# Patient Record
Sex: MALE | Race: WHITE | HISPANIC OR LATINO | Employment: UNEMPLOYED | URBAN - METROPOLITAN AREA
[De-identification: names, ages, dates, MRNs, and addresses within clinical notes are randomized per-mention and may not be internally consistent; named-entity substitution may affect disease eponyms.]

---

## 2017-01-15 ENCOUNTER — HOSPITAL ENCOUNTER (EMERGENCY)
Facility: MEDICAL CENTER | Age: 52
End: 2017-01-15
Attending: EMERGENCY MEDICINE

## 2017-01-15 VITALS
BODY MASS INDEX: 33.71 KG/M2 | SYSTOLIC BLOOD PRESSURE: 120 MMHG | DIASTOLIC BLOOD PRESSURE: 56 MMHG | WEIGHT: 248.9 LBS | HEART RATE: 95 BPM | HEIGHT: 72 IN | TEMPERATURE: 97 F | RESPIRATION RATE: 16 BRPM | OXYGEN SATURATION: 93 %

## 2017-01-15 DIAGNOSIS — J40 BRONCHITIS: ICD-10-CM

## 2017-01-15 PROCEDURE — 99284 EMERGENCY DEPT VISIT MOD MDM: CPT

## 2017-01-15 RX ORDER — PREDNISONE 20 MG/1
20 TABLET ORAL 2 TIMES DAILY
Qty: 10 TAB | Refills: 0 | Status: SHIPPED | OUTPATIENT
Start: 2017-01-15 | End: 2017-01-20

## 2017-01-15 RX ORDER — BENZONATATE 100 MG/1
100 CAPSULE ORAL 3 TIMES DAILY PRN
Qty: 20 CAP | Refills: 0 | Status: SHIPPED | OUTPATIENT
Start: 2017-01-15 | End: 2019-05-11

## 2017-01-15 RX ORDER — AZITHROMYCIN 250 MG/1
TABLET, FILM COATED ORAL
Qty: 6 TAB | Refills: 0 | Status: SHIPPED | OUTPATIENT
Start: 2017-01-15 | End: 2019-05-11

## 2017-01-15 ASSESSMENT — PAIN SCALES - GENERAL: PAINLEVEL_OUTOF10: 0

## 2017-01-15 NOTE — DISCHARGE INSTRUCTIONS
Rest and drink lots of fluids to maintain hydration. If you're not clearly getting better on a few days of antibiotics or you feel you or getting any worse return here for recheck.

## 2017-01-15 NOTE — ED AVS SNAPSHOT
After Visit Summary                                                                                                                Robert Dorantes   MRN: [id number]    Department:  [facility], Emergency Dept   Date of Visit:  1/15/2017            [facility], Emergency Dept    [address]    [place] [address]    Phone:  [phone]      You were seen by     Darwin Schmitz M.D.      Your Diagnosis Was     Bronchitis     J40       Medication Information     Review all of your home medications and newly ordered medications with your primary doctor and/or pharmacist as soon as possible. Follow medication instructions as directed by your doctor and/or pharmacist.     Please keep your complete medication list with you and share with your physician. Update the information when medications are discontinued, doses are changed, or new medications (including over-the-counter products) are added; and carry medication information at all times in the event of emergency situations.               Medication List      START taking these medications        Instructions    azithromycin 250 MG Tabs   Commonly known as:  ZITHROMAX Z-ZAC    2 tablets by mouth on day one then one tablet by mouth daily for the next 4 days.       benzonatate 100 MG Caps   Commonly known as:  TESSALON    Take 1 Cap by mouth 3 times a day as needed for Cough.   Dose:  100 mg       predniSONE 20 MG Tabs   Commonly known as:  DELTASONE    Take 1 Tab by mouth 2 times a day for 5 days.   Dose:  20 mg         ASK your doctor about these medications        Instructions    hydrocodone-acetaminophen 5-325 MG Tabs per tablet   Commonly known as:  NORCO    Take 1-2 Tabs by mouth every 6 hours as needed.   Dose:  1-2 Tab                 Discharge Instructions       Rest and drink lots of fluids to maintain hydration. If you're not clearly getting better on a few days of antibiotics or you feel you or getting any worse return  here for recheck.          Patient Information     Patient Information    Following emergency treatment: all patient requiring follow-up care must return either to a private physician or a clinic if your condition worsens before you are able to obtain further medical attention, please return to the emergency room.     Billing Information    At [facility], we work to make the billing process streamlined for our patients.  Our Representatives are here to answer any questions you may have regarding your hospital bill.  If you have insurance coverage and have supplied your insurance information to us, we will submit a claim to your insurer on your behalf.  Should you have any questions regarding your bill, we can be reached online or by phone as follows:  Online: You are able pay your bills online or live chat with our representatives about any billing questions you may have. We are here to help Monday - Friday from 8:00am to 7:30pm and 9:00am - 12:00pm on Saturdays.  Please visit [email or web address]  for more information.   Phone:  [phone] or [phone]    Please note that your emergency physician, surgeon, pathologist, radiologist, anesthesiologist, and other specialists are not employed by [facility] and will therefore bill separately for their services.  Please contact them directly for any questions concerning their bills at the numbers below:     Emergency Physician Services:  [phone]  [facility]:  [phone]  [facility]:  [phone]  [facility]:  [phone]    1. Your final bill may vary from the amount quoted upon discharge if all procedures are not complete at that time, or if your doctor has additional procedures of which we are not aware. You will receive an additional bill if you return to the Emergency Department at [facility] for suture removal regardless of the facility of which the sutures were  placed.     2. Please arrange for settlement of this account at the emergency registration.    3. All self-pay accounts are due in full at the time of treatment.  If you are unable to meet this obligation then payment is expected within 4-5 days.     4. If you have had radiology studies (CT, X-ray, Ultrasound, MRI), you have received a preliminary result during your emergency department visit. Please contact the radiology department [phone] to receive a copy of your final result. Please discuss the Final result with your primary physician or with the follow up physician provided.     Crisis Hotline:  [place] Crisis Hotline:  9-938-AMKMBBX or 1-830.252.1345  [place] Crisis Hotline:    1-981.804.7202 or 717-380-4597         ED Discharge Follow Up Questions    1. In order to provide you with very good care, we would like to follow up with a phone call in the next few days.  May we have your permission to contact you?     YES /  NO    2. What is the best phone number to call you? (       )_____-__________    3. What is the best time to call you?      Morning  /  Afternoon  /  Evening                   Patient Signature:  ____________________________________________________________    Date:  ____________________________________________________________

## 2017-01-15 NOTE — ED PROVIDER NOTES
"ED Provider Note    CHIEF COMPLAINT  Chief Complaint   Patient presents with   • Congestion     x 1 week    • Cough     x 1 week, denies fever chills        HPI  Robert Dorantes is a 51 y.o. male who presents to the emergency department complaining of a harsh cough as well as generalized body aches and discomfort on the right side of the neck. This is been progressively worsening over the last 1 week. The patient doesn't recall any specific precipitating events.    REVIEW OF SYSTEMS no chest pain no hemoptysis no leg edema no leg pain. All other systems negative    PAST MEDICAL HISTORY  No past medical history on file.    FAMILY HISTORY  No family history on file.    SOCIAL HISTORY  Social History     Social History   • Marital Status:      Spouse Name: N/A   • Number of Children: N/A   • Years of Education: N/A     Social History Main Topics   • Smoking status: Former Smoker     Quit date: 02/24/2013   • Smokeless tobacco: Not on file   • Alcohol Use: No   • Drug Use: No   • Sexual Activity: Not on file     Other Topics Concern   • Not on file     Social History Narrative   • No narrative on file       SURGICAL HISTORY  No past surgical history on file.    CURRENT MEDICATIONS  Home Medications     **Home medications have not yet been reviewed for this encounter**          ALLERGIES  Allergies   Allergen Reactions   • Pcn [Penicillins]    • Sulfa Drugs        PHYSICAL EXAM  VITAL SIGNS: /56 mmHg  Pulse 95  Temp(Src) 36.1 °C (97 °F) (Temporal)  Resp 16  Ht 1.829 m (6' 0.01\")  Wt 112.9 kg (248 lb 14.4 oz)  BMI 33.75 kg/m2  SpO2 93%   Oxygen saturation is interpreted as adequate  Constitutional: Awake and verbal and nontoxic appearing  HENT: Mucous membranes are moist and throat clear  Eyes: No erythema or discharge or jaundice  Neck: Midline no JVD  Cardiovascular: Regular rate and rhythm  Lungs: Initially there was one slight expiratory wheeze on the left side but this cleared after coughing " otherwise lungs are clear  Skin: Warm and dry  Musculoskeletal: No leg edema no calf tenderness  Neurologic: Awake and verbal and ambulatory    MEDICAL DECISION MAKING and DISPOSITION  In general the patient looks well as echo will be safe to provide treatment on an outpatient basis and I have written prescriptions for azithromycin and prednisone and Tessalon Perles. The patient is to rest at home and drink lots of fluids to maintain hydration. If he is not getting better after a few days of antibiotics or has any new or worsening symptoms he is to return here at once for recheck    IMPRESSION  1. Acute bronchitis      Electronically signed by: Darwin Schmitz, 1/15/2017 3:50 PM

## 2017-01-15 NOTE — ED NOTES
Chief Complaint   Patient presents with   • Congestion     x 1 week    • Cough     x 1 week, denies fever chills    Pt to triage in NAD.  Pt educated on triage process and instructed to notify triage RN of any change in status.

## 2017-01-15 NOTE — ED AVS SNAPSHOT
1/15/2017          Robert Dorantes  [address] [place] [address]  [place] [address]    Dear Robert:    [facility] wants to ensure your discharge home is safe and you or your loved ones have had all your questions answered regarding your care after you leave the hospital.    You may receive a telephone call within two days of your discharge.  This call is to make certain you understand your discharge instructions as well as ensure we provided you with the best care possible during your stay with us.     The call will only last approximately 3-5 minutes and will be done by a nurse.    Once again, we want to ensure your discharge home is safe and that you have a clear understanding of any next steps in your care.  If you have any questions or concerns, please do not hesitate to contact us, we are here for you.  Thank you for choosing [facility] for your healthcare needs.    Sincerely,    Michael Bustamante    [facility]

## 2017-01-15 NOTE — ED NOTES
Took otc medications only today.  Feels sob with exertion.  Noted croupy cough.  Patient does not smoke, no fevers.  Neck aches from the cough.

## 2019-05-11 PROCEDURE — 99284 EMERGENCY DEPT VISIT MOD MDM: CPT

## 2019-05-11 RX ORDER — ACETAMINOPHEN 325 MG/1
650 TABLET ORAL EVERY 4 HOURS PRN
COMMUNITY
End: 2019-09-30

## 2019-05-12 ENCOUNTER — HOSPITAL ENCOUNTER (EMERGENCY)
Facility: MEDICAL CENTER | Age: 54
End: 2019-05-12
Attending: EMERGENCY MEDICINE
Payer: MEDICAID

## 2019-05-12 ENCOUNTER — APPOINTMENT (OUTPATIENT)
Dept: RADIOLOGY | Facility: MEDICAL CENTER | Age: 54
End: 2019-05-12
Attending: EMERGENCY MEDICINE
Payer: MEDICAID

## 2019-05-12 VITALS
OXYGEN SATURATION: 94 % | RESPIRATION RATE: 19 BRPM | SYSTOLIC BLOOD PRESSURE: 120 MMHG | BODY MASS INDEX: 34.02 KG/M2 | HEIGHT: 70 IN | DIASTOLIC BLOOD PRESSURE: 69 MMHG | WEIGHT: 237.66 LBS | TEMPERATURE: 97.3 F | HEART RATE: 72 BPM

## 2019-05-12 DIAGNOSIS — H66.002 ACUTE SUPPURATIVE OTITIS MEDIA OF LEFT EAR WITHOUT SPONTANEOUS RUPTURE OF TYMPANIC MEMBRANE, RECURRENCE NOT SPECIFIED: ICD-10-CM

## 2019-05-12 PROCEDURE — 700102 HCHG RX REV CODE 250 W/ 637 OVERRIDE(OP): Performed by: EMERGENCY MEDICINE

## 2019-05-12 PROCEDURE — A9270 NON-COVERED ITEM OR SERVICE: HCPCS | Performed by: EMERGENCY MEDICINE

## 2019-05-12 PROCEDURE — 71046 X-RAY EXAM CHEST 2 VIEWS: CPT

## 2019-05-12 RX ORDER — ACETAMINOPHEN 500 MG
1000 TABLET ORAL ONCE
Status: COMPLETED | OUTPATIENT
Start: 2019-05-12 | End: 2019-05-12

## 2019-05-12 RX ORDER — CEFDINIR 300 MG/1
300 CAPSULE ORAL 2 TIMES DAILY
Qty: 14 CAP | Refills: 0 | Status: SHIPPED | OUTPATIENT
Start: 2019-05-12 | End: 2019-05-19

## 2019-05-12 RX ORDER — IBUPROFEN 600 MG/1
600 TABLET ORAL ONCE
Status: COMPLETED | OUTPATIENT
Start: 2019-05-12 | End: 2019-05-12

## 2019-05-12 RX ORDER — CEFDINIR 300 MG/1
300 CAPSULE ORAL ONCE
Status: COMPLETED | OUTPATIENT
Start: 2019-05-12 | End: 2019-05-12

## 2019-05-12 RX ADMIN — ACETAMINOPHEN 1000 MG: 500 TABLET ORAL at 01:42

## 2019-05-12 RX ADMIN — CEFDINIR 300 MG: 300 CAPSULE ORAL at 02:57

## 2019-05-12 RX ADMIN — IBUPROFEN 600 MG: 600 TABLET ORAL at 01:42

## 2019-05-12 ASSESSMENT — LIFESTYLE VARIABLES: DO YOU DRINK ALCOHOL: NO

## 2019-05-12 NOTE — ED NOTES
Robert Dorantes discharged via ambulatory with steady gait.  Discharge instructions given and reviewed, patient educated to follow up with ENT, verbalized understanding.  Prescriptions given x 1.  All personal belongings in possession.  No questions at this time.

## 2019-05-12 NOTE — ED TRIAGE NOTES
"Robert Dorantes  53 y.o.  Chief Complaint   Patient presents with   • Earache     LEFT, worsening x 1 week   • Hearing Loss     LEFT, worsening x 2-3 days   • Headache     LEFT-SIDED, worseing x 2-3 days, starting after onset of L ear pain     Ambulatory to triage with steady gait for above.    Denies trauma.    Rates pain 10/10 unrelieved with Tylenol taken PTA.    Moaning, guarding, and grimacing in triage. Needs to be redirected multiple times to answer questions asked by Triage RN. Clutching the L side of his head and repeatedly saying \"I don't know what the fuck is going on. This is normally the ear that I hear from the best. I can't hear anything.\"      Triage process explained to patient, apologized for wait time, and returned to lobby.  "

## 2019-05-12 NOTE — ED PROVIDER NOTES
ED Provider Note        History obtained from: Patient, mother    CHIEF COMPLAINT  Chief Complaint   Patient presents with   • Earache     LEFT, worsening x 1 week   • Hearing Loss     LEFT, worsening x 2-3 days   • Headache     LEFT-SIDED, worseing x 2-3 days, starting after onset of L ear pain       HPI  Robert Dorantes is a 53 y.o. male who presents with left-sided ear pain, subjective fevers, cough.  The ear pain has been worsening for the past few days and associate with hearing loss.  There is no tenderness.  Patient denies any headache or visual changes.  He does report pain radiating around to his ear.  He denies any vomiting.  He does report a productive cough for the past several days.  There is some associated nasal congestion.  Denies any history of diabetes or similar problems in the past.  He has been taking intermittent aspirin and Tylenol for pain with minimal relief.  He reports the pain is 10/10 in severity.    REVIEW OF SYSTEMS    CONSTITUTIONAL: See HPI  CARDIOVASCULAR:  No chest discomfort.  RESPIRATORY: See HPI  GASTROINTESTINAL:  No abdominal pain.    See HPI for further details.       PAST MEDICAL HISTORY  Past Medical History:   Diagnosis Date   • Smoker        FAMILY HISTORY  History reviewed. No pertinent family history.    SOCIAL HISTORY   reports that he has been smoking Cigarettes.  He has been smoking about 0.25 packs per day. He has never used smokeless tobacco. He reports that he drinks alcohol. He reports that he does not use drugs.    SURGICAL HISTORY  History reviewed. No pertinent surgical history.    CURRENT MEDICATIONS  Home Medications     Reviewed by Jaun Mccall R.N. (Registered Nurse) on 05/12/19 at 0143  Med List Status: Complete   Medication Last Dose Status   acetaminophen (TYLENOL) 325 MG Tab  Active                ALLERGIES  Allergies   Allergen Reactions   • Pcn [Penicillins]    • Sulfa Drugs        PHYSICAL EXAM  VITAL SIGNS: /85   Pulse 76   Temp 36.3  "°C (97.3 °F) (Temporal)   Resp 18   Ht 1.778 m (5' 10\")   Wt 107.8 kg (237 lb 10.5 oz)   SpO2 96%   BMI 34.10 kg/m²    Gen: alert, mild distress  HENT: ATNC normal right tympanic membrane.  Left tympanic membrane with bulging, erythema, bullous lesions on the membrane.  No mastoid tenderness  Eyes: normal conjuctiva  Resp: No resipiratory distress.,  Clear to auscultation bilateral  CV: No JVD, regular rate and rhythm  Abd: Non-distended  Extremities: No deformity          RADIOLOGY/PROCEDURES  DX-CHEST-2 VIEWS   Final Result      1.  No focal consolidation or pleural effusions.   2.  Hiatal hernia.            LABS  Labs Reviewed - No data to display     COURSE & MEDICAL DECISION MAKING  Pertinent Labs & Imaging studies reviewed. (See chart for details)    Patient presents with hearing loss and left ear pain with abnormal exam consistent with otitis media.  Patient has a penicillin allergy, unknown reaction, occurred as a child, will be started on Omnicef for this.  Given his cough, will obtain chest x-ray to rule out pneumonia.  Patient will be trialed on non-opioid pain medications to start, however suspect he will likely need opioid pain medications given the severity of his pain.    2:44 AM  Patient feeling significantly improved with non-opioid pain medications.  Will not require opiate prescription at this time.  Will be given first dose in the emergency department, referred to ENT and discharged home.  Patient is agreeable with this plan.    FINAL IMPRESSION  1. Acute suppurative otitis media of left ear without spontaneous rupture of tympanic membrane, recurrence not specified             "

## 2019-05-12 NOTE — DISCHARGE INSTRUCTIONS
You were seen in the emergency department for ear pain.  Your examination shows an ear infection.  This is most likely from a virus, however there is the possibility that this is a bacterial infection.  We recommend treating with Motrin and Tylenol.  You are being given a prescription to take home for antibiotics.      For pain you can take ibuprofen (Motrin), 600mg every 6 hours as needed for pain (take with food to avoid GI upset). You can also take acetaminophen (Tylenol), 1000mg every 8 hours as needed for pain. Do not take more than 3000mg of acetaminophen in any 24 hour period.  Taking these medications regularly during the day can be very effective in controlling pain.    Please follow up with the ear nose and throat specialist provided.    Please return to the emergency department or seek medical attention if you develop:  Pus draining from the ear, severe symptoms, difficulty breathing, any other new or concerning findings

## 2019-05-21 ENCOUNTER — HOSPITAL ENCOUNTER (INPATIENT)
Facility: MEDICAL CENTER | Age: 54
LOS: 31 days | DRG: 025 | End: 2019-06-21
Attending: EMERGENCY MEDICINE | Admitting: INTERNAL MEDICINE
Payer: MEDICAID

## 2019-05-21 ENCOUNTER — APPOINTMENT (OUTPATIENT)
Dept: RADIOLOGY | Facility: MEDICAL CENTER | Age: 54
DRG: 025 | End: 2019-05-21
Attending: EMERGENCY MEDICINE
Payer: MEDICAID

## 2019-05-21 DIAGNOSIS — E66.09 CLASS 1 OBESITY DUE TO EXCESS CALORIES WITHOUT SERIOUS COMORBIDITY WITH BODY MASS INDEX (BMI) OF 33.0 TO 33.9 IN ADULT: ICD-10-CM

## 2019-05-21 DIAGNOSIS — R47.01 EXPRESSIVE APHASIA: ICD-10-CM

## 2019-05-21 DIAGNOSIS — R13.10 SWALLOWING IMPAIRMENT: ICD-10-CM

## 2019-05-21 DIAGNOSIS — C71.9 GLIOBLASTOMA (HCC): ICD-10-CM

## 2019-05-21 DIAGNOSIS — G93.40 ACUTE ENCEPHALOPATHY: ICD-10-CM

## 2019-05-21 DIAGNOSIS — Z98.890 STATUS POST CRANIOTOMY: ICD-10-CM

## 2019-05-21 DIAGNOSIS — G93.89 BRAIN MASS: ICD-10-CM

## 2019-05-21 PROBLEM — G06.0 BRAIN ABSCESS: Status: ACTIVE | Noted: 2019-05-21

## 2019-05-21 PROBLEM — F17.200 SMOKER: Status: ACTIVE | Noted: 2019-05-21

## 2019-05-21 PROBLEM — F15.10 METHAMPHETAMINE ABUSE (HCC): Status: ACTIVE | Noted: 2019-05-21

## 2019-05-21 LAB
ALBUMIN SERPL BCP-MCNC: 4.5 G/DL (ref 3.2–4.9)
ALBUMIN/GLOB SERPL: 1.5 G/DL
ALP SERPL-CCNC: 48 U/L (ref 30–99)
ALT SERPL-CCNC: 23 U/L (ref 2–50)
ANION GAP SERPL CALC-SCNC: 11 MMOL/L (ref 0–11.9)
APTT PPP: 33.7 SEC (ref 24.7–36)
AST SERPL-CCNC: 16 U/L (ref 12–45)
BASOPHILS # BLD AUTO: 1 % (ref 0–1.8)
BASOPHILS # BLD: 0.08 K/UL (ref 0–0.12)
BILIRUB SERPL-MCNC: 0.9 MG/DL (ref 0.1–1.5)
BUN SERPL-MCNC: 20 MG/DL (ref 8–22)
CALCIUM SERPL-MCNC: 9 MG/DL (ref 8.5–10.5)
CHLORIDE SERPL-SCNC: 107 MMOL/L (ref 96–112)
CO2 SERPL-SCNC: 22 MMOL/L (ref 20–33)
CREAT SERPL-MCNC: 0.79 MG/DL (ref 0.5–1.4)
EOSINOPHIL # BLD AUTO: 0.04 K/UL (ref 0–0.51)
EOSINOPHIL NFR BLD: 0.5 % (ref 0–6.9)
ERYTHROCYTE [DISTWIDTH] IN BLOOD BY AUTOMATED COUNT: 50.4 FL (ref 35.9–50)
GLOBULIN SER CALC-MCNC: 3.1 G/DL (ref 1.9–3.5)
GLUCOSE BLD-MCNC: 104 MG/DL (ref 65–99)
GLUCOSE SERPL-MCNC: 110 MG/DL (ref 65–99)
HCT VFR BLD AUTO: 48.2 % (ref 42–52)
HGB BLD-MCNC: 14.7 G/DL (ref 14–18)
IMM GRANULOCYTES # BLD AUTO: 0.03 K/UL (ref 0–0.11)
IMM GRANULOCYTES NFR BLD AUTO: 0.4 % (ref 0–0.9)
INR PPP: 1.21 (ref 0.87–1.13)
LACTATE BLD-SCNC: 1.8 MMOL/L (ref 0.5–2)
LYMPHOCYTES # BLD AUTO: 1.22 K/UL (ref 1–4.8)
LYMPHOCYTES NFR BLD: 14.9 % (ref 22–41)
MCH RBC QN AUTO: 23.2 PG (ref 27–33)
MCHC RBC AUTO-ENTMCNC: 30.5 G/DL (ref 33.7–35.3)
MCV RBC AUTO: 76.1 FL (ref 81.4–97.8)
MONOCYTES # BLD AUTO: 0.55 K/UL (ref 0–0.85)
MONOCYTES NFR BLD AUTO: 6.7 % (ref 0–13.4)
NEUTROPHILS # BLD AUTO: 6.25 K/UL (ref 1.82–7.42)
NEUTROPHILS NFR BLD: 76.5 % (ref 44–72)
NRBC # BLD AUTO: 0 K/UL
NRBC BLD-RTO: 0 /100 WBC
PLATELET # BLD AUTO: 350 K/UL (ref 164–446)
PMV BLD AUTO: 10.3 FL (ref 9–12.9)
POTASSIUM SERPL-SCNC: 3.7 MMOL/L (ref 3.6–5.5)
PROT SERPL-MCNC: 7.6 G/DL (ref 6–8.2)
PROTHROMBIN TIME: 15.4 SEC (ref 12–14.6)
RBC # BLD AUTO: 6.33 M/UL (ref 4.7–6.1)
SODIUM SERPL-SCNC: 140 MMOL/L (ref 135–145)
TROPONIN I SERPL-MCNC: <0.01 NG/ML (ref 0–0.04)
WBC # BLD AUTO: 8.2 K/UL (ref 4.8–10.8)

## 2019-05-21 PROCEDURE — 87040 BLOOD CULTURE FOR BACTERIA: CPT | Mod: 91

## 2019-05-21 PROCEDURE — 85730 THROMBOPLASTIN TIME PARTIAL: CPT

## 2019-05-21 PROCEDURE — 93005 ELECTROCARDIOGRAM TRACING: CPT | Performed by: STUDENT IN AN ORGANIZED HEALTH CARE EDUCATION/TRAINING PROGRAM

## 2019-05-21 PROCEDURE — 700101 HCHG RX REV CODE 250: Performed by: NEUROLOGICAL SURGERY

## 2019-05-21 PROCEDURE — 36415 COLL VENOUS BLD VENIPUNCTURE: CPT

## 2019-05-21 PROCEDURE — 96375 TX/PRO/DX INJ NEW DRUG ADDON: CPT

## 2019-05-21 PROCEDURE — 70496 CT ANGIOGRAPHY HEAD: CPT

## 2019-05-21 PROCEDURE — 85610 PROTHROMBIN TIME: CPT

## 2019-05-21 PROCEDURE — 700101 HCHG RX REV CODE 250: Performed by: EMERGENCY MEDICINE

## 2019-05-21 PROCEDURE — 84484 ASSAY OF TROPONIN QUANT: CPT

## 2019-05-21 PROCEDURE — 700111 HCHG RX REV CODE 636 W/ 250 OVERRIDE (IP): Performed by: EMERGENCY MEDICINE

## 2019-05-21 PROCEDURE — 71045 X-RAY EXAM CHEST 1 VIEW: CPT

## 2019-05-21 PROCEDURE — 82962 GLUCOSE BLOOD TEST: CPT

## 2019-05-21 PROCEDURE — 96368 THER/DIAG CONCURRENT INF: CPT

## 2019-05-21 PROCEDURE — 99291 CRITICAL CARE FIRST HOUR: CPT | Performed by: INTERNAL MEDICINE

## 2019-05-21 PROCEDURE — 99291 CRITICAL CARE FIRST HOUR: CPT

## 2019-05-21 PROCEDURE — 96367 TX/PROPH/DG ADDL SEQ IV INF: CPT

## 2019-05-21 PROCEDURE — 700105 HCHG RX REV CODE 258: Performed by: EMERGENCY MEDICINE

## 2019-05-21 PROCEDURE — 80053 COMPREHEN METABOLIC PANEL: CPT

## 2019-05-21 PROCEDURE — 700117 HCHG RX CONTRAST REV CODE 255: Performed by: EMERGENCY MEDICINE

## 2019-05-21 PROCEDURE — 700105 HCHG RX REV CODE 258: Performed by: NEUROLOGICAL SURGERY

## 2019-05-21 PROCEDURE — 83605 ASSAY OF LACTIC ACID: CPT

## 2019-05-21 PROCEDURE — 700111 HCHG RX REV CODE 636 W/ 250 OVERRIDE (IP): Performed by: NEUROLOGICAL SURGERY

## 2019-05-21 PROCEDURE — 96365 THER/PROPH/DIAG IV INF INIT: CPT

## 2019-05-21 PROCEDURE — 70498 CT ANGIOGRAPHY NECK: CPT

## 2019-05-21 PROCEDURE — 770022 HCHG ROOM/CARE - ICU (200)

## 2019-05-21 PROCEDURE — 85025 COMPLETE CBC W/AUTO DIFF WBC: CPT

## 2019-05-21 RX ORDER — DOCUSATE SODIUM 100 MG/1
100 CAPSULE, LIQUID FILLED ORAL 2 TIMES DAILY
Status: DISCONTINUED | OUTPATIENT
Start: 2019-05-21 | End: 2019-05-22

## 2019-05-21 RX ORDER — SCOLOPAMINE TRANSDERMAL SYSTEM 1 MG/1
1 PATCH, EXTENDED RELEASE TRANSDERMAL
Status: DISCONTINUED | OUTPATIENT
Start: 2019-05-21 | End: 2019-06-18

## 2019-05-21 RX ORDER — OXYCODONE HYDROCHLORIDE 5 MG/1
5 TABLET ORAL
Status: DISCONTINUED | OUTPATIENT
Start: 2019-05-21 | End: 2019-05-29

## 2019-05-21 RX ORDER — LABETALOL HYDROCHLORIDE 5 MG/ML
10 INJECTION, SOLUTION INTRAVENOUS
Status: DISCONTINUED | OUTPATIENT
Start: 2019-05-21 | End: 2019-06-18

## 2019-05-21 RX ORDER — LORAZEPAM 2 MG/ML
1 INJECTION INTRAMUSCULAR ONCE
Status: COMPLETED | OUTPATIENT
Start: 2019-05-21 | End: 2019-05-21

## 2019-05-21 RX ORDER — OXYCODONE HYDROCHLORIDE 5 MG/1
2.5 TABLET ORAL
Status: DISCONTINUED | OUTPATIENT
Start: 2019-05-21 | End: 2019-05-22

## 2019-05-21 RX ORDER — DEXAMETHASONE SODIUM PHOSPHATE 4 MG/ML
4 INJECTION, SOLUTION INTRA-ARTICULAR; INTRALESIONAL; INTRAMUSCULAR; INTRAVENOUS; SOFT TISSUE EVERY 6 HOURS
Status: DISCONTINUED | OUTPATIENT
Start: 2019-05-22 | End: 2019-05-22

## 2019-05-21 RX ORDER — ONDANSETRON 2 MG/ML
4 INJECTION INTRAMUSCULAR; INTRAVENOUS ONCE
Status: COMPLETED | OUTPATIENT
Start: 2019-05-21 | End: 2019-05-21

## 2019-05-21 RX ORDER — SODIUM CHLORIDE 9 MG/ML
1000 INJECTION, SOLUTION INTRAVENOUS ONCE
Status: COMPLETED | OUTPATIENT
Start: 2019-05-21 | End: 2019-05-21

## 2019-05-21 RX ORDER — HYDRALAZINE HYDROCHLORIDE 20 MG/ML
10 INJECTION INTRAMUSCULAR; INTRAVENOUS
Status: DISCONTINUED | OUTPATIENT
Start: 2019-05-21 | End: 2019-06-18

## 2019-05-21 RX ORDER — ACETAMINOPHEN 500 MG
1000 TABLET ORAL EVERY 6 HOURS
Status: DISCONTINUED | OUTPATIENT
Start: 2019-05-22 | End: 2019-05-22

## 2019-05-21 RX ORDER — ENEMA 19; 7 G/133ML; G/133ML
1 ENEMA RECTAL
Status: DISCONTINUED | OUTPATIENT
Start: 2019-05-21 | End: 2019-06-20 | Stop reason: ALTCHOICE

## 2019-05-21 RX ORDER — BISACODYL 10 MG
10 SUPPOSITORY, RECTAL RECTAL
Status: DISCONTINUED | OUTPATIENT
Start: 2019-05-21 | End: 2019-06-20 | Stop reason: ALTCHOICE

## 2019-05-21 RX ORDER — AMOXICILLIN 250 MG
1 CAPSULE ORAL NIGHTLY
Status: DISCONTINUED | OUTPATIENT
Start: 2019-05-21 | End: 2019-05-22

## 2019-05-21 RX ORDER — ONDANSETRON 2 MG/ML
4 INJECTION INTRAMUSCULAR; INTRAVENOUS EVERY 4 HOURS PRN
Status: DISCONTINUED | OUTPATIENT
Start: 2019-05-21 | End: 2019-06-20 | Stop reason: ALTCHOICE

## 2019-05-21 RX ORDER — MORPHINE SULFATE 4 MG/ML
2 INJECTION, SOLUTION INTRAMUSCULAR; INTRAVENOUS
Status: DISCONTINUED | OUTPATIENT
Start: 2019-05-21 | End: 2019-05-22

## 2019-05-21 RX ORDER — DEXAMETHASONE SODIUM PHOSPHATE 4 MG/ML
4 INJECTION, SOLUTION INTRA-ARTICULAR; INTRALESIONAL; INTRAMUSCULAR; INTRAVENOUS; SOFT TISSUE
Status: DISCONTINUED | OUTPATIENT
Start: 2019-05-21 | End: 2019-05-23

## 2019-05-21 RX ORDER — CEFDINIR 300 MG/1
300 CAPSULE ORAL 2 TIMES DAILY
Status: ON HOLD | COMMUNITY
Start: 2019-05-12 | End: 2019-06-21

## 2019-05-21 RX ORDER — DEXTROSE MONOHYDRATE, SODIUM CHLORIDE, AND POTASSIUM CHLORIDE 50; 1.49; 9 G/1000ML; G/1000ML; G/1000ML
INJECTION, SOLUTION INTRAVENOUS CONTINUOUS
Status: DISCONTINUED | OUTPATIENT
Start: 2019-05-21 | End: 2019-05-22

## 2019-05-21 RX ORDER — CLONIDINE HYDROCHLORIDE 0.1 MG/1
0.1 TABLET ORAL EVERY 4 HOURS PRN
Status: DISCONTINUED | OUTPATIENT
Start: 2019-05-21 | End: 2019-05-22

## 2019-05-21 RX ORDER — AMOXICILLIN 250 MG
1 CAPSULE ORAL
Status: DISCONTINUED | OUTPATIENT
Start: 2019-05-21 | End: 2019-05-22

## 2019-05-21 RX ORDER — POLYETHYLENE GLYCOL 3350 17 G/17G
1 POWDER, FOR SOLUTION ORAL 2 TIMES DAILY PRN
Status: DISCONTINUED | OUTPATIENT
Start: 2019-05-21 | End: 2019-05-22

## 2019-05-21 RX ADMIN — CEFTRIAXONE SODIUM 2 G: 2 INJECTION, POWDER, FOR SOLUTION INTRAMUSCULAR; INTRAVENOUS at 22:13

## 2019-05-21 RX ADMIN — METRONIDAZOLE 500 MG: 500 INJECTION, SOLUTION INTRAVENOUS at 22:30

## 2019-05-21 RX ADMIN — DEXAMETHASONE SODIUM PHOSPHATE 4 MG: 4 INJECTION, SOLUTION INTRA-ARTICULAR; INTRALESIONAL; INTRAMUSCULAR; INTRAVENOUS; SOFT TISSUE at 23:55

## 2019-05-21 RX ADMIN — SODIUM CHLORIDE 500 MG: 9 INJECTION, SOLUTION INTRAVENOUS at 22:14

## 2019-05-21 RX ADMIN — LORAZEPAM 1 MG: 2 INJECTION INTRAMUSCULAR; INTRAVENOUS at 23:15

## 2019-05-21 RX ADMIN — VANCOMYCIN HYDROCHLORIDE 2700 MG: 100 INJECTION, POWDER, LYOPHILIZED, FOR SOLUTION INTRAVENOUS at 22:12

## 2019-05-21 RX ADMIN — ONDANSETRON 4 MG: 2 INJECTION INTRAMUSCULAR; INTRAVENOUS at 20:03

## 2019-05-21 RX ADMIN — IOHEXOL 100 ML: 350 INJECTION, SOLUTION INTRAVENOUS at 20:29

## 2019-05-21 RX ADMIN — SODIUM CHLORIDE 1000 ML: 9 INJECTION, SOLUTION INTRAVENOUS at 20:03

## 2019-05-21 ASSESSMENT — LIFESTYLE VARIABLES: DO YOU DRINK ALCOHOL: NO

## 2019-05-22 ENCOUNTER — APPOINTMENT (OUTPATIENT)
Dept: RADIOLOGY | Facility: MEDICAL CENTER | Age: 54
DRG: 025 | End: 2019-05-22
Attending: NEUROLOGICAL SURGERY
Payer: MEDICAID

## 2019-05-22 ENCOUNTER — APPOINTMENT (OUTPATIENT)
Dept: RADIOLOGY | Facility: MEDICAL CENTER | Age: 54
DRG: 025 | End: 2019-05-22
Attending: INTERNAL MEDICINE
Payer: MEDICAID

## 2019-05-22 ENCOUNTER — ANESTHESIA EVENT (OUTPATIENT)
Dept: SURGERY | Facility: MEDICAL CENTER | Age: 54
DRG: 025 | End: 2019-05-22
Payer: MEDICAID

## 2019-05-22 ENCOUNTER — ANESTHESIA (OUTPATIENT)
Dept: SURGERY | Facility: MEDICAL CENTER | Age: 54
DRG: 025 | End: 2019-05-22
Payer: MEDICAID

## 2019-05-22 PROBLEM — J96.01 ACUTE RESPIRATORY FAILURE WITH HYPOXIA (HCC): Status: ACTIVE | Noted: 2019-05-22

## 2019-05-22 PROBLEM — R41.0 DELIRIUM: Status: ACTIVE | Noted: 2019-05-22

## 2019-05-22 LAB
ACTION RANGE TRIGGERED IACRT: NO
AMPHET UR QL SCN: NEGATIVE
ANION GAP SERPL CALC-SCNC: 10 MMOL/L (ref 0–11.9)
ANION GAP SERPL CALC-SCNC: 7 MMOL/L (ref 0–11.9)
ANION GAP SERPL CALC-SCNC: 8 MMOL/L (ref 0–11.9)
BARBITURATES UR QL SCN: NEGATIVE
BASE EXCESS BLDA CALC-SCNC: -5 MMOL/L (ref -4–3)
BENZODIAZ UR QL SCN: NEGATIVE
BODY TEMPERATURE: ABNORMAL DEGREES
BUN SERPL-MCNC: 13 MG/DL (ref 8–22)
BUN SERPL-MCNC: 13 MG/DL (ref 8–22)
BUN SERPL-MCNC: 15 MG/DL (ref 8–22)
BZE UR QL SCN: NEGATIVE
CALCIUM SERPL-MCNC: 8.4 MG/DL (ref 8.5–10.5)
CALCIUM SERPL-MCNC: 8.7 MG/DL (ref 8.5–10.5)
CALCIUM SERPL-MCNC: 8.8 MG/DL (ref 8.5–10.5)
CANNABINOIDS UR QL SCN: NEGATIVE
CFT BLD TEG: 5.5 MIN (ref 5–10)
CHLORIDE SERPL-SCNC: 112 MMOL/L (ref 96–112)
CHLORIDE SERPL-SCNC: 112 MMOL/L (ref 96–112)
CHLORIDE SERPL-SCNC: 113 MMOL/L (ref 96–112)
CLOT ANGLE BLD TEG: 60.7 DEGREES (ref 53–72)
CLOT LYSIS 30M P MA LENFR BLD TEG: 0 % (ref 0–8)
CO2 BLDA-SCNC: 23 MMOL/L (ref 20–33)
CO2 SERPL-SCNC: 22 MMOL/L (ref 20–33)
CO2 SERPL-SCNC: 24 MMOL/L (ref 20–33)
CO2 SERPL-SCNC: 24 MMOL/L (ref 20–33)
CREAT SERPL-MCNC: 0.74 MG/DL (ref 0.5–1.4)
CREAT SERPL-MCNC: 0.81 MG/DL (ref 0.5–1.4)
CREAT SERPL-MCNC: 0.92 MG/DL (ref 0.5–1.4)
CT.EXTRINSIC BLD ROTEM: 2.2 MIN (ref 1–3)
EKG IMPRESSION: NORMAL
ERYTHROCYTE [DISTWIDTH] IN BLOOD BY AUTOMATED COUNT: 50.3 FL (ref 35.9–50)
GLUCOSE BLD-MCNC: 111 MG/DL (ref 65–99)
GLUCOSE BLD-MCNC: 113 MG/DL (ref 65–99)
GLUCOSE SERPL-MCNC: 116 MG/DL (ref 65–99)
GLUCOSE SERPL-MCNC: 137 MG/DL (ref 65–99)
GLUCOSE SERPL-MCNC: 146 MG/DL (ref 65–99)
GRAM STN SPEC: NORMAL
HCO3 BLDA-SCNC: 21.4 MMOL/L (ref 17–25)
HCT VFR BLD AUTO: 48.2 % (ref 42–52)
HGB BLD-MCNC: 14.7 G/DL (ref 14–18)
HIV 1+2 AB+HIV1 P24 AG SERPL QL IA: NON REACTIVE
HOROWITZ INDEX BLDA+IHG-RTO: 135 MM[HG]
INR PPP: 1.14 (ref 0.87–1.13)
INST. QUALIFIED PATIENT IIQPT: YES
LACTATE BLD-SCNC: 0.6 MMOL/L (ref 0.5–2)
MCF BLD TEG: 64.2 MM (ref 50–70)
MCH RBC QN AUTO: 23 PG (ref 27–33)
MCHC RBC AUTO-ENTMCNC: 30.5 G/DL (ref 33.7–35.3)
MCV RBC AUTO: 75.5 FL (ref 81.4–97.8)
METHADONE UR QL SCN: NEGATIVE
O2/TOTAL GAS SETTING VFR VENT: 100 %
OPIATES UR QL SCN: NEGATIVE
OXYCODONE UR QL SCN: NEGATIVE
PA AA BLD-ACNC: 10.8 %
PA ADP BLD-ACNC: 67.1 %
PCO2 BLDA: 42.6 MMHG (ref 26–37)
PCO2 TEMP ADJ BLDA: 42.5 MMHG (ref 26–37)
PCP UR QL SCN: NEGATIVE
PH BLDA: 7.31 [PH] (ref 7.4–7.5)
PH TEMP ADJ BLDA: 7.31 [PH] (ref 7.4–7.5)
PLATELET # BLD AUTO: 333 K/UL (ref 164–446)
PMV BLD AUTO: 9.8 FL (ref 9–12.9)
PO2 BLDA: 135 MMHG (ref 64–87)
PO2 TEMP ADJ BLDA: 134 MMHG (ref 64–87)
POTASSIUM SERPL-SCNC: 3.8 MMOL/L (ref 3.6–5.5)
POTASSIUM SERPL-SCNC: 4.1 MMOL/L (ref 3.6–5.5)
POTASSIUM SERPL-SCNC: 4.2 MMOL/L (ref 3.6–5.5)
PROPOXYPH UR QL SCN: NEGATIVE
PROTHROMBIN TIME: 14.7 SEC (ref 12–14.6)
RBC # BLD AUTO: 6.38 M/UL (ref 4.7–6.1)
SAO2 % BLDA: 99 % (ref 93–99)
SIGNIFICANT IND 70042: NORMAL
SITE SITE: NORMAL
SODIUM SERPL-SCNC: 143 MMOL/L (ref 135–145)
SODIUM SERPL-SCNC: 144 MMOL/L (ref 135–145)
SODIUM SERPL-SCNC: 145 MMOL/L (ref 135–145)
SOURCE SOURCE: NORMAL
SPECIMEN DRAWN FROM PATIENT: ABNORMAL
TEG ALGORITHM TGALG: NORMAL
TREPONEMA PALLIDUM IGG+IGM AB [PRESENCE] IN SERUM OR PLASMA BY IMMUNOASSAY: NON REACTIVE
TRIGL SERPL-MCNC: 104 MG/DL (ref 0–149)
WBC # BLD AUTO: 8.3 K/UL (ref 4.8–10.8)

## 2019-05-22 PROCEDURE — 02HV33Z INSERTION OF INFUSION DEVICE INTO SUPERIOR VENA CAVA, PERCUTANEOUS APPROACH: ICD-10-PCS | Performed by: INTERNAL MEDICINE

## 2019-05-22 PROCEDURE — A9585 GADOBUTROL INJECTION: HCPCS | Performed by: NEUROLOGICAL SURGERY

## 2019-05-22 PROCEDURE — 500112 HCHG BONE WAX: Performed by: NEUROLOGICAL SURGERY

## 2019-05-22 PROCEDURE — 700101 HCHG RX REV CODE 250: Performed by: STUDENT IN AN ORGANIZED HEALTH CARE EDUCATION/TRAINING PROGRAM

## 2019-05-22 PROCEDURE — 700102 HCHG RX REV CODE 250 W/ 637 OVERRIDE(OP): Performed by: NEUROLOGICAL SURGERY

## 2019-05-22 PROCEDURE — 160031 HCHG SURGERY MINUTES - 1ST 30 MINS LEVEL 5: Performed by: NEUROLOGICAL SURGERY

## 2019-05-22 PROCEDURE — 500389 HCHG DRAIN, RESERVOIR SUCT JP 100CC: Performed by: NEUROLOGICAL SURGERY

## 2019-05-22 PROCEDURE — 82803 BLOOD GASES ANY COMBINATION: CPT

## 2019-05-22 PROCEDURE — 86780 TREPONEMA PALLIDUM: CPT

## 2019-05-22 PROCEDURE — 87389 HIV-1 AG W/HIV-1&-2 AB AG IA: CPT

## 2019-05-22 PROCEDURE — 500889 HCHG PACK, NEURO: Performed by: NEUROLOGICAL SURGERY

## 2019-05-22 PROCEDURE — 700111 HCHG RX REV CODE 636 W/ 250 OVERRIDE (IP): Performed by: INTERNAL MEDICINE

## 2019-05-22 PROCEDURE — 80307 DRUG TEST PRSMV CHEM ANLYZR: CPT

## 2019-05-22 PROCEDURE — 500371 HCHG DRAIN, BLAKE 10MM: Performed by: NEUROLOGICAL SURGERY

## 2019-05-22 PROCEDURE — 700105 HCHG RX REV CODE 258: Performed by: INTERNAL MEDICINE

## 2019-05-22 PROCEDURE — 80048 BASIC METABOLIC PNL TOTAL CA: CPT

## 2019-05-22 PROCEDURE — 85576 BLOOD PLATELET AGGREGATION: CPT | Mod: 91

## 2019-05-22 PROCEDURE — 500331 HCHG COTTONOID, SURG PATTIE: Performed by: NEUROLOGICAL SURGERY

## 2019-05-22 PROCEDURE — C1781 MESH (IMPLANTABLE): HCPCS | Performed by: NEUROLOGICAL SURGERY

## 2019-05-22 PROCEDURE — 99152 MOD SED SAME PHYS/QHP 5/>YRS: CPT

## 2019-05-22 PROCEDURE — 84478 ASSAY OF TRIGLYCERIDES: CPT

## 2019-05-22 PROCEDURE — 87102 FUNGUS ISOLATION CULTURE: CPT

## 2019-05-22 PROCEDURE — C1713 ANCHOR/SCREW BN/BN,TIS/BN: HCPCS | Performed by: NEUROLOGICAL SURGERY

## 2019-05-22 PROCEDURE — 87015 SPECIMEN INFECT AGNT CONCNTJ: CPT

## 2019-05-22 PROCEDURE — 700102 HCHG RX REV CODE 250 W/ 637 OVERRIDE(OP): Performed by: INTERNAL MEDICINE

## 2019-05-22 PROCEDURE — 70553 MRI BRAIN STEM W/O & W/DYE: CPT

## 2019-05-22 PROCEDURE — 00B70ZX EXCISION OF CEREBRAL HEMISPHERE, OPEN APPROACH, DIAGNOSTIC: ICD-10-PCS | Performed by: NEUROLOGICAL SURGERY

## 2019-05-22 PROCEDURE — 87205 SMEAR GRAM STAIN: CPT

## 2019-05-22 PROCEDURE — 0BH18EZ INSERTION OF ENDOTRACHEAL AIRWAY INTO TRACHEA, VIA NATURAL OR ARTIFICIAL OPENING ENDOSCOPIC: ICD-10-PCS | Performed by: INTERNAL MEDICINE

## 2019-05-22 PROCEDURE — 85610 PROTHROMBIN TIME: CPT

## 2019-05-22 PROCEDURE — 700111 HCHG RX REV CODE 636 W/ 250 OVERRIDE (IP): Performed by: NEUROLOGICAL SURGERY

## 2019-05-22 PROCEDURE — 36600 WITHDRAWAL OF ARTERIAL BLOOD: CPT

## 2019-05-22 PROCEDURE — 770022 HCHG ROOM/CARE - ICU (200)

## 2019-05-22 PROCEDURE — 502240 HCHG MISC OR SUPPLY RC 0272: Performed by: NEUROLOGICAL SURGERY

## 2019-05-22 PROCEDURE — 160042 HCHG SURGERY MINUTES - EA ADDL 1 MIN LEVEL 5: Performed by: NEUROLOGICAL SURGERY

## 2019-05-22 PROCEDURE — 87116 MYCOBACTERIA CULTURE: CPT

## 2019-05-22 PROCEDURE — 85027 COMPLETE CBC AUTOMATED: CPT

## 2019-05-22 PROCEDURE — 500075 HCHG BLADE, CLIPPER NEURO: Performed by: NEUROLOGICAL SURGERY

## 2019-05-22 PROCEDURE — 700105 HCHG RX REV CODE 258: Performed by: NEUROLOGICAL SURGERY

## 2019-05-22 PROCEDURE — 500440 HCHG DRESSING, STERILE ROLL (KERLIX): Performed by: NEUROLOGICAL SURGERY

## 2019-05-22 PROCEDURE — 88307 TISSUE EXAM BY PATHOLOGIST: CPT | Mod: 59

## 2019-05-22 PROCEDURE — 36556 INSERT NON-TUNNEL CV CATH: CPT

## 2019-05-22 PROCEDURE — 700101 HCHG RX REV CODE 250: Performed by: INTERNAL MEDICINE

## 2019-05-22 PROCEDURE — C1725 CATH, TRANSLUMIN NON-LASER: HCPCS | Performed by: NEUROLOGICAL SURGERY

## 2019-05-22 PROCEDURE — 36556 INSERT NON-TUNNEL CV CATH: CPT | Mod: RT,GC | Performed by: INTERNAL MEDICINE

## 2019-05-22 PROCEDURE — 93010 ELECTROCARDIOGRAM REPORT: CPT | Performed by: INTERNAL MEDICINE

## 2019-05-22 PROCEDURE — 31500 INSERT EMERGENCY AIRWAY: CPT

## 2019-05-22 PROCEDURE — 700111 HCHG RX REV CODE 636 W/ 250 OVERRIDE (IP): Performed by: ANESTHESIOLOGY

## 2019-05-22 PROCEDURE — 5A1945Z RESPIRATORY VENTILATION, 24-96 CONSECUTIVE HOURS: ICD-10-PCS | Performed by: INTERNAL MEDICINE

## 2019-05-22 PROCEDURE — 00U20KZ SUPPLEMENT DURA MATER WITH NONAUTOLOGOUS TISSUE SUBSTITUTE, OPEN APPROACH: ICD-10-PCS | Performed by: NEUROLOGICAL SURGERY

## 2019-05-22 PROCEDURE — C1751 CATH, INF, PER/CENT/MIDLINE: HCPCS

## 2019-05-22 PROCEDURE — 92950 HEART/LUNG RESUSCITATION CPR: CPT

## 2019-05-22 PROCEDURE — 83605 ASSAY OF LACTIC ACID: CPT

## 2019-05-22 PROCEDURE — 71045 X-RAY EXAM CHEST 1 VIEW: CPT

## 2019-05-22 PROCEDURE — 99291 CRITICAL CARE FIRST HOUR: CPT | Performed by: INTERNAL MEDICINE

## 2019-05-22 PROCEDURE — 87075 CULTR BACTERIA EXCEPT BLOOD: CPT

## 2019-05-22 PROCEDURE — A6222 GAUZE <=16 IN NO W/SAL W/O B: HCPCS | Performed by: NEUROLOGICAL SURGERY

## 2019-05-22 PROCEDURE — 00970ZZ DRAINAGE OF CEREBRAL HEMISPHERE, OPEN APPROACH: ICD-10-PCS | Performed by: NEUROLOGICAL SURGERY

## 2019-05-22 PROCEDURE — A6404 STERILE GAUZE > 48 SQ IN: HCPCS | Performed by: NEUROLOGICAL SURGERY

## 2019-05-22 PROCEDURE — 500445 HCHG HEMOSTAT, SURGICEL 4X8: Performed by: NEUROLOGICAL SURGERY

## 2019-05-22 PROCEDURE — 85384 FIBRINOGEN ACTIVITY: CPT

## 2019-05-22 PROCEDURE — 85347 COAGULATION TIME ACTIVATED: CPT

## 2019-05-22 PROCEDURE — 94002 VENT MGMT INPAT INIT DAY: CPT

## 2019-05-22 PROCEDURE — 501838 HCHG SUTURE GENERAL: Performed by: NEUROLOGICAL SURGERY

## 2019-05-22 PROCEDURE — 700117 HCHG RX CONTRAST REV CODE 255: Performed by: NEUROLOGICAL SURGERY

## 2019-05-22 PROCEDURE — 160009 HCHG ANES TIME/MIN: Performed by: NEUROLOGICAL SURGERY

## 2019-05-22 PROCEDURE — 87206 SMEAR FLUORESCENT/ACID STAI: CPT

## 2019-05-22 PROCEDURE — A9270 NON-COVERED ITEM OR SERVICE: HCPCS | Performed by: NEUROLOGICAL SURGERY

## 2019-05-22 PROCEDURE — 31500 INSERT EMERGENCY AIRWAY: CPT | Mod: GC | Performed by: INTERNAL MEDICINE

## 2019-05-22 PROCEDURE — 88331 PATH CONSLTJ SURG 1 BLK 1SPC: CPT

## 2019-05-22 PROCEDURE — 160048 HCHG OR STATISTICAL LEVEL 1-5: Performed by: NEUROLOGICAL SURGERY

## 2019-05-22 PROCEDURE — 700101 HCHG RX REV CODE 250: Performed by: NEUROLOGICAL SURGERY

## 2019-05-22 PROCEDURE — 82962 GLUCOSE BLOOD TEST: CPT

## 2019-05-22 PROCEDURE — B548ZZA ULTRASONOGRAPHY OF SUPERIOR VENA CAVA, GUIDANCE: ICD-10-PCS | Performed by: INTERNAL MEDICINE

## 2019-05-22 PROCEDURE — 700105 HCHG RX REV CODE 258: Performed by: ANESTHESIOLOGY

## 2019-05-22 PROCEDURE — 87070 CULTURE OTHR SPECIMN AEROBIC: CPT

## 2019-05-22 PROCEDURE — 700101 HCHG RX REV CODE 250: Performed by: ANESTHESIOLOGY

## 2019-05-22 PROCEDURE — A4314 CATH W/DRAINAGE 2-WAY LATEX: HCPCS | Performed by: INTERNAL MEDICINE

## 2019-05-22 DEVICE — PLATE NC BURR HOLE COVER 10MM (6NCX6=36): Type: IMPLANTABLE DEVICE | Status: FUNCTIONAL

## 2019-05-22 DEVICE — DUREPAIR 2X2: Type: IMPLANTABLE DEVICE | Status: FUNCTIONAL

## 2019-05-22 DEVICE — SCREW STRYK NC 1.5X4MM (6NCX40=240) CONSIGNED QTY 240 PRE-LOAD 80/PK: Type: IMPLANTABLE DEVICE | Status: FUNCTIONAL

## 2019-05-22 RX ORDER — BUPIVACAINE HYDROCHLORIDE AND EPINEPHRINE 5; 5 MG/ML; UG/ML
INJECTION, SOLUTION PERINEURAL
Status: DISCONTINUED | OUTPATIENT
Start: 2019-05-22 | End: 2019-05-22 | Stop reason: HOSPADM

## 2019-05-22 RX ORDER — CEFAZOLIN SODIUM 1 G/3ML
INJECTION, POWDER, FOR SOLUTION INTRAMUSCULAR; INTRAVENOUS PRN
Status: DISCONTINUED | OUTPATIENT
Start: 2019-05-22 | End: 2019-05-22 | Stop reason: SURG

## 2019-05-22 RX ORDER — PROPOFOL 10 MG/ML
30 INJECTION, EMULSION INTRAVENOUS ONCE
Status: COMPLETED | OUTPATIENT
Start: 2019-05-22 | End: 2019-05-22

## 2019-05-22 RX ORDER — POLYETHYLENE GLYCOL 3350 17 G/17G
1 POWDER, FOR SOLUTION ORAL 2 TIMES DAILY PRN
Status: DISCONTINUED | OUTPATIENT
Start: 2019-05-22 | End: 2019-05-26

## 2019-05-22 RX ORDER — DOCUSATE SODIUM 50 MG/5ML
100 LIQUID ORAL 2 TIMES DAILY
Status: DISCONTINUED | OUTPATIENT
Start: 2019-05-22 | End: 2019-05-26

## 2019-05-22 RX ORDER — CLONIDINE HYDROCHLORIDE 0.1 MG/1
0.1 TABLET ORAL EVERY 4 HOURS PRN
Status: DISCONTINUED | OUTPATIENT
Start: 2019-05-22 | End: 2019-05-26

## 2019-05-22 RX ORDER — FAMOTIDINE 20 MG/1
20 TABLET, FILM COATED ORAL EVERY 12 HOURS
Status: DISCONTINUED | OUTPATIENT
Start: 2019-05-22 | End: 2019-05-24

## 2019-05-22 RX ORDER — ETOMIDATE 2 MG/ML
20 INJECTION INTRAVENOUS ONCE
Status: COMPLETED | OUTPATIENT
Start: 2019-05-22 | End: 2019-05-22

## 2019-05-22 RX ORDER — SODIUM CHLORIDE, SODIUM LACTATE, POTASSIUM CHLORIDE, CALCIUM CHLORIDE 600; 310; 30; 20 MG/100ML; MG/100ML; MG/100ML; MG/100ML
INJECTION, SOLUTION INTRAVENOUS
Status: DISCONTINUED | OUTPATIENT
Start: 2019-05-22 | End: 2019-05-22 | Stop reason: SURG

## 2019-05-22 RX ORDER — MANNITOL 20 G/100ML
INJECTION, SOLUTION INTRAVENOUS PRN
Status: DISCONTINUED | OUTPATIENT
Start: 2019-05-22 | End: 2019-05-22 | Stop reason: SURG

## 2019-05-22 RX ORDER — GADOBUTROL 604.72 MG/ML
10 INJECTION INTRAVENOUS ONCE
Status: COMPLETED | OUTPATIENT
Start: 2019-05-22 | End: 2019-05-22

## 2019-05-22 RX ORDER — FAMOTIDINE 20 MG/1
20 TABLET, FILM COATED ORAL EVERY 12 HOURS
Status: DISCONTINUED | OUTPATIENT
Start: 2019-05-22 | End: 2019-05-22

## 2019-05-22 RX ORDER — ROCURONIUM BROMIDE 10 MG/ML
100 INJECTION, SOLUTION INTRAVENOUS ONCE
Status: COMPLETED | OUTPATIENT
Start: 2019-05-22 | End: 2019-05-22

## 2019-05-22 RX ORDER — HALOPERIDOL 5 MG/ML
1 INJECTION INTRAMUSCULAR
Status: DISCONTINUED | OUTPATIENT
Start: 2019-05-22 | End: 2019-05-22

## 2019-05-22 RX ORDER — 3% SODIUM CHLORIDE 3 G/100ML
500 INJECTION, SOLUTION INTRAVENOUS CONTINUOUS
Status: DISCONTINUED | OUTPATIENT
Start: 2019-05-22 | End: 2019-05-24

## 2019-05-22 RX ORDER — MIDAZOLAM HYDROCHLORIDE 1 MG/ML
1-5 INJECTION INTRAMUSCULAR; INTRAVENOUS ONCE
Status: COMPLETED | OUTPATIENT
Start: 2019-05-22 | End: 2019-05-22

## 2019-05-22 RX ORDER — AMOXICILLIN 250 MG
1 CAPSULE ORAL NIGHTLY
Status: DISCONTINUED | OUTPATIENT
Start: 2019-05-22 | End: 2019-05-26

## 2019-05-22 RX ORDER — DEXMEDETOMIDINE HYDROCHLORIDE 100 UG/ML
INJECTION, SOLUTION INTRAVENOUS PRN
Status: DISCONTINUED | OUTPATIENT
Start: 2019-05-22 | End: 2019-05-22 | Stop reason: SURG

## 2019-05-22 RX ORDER — VECURONIUM BROMIDE 1 MG/ML
INJECTION, POWDER, LYOPHILIZED, FOR SOLUTION INTRAVENOUS PRN
Status: DISCONTINUED | OUTPATIENT
Start: 2019-05-22 | End: 2019-05-22 | Stop reason: SURG

## 2019-05-22 RX ORDER — ACETAMINOPHEN 500 MG
1000 TABLET ORAL EVERY 6 HOURS
Status: DISCONTINUED | OUTPATIENT
Start: 2019-05-22 | End: 2019-05-26

## 2019-05-22 RX ORDER — OXYCODONE HYDROCHLORIDE 5 MG/1
2.5 TABLET ORAL
Status: DISCONTINUED | OUTPATIENT
Start: 2019-05-22 | End: 2019-05-29

## 2019-05-22 RX ORDER — DEXAMETHASONE SODIUM PHOSPHATE 4 MG/ML
10 INJECTION, SOLUTION INTRA-ARTICULAR; INTRALESIONAL; INTRAMUSCULAR; INTRAVENOUS; SOFT TISSUE EVERY 6 HOURS
Status: DISCONTINUED | OUTPATIENT
Start: 2019-05-22 | End: 2019-05-23

## 2019-05-22 RX ORDER — SODIUM CHLORIDE, SODIUM LACTATE, POTASSIUM CHLORIDE, CALCIUM CHLORIDE 600; 310; 30; 20 MG/100ML; MG/100ML; MG/100ML; MG/100ML
INJECTION, SOLUTION INTRAVENOUS CONTINUOUS
Status: DISCONTINUED | OUTPATIENT
Start: 2019-05-22 | End: 2019-05-22

## 2019-05-22 RX ORDER — BACITRACIN ZINC 500 [USP'U]/G
OINTMENT TOPICAL
Status: DISCONTINUED | OUTPATIENT
Start: 2019-05-22 | End: 2019-05-22 | Stop reason: HOSPADM

## 2019-05-22 RX ORDER — ZIPRASIDONE MESYLATE 20 MG/ML
20 INJECTION, POWDER, LYOPHILIZED, FOR SOLUTION INTRAMUSCULAR ONCE
Status: COMPLETED | OUTPATIENT
Start: 2019-05-22 | End: 2019-05-22

## 2019-05-22 RX ORDER — SODIUM CHLORIDE 9 MG/ML
INJECTION, SOLUTION INTRAVENOUS CONTINUOUS
Status: DISCONTINUED | OUTPATIENT
Start: 2019-05-22 | End: 2019-05-25

## 2019-05-22 RX ORDER — ONDANSETRON 2 MG/ML
4 INJECTION INTRAMUSCULAR; INTRAVENOUS
Status: DISCONTINUED | OUTPATIENT
Start: 2019-05-22 | End: 2019-05-22

## 2019-05-22 RX ORDER — HYDRALAZINE HYDROCHLORIDE 20 MG/ML
5 INJECTION INTRAMUSCULAR; INTRAVENOUS
Status: DISCONTINUED | OUTPATIENT
Start: 2019-05-22 | End: 2019-05-22

## 2019-05-22 RX ORDER — AMOXICILLIN 250 MG
1 CAPSULE ORAL
Status: DISCONTINUED | OUTPATIENT
Start: 2019-05-22 | End: 2019-05-26

## 2019-05-22 RX ORDER — DIPHENHYDRAMINE HYDROCHLORIDE 50 MG/ML
12.5 INJECTION INTRAMUSCULAR; INTRAVENOUS
Status: DISCONTINUED | OUTPATIENT
Start: 2019-05-22 | End: 2019-05-22

## 2019-05-22 RX ADMIN — MIDAZOLAM 2 MG: 1 INJECTION INTRAMUSCULAR; INTRAVENOUS at 08:20

## 2019-05-22 RX ADMIN — VECURONIUM BROMIDE 6 MG: 1 INJECTION, POWDER, LYOPHILIZED, FOR SOLUTION INTRAVENOUS at 18:52

## 2019-05-22 RX ADMIN — GADOBUTROL 10 ML: 604.72 INJECTION INTRAVENOUS at 09:01

## 2019-05-22 RX ADMIN — DEXAMETHASONE SODIUM PHOSPHATE 4 MG: 4 INJECTION, SOLUTION INTRA-ARTICULAR; INTRALESIONAL; INTRAMUSCULAR; INTRAVENOUS; SOFT TISSUE at 06:50

## 2019-05-22 RX ADMIN — DEXAMETHASONE SODIUM PHOSPHATE 4 MG: 4 INJECTION, SOLUTION INTRA-ARTICULAR; INTRALESIONAL; INTRAMUSCULAR; INTRAVENOUS; SOFT TISSUE at 12:07

## 2019-05-22 RX ADMIN — PROPOFOL 30 MG: 10 INJECTION, EMULSION INTRAVENOUS at 03:35

## 2019-05-22 RX ADMIN — SODIUM CHLORIDE: 9 INJECTION, SOLUTION INTRAVENOUS at 03:59

## 2019-05-22 RX ADMIN — FENTANYL CITRATE 50 MCG: 50 INJECTION INTRAMUSCULAR; INTRAVENOUS at 16:22

## 2019-05-22 RX ADMIN — METRONIDAZOLE 500 MG: 500 INJECTION, SOLUTION INTRAVENOUS at 15:07

## 2019-05-22 RX ADMIN — PROPOFOL 20 MCG/KG/MIN: 10 INJECTION, EMULSION INTRAVENOUS at 04:14

## 2019-05-22 RX ADMIN — ETOMIDATE 20 MG: 2 INJECTION INTRAVENOUS at 03:08

## 2019-05-22 RX ADMIN — ACETAMINOPHEN 1000 MG: 500 TABLET ORAL at 12:05

## 2019-05-22 RX ADMIN — VANCOMYCIN HYDROCHLORIDE 2000 MG: 100 INJECTION, POWDER, LYOPHILIZED, FOR SOLUTION INTRAVENOUS at 12:59

## 2019-05-22 RX ADMIN — CEFEPIME 2 G: 2 INJECTION, POWDER, FOR SOLUTION INTRAVENOUS at 23:04

## 2019-05-22 RX ADMIN — PROPOFOL 30 MG: 10 INJECTION, EMULSION INTRAVENOUS at 03:47

## 2019-05-22 RX ADMIN — SODIUM CHLORIDE 500 MG: 9 INJECTION, SOLUTION INTRAVENOUS at 05:57

## 2019-05-22 RX ADMIN — ROCURONIUM BROMIDE 100 MG: 10 INJECTION, SOLUTION INTRAVENOUS at 03:08

## 2019-05-22 RX ADMIN — FAMOTIDINE 20 MG: 10 INJECTION INTRAVENOUS at 06:04

## 2019-05-22 RX ADMIN — ROCURONIUM BROMIDE 50 MG: 10 INJECTION, SOLUTION INTRAVENOUS at 17:07

## 2019-05-22 RX ADMIN — METRONIDAZOLE 500 MG: 500 INJECTION, SOLUTION INTRAVENOUS at 06:21

## 2019-05-22 RX ADMIN — METRONIDAZOLE 500 MG: 500 INJECTION, SOLUTION INTRAVENOUS at 23:14

## 2019-05-22 RX ADMIN — FENTANYL CITRATE 50 MCG: 50 INJECTION INTRAMUSCULAR; INTRAVENOUS at 16:35

## 2019-05-22 RX ADMIN — VECURONIUM BROMIDE 4 MG: 1 INJECTION, POWDER, LYOPHILIZED, FOR SOLUTION INTRAVENOUS at 18:37

## 2019-05-22 RX ADMIN — SODIUM CHLORIDE 500 ML: 3 INJECTION, SOLUTION INTRAVENOUS at 21:18

## 2019-05-22 RX ADMIN — POTASSIUM CHLORIDE, DEXTROSE MONOHYDRATE AND SODIUM CHLORIDE: 150; 5; 900 INJECTION, SOLUTION INTRAVENOUS at 00:03

## 2019-05-22 RX ADMIN — SODIUM CHLORIDE: 9 INJECTION, SOLUTION INTRAVENOUS at 15:10

## 2019-05-22 RX ADMIN — SODIUM CHLORIDE, POTASSIUM CHLORIDE, SODIUM LACTATE AND CALCIUM CHLORIDE: 600; 310; 30; 20 INJECTION, SOLUTION INTRAVENOUS at 16:22

## 2019-05-22 RX ADMIN — PROPOFOL 10 MCG/KG/MIN: 10 INJECTION, EMULSION INTRAVENOUS at 15:16

## 2019-05-22 RX ADMIN — FENTANYL CITRATE 50 MCG: 50 INJECTION INTRAMUSCULAR; INTRAVENOUS at 18:09

## 2019-05-22 RX ADMIN — ZIPRASIDONE MESYLATE 20 MG: 20 INJECTION, POWDER, LYOPHILIZED, FOR SOLUTION INTRAMUSCULAR at 02:08

## 2019-05-22 RX ADMIN — FENTANYL CITRATE 50 MCG: 50 INJECTION INTRAMUSCULAR; INTRAVENOUS at 18:00

## 2019-05-22 RX ADMIN — CEFAZOLIN 2 G: 330 INJECTION, POWDER, FOR SOLUTION INTRAMUSCULAR; INTRAVENOUS at 16:22

## 2019-05-22 RX ADMIN — FENTANYL CITRATE 50 MCG: 50 INJECTION INTRAMUSCULAR; INTRAVENOUS at 08:20

## 2019-05-22 RX ADMIN — ROCURONIUM BROMIDE 50 MG: 10 INJECTION, SOLUTION INTRAVENOUS at 16:59

## 2019-05-22 RX ADMIN — MANNITOL 70 G: 20 INJECTION, SOLUTION INTRAVENOUS at 17:46

## 2019-05-22 RX ADMIN — DEXMEDETOMIDINE HYDROCHLORIDE 150 MCG: 100 INJECTION, SOLUTION INTRAVENOUS at 18:33

## 2019-05-22 RX ADMIN — SODIUM CHLORIDE 500 ML: 3 INJECTION, SOLUTION INTRAVENOUS at 03:56

## 2019-05-22 RX ADMIN — FENTANYL CITRATE 50 MCG: 50 INJECTION INTRAMUSCULAR; INTRAVENOUS at 18:35

## 2019-05-22 RX ADMIN — ROCURONIUM BROMIDE 50 MG: 10 INJECTION, SOLUTION INTRAVENOUS at 18:17

## 2019-05-22 ASSESSMENT — PAIN SCALES - GENERAL: PAIN_LEVEL: 0

## 2019-05-22 NOTE — ASSESSMENT & PLAN NOTE
Craniotomy on 5/22/19 with negative cultures, mild reactive gliosis on pathology.   Repeat craniotomy with resection on 6/16/19 - pathology positive for glioblastoma.  Per Neurosurgery, okay for discharge.  Will wean dexamethasone: Decrease to 6 mg p.o. 3 times daily x3 days, followed by 6 mg twice daily x3 days, followed by 4 mg twice daily x3 days.  Patient to follow-up with neurosurgery, medical oncology, radiation oncology.  Neurosurgery to complete taper outpatient.  SLP cleared for diet  Medical Oncology, Dr. De Luna, consulted on 6/20; pending recs; appreciated.    Radiation Oncology, Dr. Waters, consulted on 6/20 with specific question of whether he prefers mapping done in house or on outpatient follow-up as well as for outpatient management.   Case Management and Oncology Nurse Navigator on board for assistance with home health and oncology appointments, respectively.

## 2019-05-22 NOTE — CONSULTS
No neurosurgery Consult  Time Paged: 900 pm  Time Arrived:915pm      Neurosurgery consult.  I took the history from the emergency room physician's notes as the patient was not able to give a good history.  His mother was in attendance and is a smoker.    Robert Dorantes is a 53 y.o. male who presents with left-sided ear pain, subjective fevers, cough.  The ear pain has been worsening for the past few days and associate with hearing loss.  There is no tenderness.  Patient denies any headache or visual changes.  He does report pain radiating around to his ear.  He denies any vomiting.  He does report a productive cough for the past several days.  There is some associated nasal congestion.  Denies any history of diabetes or similar problems in the past.  He has been taking intermittent aspirin and Tylenol for pain with minimal relief.  He reports the pain is 10/10 in severity.    He works as a .  He has not worked for the last month.  She states he is never had any driving drug use.  He does smoke.      No family history on file.      Past Medical History:   Diagnosis Date   • Smoker          No past surgical history on file.      History   Smoking Status   • Current Every Day Smoker   • Packs/day: 0.25   • Types: Cigarettes   • Last attempt to quit: 2/24/2013   Smokeless Tobacco   • Never Used         No past surgical history on file.        Exam:    He was somnolent.  GCS was 13-14.  He was dysphasic.  He had an expressive component.  He did seem to understand.  In no apparent distress  Affect, mood appropriate  Oriented x 3  Pupils 3 mm midline, reactive.  Conjugate gaze.  Visual fields full to confrontation  Face symmetric  Tongue midline without fasciculation  Facial sensation intact light touch  Hearing intact to conversation, light finger rub bilaterally  Motor:  Right pronator drift.  And right hemiparesis as well.  Antigravity in his right arm with his right leg.    Sensation:  Intact touch face,  neck, torso, four extremities  Reflex:  No Cleary's no clonus  Finger-nose-finger, SHAUNA unremarkable      DX-CHEST-PORTABLE (1 VIEW)   Final Result         1. Borderline cardiomegaly. No focal consolidation or pleural effusions.   2. Hiatal hernia.      CT-CTA NECK WITH & W/O-POST PROCESSING   Final Result      Unremarkable CT angiogram of the neck. No high-grade stenosis, large vessel occlusion, aneurysm or dissection.      CT-CTA HEAD WITH & W/O-POST PROCESS   Final Result      1.  No large vessel occlusion, high-grade stenosis or aneurysm of the [place].   2.  A 4 cm mass lesion in the left frontotemporal region. Imaging features favor an abscess over a solid lesion. However, MRI is needed for definitive characterization.   3.  Extensive edema associated with the mass, resulting in 10 mm left-to-right midline shift.   4.  Effacement of the left and enlargement of the right lateral ventricles may be due to developing hydrocephalus.      Findings were discussed with JOSE ALTAMIRANO on 5/21/2019 8:39 PM.      MR-STEALTH BRAIN WITH & W/O    (Results Pending)       Blood cell count was 8.2.      Impression    1.  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  Admission ICU    2.  Infectious disease consult    3.  N.p.o.    4.  Keppra and dexamethasone and a stealth MRI scan with and without contrast tonight.    5.  I spoken to Dr. Chase Young MD one of my colleagues.  He will most likely need surgery tomorrow.  I have communicated all this to the emergency room physician and to the the patient's mother.    6.  We will also order TEG

## 2019-05-22 NOTE — DISCHARGE PLANNING
Care Transition Team Assessment    Difficult to determine patient's discharge needs at this time. Patient will most likely require some level of post acute care upon discharge from hospital.    Information Source  Orientation : Unable to Assess  Information Given By: Parent         Elopement Risk  Legal Hold: No    Interdisciplinary Discharge Planning  Does Admitting Nurse Feel This Could be a Complex Discharge?: Yes  Primary Care Physician: none  Lives with - Patient's Self Care Capacity: Parents (mom recently moved in with him)  Housing / Facility: 1 Story House  Able to Return to Previous ADL's: Other (unknown at this time)  Mobility Issues: No (recent fall)  Assistance Needed: Unknown at this Time    Discharge Preparedness  What are your discharge supports?: Parent  Prior Functional Level: Drives Self, Independent with Activities of Daily Living, Independent with Medication Management    Functional Assesment  Prior Functional Level: Drives Self, Independent with Activities of Daily Living, Independent with Medication Management         Vision / Hearing Impairment  Vision Impairment : Yes  Right Eye Vision: Wears Glasses, Other (Comments) (reading glasses)  Left Eye Vision: Wears Glasses, Other (Comments) (reading glasses)  Hearing Impairment : No                   Psychological Assessment  History of Psychiatric Problems: No    Discharge Risks or Barriers  Discharge risks or barriers?: No PCP, Uninsured / underinsured  Patient risk factors: No PCP, Uninsured or underinsured    Anticipated Discharge Information  Anticipated discharge disposition: Discharge needs currently unknown

## 2019-05-22 NOTE — CONSULTS
Neurosurgery Consultation  Referring MD:  Dr. Geovanna MD   5/21/2019  7:45 PM  Reason for Referral:  Left brain cystic mass        CHIEF COMPLAINT      Chief Complaint   Patient presents with   • Headache   • Altered Mental Status   • Inability To Get Words Out         HPI  Robert Dorantes is a 53 y.o. Otherwise healthy male who presented to the Emergency Department for evaluation of confusion onset 1.5 weeks ago. His mother states he has had left sided posterior head pain, weakness, fatigue, sleeping more than normal, and difficulty finding words which has been progressively worsening over the last week.  She denies knowledge of any fever, headache, ear pain, chest pain, and abdominal pain. He has not fallen recently. The mother reports that he has not been eating or drinking much. The patient drinks beer however she believes last drink was likely last week. The patient was recently treated for otitis media and finished his antibiotics. Per RN, the patient's nephew reports that he uses methamphetamines.  He has been intubated for airway protection.  Decadron and antibiotics have been instigated for possible brain abscess.     REVIEW OF SYSTEMS  Pertinent positive include facial droop, head pain, weakness, fatigue, sleeping more than normal, and difficulty finding words. Pertinent negative include fever, headache, ear pain, chest pain, and abdominal pain.     Review of systems limited secondary to AMS.     PAST MEDICAL HISTORY   has a past medical history of Smoker.     SOCIAL HISTORY  Social History             Social History Main Topics   • Smoking status: Current Every Day Smoker       Packs/day: 0.25       Types: Cigarettes       Last attempt to quit: 2/24/2013   • Smokeless tobacco: Never Used   • Alcohol use Yes         Comment: rare   • Drug use: No         SURGICAL HISTORY  patient denies any surgical history     CURRENT MEDICATIONS     Current Outpatient Prescriptions:   •  cefdinir (OMNICEF) 300 MG Cap,  Take 300 mg by mouth 2 times a day. Pt completed a 7 day course of CEFDINIR on 5/19, Disp: , Rfl:   •  acetaminophen (TYLENOL) 325 MG Tab, Take 650 mg by mouth every four hours as needed., Disp: , Rfl:      ALLERGIES        Allergies   Allergen Reactions   • Pcn [Penicillins]         Has tolerated cefdinir in May 2019   • Sulfa Drugs           PHYSICAL EXAM   VITAL SIGNS: /88   Pulse 66   Temp 36.4 °C (97.5 °F) (Temporal)   Resp 16   Ht 1.829 m (6')   Wt 106.6 kg (235 lb 0.2 oz)   SpO2 94%   BMI 31.87 kg/m²    Constitutional: intubated, sedated  HENT: Normocephalic, Atraumatic.  Eyes: Pupils 2 mm bilaterally. Conjunctiva normal.   Neck: no rigidity.  Skin: Warm, Dry.  .   Neurologic:   Intubated, sedated  Flexes, reaches up nearly localizing bilaterally with gentle sternal rub  No Cleary's no clonus  Psychiatric: Unable to obtain     DIAGNOSTIC STUDIES     LABS  Personally reviewed by me        Labs Reviewed   CBC WITH DIFFERENTIAL - Abnormal; Notable for the following:        Result Value      RBC 6.33 (*)       MCV 76.1 (*)       MCH 23.2 (*)       MCHC 30.5 (*)       RDW 50.4 (*)       Neutrophils-Polys 76.50 (*)       Lymphocytes 14.90 (*)       All other components within normal limits     Narrative:      Indicate which anticoagulants the patient is on:->UNKNOWN   PROTHROMBIN TIME - Abnormal; Notable for the following:      PT 15.4 (*)       INR 1.21 (*)       All other components within normal limits     Narrative:      Indicate which anticoagulants the patient is on:->UNKNOWN   COMP METABOLIC PANEL - Abnormal; Notable for the following:      Glucose 110 (*)       All other components within normal limits   ACCU-CHEK GLUCOSE - Abnormal; Notable for the following:      Glucose - Accu-Ck 104 (*)       All other components within normal limits   APTT     Narrative:      Indicate which anticoagulants the patient is on:->UNKNOWN   LACTIC ACID   TROPONIN   ESTIMATED GFR   BLOOD CULTURE     Narrative:   "    Per Hospital Policy: Only change Specimen Src: to \"Line\" if  specified by physician order.   BLOOD CULTURE     Narrative:      Per Hospital Policy: Only change Specimen Src: to \"Line\" if  specified by physician order.   PLATELET MAPPING WITH BASIC TEG            RADIOLOGY  Personally reviewed by me  DX-CHEST-PORTABLE (1 VIEW)   Final Result           1. Borderline cardiomegaly. No focal consolidation or pleural effusions.   2. Hiatal hernia.       CT-CTA NECK WITH & W/O-POST PROCESSING   Final Result       Unremarkable CT angiogram of the neck. No high-grade stenosis, large vessel occlusion, aneurysm or dissection.       CT-CTA HEAD WITH & W/O-POST PROCESS   Final Result       1.  No large vessel occlusion, high-grade stenosis or aneurysm of the [place].   2.  A 4 cm mass lesion in the left frontotemporal region. Imaging features favor an abscess over a solid lesion. However, MRI is needed for definitive characterization.   3.  Extensive edema associated with the mass, resulting in 10 mm left-to-right midline shift.   4.  Effacement of the left and enlargement of the right lateral ventricles may be due to developing hydrocephalus.       Findings were discussed with JOSE ALTAMIRANO on 5/21/2019 8:39 PM.       MR-[facility] BRAIN WITH & W/O    (Results Pending)   MRI brain with without contrast: Approximate 4 cm peripherally enhancing cystic lesion left temporal lobe.  No diffusion-weighted images were obtained.  Differential includes abscess, and primary glial tumor.  10 mm left to right shift local to cyst.    Assessment and plan: 53-year-old male with 4 cm peripherally enhancing cystic lesion involving the left temporal lobe.  He has a recent otitis media which was treated with antibiotics.  This suggest infectious etiology, although primary glial tumor is in the differential as well.  No diffusion-weighted images were obtained on the MRI, so differentiation is not possible based " radiographically.    Given the size of the lesion mass-effect and associated edema, I feel that he would benefit from surgical drainage of the cyst and possible resection of the cyst wall should malignancy be noted.  The procedure was discussed with family at the bedside.  Risks including, but not limited to, infection, bleeding, seizure, recurrence of cyst, poor incisional healing were discussed.  Procedure is scheduled for today.

## 2019-05-22 NOTE — ASSESSMENT & PLAN NOTE
Smokes 3 cigarettes per day per his mother's report.  Smoking cessation counseling prior to discharge.

## 2019-05-22 NOTE — ED TRIAGE NOTES
WC to triage w/ mother w/ c/o headache, confusion, unsteady gait, word finding impairment x 3 days, getting progressively worse.  FSBS 104.  Mother reports patient had a fall 2 wks ago, striking his head, +loc.  Not on thinners.

## 2019-05-22 NOTE — CARE PLAN
Problem: Psychosocial Needs:  Goal: Level of anxiety will decrease  Outcome: PROGRESSING AS EXPECTED  Patient less agitated with propofol drip.  Frequently reoriented and educated.    Problem: Safety - Medical Restraint  Goal: Remains free of injury from restraints (Restraint for Interference with Medical Device)  INTERVENTIONS:  1. Determine that other, less restrictive measures have been tried or would not be effective before applying the restraint  2. Evaluate the patient's condition at the time of restraint application  3. Inform patient/family regarding the reason for restraint  4. Q2H: Monitor safety, psychosocial status, comfort, nutrition and hydration   Outcome: PROGRESSING AS EXPECTED  Restraints monitored q2h, patient repositioned frequently, ROM performed.

## 2019-05-22 NOTE — ASSESSMENT & PLAN NOTE
Nicotine replacement patch  Tobacco cessation education to be provided eventually  Query COPD, RT protocols ongoing  Mobilize  Incentive spirometry in the hospital  Diagnostic spirometry as an outpatient

## 2019-05-22 NOTE — PROCEDURES
Endotracheal Intubation Procedure Note    INDICATION: _ Airway protection   PROCEDURE : Dr. Sanchez_  ATTENDING PHYSICIAN: Dr. Gonda.  In Attendance (Y/N)_ yes      CONSENT:   Consent was obtained from Mother over the phone prior to the procedure. Indications, risks, and benefits were explained at length.      PROCEDURE SUMMARY:      A time out was performed. My hands were washed immediately prior to the procedure. I wore a gloves throughout the procedure. The patient was placed on a cardiac monitor including continuous pulse oximetry. Rapid Sequence Intubation was conducted. The patient received 20 mg of Etomidate for induction and 100 mg of AP for adequate paralysis. Cricoid pressure was maintained from time induction agent was given to time of cuff balloon inflation. Using a glidescope and a size 8 endotracheal tube with stylet, the patient was intubated on the first attempt. The stylet was removed and cuff balloon was inflated. Appropriate endotracheal tube position was confirmed by direct visualization of vocal cord passage, fogging of the tube, CO2 colometric indicator and symmetric breath sounds. The tube was secured at 24 cm at the lips. Post intubation chest x-ray is pending at this time.

## 2019-05-22 NOTE — ANESTHESIA PREPROCEDURE EVALUATION
Relevant Problems   No relevant active problems       Physical Exam    Airway   Mallampati: II  TM distance: >3 FB  Neck ROM: full       Cardiovascular - normal exam  Rhythm: regular  Rate: normal  (-) murmur     Dental - normal exam         Pulmonary - normal exam  Breath sounds clear to auscultation     Abdominal   Abdomen: soft     Neurological - sedated/unconcious                 Anesthesia Plan    ASA 4 - emergent   ASA physical status 4 criteria: respiratory failure, acute alcohol or substance abuse withdrawal and CVA or TIA - recent (< 3 months)ASA physical status emergent criteria: neurologic compromise requiring immediate intervention    Plan - general       Airway plan will be ETT        Induction: inhalational    Postoperative Plan: Postoperative administration of opioids is intended.    Pertinent diagnostic labs and testing reviewed    Informed Consent:    Anesthetic plan and risks discussed with patient.    Use of blood products discussed with: patient whom consented to blood products.

## 2019-05-22 NOTE — ASSESSMENT & PLAN NOTE
History of drug abuse including methamphetamine abuse which makes him a poor candidate for outpatient management with a PICC line orders are equivalent

## 2019-05-22 NOTE — ASSESSMENT & PLAN NOTE
Hyperactive delirium, secondary CNS infection/tumor status post craniotomy  Intubated 5/22 -extubated 5/24  Physical restraints as needed for patient and staff's safety  Off dexmedetomidine, was not super helpful  As needed for safety of patient and staff soft restraints and IV Haldol/lorazepam at low dose  Mobilize if he will allow, patient improving  Limit overstimulation  Family continuing to help to calm this patient  Monitoring for withdrawals, no so far

## 2019-05-22 NOTE — PROGRESS NOTES
"Pharmacy Kinetics 53 y.o. male on vancomycin day # 2 2019    Vancomycin New Start   Received vancomycin 2700 mg in the ED at 2212 on     Indication for Treatment: Brain Abscess    Pertinent history per medical record: Admitted on 2019 for confusion for the past week and a half.  He recently completed treatment with cefdinir for otitis media.  PMH of methamphetamine abuse.  ID and Neurosurgery are consulting.  Scheduled for crani today.    Other antibiotics: ceftriaxone 2 grams iv q12h, metronidazole 500 mg iv q8h    Allergies: Pcn [penicillins] and Sulfa drugs     List concerns for renal function: obesity, CT with contrast     Pertinent cultures to date:   19 blood-peripheral x 2:  NGTD    MRSA nares swab if pneumonia is a concern (ordered/positive/negative/n-a): n-a    Recent Labs      19   0424   WBC  8.2  8.3   NEUTSPOLYS  76.50*   --      Recent Labs      194  19   1114   BUN  20  13  13   CREATININE  0.79  0.74  0.81   ALBUMIN  4.5   --    --      No results for input(s): VANCOTROUGH, VANCOPEAK, VANCORANDOM in the last 72 hours.  Intake/Output Summary (Last 24 hours) at 19 1329  Last data filed at 19 1203   Gross per 24 hour   Intake          2434.89 ml   Output             2795 ml   Net          -360.11 ml      /88   Pulse 72   Temp 37.7 °C (99.9 °F) ([place])   Resp 16   Ht 1.803 m (5' 11\")   Wt 103.3 kg (227 lb 11.8 oz)   SpO2 98%  Temp (24hrs), Av.9 °C (98.5 °F), Min:36.3 °C (97.3 °F), Max:37.7 °C (99.9 °F)      A/P   1. Vancomycin dose change: scheduled vancomycin 2000 mg iv q12h  2. Next vancomycin level: 2 days (not ordered)  3. Goal trough: 18-22 mcg/mL  4. Assessment: Scheduled for a crani today.  Awaiting ID assessment and plan.  Follow for culture specimen collection and analysis from OR.  Monitor renal function after imaging with contrast.  He is at risk for accumulation, however, do want to be " aggressive with dosing.  5. Plan:  Scheduled vancomycin 20 mg/kg iv q12h capped at 2000 mg per protocol.  Recommend a follow up level after 2 days,    Kailey Melo Pharm.D., BCPS

## 2019-05-22 NOTE — CARE PLAN
Problem: Ventilation Defect:  Goal: Ability to achieve and maintain unassisted ventilation or tolerate decreased levels of ventilator support  Outcome: PROGRESSING AS EXPECTED    Intervention: Manage ventilation therapy by monitoring diagnostic test results  Adult Ventilation Update    Total Vent Days: 1    Patient Lines/Drains/Airways Status    Active Airway     Name: Placement date: Placement time: Site: Days:    Airway ETT Oral 8.0 05/22/19   0305   Oral   less than 1              APV-CMV x 20, , +8, 30%    In the last 24 hours, no SBT    Plateau Pressure (Q Shift): 13 (05/22/19 0705)  Static Compliance (ml / cm H2O): 62 (05/22/19 1431)    Patient failed trials because of Barriers to Wean: Other (Comments) new vent, pending surgery    Sputum/Suction: strong  Cough: Productive (05/22/19 0439)  Sputum Amount: Small (05/22/19 1431)  Sputum Color: White (05/22/19 1431)  Sputum Consistency: Thin (05/22/19 1431)    Mobility  Level of Mobility: Level II (05/22/19 1400)  Activity Performed: Unable to mobilize (05/22/19 1400)  Reason Not Mobilized: Unstable condition (05/22/19 1400)    Events/Summary/Plan: Rate increased to 20 this am per Dr. Gonda,  to surgery this pm.

## 2019-05-22 NOTE — PROGRESS NOTES
Critical Care Progress Note    Date of admission  5/21/2019    Chief Complaint  53 y.o. male admitted 5/21/2019 with altered LOC, possible brain abcess by imaging     Hospital Course    53 y.o. male who presented 5/21/2019 with a past medical history significant for methamphetamine abuse who was seen in the emergency department 9 days ago and prescribed Cefdinir at discharge.  Apparently over the last several days he has had worsening pain, headache and developing confusion and unsteady gait with word finding difficulty.  No reported fevers or chills and he reportedly completed his antibiotics as prescribed.  In the emergency department patient underwent CT imaging of his brain which revealed a left frontotemporal mass favoring abscess over a solid lesion with extensive edema and a 10 mm left to right midline shift with effacement of the lateral ventricle.  Neurosurgery was consulted recommending an MRI of his brain and he was started on IV Keppra as well as Decadron.  Infectious disease was consulted and recommended IV antibiotics including Rocephin, vancomycin, and Flagyl.  Patient is being admitted to the intensive care unit and I was consulted for his critical care management.      Interval Problem Update  Reviewed last 24 hour events:    D/w RN re sedation for MRI - PRN Versed + Fent on top of PROP    Vent day #2  PEEP 8, 30%  ABG noted  CXR SSLLLA  Recent OM -> brain abcess -> to OR today  - L frontal abcess?  MRI pending interp - Stealth scan  PERRLA  Restrained  Not follows - moves all 4  PROP 10   SR 70s  SBp 90-170s  UO great, no lasix  Tm 37.8  WBC  3% saline 30cc/hr - last Na 144 - goal 150-155  EEG pending  Keppra  SCDs  Decadron    OR today  Continue aggressive antibiotic therapy  EEG later today or in a.m., Stealth MRI and neurosurgical drainage of abscess takes priority, EEG off propofol if possible  ID to see    Case reviewed at length with family at the bedside  Reviewed the case at great length  with Dr. Young, neurosurgery, in the ICU at the bedside, in particular reviewed results of exam, MRI and treatment plan    Review of Systems  Review of Systems   Unable to perform ROS: Intubated        Vital Signs for last 24 hours   Temp:  [36.9 °C (98.5 °F)-37.7 °C (99.9 °F)] 37.7 °C (99.9 °F)  Pulse:  [59-98] 63  Resp:  [5-38] 19  SpO2:  [91 %-100 %] 98 %    Hemodynamic parameters for last 24 hours       Respiratory Information for the last 24 hours  Taylor Vent Mode: APVCMV  Rate (breaths/min): 20  Vt Target (mL): 450  PEEP/CPAP: 8  FiO2: 40  P MEAN: 11  Control VTE (exp VT): 449    Physical Exam   Physical Exam   Constitutional: He appears well-developed and well-nourished. He appears lethargic. He has a sickly appearance. No distress. He is sedated, intubated and restrained.   HENT:   Head: Normocephalic and atraumatic.   Mouth/Throat: Oropharynx is clear and moist. Mucous membranes are not dry and not cyanotic.   Eyes: No scleral icterus.   Small but reactive pupils, not disconjugate   Neck: Neck supple. No JVD present. No neck rigidity. No Brudzinski's sign and no Kernig's sign noted.   Cardiovascular: Normal rate, regular rhythm and intact distal pulses.   No extrasystoles are present. Exam reveals no gallop and no friction rub.    No murmur heard.  Pulmonary/Chest: He is intubated. No respiratory distress (On full vent support). He has no wheezes. He has no rhonchi. He has no rales.   Abdominal: Soft. Bowel sounds are normal. He exhibits no distension. There is no hepatosplenomegaly. There is no tenderness. There is no guarding and no CVA tenderness.   Neurological: He appears lethargic.   Skin: Skin is warm. He is diaphoretic. No cyanosis. Nails show no clubbing.   Psychiatric:   Unable to assess   Vitals reviewed.      Medications  Current Facility-Administered Medications   Medication Dose Route Frequency Provider Last Rate Last Dose   • metroNIDAZOLE (FLAGYL) IVPB 500 mg  500 mg Intravenous Q8HRS  Sadiq Guerin M.D.   Stopped at 05/22/19 1607   • insulin regular (HUMULIN R) injection 1-6 Units  1-6 Units Subcutaneous Q6HRS Jeremy M Gonda, M.D.   Stopped at 05/22/19 0600    And   • DEXTROSE 10% BOLUS 125 mL  125 mL Intravenous Q15 MIN PRN Jeremy M Gonda, M.D.       • Respiratory Care per Protocol   Nebulization Continuous RT Jeremy M Gonda, M.D.       • MD Alert...ICU Electrolyte Replacement per Pharmacy   Other PHARMACY TO DOSE Jeremy M Gonda, M.D.       • Pharmacy Consult: Enteral tube insertion - review meds/change route/product selection   Other PHARMACY TO DOSE Jeremy M Gonda, M.D.       • fentaNYL (SUBLIMAZE) injection 25 mcg  25 mcg Intravenous Q HOUR PRN Jeremy M Gonda, M.D.   Stopped at 05/22/19 0752    Or   • fentaNYL (SUBLIMAZE) injection 50 mcg  50 mcg Intravenous Q HOUR PRN Jeremy M Gonda, M.D.   50 mcg at 05/22/19 1835    Or   • fentaNYL (SUBLIMAZE) injection 100 mcg  100 mcg Intravenous Q HOUR PRN Jeremy M Gonda, M.D.       • 3% sodium chloride (HYPERTONIC SALINE) 500mL infusion 500 mL  500 mL Intravenous Continuous Jeremy M Gonda, M.D. 30 mL/hr at 05/22/19 2118 500 mL at 05/22/19 2118   • NS infusion   Intravenous Continuous Jeremy M Gonda, M.D. 100 mL/hr at 05/22/19 1510     • propofol (DIPRIVAN) injection  0-80 mcg/kg/min Intravenous Continuous Jeremy M Gonda, M.D.   Stopped at 05/22/19 2013   • MD Alert...Vancomycin per Pharmacy   Other PHARMACY TO DOSE Amilcar Reaves M.D.       • vancomycin 2,000 mg in  mL IVPB  2,000 mg Intravenous Q12HR Amilcar Reaves M.D.   Stopped at 05/22/19 1459   • acetaminophen (TYLENOL) tablet 1,000 mg  1,000 mg Enteral Tube Q6HRS Amilcar Reaves M.D.       • docusate sodium 100mg/10mL (COLACE) solution 100 mg  100 mg Enteral Tube BID Amilcar Reaves M.D.       • famotidine (PEPCID) tablet 20 mg  20 mg Enteral Tube Q12HRS Amilcar Reaves M.D.        Or   • famotidine (PEPCID) injection 20 mg  20 mg Intravenous Q12HRS Amilcar ROSS  ADAN Reaves       • senna-docusate (PERICOLACE or SENOKOT S) 8.6-50 MG per tablet 1 Tab  1 Tab Enteral Tube Nightly Amilcar Reaves M.D.       • cloNIDine (CATAPRES) tablet 0.1 mg  0.1 mg Enteral Tube Q4HRS PRN Amilcar Reaves M.D.       • magnesium hydroxide (MILK OF MAGNESIA) suspension 30 mL  30 mL Enteral Tube QDAY PRN Amilcar Reaves M.D.       • oxyCODONE immediate-release (ROXICODONE) tablet 2.5 mg  2.5 mg Enteral Tube Q3HRS PRN Amilcar Reaves M.D.       • polyethylene glycol/lytes (MIRALAX) PACKET 1 Packet  1 Packet Enteral Tube BID PRN Amilcar Reaves M.D.       • senna-docusate (PERICOLACE or SENOKOT S) 8.6-50 MG per tablet 1 Tab  1 Tab Enteral Tube Q24HRS PRN Amilcar Reaves M.D.       • dexamethasone (DECADRON) injection 10 mg  10 mg Intravenous Q6HRS Catherine Magallon, A.P.R.N.       • cefepime (MAXIPIME) 2 g in  mL IVPB  2 g Intravenous Q8HRS Deyanira Hines M.D.       • Pharmacy Consult Request ...Pain Management Review 1 Each  1 Each Other PHARMACY TO DOSE Yanelis Austin M.D.       • oxyCODONE immediate-release (ROXICODONE) tablet 5 mg  5 mg Oral Q3HRS PRN Yanelis Austin M.D.       • MD ALERT...DO NOT ADMINISTER NSAIDS or ASPIRIN unless ORDERED By Neurosurgery 1 Each  1 Each Other PRN Yanelis Austin M.D.       • ondansetron (ZOFRAN) syringe/vial injection 4 mg  4 mg Intravenous Q4HRS PRN Yanelis Austin M.D.       • dexamethasone (DECADRON) injection 4 mg  4 mg Intravenous Once PRN Yanelis Austin M.D.       • scopolamine (TRANSDERM-SCOP) patch 1 Patch  1 Patch Transdermal Q72HRS PRN Yanelis H S Geovanna, M.D.       • labetalol (NORMODYNE,TRANDATE) injection 10 mg  10 mg Intravenous Q HOUR PRN Yanelis Austin M.D.       • hydrALAZINE (APRESOLINE) injection 10 mg  10 mg Intravenous Q HOUR PRN Yanelis Austin M.D.       • niCARdipine (CARDENE) 25 mg in  mL Infusion  0-15 mg/hr Intravenous Continuous Yanelis Austin M.D.   Stopped at 05/21/19 3519   •  levETIRAcetam (KEPPRA) 500 mg in  mL IVPB  500 mg Intravenous Q12HRS Yanelis Austin M.D.   Stopped at 05/22/19 0612   • bisacodyl (DULCOLAX) suppository 10 mg  10 mg Rectal Q24HRS PRN Yanelis Austin M.D.       • fleet enema 133 mL  1 Each Rectal Once PRN Yanelis Austin M.D.           Fluids    Intake/Output Summary (Last 24 hours) at 05/22/19 2231  Last data filed at 05/22/19 2000   Gross per 24 hour   Intake          3906.98 ml   Output             3460 ml   Net           446.98 ml       Laboratory  Recent Labs      05/22/19 0427   ISTATAPH  7.309*   ISTATAPCO2  42.6*   ISTATAPO2  135*   ISTATATCO2  23   TNRNHHI0NEL  99   ISTATARTHCO3  21.4   ISTATARTBE  -5*   ISTATTEMP  98.5 F   ISTATFIO2  100   ISTATSPEC  Arterial   ISTATAPHTC  7.310*   MRRUWKZA6VA  134*     Recent Labs      05/21/19 1944   TROPONINI  <0.01     Recent Labs      05/22/19 0424 05/22/19 1114 05/22/19 2115   SODIUM  144  145  143   POTASSIUM  3.8  4.1  4.2   CHLORIDE  112  113*  112   CO2  22  24  24   BUN  13  13  15   CREATININE  0.74  0.81  0.92   CALCIUM  8.8  8.7  8.4*     Recent Labs      05/21/19 1944 05/22/19 0424 05/22/19 1114 05/22/19 2115   ALTSGPT  23   --    --    --    ASTSGOT  16   --    --    --    ALKPHOSPHAT  48   --    --    --    TBILIRUBIN  0.9   --    --    --    GLUCOSE  110*  137*  116*  146*     Recent Labs      05/21/19 1944 05/22/19 0424   WBC  8.2  8.3   NEUTSPOLYS  76.50*   --    LYMPHOCYTES  14.90*   --    MONOCYTES  6.70   --    EOSINOPHILS  0.50   --    BASOPHILS  1.00   --    ASTSGOT  16   --    ALTSGPT  23   --    ALKPHOSPHAT  48   --    TBILIRUBIN  0.9   --      Recent Labs      05/21/19 1944 05/22/19   0424   RBC  6.33*  6.38*   HEMOGLOBIN  14.7  14.7   HEMATOCRIT  48.2  48.2   PLATELETCT  350  333   PROTHROMBTM  15.4*  14.7*   APTT  33.7   --    INR  1.21*  1.14*       Imaging  X-Ray:  I have personally reviewed the images and compared with prior images.  EKG:  I have  personally reviewed the images and compared with prior images.  CT:    Reviewed    Assessment/Plan  * Brain abscess- (present on admission)   Assessment & Plan    Presumed based on imaging and recent left otitis media  MRI most consistent with abscess, reviewed with neurosurgery  IV antibiotics including Rocephin, Flagyl, vancomycin  Cultures pending, further cultures from CNS surgery 5/22  Keppra prophylaxis  Decadron ongoing, de-escalate postop after discussion with neurosurgery  Neurosurgical ongoing and infectious disease consultation pending  Likely operative intervention to follow shortly, query need for ENT consultation depending on results of MRI, deferred to neurosurgery  Will check HIV and RPR given abscess and meth abuse, ?IVDA, pending  Strict BP control (goal SBP<160) with nicardipine gtt as needed     Acute respiratory failure with hypoxia (HCC)   Assessment & Plan    Intubated a.m. 5/22  Delirium, sepsis, inability to protect airway, respiratory failure  RT protocols  Ventilator bundle  Serial ABG/chest x-ray/ventilator mechanics review  SAT/SBT when clinically appropriate  Not ready for liberation trials     Delirium   Assessment & Plan    Hyperactive delirium, secondary CNS infection  Intubated 5/22  Physical restraints as needed for patient and staff's safety     Methamphetamine abuse (HCC)- (present on admission)   Assessment & Plan    Eventual drug cessation education to be provided once able to converse with patient  Monitor for withdrawal syndrome  Check drug screen negative, HIV pending     Smoker- (present on admission)   Assessment & Plan    Nicotine replacement patch  Tobacco cessation education to be provided eventually  Query COPD, RT protocols          VTE:  Contraindicated  Ulcer: H2 Antagonist  Lines: None    I have performed a physical exam and reviewed and updated ROS and Plan today (5/22/2019). In review of yesterday's note (5/21/2019), there are no changes except as documented  above.     Discussed patient condition and risk of morbidity and/or mortality with RN, RT, Pharmacy, [employer] resident, Charge nurse / hot rounds and neurosurgery  The patient remains critically ill.  Critical care time = 40 minutes in directly providing and coordinating critical care and extensive data review.  No time overlap and excludes procedures.

## 2019-05-22 NOTE — OR NURSING
Patient rolled through preop without stopping in a room. ICU RN gave report to Dr Castillo. ID and Consents verified. Patient's mom signed consents after speaking with physicians at the bedside.

## 2019-05-22 NOTE — ED NOTES
Med Rec complete per Pt at bedside  Allergies Reviewed    Pt completed a 7 day course of CEFDINIR on 5/19

## 2019-05-22 NOTE — PROCEDURES
Central Line Insertion    Resident:    Sadiq Guerin  Attending:    Dr. Gonda  Indication for procedure:  Critical illness, CVP monitoring,access       A time-out was completed verifying correct patient, procedure, site, positioning, and special equipment if applicable. The patient was placed in a dependent position appropriate for central line placement based on the vein to be cannulated. The patient’s right neck was prepped and draped in sterile fashion. Propofol 30 mg was given for sedation. A triple lumen 9-Maced[place] Cordis catheter was introduced into the the internal jugular vein using the Seldinger technique and under ultrasound guidance. The catheter was threaded smoothly over the guide wire and appropriate blood return was obtained. Each lumen of the catheter was evacuated of air and flushed with sterile saline. The catheter was then sutured in place to the skin and a sterile dressing applied. Perfusion to the extremity distal to the point of catheter insertion was checked and found to be adequate. The Attending was present for the entire procedure.    Estimated Blood Loss: 3cc  A chest X-ray was done after the procedure, it showed no pneumothorax, and good position of the cathter tip.  The patient tolerated the procedure well and there were no complications.

## 2019-05-22 NOTE — PROGRESS NOTES
Late entry:  Dr. Gonda notified of patient increased WOB and neurological status.  Dr. Gonda to bedside to intubate and for central line insertion.  New medication orders received.  S/W MRI, per MRI tech imaging will be postponed until neurosurgery's go ahead due to type of exam.

## 2019-05-22 NOTE — ED PROVIDER NOTES
ED Provider Note    Scribed for Kellie Marinelli M.D. by Augustin Lyons. 5/21/2019  7:45 PM    Means of arrival: Walk in  History obtained from: Patient  History limited by: Altered mental status      CHIEF COMPLAINT  Chief Complaint   Patient presents with   • Headache   • Altered Mental Status   • Inability To Get Words Out       HPI  Robert Dorantes is a 53 y.o. Otherwise healthy male who presents to the Emergency Department for evaluation of persistent confusion onset 1.5 weeks ago. The mother endorses he has had ongoing left sided posterior head pain, weakness, fatigue, sleeping more than normal, and difficulty finding words which has been progressively worsening over the last week.  She denies knowledge of any fever, headache, ear pain, chest pain, and abdominal pain. He has not fallen recently. The mother reports that he has not been eating or drinking at baseline. The patient drinks beer however she believes last drink was likely last week. The patient has never withdrawn from alcohol. He denies any drug use. The patient was recently treated for otitis media and finished his antibiotics. Per RN, the patient's nephew reports that he uses methamphetamines.    Patient is unable to provide any current history due to altered mentation.  He denies current complaints of pain.    REVIEW OF SYSTEMS  Pertinent positive include facial droop, head pain, weakness, fatigue, sleeping more than normal, and difficulty finding words. Pertinent negative include fever, headache, ear pain, chest pain, and abdominal pain.    Review of systems limited secondary to AMS.    PAST MEDICAL HISTORY   has a past medical history of Smoker.    SOCIAL HISTORY  Social History     Social History Main Topics   • Smoking status: Current Every Day Smoker     Packs/day: 0.25     Types: Cigarettes     Last attempt to quit: 2/24/2013   • Smokeless tobacco: Never Used   • Alcohol use Yes      Comment: rare   • Drug use: No       SURGICAL  HISTORY  patient denies any surgical history    CURRENT MEDICATIONS    Current Facility-Administered Medications:   •  Pharmacy Consult Request ...Pain Management Review 1 Each, 1 Each, Other, PHARMACY TO DOSE, Yanelis Austin M.D.  •  [START ON 5/22/2019] acetaminophen (TYLENOL) tablet 1,000 mg, 1,000 mg, Oral, Q6HRS, Yanelis Austin M.D.  •  oxyCODONE immediate-release (ROXICODONE) tablet 2.5 mg, 2.5 mg, Oral, Q3HRS PRN, Yanelis Austin M.D.  •  oxyCODONE immediate-release (ROXICODONE) tablet 5 mg, 5 mg, Oral, Q3HRS PRN, Yanelis Austin M.D.  •  MD ALERT...DO NOT ADMINISTER NSAIDS or ASPIRIN unless ORDERED By Neurosurgery 1 Each, 1 Each, Other, PRN, Yanelis Austin M.D.  •  ondansetron (ZOFRAN) syringe/vial injection 4 mg, 4 mg, Intravenous, Q4HRS PRN, Yanelis Austin M.D.  •  dexamethasone (DECADRON) injection 4 mg, 4 mg, Intravenous, Once PRN, Yanelis Austin M.D.  •  scopolamine (TRANSDERM-SCOP) patch 1 Patch, 1 Patch, Transdermal, Q72HRS PRN, Yanelis Austin M.D.  •  labetalol (NORMODYNE,TRANDATE) injection 10 mg, 10 mg, Intravenous, Q HOUR PRN, Yanelis Austin M.D.  •  hydrALAZINE (APRESOLINE) injection 10 mg, 10 mg, Intravenous, Q HOUR PRN, Yanelis Austin M.D.  •  cloNIDine (CATAPRES) tablet 0.1 mg, 0.1 mg, Oral, Q4HRS PRN, Yanelis Austin M.D.  •  niCARdipine (CARDENE) 25 mg in  mL Infusion, 0-15 mg/hr, Intravenous, Continuous, Yanelis Austin M.D.  •  levETIRAcetam (KEPPRA) 500 mg in  mL IVPB, 500 mg, Intravenous, Q12HRS, Yanelis Austin M.D.  •  docusate sodium (COLACE) capsule 100 mg, 100 mg, Oral, BID, Yanelis Austin M.D.  •  senna-docusate (PERICOLACE or SENOKOT S) 8.6-50 MG per tablet 1 Tab, 1 Tab, Oral, Nightly, Yanelis Austin M.D.  •  senna-docusate (PERICOLACE or SENOKOT S) 8.6-50 MG per tablet 1 Tab, 1 Tab, Oral, Q24HRS PRN, Yanelis Austin M.D.  •  polyethylene glycol/lytes (MIRALAX) PACKET 1 Packet, 1 Packet, Oral, BID PRN, Yanelis Austin M.D.  •  magnesium  hydroxide (MILK OF MAGNESIA) suspension 30 mL, 30 mL, Oral, QDAY PRN, Yanelis Austin M.D.  •  bisacodyl (DULCOLAX) suppository 10 mg, 10 mg, Rectal, Q24HRS PRN, Yanelis Austin M.D.  •  fleet enema 133 mL, 1 Each, Rectal, Once PRN, Yanelis Austin M.D.  •  dextrose 5 % and 0.9 % NaCl with KCl 20 mEq infusion, , Intravenous, Continuous, Yanelis Austin M.D.  •  morphine (pf) 4 mg/ml injection 2 mg, 2 mg, Intravenous, Q3HRS PRN, Yanelis Austin M.D.  •  [START ON 5/22/2019] dexamethasone (DECADRON) injection 4 mg, 4 mg, Intravenous, Q6HRS, Yanelis Austin M.D.  •  MD Alert...Vancomycin per Pharmacy, , Other, Once, Kellie Marinelli M.D.  •  cefTRIAXone (ROCEPHIN) 2 g in  mL IVPB, 2 g, Intravenous, Once, Kellie Marinelli M.D.  •  metroNIDAZOLE (FLAGYL) IVPB 500 mg, 500 mg, Intravenous, Once, Kellie Marinelli M.D.  •  vancomycin 2,700 mg in  mL IVPB, 25 mg/kg, Intravenous, Once, Kellie Marinelli M.D.    Current Outpatient Prescriptions:   •  cefdinir (OMNICEF) 300 MG Cap, Take 300 mg by mouth 2 times a day. Pt completed a 7 day course of CEFDINIR on 5/19, Disp: , Rfl:   •  acetaminophen (TYLENOL) 325 MG Tab, Take 650 mg by mouth every four hours as needed., Disp: , Rfl:     ALLERGIES  Allergies   Allergen Reactions   • Pcn [Penicillins]      Has tolerated cefdinir in May 2019   • Sulfa Drugs        PHYSICAL EXAM   VITAL SIGNS: /88   Pulse 66   Temp 36.4 °C (97.5 °F) (Temporal)   Resp 16   Ht 1.829 m (6')   Wt 106.6 kg (235 lb 0.2 oz)   SpO2 94%   BMI 31.87 kg/m²    Constitutional: Alert  male, nontoxic appearing however appears confused.  HENT: Normocephalic, Atraumatic. Bilateral external ears normal. Nose normal.  Moist mucous membranes.  Oropharynx clear. Right TM is erythematous and bulging, left TM is normal. No mastoid tenderness or overlying erythema.  Eyes: Pupils are equal and reactive. Conjunctiva normal.   Neck: Supple, full range of motion  Heart: Regular rate and  "rhythm.  No murmurs.    Lungs: No respiratory distress, normal work of breathing. Lungs clear to auscultation bilaterally.  Abdomen Soft, no distention.  No tenderness to palpation.  Musculoskeletal: Atraumatic. No obvious deformities noted.  No lower extremity edema.  Skin: Warm, Dry.  No erythema, No rash.   Neurologic: A&O x 2, Mild right sided facial droop, mild dysarthria.  Moving all extremities spontaneously.Strength is 5/5 in all 4 extremities.  Sensation intact.  Neurologic exam otherwise difficult due to patient's cooperation.  Psychiatric: Unable to obtain    DIAGNOSTIC STUDIES    LABS  Personally reviewed by me  Labs Reviewed   CBC WITH DIFFERENTIAL - Abnormal; Notable for the following:        Result Value    RBC 6.33 (*)     MCV 76.1 (*)     MCH 23.2 (*)     MCHC 30.5 (*)     RDW 50.4 (*)     Neutrophils-Polys 76.50 (*)     Lymphocytes 14.90 (*)     All other components within normal limits    Narrative:     Indicate which anticoagulants the patient is on:->UNKNOWN   PROTHROMBIN TIME - Abnormal; Notable for the following:     PT 15.4 (*)     INR 1.21 (*)     All other components within normal limits    Narrative:     Indicate which anticoagulants the patient is on:->UNKNOWN   COMP METABOLIC PANEL - Abnormal; Notable for the following:     Glucose 110 (*)     All other components within normal limits   ACCU-CHEK GLUCOSE - Abnormal; Notable for the following:     Glucose - Accu-Ck 104 (*)     All other components within normal limits   APTT    Narrative:     Indicate which anticoagulants the patient is on:->UNKNOWN   LACTIC ACID   TROPONIN   ESTIMATED GFR   BLOOD CULTURE    Narrative:     Per Hospital Policy: Only change Specimen Src: to \"Line\" if  specified by physician order.   BLOOD CULTURE    Narrative:     Per Hospital Policy: Only change Specimen Src: to \"Line\" if  specified by physician order.   PLATELET MAPPING WITH BASIC TEG         RADIOLOGY  Personally reviewed by me  DX-CHEST-PORTABLE (1 " VIEW)   Final Result         1. Borderline cardiomegaly. No focal consolidation or pleural effusions.   2. Hiatal hernia.      CT-CTA NECK WITH & W/O-POST PROCESSING   Final Result      Unremarkable CT angiogram of the neck. No high-grade stenosis, large vessel occlusion, aneurysm or dissection.      CT-CTA HEAD WITH & W/O-POST PROCESS   Final Result      1.  No large vessel occlusion, high-grade stenosis or aneurysm of the [place].   2.  A 4 cm mass lesion in the left frontotemporal region. Imaging features favor an abscess over a solid lesion. However, MRI is needed for definitive characterization.   3.  Extensive edema associated with the mass, resulting in 10 mm left-to-right midline shift.   4.  Effacement of the left and enlargement of the right lateral ventricles may be due to developing hydrocephalus.      Findings were discussed with JOSE ALTAMIRANO on 5/21/2019 8:39 PM.      MR-[employer] BRAIN WITH & W/O    (Results Pending)       ED COURSE  Vitals:    05/21/19 2000 05/21/19 2030 05/21/19 2100 05/21/19 2131   BP:       Pulse: 66 72 72 66   Resp:       Temp:       TempSrc:       SpO2: 96% 98% 94% 97%   Weight:       Height:             Medications administered:  IV fluids, vancomycin, ceftriaxone, metronidazole    Patient was given IV fluids for possible sepsis and dehydration.  IV hydration was used because oral hydration was not adequate alone.  Following fluid administration patient's dehydration was improved.    Old records personally reviewed:  Evaluated here 10 days ago for ear pain and headache. He was put on antibiotics for left otitis media.    7:45 PM Patient seen and examined at bedside. The patient presents with confusion. Ordered for DX chest, CT-CTA head, CT-CTA neck, lactic acid, CBC, CMP, troponin, INR, aPTT, and accu-chek glucose. to evaluate. Patient will be treated with Zofran 4 mg for his symptoms. I informed the mother that he will need to undergo imaging of the brain and lab  work for evaluation.    MEDICAL DECISION MAKING  Otherwise healthy  male who presents with 1 to 2-week history of worsening headaches and confusion.  He is afebrile with normal vitals on arrival however significantly confused.  He has evidence of dysarthria and right-sided facial droop on my initial evaluation.  Patient is outside of the window for treatment with IV alteplase therefore stroke alert was not called.  Emergent neuroimaging however was performed with evidence of a large brain mass concerning for abscess versus tumor which is causing midline shift and possible hydrocephalus.  There is no evidence of associated vascular pathology such as thrombosis or dissection.  Labs are overall reassuring without evidence of leukocytosis or electrolyte abnormality.  Lactate is normal without evidence of sepsis.    9:21 PM Dr. Austin and I discussed the patient's plan of care.  Plan for MRI in addition to steroids and antiepileptics overnight.  He will likely peform surgery tomorrow and medicine will admit.    9:23 PM I reevaluated the patient and he was resting in bed. I informed the patient and his mother of his imaging findings and need for admission and likely surgery.    9:26 PM Infectious Disease paged. Ordered Tylenol 1,000 mg and Decadron 4 mg.    9:27 PM Dr. Dorado, Infectious Disease, consulted and agree to evaluate the patient in the morning and would like the patient started on antibiotics.     9:40 PM [facility] consulted and agrees to admit the patient.    CRITICAL CARE TIME  Upon my evaluation, this patient had a high probability of imminent or life-threatening deterioration due to brain mass causing midline shift requiring close neurologic monitoring which required my direct attention, intervention, and personal management.     I personally provided 36 minutes of total critical care time outside of time spent on separately billable/documented procedures. Time includes: review of laboratory data,  review of radiology studies, discussion with consultants, discussion with family/patient, monitoring for potential decompensation.  Interventions were performed as documented above.       DISPOSITION:  Patient will be admitted to [facility] in critical condition.    IMPRESSION  1. Brain mass    2. Acute encephalopathy        Critical care time of 36 minutes.    Results, diagnoses, and treatment options were discussed with the patient and/or family. Patient verbalized understanding of plan of care.    New Prescriptions    No medications on file            IAugustin (Scribe), am scribing for, and in the presence of, Kellie Marinelli M.D..    Electronically signed by: Augustin Lyons (Scribe), 5/21/2019    I, Kellie Marinelli M.D. personally performed the services described in this documentation, as scribed by Augustin Lyons in my presence, and it is both accurate and complete. C    The note accurately reflects work and decisions made by me.  Kellie Marinelli  5/21/2019  11:24 PM

## 2019-05-22 NOTE — THERAPY
Occupational Therapy Contact Note:    OT orders received, per Neurosurgery pt is NPO for sx and currently intubated. Will hold and round back post op.     Apurva Becker, OTR/L  Pager: [phone]

## 2019-05-22 NOTE — ASSESSMENT & PLAN NOTE
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  Substance abuse counseling

## 2019-05-22 NOTE — CONSULTS
Critical Care Consultation    Date of consult: 5/21/2019    Referring Physician  Kellie Marinelli M.D.    Reason for Consultation  Altered mental status, possible brain abscess    History of Presenting Illness  53 y.o. male who presented 5/21/2019 with a past medical history significant for methamphetamine abuse who was seen in the emergency department 9 days ago and prescribed Cefdinir at discharge.  Apparently over the last several days he has had worsening pain, headache and developing confusion and unsteady gait with word finding difficulty.  No reported fevers or chills and he reportedly completed his antibiotics as prescribed.  In the emergency department patient underwent CT imaging of his brain which revealed a left frontotemporal mass favoring abscess over a solid lesion with extensive edema and a 10 mm left to right midline shift with effacement of the lateral ventricle.  Neurosurgery was consulted recommending an MRI of his brain and he was started on IV Keppra as well as Decadron.  Infectious disease was consulted and recommended IV antibiotics including Rocephin, vancomycin, and Flagyl.  Patient is being admitted to the intensive care unit and I was consulted for his critical care management.    Code Status  Full Code    Review of Systems  Review of Systems   Unable to perform ROS: Mental status change       Past Medical History   has a past medical history of Smoker.  (Methamphetamine)    Surgical History   has no past surgical history on file.-None    Family History  family history is not on file. -No history of brain masses or tumors    Social History   reports that he has been smoking Cigarettes.  He has been smoking about 0.25 packs per day. He has never used smokeless tobacco. He reports that he drinks alcohol. He reports that he does not use drugs.    Medications  Home Medications     Reviewed by Zuly Gilbert (Pharmacy Tech) on 05/21/19 at 2043  Med List Status: Complete   Medication  Last Dose Status   acetaminophen (TYLENOL) 325 MG Tab 5/21/2019 Active   cefdinir (OMNICEF) 300 MG Cap 5/19/2019 Active              Current Facility-Administered Medications   Medication Dose Route Frequency Provider Last Rate Last Dose   • Pharmacy Consult Request ...Pain Management Review 1 Each  1 Each Other PHARMACY TO DOSE aYnelis Austin M.D.       • [START ON 5/22/2019] acetaminophen (TYLENOL) tablet 1,000 mg  1,000 mg Oral Q6HRS Yanelis Austin M.D.       • oxyCODONE immediate-release (ROXICODONE) tablet 2.5 mg  2.5 mg Oral Q3HRS PRN Yanelis Austin M.D.       • oxyCODONE immediate-release (ROXICODONE) tablet 5 mg  5 mg Oral Q3HRS PRN Yanelis Austin M.D.       • MD ALERT...DO NOT ADMINISTER NSAIDS or ASPIRIN unless ORDERED By Neurosurgery 1 Each  1 Each Other PRN Yanelis Austin M.D.       • ondansetron (ZOFRAN) syringe/vial injection 4 mg  4 mg Intravenous Q4HRS PRN Yanelis Austin M.D.       • dexamethasone (DECADRON) injection 4 mg  4 mg Intravenous Once PRN Yanelis Austin M.D.       • scopolamine (TRANSDERM-SCOP) patch 1 Patch  1 Patch Transdermal Q72HRS PRN Yanelis Austin M.D.       • labetalol (NORMODYNE,TRANDATE) injection 10 mg  10 mg Intravenous Q HOUR PRN Yanelis Austin M.D.       • hydrALAZINE (APRESOLINE) injection 10 mg  10 mg Intravenous Q HOUR PRN Yanelis Austin M.D.       • cloNIDine (CATAPRES) tablet 0.1 mg  0.1 mg Oral Q4HRS PRN Yanelis Austin M.D.       • niCARdipine (CARDENE) 25 mg in  mL Infusion  0-15 mg/hr Intravenous Continuous Yanelis Austin M.D.   Stopped at 05/21/19 8863   • levETIRAcetam (KEPPRA) 500 mg in  mL IVPB  500 mg Intravenous Q12HRS Yanelis Austin M.D.   Stopped at 05/21/19 2229   • docusate sodium (COLACE) capsule 100 mg  100 mg Oral BID Yanelis Austin M.D.       • senna-docusate (PERICOLACE or SENOKOT S) 8.6-50 MG per tablet 1 Tab  1 Tab Oral Nightly Yanelis Austin M.D.       • senna-docusate (PERICOLACE or SENOKOT S) 8.6-50 MG per  tablet 1 Tab  1 Tab Oral Q24HRS PRN Yanelis Austin M.D.       • polyethylene glycol/lytes (MIRALAX) PACKET 1 Packet  1 Packet Oral BID PRN Yanelis Austin M.D.       • magnesium hydroxide (MILK OF MAGNESIA) suspension 30 mL  30 mL Oral QDAY PRN Yanelis Austin M.D.       • bisacodyl (DULCOLAX) suppository 10 mg  10 mg Rectal Q24HRS PRN Yanelis Austin M.D.       • fleet enema 133 mL  1 Each Rectal Once PRN Yanelis Austin M.D.       • dextrose 5 % and 0.9 % NaCl with KCl 20 mEq infusion   Intravenous Continuous Yanelis Austin M.D.       • morphine (pf) 4 mg/ml injection 2 mg  2 mg Intravenous Q3HRS PRN Yanelis Austin M.D.       • [START ON 5/22/2019] dexamethasone (DECADRON) injection 4 mg  4 mg Intravenous Q6HRS Yanelis Austin M.D.       • MD Alert...Vancomycin per Pharmacy   Other Once Kellie Marinelli M.D.       • metroNIDAZOLE (FLAGYL) IVPB 500 mg  500 mg Intravenous Once Kellie Marinelli M.D. 100 mL/hr at 05/21/19 2230 500 mg at 05/21/19 2230   • vancomycin 2,700 mg in  mL IVPB  25 mg/kg Intravenous Once Kellie Marinelli M.D. 166.7 mL/hr at 05/21/19 2212 2,700 mg at 05/21/19 2212   • [COMPLETED] LORazepam (ATIVAN) injection 1 mg  1 mg Intravenous Once Kellie Marinelli M.D.   1 mg at 05/21/19 2315     Current Outpatient Prescriptions   Medication Sig Dispense Refill   • cefdinir (OMNICEF) 300 MG Cap Take 300 mg by mouth 2 times a day. Pt completed a 7 day course of CEFDINIR on 5/19     • acetaminophen (TYLENOL) 325 MG Tab Take 650 mg by mouth every four hours as needed.         Allergies  Allergies   Allergen Reactions   • Pcn [Penicillins]      Has tolerated cefdinir in May 2019   • Sulfa Drugs        Vital Signs last 24 hours  Temp:  [36.3 °C (97.3 °F)-36.4 °C (97.5 °F)] 36.3 °C (97.3 °F)  Pulse:  [66-72] 69  Resp:  [16] 16  BP: (137)/(88) 137/88  SpO2:  [92 %-98 %] 92 %    Physical Exam  Physical Exam   Constitutional: He appears well-developed and well-nourished. He appears distressed.    Agitated, requiring three-point restraints   HENT:   Head: Normocephalic and atraumatic.   Nose: Nose normal.   Dry oral mucosa membranes, left TM with erythema and some compression externally of his canal, mild facial swelling anterior to the tragus   Eyes: Pupils are equal, round, and reactive to light. Conjunctivae are normal. No scleral icterus.   Neck: Neck supple. No JVD present. No tracheal deviation present.   Cardiovascular: Regular rhythm.   Occasional extrasystoles are present. Tachycardia present.  PMI is not displaced.  Exam reveals no distant heart sounds and no decreased pulses.    No murmur heard.  Pulmonary/Chest: Effort normal. No accessory muscle usage. No tachypnea. No respiratory distress. He has no decreased breath sounds. He has no wheezes. He has rhonchi in the left lower field. He has no rales.   Gag intact, currently protecting airway   Abdominal: Soft. Bowel sounds are normal. He exhibits no distension. There is no tenderness.   Genitourinary:   Genitourinary Comments: Richards catheter in place   Musculoskeletal: He exhibits no edema or deformity.   Neurological: He exhibits normal muscle tone.   Waxes and wanes between significant agitation and strength moving all extremities purposefully but not following commands with somnolence and snoring   Skin: Skin is warm and dry. No rash noted. He is not diaphoretic. No pallor.   Psychiatric:   Unable to assess given current clinical condition   Nursing note and vitals reviewed.      Fluids  No intake or output data in the 24 hours ending 05/21/19 2302    Laboratory  Recent Results (from the past 48 hour(s))   ACCU-CHEK GLUCOSE    Collection Time: 05/21/19  6:37 PM   Result Value Ref Range    Glucose - Accu-Ck 104 (H) 65 - 99 mg/dL   CBC WITH DIFFERENTIAL    Collection Time: 05/21/19  7:44 PM   Result Value Ref Range    WBC 8.2 4.8 - 10.8 K/uL    RBC 6.33 (H) 4.70 - 6.10 M/uL    Hemoglobin 14.7 14.0 - 18.0 g/dL    Hematocrit 48.2 42.0 - 52.0 %     MCV 76.1 (L) 81.4 - 97.8 fL    MCH 23.2 (L) 27.0 - 33.0 pg    MCHC 30.5 (L) 33.7 - 35.3 g/dL    RDW 50.4 (H) 35.9 - 50.0 fL    Platelet Count 350 164 - 446 K/uL    MPV 10.3 9.0 - 12.9 fL    Neutrophils-Polys 76.50 (H) 44.00 - 72.00 %    Lymphocytes 14.90 (L) 22.00 - 41.00 %    Monocytes 6.70 0.00 - 13.40 %    Eosinophils 0.50 0.00 - 6.90 %    Basophils 1.00 0.00 - 1.80 %    Immature Granulocytes 0.40 0.00 - 0.90 %    Nucleated RBC 0.00 /100 WBC    Neutrophils (Absolute) 6.25 1.82 - 7.42 K/uL    Lymphs (Absolute) 1.22 1.00 - 4.80 K/uL    Monos (Absolute) 0.55 0.00 - 0.85 K/uL    Eos (Absolute) 0.04 0.00 - 0.51 K/uL    Baso (Absolute) 0.08 0.00 - 0.12 K/uL    Immature Granulocytes (abs) 0.03 0.00 - 0.11 K/uL    NRBC (Absolute) 0.00 K/uL   PROTHROMBIN TIME (INR)    Collection Time: 05/21/19  7:44 PM   Result Value Ref Range    PT 15.4 (H) 12.0 - 14.6 sec    INR 1.21 (H) 0.87 - 1.13   APTT    Collection Time: 05/21/19  7:44 PM   Result Value Ref Range    APTT 33.7 24.7 - 36.0 sec   LACTIC ACID    Collection Time: 05/21/19  7:44 PM   Result Value Ref Range    Lactic Acid 1.8 0.5 - 2.0 mmol/L   Comp Metabolic Panel    Collection Time: 05/21/19  7:44 PM   Result Value Ref Range    Sodium 140 135 - 145 mmol/L    Potassium 3.7 3.6 - 5.5 mmol/L    Chloride 107 96 - 112 mmol/L    Co2 22 20 - 33 mmol/L    Anion Gap 11.0 0.0 - 11.9    Glucose 110 (H) 65 - 99 mg/dL    Bun 20 8 - 22 mg/dL    Creatinine 0.79 0.50 - 1.40 mg/dL    Calcium 9.0 8.5 - 10.5 mg/dL    AST(SGOT) 16 12 - 45 U/L    ALT(SGPT) 23 2 - 50 U/L    Alkaline Phosphatase 48 30 - 99 U/L    Total Bilirubin 0.9 0.1 - 1.5 mg/dL    Albumin 4.5 3.2 - 4.9 g/dL    Total Protein 7.6 6.0 - 8.2 g/dL    Globulin 3.1 1.9 - 3.5 g/dL    A-G Ratio 1.5 g/dL   TROPONIN    Collection Time: 05/21/19  7:44 PM   Result Value Ref Range    Troponin I <0.01 0.00 - 0.04 ng/mL   ESTIMATED GFR    Collection Time: 05/21/19  7:44 PM   Result Value Ref Range    GFR If  >60 >60  mL/min/1.73 m 2    GFR If Non African American >60 >60 mL/min/1.73 m 2       Imaging  DX-CHEST-PORTABLE (1 VIEW)   Final Result         1. Borderline cardiomegaly. No focal consolidation or pleural effusions.   2. Hiatal hernia.      CT-CTA NECK WITH & W/O-POST PROCESSING   Final Result      Unremarkable CT angiogram of the neck. No high-grade stenosis, large vessel occlusion, aneurysm or dissection.      CT-CTA HEAD WITH & W/O-POST PROCESS   Final Result      1.  No large vessel occlusion, high-grade stenosis or aneurysm of the [place].   2.  A 4 cm mass lesion in the left frontotemporal region. Imaging features favor an abscess over a solid lesion. However, MRI is needed for definitive characterization.   3.  Extensive edema associated with the mass, resulting in 10 mm left-to-right midline shift.   4.  Effacement of the left and enlargement of the right lateral ventricles may be due to developing hydrocephalus.      Findings were discussed with JOSE ALTAMIRANO on 5/21/2019 8:39 PM.      MR-[facility] BRAIN WITH & W/O    (Results Pending)    *Personally reviewed chest x-ray and compared to prior showing low lung volumes with enlarged cardiac silhouette, mild perihilar fullness with bibasilar atelectasis   *Personally reviewed EKG showing normal sinus rhythm with a rate of 71 with borderline T wave abnormalities in the lateral leads, QTc interval normal at 408    Assessment/Plan  Brain abscess- (present on admission)   Assessment & Plan    Presumed based on imaging and recent left otitis media  MRI with and without contrast tonight  IV antibiotics including Rocephin, Flagyl, vancomycin  Cultures  Keppra  Decadron  Neurosurgical and infectious disease consultation  Low threshold for intubation for airway protection and to facilitate MRI brain  Likely operative intervention to follow shortly, query need for ENT consultation depending on results of MRI  Will check HIV and RPR given abscess and meth abuse,  ?IVDA  Strict BP control (goal SBP<160) with nicardipine gtt     Delirium   Assessment & Plan    Hyperactive delirium  We will trial Geodon IM x1 (normal QTc interval)  Low threshold for intubation if unsuccessful given need for MRI and operative intervention  Physical restraints as needed for patient and staff's safety     Methamphetamine abuse (HCC)- (present on admission)   Assessment & Plan    Eventual drug cessation education to be provided once able to converse with patient  Monitor for withdrawal syndrome  Check drug screen, HIV     Smoker- (present on admission)   Assessment & Plan    Nicotine replacement patch  Tobacco cessation education to be provided eventually         Discussed patient condition and risk of morbidity and/or mortality with RN, RT, Pharmacy, [employer] [facility] resident, Charge nurse / hot rounds, Patient and infectious disease and neurosurgery.    The patient remains critically ill.  Critical care time = 45 minutes in directly providing and coordinating critical care and extensive data review.  No time overlap and excludes procedures.

## 2019-05-22 NOTE — ANESTHESIA PREPROCEDURE EVALUATION
Relevant Problems   No relevant active problems       Physical Exam    Airway   Mallampati: II  TM distance: >3 FB  Neck ROM: full       Cardiovascular - normal exam  Rhythm: regular  Rate: normal  (-) murmur     Dental - normal exam         Pulmonary - normal exam  Breath sounds clear to auscultation  Comments: Intubated/vented    Abdominal   Abdomen: soft     Neurological - sedated/unconcious                 Anesthesia Plan    ASA 4 - emergent   ASA physical status 4 criteria: CVA or TIA - recent (< 3 months)ASA physical status emergent criteria: neurologic compromise requiring immediate intervention    Plan - general       Airway plan will be ETT        Induction: inhalational          Informed Consent:  Emergent - Consent given by clinician

## 2019-05-22 NOTE — PROGRESS NOTES
2 RN skin check complete with GRACIELA Pimentel.   Devices in place SCDs, PIVs, ekg leads, bp cuff, r/l wrist and r ankle soft restraint, pulse oximeter..  Skin assessed under devices listed above.  Confirmed pressure ulcers found on n/a.  Abrasions noted to posterior left thigh.  New potential pressure ulcers noted on n/a.  The following interventions in place frequent repositioning, photos taken of pre existing wounds and charted on in EPIC, padding and rotating medical devices, pillows for positioning.

## 2019-05-23 ENCOUNTER — APPOINTMENT (OUTPATIENT)
Dept: CARDIOLOGY | Facility: MEDICAL CENTER | Age: 54
DRG: 025 | End: 2019-05-23
Attending: INTERNAL MEDICINE
Payer: MEDICAID

## 2019-05-23 ENCOUNTER — APPOINTMENT (OUTPATIENT)
Dept: RADIOLOGY | Facility: MEDICAL CENTER | Age: 54
DRG: 025 | End: 2019-05-23
Attending: INTERNAL MEDICINE
Payer: MEDICAID

## 2019-05-23 PROBLEM — Z98.890 S/P CRANIOTOMY: Status: ACTIVE | Noted: 2019-05-23

## 2019-05-23 LAB
ACTION RANGE TRIGGERED IACRT: NO
ANION GAP SERPL CALC-SCNC: 5 MMOL/L (ref 0–11.9)
ANION GAP SERPL CALC-SCNC: 6 MMOL/L (ref 0–11.9)
ANION GAP SERPL CALC-SCNC: 7 MMOL/L (ref 0–11.9)
BASE EXCESS BLDA CALC-SCNC: -2 MMOL/L (ref -4–3)
BODY TEMPERATURE: NORMAL DEGREES
BUN SERPL-MCNC: 19 MG/DL (ref 8–22)
BUN SERPL-MCNC: 23 MG/DL (ref 8–22)
BUN SERPL-MCNC: 26 MG/DL (ref 8–22)
CALCIUM SERPL-MCNC: 8.1 MG/DL (ref 8.5–10.5)
CALCIUM SERPL-MCNC: 8.3 MG/DL (ref 8.5–10.5)
CALCIUM SERPL-MCNC: 8.4 MG/DL (ref 8.5–10.5)
CHLORIDE SERPL-SCNC: 117 MMOL/L (ref 96–112)
CHLORIDE SERPL-SCNC: 118 MMOL/L (ref 96–112)
CHLORIDE SERPL-SCNC: 119 MMOL/L (ref 96–112)
CO2 BLDA-SCNC: 23 MMOL/L (ref 20–33)
CO2 SERPL-SCNC: 22 MMOL/L (ref 20–33)
CO2 SERPL-SCNC: 23 MMOL/L (ref 20–33)
CO2 SERPL-SCNC: 25 MMOL/L (ref 20–33)
CREAT SERPL-MCNC: 0.81 MG/DL (ref 0.5–1.4)
CREAT SERPL-MCNC: 0.84 MG/DL (ref 0.5–1.4)
CREAT SERPL-MCNC: 0.9 MG/DL (ref 0.5–1.4)
ERYTHROCYTE [DISTWIDTH] IN BLOOD BY AUTOMATED COUNT: 52.9 FL (ref 35.9–50)
GLUCOSE BLD-MCNC: 128 MG/DL (ref 65–99)
GLUCOSE BLD-MCNC: 136 MG/DL (ref 65–99)
GLUCOSE BLD-MCNC: 138 MG/DL (ref 65–99)
GLUCOSE SERPL-MCNC: 143 MG/DL (ref 65–99)
GLUCOSE SERPL-MCNC: 145 MG/DL (ref 65–99)
GLUCOSE SERPL-MCNC: 146 MG/DL (ref 65–99)
HCO3 BLDA-SCNC: 22.2 MMOL/L (ref 17–25)
HCT VFR BLD AUTO: 39.2 % (ref 42–52)
HGB BLD-MCNC: 11.4 G/DL (ref 14–18)
HOROWITZ INDEX BLDA+IHG-RTO: 173 MM[HG]
INST. QUALIFIED PATIENT IIQPT: YES
MAGNESIUM SERPL-MCNC: 1.8 MG/DL (ref 1.5–2.5)
MCH RBC QN AUTO: 23 PG (ref 27–33)
MCHC RBC AUTO-ENTMCNC: 29.5 G/DL (ref 33.7–35.3)
MCV RBC AUTO: 77.8 FL (ref 81.4–97.8)
O2/TOTAL GAS SETTING VFR VENT: 40 %
PATHOLOGY CONSULT NOTE: NORMAL
PCO2 BLDA: 35.4 MMHG (ref 26–37)
PCO2 TEMP ADJ BLDA: 35.9 MMHG (ref 26–37)
PH BLDA: 7.41 [PH] (ref 7.4–7.5)
PH TEMP ADJ BLDA: 7.4 [PH] (ref 7.4–7.5)
PHOSPHATE SERPL-MCNC: 3.1 MG/DL (ref 2.5–4.5)
PLATELET # BLD AUTO: 329 K/UL (ref 164–446)
PMV BLD AUTO: 10.4 FL (ref 9–12.9)
PO2 BLDA: 69 MMHG (ref 64–87)
PO2 TEMP ADJ BLDA: 70 MMHG (ref 64–87)
POTASSIUM SERPL-SCNC: 3.8 MMOL/L (ref 3.6–5.5)
POTASSIUM SERPL-SCNC: 3.9 MMOL/L (ref 3.6–5.5)
POTASSIUM SERPL-SCNC: 3.9 MMOL/L (ref 3.6–5.5)
RBC # BLD AUTO: 5.01 M/UL (ref 4.7–6.1)
SAO2 % BLDA: 94 % (ref 93–99)
SODIUM SERPL-SCNC: 145 MMOL/L (ref 135–145)
SODIUM SERPL-SCNC: 148 MMOL/L (ref 135–145)
SODIUM SERPL-SCNC: 149 MMOL/L (ref 135–145)
SPECIMEN DRAWN FROM PATIENT: NORMAL
VANCOMYCIN TROUGH SERPL-MCNC: 14.9 UG/ML (ref 10–20)
WBC # BLD AUTO: 14.6 K/UL (ref 4.8–10.8)

## 2019-05-23 PROCEDURE — A9270 NON-COVERED ITEM OR SERVICE: HCPCS | Performed by: INTERNAL MEDICINE

## 2019-05-23 PROCEDURE — 700101 HCHG RX REV CODE 250: Performed by: INTERNAL MEDICINE

## 2019-05-23 PROCEDURE — 82803 BLOOD GASES ANY COMBINATION: CPT

## 2019-05-23 PROCEDURE — 83735 ASSAY OF MAGNESIUM: CPT

## 2019-05-23 PROCEDURE — 700101 HCHG RX REV CODE 250: Performed by: STUDENT IN AN ORGANIZED HEALTH CARE EDUCATION/TRAINING PROGRAM

## 2019-05-23 PROCEDURE — 80202 ASSAY OF VANCOMYCIN: CPT

## 2019-05-23 PROCEDURE — 700111 HCHG RX REV CODE 636 W/ 250 OVERRIDE (IP): Performed by: INTERNAL MEDICINE

## 2019-05-23 PROCEDURE — 700105 HCHG RX REV CODE 258: Performed by: INTERNAL MEDICINE

## 2019-05-23 PROCEDURE — 770022 HCHG ROOM/CARE - ICU (200)

## 2019-05-23 PROCEDURE — 84100 ASSAY OF PHOSPHORUS: CPT

## 2019-05-23 PROCEDURE — 94003 VENT MGMT INPAT SUBQ DAY: CPT

## 2019-05-23 PROCEDURE — 4A10X4Z MONITORING OF CENTRAL NERVOUS ELECTRICAL ACTIVITY, EXTERNAL APPROACH: ICD-10-PCS | Performed by: PSYCHIATRY & NEUROLOGY

## 2019-05-23 PROCEDURE — 700112 HCHG RX REV CODE 229: Performed by: INTERNAL MEDICINE

## 2019-05-23 PROCEDURE — 80048 BASIC METABOLIC PNL TOTAL CA: CPT | Mod: 91

## 2019-05-23 PROCEDURE — 71045 X-RAY EXAM CHEST 1 VIEW: CPT

## 2019-05-23 PROCEDURE — 700105 HCHG RX REV CODE 258: Performed by: NEUROLOGICAL SURGERY

## 2019-05-23 PROCEDURE — 85027 COMPLETE CBC AUTOMATED: CPT

## 2019-05-23 PROCEDURE — 37799 UNLISTED PX VASCULAR SURGERY: CPT

## 2019-05-23 PROCEDURE — 700102 HCHG RX REV CODE 250 W/ 637 OVERRIDE(OP): Performed by: INTERNAL MEDICINE

## 2019-05-23 PROCEDURE — 99253 IP/OBS CNSLTJ NEW/EST LOW 45: CPT | Performed by: INTERNAL MEDICINE

## 2019-05-23 PROCEDURE — 95819 EEG AWAKE AND ASLEEP: CPT | Mod: 26 | Performed by: PSYCHIATRY & NEUROLOGY

## 2019-05-23 PROCEDURE — 84478 ASSAY OF TRIGLYCERIDES: CPT

## 2019-05-23 PROCEDURE — 95819 EEG AWAKE AND ASLEEP: CPT | Performed by: PSYCHIATRY & NEUROLOGY

## 2019-05-23 PROCEDURE — 99291 CRITICAL CARE FIRST HOUR: CPT | Performed by: INTERNAL MEDICINE

## 2019-05-23 PROCEDURE — 700111 HCHG RX REV CODE 636 W/ 250 OVERRIDE (IP): Performed by: NURSE PRACTITIONER

## 2019-05-23 PROCEDURE — 82962 GLUCOSE BLOOD TEST: CPT

## 2019-05-23 PROCEDURE — 700111 HCHG RX REV CODE 636 W/ 250 OVERRIDE (IP): Performed by: NEUROLOGICAL SURGERY

## 2019-05-23 RX ORDER — DEXAMETHASONE SODIUM PHOSPHATE 4 MG/ML
10 INJECTION, SOLUTION INTRA-ARTICULAR; INTRALESIONAL; INTRAMUSCULAR; INTRAVENOUS; SOFT TISSUE 3 TIMES DAILY
Status: COMPLETED | OUTPATIENT
Start: 2019-05-23 | End: 2019-05-23

## 2019-05-23 RX ORDER — DEXMEDETOMIDINE HYDROCHLORIDE 4 UG/ML
.1-1.5 INJECTION, SOLUTION INTRAVENOUS CONTINUOUS
Status: DISCONTINUED | OUTPATIENT
Start: 2019-05-23 | End: 2019-05-25

## 2019-05-23 RX ORDER — DEXAMETHASONE SODIUM PHOSPHATE 4 MG/ML
4 INJECTION, SOLUTION INTRA-ARTICULAR; INTRALESIONAL; INTRAMUSCULAR; INTRAVENOUS; SOFT TISSUE 3 TIMES DAILY
Status: COMPLETED | OUTPATIENT
Start: 2019-05-26 | End: 2019-05-27

## 2019-05-23 RX ORDER — DEXAMETHASONE SODIUM PHOSPHATE 4 MG/ML
4 INJECTION, SOLUTION INTRA-ARTICULAR; INTRALESIONAL; INTRAMUSCULAR; INTRAVENOUS; SOFT TISSUE 2 TIMES DAILY
Status: COMPLETED | OUTPATIENT
Start: 2019-05-28 | End: 2019-05-29

## 2019-05-23 RX ORDER — DEXAMETHASONE SODIUM PHOSPHATE 4 MG/ML
2 INJECTION, SOLUTION INTRA-ARTICULAR; INTRALESIONAL; INTRAMUSCULAR; INTRAVENOUS; SOFT TISSUE 2 TIMES DAILY
Status: COMPLETED | OUTPATIENT
Start: 2019-05-30 | End: 2019-05-31

## 2019-05-23 RX ORDER — MAGNESIUM SULFATE HEPTAHYDRATE 40 MG/ML
2 INJECTION, SOLUTION INTRAVENOUS ONCE
Status: COMPLETED | OUTPATIENT
Start: 2019-05-23 | End: 2019-05-23

## 2019-05-23 RX ORDER — DEXAMETHASONE SODIUM PHOSPHATE 4 MG/ML
6 INJECTION, SOLUTION INTRA-ARTICULAR; INTRALESIONAL; INTRAMUSCULAR; INTRAVENOUS; SOFT TISSUE 3 TIMES DAILY
Status: COMPLETED | OUTPATIENT
Start: 2019-05-24 | End: 2019-05-25

## 2019-05-23 RX ORDER — DEXAMETHASONE SODIUM PHOSPHATE 4 MG/ML
2 INJECTION, SOLUTION INTRA-ARTICULAR; INTRALESIONAL; INTRAMUSCULAR; INTRAVENOUS; SOFT TISSUE DAILY
Status: COMPLETED | OUTPATIENT
Start: 2019-06-01 | End: 2019-06-02

## 2019-05-23 RX ADMIN — DOCUSATE SODIUM 100 MG: 50 LIQUID ORAL at 05:00

## 2019-05-23 RX ADMIN — CEFEPIME 2 G: 2 INJECTION, POWDER, FOR SOLUTION INTRAVENOUS at 14:28

## 2019-05-23 RX ADMIN — DEXMEDETOMIDINE HYDROCHLORIDE 0.4 MCG/KG/HR: 100 INJECTION, SOLUTION INTRAVENOUS at 14:41

## 2019-05-23 RX ADMIN — CEFEPIME 2 G: 2 INJECTION, POWDER, FOR SOLUTION INTRAVENOUS at 22:14

## 2019-05-23 RX ADMIN — METRONIDAZOLE 500 MG: 500 INJECTION, SOLUTION INTRAVENOUS at 15:08

## 2019-05-23 RX ADMIN — CEFEPIME 2 G: 2 INJECTION, POWDER, FOR SOLUTION INTRAVENOUS at 05:00

## 2019-05-23 RX ADMIN — METRONIDAZOLE 500 MG: 500 INJECTION, SOLUTION INTRAVENOUS at 22:14

## 2019-05-23 RX ADMIN — VANCOMYCIN HYDROCHLORIDE 2000 MG: 100 INJECTION, POWDER, LYOPHILIZED, FOR SOLUTION INTRAVENOUS at 13:03

## 2019-05-23 RX ADMIN — FENTANYL CITRATE 100 MCG: 50 INJECTION INTRAMUSCULAR; INTRAVENOUS at 13:42

## 2019-05-23 RX ADMIN — SENNOSIDES, DOCUSATE SODIUM 1 TABLET: 50; 8.6 TABLET, FILM COATED ORAL at 19:55

## 2019-05-23 RX ADMIN — FENTANYL CITRATE 100 MCG: 50 INJECTION INTRAMUSCULAR; INTRAVENOUS at 09:51

## 2019-05-23 RX ADMIN — FAMOTIDINE 20 MG: 10 INJECTION INTRAVENOUS at 18:00

## 2019-05-23 RX ADMIN — SODIUM CHLORIDE 500 ML: 3 INJECTION, SOLUTION INTRAVENOUS at 11:34

## 2019-05-23 RX ADMIN — MAGNESIUM SULFATE 2 G: 2 INJECTION INTRAVENOUS at 07:39

## 2019-05-23 RX ADMIN — ACETAMINOPHEN 1000 MG: 500 TABLET ORAL at 00:29

## 2019-05-23 RX ADMIN — VANCOMYCIN HYDROCHLORIDE 2000 MG: 100 INJECTION, POWDER, LYOPHILIZED, FOR SOLUTION INTRAVENOUS at 00:30

## 2019-05-23 RX ADMIN — FAMOTIDINE 20 MG: 20 TABLET ORAL at 05:00

## 2019-05-23 RX ADMIN — SODIUM CHLORIDE 500 MG: 9 INJECTION, SOLUTION INTRAVENOUS at 18:01

## 2019-05-23 RX ADMIN — ACETAMINOPHEN 1000 MG: 500 TABLET ORAL at 12:39

## 2019-05-23 RX ADMIN — METRONIDAZOLE 500 MG: 500 INJECTION, SOLUTION INTRAVENOUS at 05:00

## 2019-05-23 RX ADMIN — DOCUSATE SODIUM 100 MG: 50 LIQUID ORAL at 18:00

## 2019-05-23 RX ADMIN — DEXAMETHASONE SODIUM PHOSPHATE 10 MG: 4 INJECTION, SOLUTION INTRA-ARTICULAR; INTRALESIONAL; INTRAMUSCULAR; INTRAVENOUS; SOFT TISSUE at 18:00

## 2019-05-23 RX ADMIN — FENTANYL CITRATE 50 MCG: 50 INJECTION INTRAMUSCULAR; INTRAVENOUS at 07:34

## 2019-05-23 RX ADMIN — DEXAMETHASONE SODIUM PHOSPHATE 10 MG: 4 INJECTION, SOLUTION INTRA-ARTICULAR; INTRALESIONAL; INTRAMUSCULAR; INTRAVENOUS; SOFT TISSUE at 14:14

## 2019-05-23 RX ADMIN — FENTANYL CITRATE 100 MCG: 50 INJECTION INTRAMUSCULAR; INTRAVENOUS at 23:59

## 2019-05-23 RX ADMIN — ACETAMINOPHEN 1000 MG: 500 TABLET ORAL at 19:55

## 2019-05-23 RX ADMIN — ACETAMINOPHEN 1000 MG: 500 TABLET ORAL at 05:00

## 2019-05-23 RX ADMIN — SODIUM CHLORIDE 500 MG: 9 INJECTION, SOLUTION INTRAVENOUS at 05:00

## 2019-05-23 RX ADMIN — SENNOSIDES, DOCUSATE SODIUM 1 TABLET: 50; 8.6 TABLET, FILM COATED ORAL at 00:00

## 2019-05-23 RX ADMIN — PROPOFOL 20 MCG/KG/MIN: 10 INJECTION, EMULSION INTRAVENOUS at 10:30

## 2019-05-23 RX ADMIN — DEXAMETHASONE SODIUM PHOSPHATE 10 MG: 4 INJECTION, SOLUTION INTRA-ARTICULAR; INTRALESIONAL; INTRAMUSCULAR; INTRAVENOUS; SOFT TISSUE at 00:30

## 2019-05-23 RX ADMIN — DEXAMETHASONE SODIUM PHOSPHATE 10 MG: 4 INJECTION, SOLUTION INTRA-ARTICULAR; INTRALESIONAL; INTRAMUSCULAR; INTRAVENOUS; SOFT TISSUE at 05:00

## 2019-05-23 ASSESSMENT — COGNITIVE AND FUNCTIONAL STATUS - GENERAL
STANDING UP FROM CHAIR USING ARMS: TOTAL
MOVING TO AND FROM BED TO CHAIR: UNABLE
TURNING FROM BACK TO SIDE WHILE IN FLAT BAD: UNABLE
MOVING FROM LYING ON BACK TO SITTING ON SIDE OF FLAT BED: UNABLE
WALKING IN HOSPITAL ROOM: TOTAL

## 2019-05-23 ASSESSMENT — PAIN SCALES - WONG BAKER: WONGBAKER_NUMERICALRESPONSE: DOESN'T HURT AT ALL

## 2019-05-23 NOTE — ANESTHESIA PROCEDURE NOTES
Arterial Line  Performed by: MATTHEW ZHAO  Authorized by: MATTHEW ZHAO     Start Time:  5/22/2019 5:00 PM  End Time:  5/22/2019 5:02 PM  Localization: ultrasound guidance and surface landmarks  Image captured, interpreted and electronically stored.  Patient Location:  OR  Indication: continuous blood pressure monitoring    Catheter Size:  20 G  Seldinger Technique?: Yes    Laterality:  Left  Site:  Radial artery  Line Secured:  Antimicrobial disc, tape and transparent dressing  Events: patient tolerated procedure well with no complications

## 2019-05-23 NOTE — ANESTHESIA POSTPROCEDURE EVALUATION
Patient: Robert Dorantes    Procedure Summary     Date:  05/22/19 Room / Location:  [facility] 04 / SURGERY [facility]    Anesthesia Start:  1622 Anesthesia Stop:  1929    Procedure:  LEFT TEMPORAL CRANIOTOMY for DRAINAGE OF BRAIN CYST, RESECTION OF BRAIN MASS (Left Head) Diagnosis:  (PENDING PATHOLOGY)    Surgeon:  Chase Young M.D. Responsible Provider:  Casper Garcia M.D.    Anesthesia Type:  general ASA Status:  4 - Emergent          Final Anesthesia Type: general  Last vitals  BP   Blood Pressure: 137/88, NIBP: 109/63    Temp   37.7 °C (99.9 °F)    Pulse   Pulse: 69, Heart Rate (Monitored): 69   Resp   (!) 21    SpO2   98 %      Anesthesia Post Evaluation    Patient location during evaluation: ICU  Patient participation: complete - patient cannot participate  Level of consciousness: awake and alert and obtunded/minimal responses  Pain score: 0    Airway patency: patent  Anesthetic complications: no  Cardiovascular status: hemodynamically stable  Respiratory status: acceptable  Hydration status: euvolemic    PONV: none           Nurse Pain Score: 10 (NPRS)

## 2019-05-23 NOTE — ANESTHESIA PROCEDURE NOTES
Arterial Line  Performed by: MATTHEW ZHAO  Authorized by: MATTHEW ZHAO     Start Time:  5/22/2019 5:00 PM  End Time:  5/22/2019 5:00 PM  Localization: surface landmarks    Patient Location:  OR  Indication: continuous blood pressure monitoring    Catheter Size:  20 G  Seldinger Technique?: Yes    Laterality:  Left  Site:  Radial artery  Line Secured:  Antimicrobial disc, tape and transparent dressing  Events: patient tolerated procedure well with no complications

## 2019-05-23 NOTE — ANESTHESIA TIME REPORT
Anesthesia Start and Stop Event Times     Date Time Event    5/22/2019 1622 Anesthesia Start     1929 Anesthesia Stop        Responsible Staff  05/22/19    Name Role Begin End    Zain Castillo M.D. Anesth 1622 1832    Casper Garcia M.D. Anesth 1832 1929        Preop Diagnosis (Free Text):  Pre-op Diagnosis      left temporal lobe cystic lesion        Preop Diagnosis (Codes):  Diagnosis Information     Diagnosis Code(s):         Post op Diagnosis  Intracranial abscess      Premium Reason  A. 3PM - 7AM    Comments:

## 2019-05-23 NOTE — CARE PLAN
Problem: Ventilation Defect:  Goal: Ability to achieve and maintain unassisted ventilation or tolerate decreased levels of ventilator support    Intervention: Support and monitor invasive and noninvasive mechanical ventilation  Adult Ventilation Update    Total Vent Days: 1  RR 20 Vt 450 Peep 8 FIO2 30%     Patient Lines/Drains/Airways Status    Active Airway     Name: Placement date: Placement time: Site: Days:    Airway ETT Oral 8.0 05/22/19   0305   Oral   less than 1              In last 24 hrs no SBTs    Patient failed trials because of Barriers to Wean: Other (Comments) (pending transport to [facility] this am) (05/22/19 0705)  Barriers to SBT    Length of Weaning Trial      Cough: Productive (05/22/19 2209)  Sputum Amount: Small (05/22/19 2209)  Sputum Color: White (05/22/19 2209)  Sputum Consistency: Thin;Thick (05/22/19 2209)    Mobility  Level of Mobility: Level I (05/22/19 2000)  Activity Performed: Unable to mobilize (05/22/19 2000)  Reason Not Mobilized: Unstable condition (05/22/19 2000)  Mobilization Comments: aggressive, respiratory decline (05/22/19 0200)

## 2019-05-23 NOTE — THERAPY
Occupational Therapy Contact Note:    Per RN pt is intubated, sedated and agitated requested to hold for another day.    Apurva Becker, OTR/L  Pager: [phone]

## 2019-05-23 NOTE — PROGRESS NOTES
Critical Care Progress Note    Date of admission  5/21/2019    Chief Complaint  53 y.o. male admitted 5/21/2019 with altered LOC, possible brain abcess by imaging     Hospital Course    53 y.o. male who presented 5/21/2019 with a past medical history significant for methamphetamine abuse who was seen in the emergency department 9 days ago and prescribed Cefdinir at discharge.  Apparently over the last several days he has had worsening pain, headache and developing confusion and unsteady gait with word finding difficulty.  No reported fevers or chills and he reportedly completed his antibiotics as prescribed.  In the emergency department patient underwent CT imaging of his brain which revealed a left frontotemporal mass favoring abscess over a solid lesion with extensive edema and a 10 mm left to right midline shift with effacement of the lateral ventricle.  Neurosurgery was consulted recommending an MRI of his brain and he was started on IV Keppra as well as Decadron.  Infectious disease was consulted and recommended IV antibiotics including Rocephin, vancomycin, and Flagyl.  Patient is being admitted to the intensive care unit and I was consulted for his critical care management.      Interval Problem Update  Reviewed last 24 hour events:    POD#1 Cranio for CNS abcess - gramstain neg  Agitated with suctioning  Follows bilat L>R, purposeful  SB/SR 50-60s  SBp 90-110s  Tm 37.3  Vent day #2  PEEP 8 - 30%  CXR worse ATX BB  Min secretions  NPO - OG to suction  400/12hrs  UO good  CVC  Prop 20  EEG about to start    3% saline 40 - new goal 135-145  NO Pharm VTE  Vanco/Cefepimne/Flagyl  Decadron    Replete Mg  SAT/SBT not tolerated  Switching propofol over to dexmedetomidine although bradycardia at times    Successfully off propofol and on dexmedetomidine with heart rate running in the 50s with normal blood pressure  Repeat SBT when clinically appropriate     YESTERDAY  D/w RN re sedation for MRI - PRN Versed +  Fent on top of PROP    Vent day #2  PEEP 8, 30%  ABG noted  CXR SSLLLA  Recent OM -> brain abcess -> to OR today  - L frontal abcess?  MRI pending interp - Stealth scan  PERRLA  Restrained  Not follows - moves all 4  PROP 10   SR 70s  SBp 90-170s  UO great, no lasix  Tm 37.8  WBC  3% saline 30cc/hr - last Na 144 - goal 150-155  EEG pending  Kera  SCDs  Decadron    OR today  Continue aggressive antibiotic therapy  EEG later today or in a.m., Stealth MRI and neurosurgical drainage of abscess takes priority, EEG off propofol if possible  ID to see    Case reviewed at length with family at the bedside  Reviewed the case at great length with Dr. Young, neurosurgery, in the ICU at the bedside, in particular reviewed results of exam, MRI and treatment plan    Review of Systems  Review of Systems   Unable to perform ROS: Intubated   Remains agitated and not following    Vital Signs for last 24 hours   Pulse:  [49-67] 49  Resp:  [11-26] 19  SpO2:  [94 %-100 %] 97 %    Hemodynamic parameters for last 24 hours       Respiratory Information for the last 24 hours  Taylor Vent Mode: APVCMV  Rate (breaths/min): 20  Vt Target (mL): 450  PEEP/CPAP: 8  FiO2: 30  P Support: 5  P MEAN: 12  Length of Weaning Trial (Hours): .75  Control VTE (exp VT): 447    Physical Exam   Physical Exam   Constitutional: He appears well-developed and well-nourished. He appears lethargic.  Non-toxic appearance. He appears ill. No distress. He is sedated, intubated and restrained.   HENT:   Head: Normocephalic.   Mouth/Throat: Oropharynx is clear and moist. Mucous membranes are not dry and not cyanotic.   CNS strain craniotomy scar noted on the left   Eyes: No scleral icterus.   Small but reactive pupils, not disconjugate   Neck: Neck supple. No JVD present. No neck rigidity. No Brudzinski's sign and no Kernig's sign noted.   Cardiovascular: Normal rate, regular rhythm and intact distal pulses.   No extrasystoles are present. Exam reveals no gallop  and no friction rub.    No murmur heard.  Pulmonary/Chest: He is intubated. No respiratory distress (On full vent support). He has no wheezes. He has no rhonchi. He has no rales.   Abdominal: Soft. Bowel sounds are normal. He exhibits no distension. There is no hepatosplenomegaly. There is no tenderness. There is no guarding and no CVA tenderness.   Neurological: He appears lethargic. He exhibits normal muscle tone. GCS eye subscore is 3. GCS verbal subscore is 1. GCS motor subscore is 5.   Cranial nerves intact, moves all 4, not following commands, no tremor or seizure activity   Skin: Skin is warm and dry. He is not diaphoretic (Improved). No cyanosis or erythema. Nails show no clubbing.   Psychiatric:   Unable to assess   Vitals reviewed.      Medications  Current Facility-Administered Medications   Medication Dose Route Frequency Provider Last Rate Last Dose   • [START ON 5/24/2019] dexamethasone (DECADRON) injection 6 mg  6 mg Intravenous TID Catherine Magallon, A.P.R.N.        Followed by   • [START ON 5/26/2019] dexamethasone (DECADRON) injection 4 mg  4 mg Intravenous TID Catherine Magallon A.P.R.N.        Followed by   • [START ON 5/28/2019] dexamethasone (DECADRON) injection 4 mg  4 mg Intravenous BID Catherine Magallon, A.P.R.N.        Followed by   • [START ON 5/30/2019] dexamethasone (DECADRON) injection 2 mg  2 mg Intravenous BID Catherine Magallon, A.P.R.N.        Followed by   • [START ON 6/1/2019] dexamethasone (DECADRON) injection 2 mg  2 mg Intravenous DAILY Catherine Magallon A.P.R.N.       • dexmedetomidine (PRECEDEX) 400 mcg/100mL NS premix infusion  0.1-1.5 mcg/kg/hr Intravenous Continuous Amilcar Reaves M.D. 10.6 mL/hr at 05/23/19 1626 0.4 mcg/kg/hr at 05/23/19 1626   • metroNIDAZOLE (FLAGYL) IVPB 500 mg  500 mg Intravenous Q8HRS Sadiq Guerin M.D.   Stopped at 05/23/19 1608   • insulin regular (HUMULIN R) injection 1-6 Units  1-6 Units Subcutaneous Q6HRS Jeremy M Gonda, M.D.    Stopped at 05/22/19 0600    And   • DEXTROSE 10% BOLUS 125 mL  125 mL Intravenous Q15 MIN PRN Jeremy M Gonda, M.D.       • Respiratory Care per Protocol   Nebulization Continuous RT Jeremy M Gonda, M.D.       • MD Alert...ICU Electrolyte Replacement per Pharmacy   Other PHARMACY TO DOSE Jeremy M Gonda, M.D.       • Pharmacy Consult: Enteral tube insertion - review meds/change route/product selection   Other PHARMACY TO DOSE Jeremy M Gonda, M.D.       • fentaNYL (SUBLIMAZE) injection 25 mcg  25 mcg Intravenous Q HOUR PRN Jeremy M Gonda, M.D.   Stopped at 05/22/19 0752    Or   • fentaNYL (SUBLIMAZE) injection 50 mcg  50 mcg Intravenous Q HOUR PRN Jeremy M Gonda, M.D.   50 mcg at 05/23/19 0734    Or   • fentaNYL (SUBLIMAZE) injection 100 mcg  100 mcg Intravenous Q HOUR PRN Jeremy M Gonda, M.D.   100 mcg at 05/23/19 1342   • 3% sodium chloride (HYPERTONIC SALINE) 500mL infusion 500 mL  500 mL Intravenous Continuous Amilcar Reaves M.D. 20 mL/hr at 05/23/19 1220 500 mL at 05/23/19 1220   • NS infusion   Intravenous Continuous Jeremy M Gonda, M.D. 100 mL/hr at 05/22/19 1510     • propofol (DIPRIVAN) injection  0-80 mcg/kg/min Intravenous Continuous Jeremy M Gonda, M.D.   Stopped at 05/23/19 1625   • MD Alert...Vancomycin per Pharmacy   Other PHARMACY TO DOSE Amilcar Reaves M.D.       • vancomycin 2,000 mg in  mL IVPB  2,000 mg Intravenous Q12HR Amilcar Reaves M.D.   Stopped at 05/23/19 1503   • acetaminophen (TYLENOL) tablet 1,000 mg  1,000 mg Enteral Tube Q6HRS Amilcar Reaves M.D.   1,000 mg at 05/23/19 1955   • docusate sodium 100mg/10mL (COLACE) solution 100 mg  100 mg Enteral Tube BID Amilcar Reaves M.D.   100 mg at 05/23/19 1800   • famotidine (PEPCID) tablet 20 mg  20 mg Enteral Tube Q12HRS Amilcar Reaves M.D.   20 mg at 05/23/19 0500    Or   • famotidine (PEPCID) injection 20 mg  20 mg Intravenous Q12HRS Amilcar Reaves M.D.   20 mg at 05/23/19 1800   • senna-docusate (PERICOLACE or  SENOKOT S) 8.6-50 MG per tablet 1 Tab  1 Tab Enteral Tube Nightly Amilcar Reaves M.D.   1 Tab at 05/23/19 1955   • cloNIDine (CATAPRES) tablet 0.1 mg  0.1 mg Enteral Tube Q4HRS PRN Amilcar Reaves M.D.       • magnesium hydroxide (MILK OF MAGNESIA) suspension 30 mL  30 mL Enteral Tube QDAY PRN Amilcar Reaves M.D.       • oxyCODONE immediate-release (ROXICODONE) tablet 2.5 mg  2.5 mg Enteral Tube Q3HRS PRN Amilcar Reaves M.D.       • polyethylene glycol/lytes (MIRALAX) PACKET 1 Packet  1 Packet Enteral Tube BID PRN Amilcar Reaves M.D.       • senna-docusate (PERICOLACE or SENOKOT S) 8.6-50 MG per tablet 1 Tab  1 Tab Enteral Tube Q24HRS PRN Amilcar Reaves M.D.       • cefepime (MAXIPIME) 2 g in  mL IVPB  2 g Intravenous Q8HRS Deyanira Hines M.D.   Stopped at 05/23/19 1458   • Pharmacy Consult Request ...Pain Management Review 1 Each  1 Each Other PHARMACY TO DOSE Yanelis Austin M.D.       • oxyCODONE immediate-release (ROXICODONE) tablet 5 mg  5 mg Oral Q3HRS PRN Yanelis Austin M.D.       • MD ALERT...DO NOT ADMINISTER NSAIDS or ASPIRIN unless ORDERED By Neurosurgery 1 Each  1 Each Other PRN Yanelis Austin M.D.       • ondansetron (ZOFRAN) syringe/vial injection 4 mg  4 mg Intravenous Q4HRS PRN Yanelis Austin M.D.       • scopolamine (TRANSDERM-SCOP) patch 1 Patch  1 Patch Transdermal Q72HRS PRN Yanelis Austin M.D.       • labetalol (NORMODYNE,TRANDATE) injection 10 mg  10 mg Intravenous Q HOUR PRN Yanelis Austin M.D.       • hydrALAZINE (APRESOLINE) injection 10 mg  10 mg Intravenous Q HOUR PRN Yanelis Austin M.D.       • niCARdipine (CARDENE) 25 mg in  mL Infusion  0-15 mg/hr Intravenous Continuous Yanelis Austin M.D.   Stopped at 05/21/19 2214   • levETIRAcetam (KEPPRA) 500 mg in  mL IVPB  500 mg Intravenous Q12HRS Yanelis Austin M.D. 400 mL/hr at 05/23/19 1801 500 mg at 05/23/19 1801   • bisacodyl (DULCOLAX) suppository 10 mg  10 mg Rectal Q24HRS PRN  Yanelis Austin M.D.       • fleet enema 133 mL  1 Each Rectal Once PRN Yanelis Austin M.D.           Fluids    Intake/Output Summary (Last 24 hours) at 05/23/19 2144  Last data filed at 05/23/19 2000   Gross per 24 hour   Intake          4517.12 ml   Output             1963 ml   Net          2554.12 ml       Laboratory  Recent Labs      05/22/19 0427 05/23/19 0424   ISTATAPH  7.309*  7.406   ISTATAPCO2  42.6*  35.4   ISTATAPO2  135*  69   ISTATATCO2  23  23   [id number]  99  94   ISTATARTHCO3  21.4  22.2   ISTATARTBE  -5*  -2   ISTATTEMP  98.5 F  37.3 C   ISTATFIO2  100  40   ISTATSPEC  Arterial  Arterial   ISTATAPHTC  7.310*  7.402   [id number]  134*  70     Recent Labs      05/21/19 1944   TROPONINI  <0.01     Recent Labs      05/23/19 0450 05/23/19 1133 05/23/19 1717   SODIUM  148*  149*  145   POTASSIUM  3.9  3.8  3.9   CHLORIDE  117*  119*  118*   CO2  25  23  22   BUN  19  23*  26*   CREATININE  0.90  0.84  0.81   MAGNESIUM  1.8   --    --    PHOSPHORUS  3.1   --    --    CALCIUM  8.4*  8.1*  8.3*     Recent Labs      05/21/19 1944 05/23/19 0450 05/23/19 1133 05/23/19 1717   ALTSGPT  23   --    --    --    --    ASTSGOT  16   --    --    --    --    ALKPHOSPHAT  48   --    --    --    --    TBILIRUBIN  0.9   --    --    --    --    GLUCOSE  110*   < >  145*  143*  146*    < > = values in this interval not displayed.     Recent Labs      05/21/19 1944 05/22/19 0424 05/23/19 0450   WBC  8.2  8.3  14.6*   NEUTSPOLYS  76.50*   --    --    LYMPHOCYTES  14.90*   --    --    MONOCYTES  6.70   --    --    EOSINOPHILS  0.50   --    --    BASOPHILS  1.00   --    --    ASTSGOT  16   --    --    ALTSGPT  23   --    --    ALKPHOSPHAT  48   --    --    TBILIRUBIN  0.9   --    --      Recent Labs      05/21/19 1944 05/22/19 0424  05/23/19   0450   RBC  6.33*  6.38*  5.01   HEMOGLOBIN  14.7  14.7  11.4*   HEMATOCRIT  48.2  48.2  39.2*   PLATELETCT  350  333  329   PROTHROMBTM   15.4*  14.7*   --    APTT  33.7   --    --    INR  1.21*  1.14*   --        Imaging  X-Ray:  I have personally reviewed the images and compared with prior images.  EKG:  I have personally reviewed the images and compared with prior images.  CT:    Reviewed    Assessment/Plan  * Brain abscess- (present on admission)   Assessment & Plan    Presumed based on imaging and recent left otitis media  MRI most consistent with abscess, reviewed with neurosurgery  IV antibiotics including Rocephin, Flagyl, vancomycin  Cultures pending, further cultures from CNS surgery 5/22, culture negative so far  Keppra prophylaxis ongoing  Decadron ongoing, de-escalate postop after discussion with neurosurgery, weaning program in the chart  Neurosurgical ongoing and infectious disease consultation pending  Likely operative intervention to follow shortly, query need for ENT consultation depending on results of MRI, deferred to neurosurgery  Will check HIV and RPR given abscess and meth abuse, ?IVDA, pending  Strict BP control (goal SBP<160) with nicardipine gtt as needed     Acute respiratory failure with hypoxia (HCC)- (present on admission)   Assessment & Plan    Intubated a.m. 5/22  Delirium, sepsis, inability to protect airway, respiratory failure  RT protocols  Ventilator bundle  Serial ABG/chest x-ray/ventilator mechanics review  SAT/SBT when clinically appropriate, not tolerated 5/23  Not ready for liberation trials  Attempt to wean off propofol and use dexmedetomidine to facilitate SATs safely and then SBT     Delirium   Assessment & Plan    Hyperactive delirium, secondary CNS infection  Intubated 5/22  Physical restraints as needed for patient and staff's safety  Add dexmedetomidine and wean off propofol as needed, continue infusion  Mobilize  Limit other forms of sedation or overstimulation     Methamphetamine abuse (HCC)- (present on admission)   Assessment & Plan    Eventual drug cessation education to be provided once able to  converse with patient  Monitor for withdrawal syndrome  Check drug screen negative, HIV pending     S/P craniotomy   Assessment & Plan    Wound site looks good  Await pathology and microbiology results  Frequent neurochecks ongoing     Smoker- (present on admission)   Assessment & Plan    Nicotine replacement patch  Tobacco cessation education to be provided eventually  Query COPD, RT protocols          VTE:  Contraindicated  Ulcer: H2 Antagonist  Lines: None    I have performed a physical exam and reviewed and updated ROS and Plan today (5/23/2019). In review of yesterday's note (5/22/2019), there are no changes except as documented above.     Patient remains critically ill.  Patient remains on full support with ventilator not tolerating sedation vacation.  Patient remains altered and not following for me as well.  Patient status post craniotomy and drainage of presumably CNS abscess.  Extensive microbiological studies pending and ID now starting to assess.  Continue current IV antibiotic regimen.  Converting propofol over to dexmedetomidine for sedation.  Spontaneous breathing trial in a.m. as well as sedation vacation, hopefully attempt to wean and extubate.  Prognosis remains guarded.  Patient remains at increased risk for significant morbidity mortality without the above critical care interventions.    Discussed patient condition and risk of morbidity and/or mortality with Family, RN, RT, Pharmacy, [employer] [facility] resident, Charge nurse / hot rounds and neurosurgery  The patient remains critically ill.  Critical care time = 35 minutes in directly providing and coordinating critical care and extensive data review.  No time overlap and excludes procedures.

## 2019-05-23 NOTE — ANESTHESIA QCDR
2019 [facility] Clinical Data Registry (for Quality Improvement)     Postoperative nausea/vomiting risk protocol (Adult = 18 yrs and Pediatric 3-17 yrs)- (430 and 463)  General inhalation anesthetic (NOT TIVA) with PONV risk factors: No  Provision of anti-emetic therapy with at least 2 different classes of agents: N/A  Patient DID NOT receive anti-emetic therapy and reason is documented in Medical Record: N/A    Multimodal Pain Management- (AQI59)  Patient undergoing Elective Surgery (i.e. Outpatient, or ASC, or Prescheduled Surgery prior to Hospital Admission): No  Use of Multimodal Pain Management, two or more drugs and/or interventions, NOT including systemic opioids: N/A  Exception: Documented allergy to multiple classes of analgesics: N/A    PACU assessment of acute postoperative pain prior to Anesthesia Care End- Applies to Patients Age = 18- (ABG7)  Initial PACU pain score is which of the following: < 7/10  Patient unable to report pain score: N/A    Post-anesthetic transfer of care checklist/protocol to PACU/ICU- (426 and 427)  Upon conclusion of case, patient transferred to which of the following locations: ICU  Use of transfer checklist/protocol: Yes  Exclusion: Service Performed in Patient Hospital Room (and thus did not require transfer): N/A    PACU Reintubation- (AQI31)  General anesthesia requiring endotracheal intubation (ETT) along with subsequent extubation in OR or PACU: No  Required reintubation in the PACU: N/A  Extubation was a planned trial documented in the medical record prior to removal of the original airway device: N/A    Unplanned admission to ICU related to anesthesia service up through end of PACU care- (MD51)  Unplanned admission to ICU (not initially anticipated at anesthesia start time): No

## 2019-05-23 NOTE — PROGRESS NOTES
"Pharmacy Kinetics 53 y.o. male on vancomycin day #3 5/23/2019    Vancomycin New Start              Received vancomycin 2700 mg in the ED at 2212 on 5/21   Followed by 2000mg q12hr     Indication for Treatment: Brain Abscess     Pertinent history per medical record: Admitted on 5/21/2019 for confusion for the past week and a half.  He recently completed treatment with cefdinir for otitis media.  PMH of methamphetamine abuse.  ID and Neurosurgery are consulting. S/p craniotomy.     Other antibiotics: cefepime 2 grams iv q8h, metronidazole 500 mg iv q8h     Allergies: Pcn [penicillins] and Sulfa drugs       List concerns for renal function: obesity, CT with contrast 5/21     Pertinent cultures to date:   5/21/19 blood-peripheral x 2:  NGTD  No organisms seen on gram stain from OR, cultures remain in process       Recent Labs      05/21/19 1944 05/22/19   0424  05/23/19   0450   WBC  8.2  8.3  14.6*   NEUTSPOLYS  76.50*   --    --      Recent Labs      05/21/19   1944 05/22/19   0424  05/22/19   1114  05/22/19   2115  05/23/19   0450  05/23/19   1133   BUN  20  13  13  15  19  23*   CREATININE  0.79  0.74  0.81  0.92  0.90  0.84   ALBUMIN  4.5   --    --    --    --    --      No results for input(s): VANCOTROUGH, VANCOPEAK, VANCORANDOM in the last 72 hours.  Intake/Output Summary (Last 24 hours) at 05/23/19 1355  Last data filed at 05/23/19 1200   Gross per 24 hour   Intake          4830.55 ml   Output             2103 ml   Net          2727.55 ml      /88   Pulse 60   Temp 37.7 °C (99.9 °F) ([place])   Resp 18   Ht 1.803 m (5' 11\")   Wt 105.7 kg (233 lb 0.4 oz)   SpO2 98%  No data recorded.      A/P   1. Vancomycin dose change: N/A continue current dosing  2. Next vancomycin level: 5/24/18 @0000 - prior to the 5th total dose  3. Goal trough: 18-22 mcg/mL  4. Comments: Renal stable at this time. Trough tomorrow as above. Patient is at risk for accumulation, however will dose aggressively at this time. " Pharmacy will follow. Narrow therapy as able. Follow cultures.    Herberth Camara, PharmD, BCPS

## 2019-05-23 NOTE — ASSESSMENT & PLAN NOTE
Resolved.  Intubated on admission for airway protection on 5/22/2019, extubated on 5/24/2019.  RT protocol, supplemental oxygen as needed.

## 2019-05-23 NOTE — PROGRESS NOTES
· 2 RN skin check complete with GALI.  · Devices in place left radial [place], right TL IJ, rojo, OGT, and left temporal drain.  · Skin assessed under devices: yes  · Confirmed pressure ulcers found on N/A..  · The following interventions in place Q2H turns, pillows to support elbows and float heels, waffle cushion in place, reposition pulse ox and BP cuff, mepilex to bottom, barrier wipes in use.

## 2019-05-23 NOTE — PROCEDURES
ROUTINE ELECTROENCEPHALOGRAM REPORT      Referring provider: Dr. Gonda.     DOS: 5/23/2019 (total recording of 35 minutes)    INDICATION:  Robert Dorantes 53 y.o. male presenting with left frontal cyst, recurrent speech disturbances.  Rule out nonconvulsive seizures.    CURRENT ANTIEPILEPTIC REGIMEN: Levetiracetam 500 mg every 12 hours.  Propofol infusion, which was paussed for the study per    TECHNIQUE: 30 channel routine electroencephalogram (EEG) was performed in accordance with the international 10-20 system. The study was reviewed in bipolar and referential montages. The recording examined the patient during wakeful and drowsy/sleep state(s).     DESCRIPTION OF THE RECORD:  During the wakefulness, the background showed a symmetrical 6-7 hz theta activity posteriorly with amplitude of 70 mV.  There was reactivity to eye closure/opening.  During drowsiness, theta/delta frequencies were seen.    During the sleep state, background shows diffuse high-amplitude 4-5 Hz delta activity.      ACTIVATION PROCEDURES:   Intermittent Photic stimulation was performed in a stepwise fashion from 1 to 30 Hz, but failed to produce any significant background changes.      ICTAL AND/OR INTERICTAL FINDINGS:   There is slowing on the left frontal region.  No seizures captured during the study.    EKG: sampling of the EKG recording demonstrated sinus rhythm.       INTERPRETATION:  This is an abnormal routine EEG in an awake, drowsy, and sleep states.  A mild encephalopathy is suggested.  There is slowing in the left frontal region, suggestive of an underlying structural abnormality.  No seizures were captured during the study.    Clinical and radiological correlation is recommended.    Note: This EEG does not rule out epilepsy.  If the clinical suspicion remains high for seizures, a prolonged recording to capture clinical or subclinical events may be helpful.        Alcon Fernando MD   Epilepsy and Neurodiagnostics.    Clinical  of Neurology [facility].   Diplomate in Neurology, Epilepsy, and Electrodiagnostic Medicine.   Office: [phone]  Fax: [phone]

## 2019-05-23 NOTE — CONSULTS
Consults   INFECTIOUS DISEASES INPATIENT CONSULT NOTE     Date of Service: 5/23/2019    Consult Requested By: Jeremy M Gonda, M.D.    Reason for Consultation: Brain mass     History of Present Illness:   Robert Dorantes is a 53 y.o.  admitted 5/21/2019. Pt has a past medical history of meth abuse came to the emergency department per notes with approximately 10 days of confusion.  He previously been in the ED on May 12 and was diagnosed with acute suppurative otitis media.  He was given a 7-day course of Cefdinir.  Per report he continued to have ear pain and then increasing confusion and word finding aphasia.  On the day of the ED visit he had new onset right-sided facial droop.      Hospital Course:   He is been afebrile, some low blood pressures.  He underwent CTA head in the ED with no stenosis or aneurysm.  A 4 cm mass lesion in the left frontotemporal region was noted.  Imaging features consistent with abscess.  Extensive edema was associated with a mass resulting in a 10 m left to right midline shift.  Concern for early developing hydrocephalus.  CT neck soft tissue unremarkable.  MRI head was done on 5/22 also showing a large peripherally enhancing intra-axial mass in the left temporal lobe with marked surrounding edema.  Per MRI findings are consistent with high-grade primary brain tumor such as glioblastoma.  No metastasis were noted.  Brain abscess not excluded.  He was taken the OR on 5/22.  He was initially started on vancomycin, ceftriaxone and Flagyl.  Ceftriaxone changed to cefepime on 5/22.  Pt remains in the ICU on the ventilator at minimal settings.  Starting EEG today.      Review Of Systems:  Review of Systems   Unable to perform ROS: Intubated         PMH:   Past Medical History:   Diagnosis Date   • Smoker        PSH:  Past Surgical History:   Procedure Laterality Date   • CRANIOTOMY STEALTH Left 5/22/2019    Procedure: LEFT TEMPORAL CRANIOTOMY for DRAINAGE OF BRAIN CYST, RESECTION OF BRAIN  MASS;  Surgeon: Chase Young M.D.;  Location: SURGERY [facility];  Service: Neurosurgery       FAMILY HX:  History reviewed. No pertinent family history.    SOCIAL HX:  Social History     Social History   • Marital status:      Spouse name: N/A   • Number of children: N/A   • Years of education: N/A     Occupational History   • Not on file.     Social History Main Topics   • Smoking status: Current Every Day Smoker     Packs/day: 0.25     Types: Cigarettes     Last attempt to quit: 2/24/2013   • Smokeless tobacco: Never Used   • Alcohol use Yes      Comment: rare   • Drug use: Yes     Types: Methamphetamines   • Sexual activity: Not on file     Other Topics Concern   • Not on file     Social History Narrative   • No narrative on file     History   Smoking Status   • Current Every Day Smoker   • Packs/day: 0.25   • Types: Cigarettes   • Last attempt to quit: 2/24/2013   Smokeless Tobacco   • Never Used     History   Alcohol Use   • Yes     Comment: rare       Allergies/Intolerances:  Allergies   Allergen Reactions   • Pcn [Penicillins]      Has tolerated cefdinir in May 2019   • Sulfa Drugs        History reviewed from chart.     Other Current Medications:    Current Facility-Administered Medications:   •  magnesium sulfate IVPB premix 2 g, 2 g, Intravenous, Once, Amilcar Reaves M.D., Last Rate: 25 mL/hr at 05/23/19 0739, 2 g at 05/23/19 0739  •  metroNIDAZOLE (FLAGYL) IVPB 500 mg, 500 mg, Intravenous, Q8HRS, Sadiq Guerin M.D., Stopped at 05/23/19 0600  •  insulin regular (HUMULIN R) injection 1-6 Units, 1-6 Units, Subcutaneous, Q6HRS, Stopped at 05/22/19 0600 **AND** Accu-Chek Q6 if NPO, , , Q6H **AND** NOTIFY MD and PharmD, , , Once **AND** [DISCONTINUED] glucose 4 g chewable tablet 16 g, 16 g, Oral, Q15 MIN PRN **AND** DEXTROSE 10% BOLUS 125 mL, 125 mL, Intravenous, Q15 MIN PRN, Jeremy M Gonda, M.D.  •  Respiratory Care per Protocol, , Nebulization, Continuous RT, Jeremy M Gonda,  M.D.  •  MD Alert...ICU Electrolyte Replacement per Pharmacy, , Other, PHARMACY TO DOSE, Jeremy M Gonda, M.D.  •  Pharmacy Consult: Enteral tube insertion - review meds/change route/product selection, , Other, PHARMACY TO DOSE, Jeremy M Gonda, M.D.  •  fentaNYL (SUBLIMAZE) injection 25 mcg, 25 mcg, Intravenous, Q HOUR PRN, Stopped at 05/22/19 0752 **OR** fentaNYL (SUBLIMAZE) injection 50 mcg, 50 mcg, Intravenous, Q HOUR PRN, 50 mcg at 05/23/19 0734 **OR** fentaNYL (SUBLIMAZE) injection 100 mcg, 100 mcg, Intravenous, Q HOUR PRN, Jeremy M Gonda, M.D.  •  3% sodium chloride (HYPERTONIC SALINE) 500mL infusion 500 mL, 500 mL, Intravenous, Continuous, Jeremy M Gonda, M.D., Last Rate: 40 mL/hr at 05/22/19 2303, 500 mL at 05/22/19 2303  •  NS infusion, , Intravenous, Continuous, Jeremy M Gonda, M.D., Last Rate: 100 mL/hr at 05/22/19 1510  •  propofol (DIPRIVAN) injection, 0-80 mcg/kg/min, Intravenous, Continuous, Last Rate: 12.4 mL/hr at 05/23/19 0530, 20 mcg/kg/min at 05/23/19 0530 **AND** Triglycerides Starting now and then Every 3 Days, , , Every 3 Days (0300), Jeremy M Gonda, M.D.  •  MD Alert...Vancomycin per Pharmacy, , Other, PHARMACY TO DOSE, Amilcar Reaves M.D.  •  vancomycin 2,000 mg in  mL IVPB, 2,000 mg, Intravenous, Q12HR, Amilcar Reaves M.D., Stopped at 05/23/19 0230  •  acetaminophen (TYLENOL) tablet 1,000 mg, 1,000 mg, Enteral Tube, Q6HRS, Amilcar Reaves M.D., 1,000 mg at 05/23/19 0500  •  docusate sodium 100mg/10mL (COLACE) solution 100 mg, 100 mg, Enteral Tube, BID, Amilcar Reaves M.D., 100 mg at 05/23/19 0500  •  famotidine (PEPCID) tablet 20 mg, 20 mg, Enteral Tube, Q12HRS, 20 mg at 05/23/19 0500 **OR** famotidine (PEPCID) injection 20 mg, 20 mg, Intravenous, Q12HRS, Amilcar Reaves M.D.  •  senna-docusate (PERICOLACE or SENOKOT S) 8.6-50 MG per tablet 1 Tab, 1 Tab, Enteral Tube, Nightly, Amilcar Reaves M.D., 1 Tab at 05/23/19 0000  •  cloNIDine (CATAPRES) tablet 0.1 mg, 0.1  mg, Enteral Tube, Q4HRS PRN, Amilcar Reaves M.D.  •  magnesium hydroxide (MILK OF MAGNESIA) suspension 30 mL, 30 mL, Enteral Tube, QDAY PRN, Amilcar Reaves M.D.  •  oxyCODONE immediate-release (ROXICODONE) tablet 2.5 mg, 2.5 mg, Enteral Tube, Q3HRS PRN, Amilcar Reaves M.D.  •  polyethylene glycol/lytes (MIRALAX) PACKET 1 Packet, 1 Packet, Enteral Tube, BID PRN, Amilcar Reaves M.D.  •  senna-docusate (PERICOLACE or SENOKOT S) 8.6-50 MG per tablet 1 Tab, 1 Tab, Enteral Tube, Q24HRS PRN, Amilcar Reaves M.D.  •  dexamethasone (DECADRON) injection 10 mg, 10 mg, Intravenous, Q6HRS, Catherine Magallon, A.P.R.N., 10 mg at 05/23/19 0500  •  cefepime (MAXIPIME) 2 g in  mL IVPB, 2 g, Intravenous, Q8HRS, Deyanira Hines M.D., Stopped at 05/23/19 0530  •  Pharmacy Consult Request ...Pain Management Review 1 Each, 1 Each, Other, PHARMACY TO DOSE, Yanelis Austin M.D.  •  oxyCODONE immediate-release (ROXICODONE) tablet 5 mg, 5 mg, Oral, Q3HRS PRN, Yanelis Austin M.D.  •  MD ALERT...DO NOT ADMINISTER NSAIDS or ASPIRIN unless ORDERED By Neurosurgery 1 Each, 1 Each, Other, PRN, Yanelis Austin M.D.  •  ondansetron (ZOFRAN) syringe/vial injection 4 mg, 4 mg, Intravenous, Q4HRS PRN, Yanelis Austin M.D.  •  dexamethasone (DECADRON) injection 4 mg, 4 mg, Intravenous, Once PRN, Yanelis Austin M.D.  •  scopolamine (TRANSDERM-SCOP) patch 1 Patch, 1 Patch, Transdermal, Q72HRS PRN, Yanelis Austin M.D.  •  labetalol (NORMODYNE,TRANDATE) injection 10 mg, 10 mg, Intravenous, Q HOUR PRN, Yanelis Austin M.D.  •  hydrALAZINE (APRESOLINE) injection 10 mg, 10 mg, Intravenous, Q HOUR PRN, Yanelis Austin M.D.  •  niCARdipine (CARDENE) 25 mg in  mL Infusion, 0-15 mg/hr, Intravenous, Continuous, Yanelis Austin M.D., Stopped at 05/21/19 0514  •  levETIRAcetam (KEPPRA) 500 mg in  mL IVPB, 500 mg, Intravenous, Q12HRS, Yanelis Austin M.D., Stopped at 05/23/19 5205  •  bisacodyl (DULCOLAX)  "suppository 10 mg, 10 mg, Rectal, Q24HRS PRN, Yanelis Austin M.D.  •  fleet enema 133 mL, 1 Each, Rectal, Once PRN, Yanelis Austin M.D.  [unfilled]    Most Recent Vital Signs:  /88   Pulse 63   Temp 37.7 °C (99.9 °F) ([place])   Resp 19   Ht 1.803 m (5' 11\")   Wt 105.7 kg (233 lb 0.4 oz)   SpO2 95%   BMI 32.50 kg/m²   Temp  Av.9 °C (98.5 °F)  Min: 36.3 °C (97.3 °F)  Max: 37.7 °C (99.9 °F)    Physical Exam:  Physical Exam   Constitutional: He is well-developed, well-nourished, and in no distress.   HENT:   Head: Normocephalic.   Surgical site on left side of head, some dried blood, drain in place.   Eyes: Conjunctivae and EOM are normal.   Cardiovascular: Normal rate, regular rhythm and normal heart sounds.    Pulmonary/Chest: Effort normal and breath sounds normal.   Abdominal: Soft. Bowel sounds are normal. He exhibits no distension. There is no tenderness. There is no rebound and no guarding.   Musculoskeletal: He exhibits no edema.   Neurological:   Agitated    Skin: Skin is warm and dry.         Pertinent Lab Results:  Recent Labs      19   0450   WBC  8.2  8.3  14.6*      Recent Labs      194  19   0450   HEMOGLOBIN  14.7  14.7  11.4*   HEMATOCRIT  48.2  48.2  39.2*   MCV  76.1*  75.5*  77.8*   MCH  23.2*  23.0*  23.0*   PLATELETCT  350  333  329         Recent Labs      19   1114  19   0450   SODIUM  145  143  148*   POTASSIUM  4.1  4.2  3.9   CHLORIDE  113*  112  117*   CO2  24  24  25   CREATININE  0.81  0.92  0.90        Recent Labs      19   ALBUMIN  4.5        Pertinent Micro:  Results     Procedure Component Value Units Date/Time    GRAM STAIN [[id number]] Collected:  19    Order Status:  Completed Specimen:  Wound Updated:  19     Significant Indicator .     Source WND     Site Brain Abscess     Gram Stain Result Rare WBCs.  No organisms " "seen.      Narrative:       CALL  [facility]  RST2 tel. ,  CALLED  RST2 tel.  05/22/2019, 19:14, RB PERF. RESULTS CALLED TO:[id number].  Surgery Specimen    Anaerobic Culture [[id number]] Collected:  05/22/19 1818    Order Status:  Completed Specimen:  Other Updated:  05/22/19 1844    CULTURE WOUND W/ GRAM STAIN [[id number]] Collected:  05/22/19 1818    Order Status:  Completed Specimen:  Other Updated:  05/22/19 1844    AFB Culture [[id number]] Collected:  05/22/19 1700    Order Status:  Sent Specimen:  Cyst from Cyst     Fungal Culture [[id number]] Collected:  05/22/19 1700    Order Status:  Sent Specimen:  Cyst from Cyst     FLUID CULTURE W/GRAM STAIN [[id number]]     Order Status:  No result Specimen:  Cyst from Other Body Fluid     BLOOD CULTURE [[id number]] Collected:  05/21/19 2126    Order Status:  Completed Specimen:  Blood from Peripheral Updated:  05/22/19 0833     Significant Indicator NEG     Source BLD     Site PERIPHERAL     Culture Result No Growth  Note: Blood cultures are incubated for 5 days and  are monitored continuously.Positive blood cultures  are called to the RN and reported as soon as  they are identified.      Narrative:       Per Hospital Policy: Only change Specimen Src: to \"Line\" if  specified by physician order.  Left AC    BLOOD CULTURE [[id number]] Collected:  05/21/19 2120    Order Status:  Completed Specimen:  Blood from Peripheral Updated:  05/22/19 0833     Significant Indicator NEG     Source BLD     Site PERIPHERAL     Culture Result No Growth  Note: Blood cultures are incubated for 5 days and  are monitored continuously.Positive blood cultures  are called to the RN and reported as soon as  they are identified.      Narrative:       Per Hospital Policy: Only change Specimen Src: to \"Line\" if  specified by physician order.  Right Wrist        No results found for: BLOODCULTU, BLDCULT, BCHOLD     Studies:  Ct-cta Head With & W/o-post Process    Result Date: 5/21/2019 5/21/2019 8:06 PM " HISTORY/REASON FOR EXAM:  Headache, acute, severe, worst HA of life TECHNIQUE/EXAM DESCRIPTION: CT angiogram of the [place] without and with contrast.  Initial precontrast images were obtained of the head from the skull base through the vertex.  Postcontrast images were obtained of the [place] following the power injection of nonionic contrast at 5.0 mL/sec. Thin-section helical images were obtained with overlapping reconstruction interval. Coronal and sagittal multiplanar volume reformats were generated.  3D angiographic images were reviewed on PACS.  Maximum intensity projection (MIP) images were generated and reviewed. 100 mL of Omnipaque 350 nonionic contrast was injected intravenously. Low dose optimization technique was utilized for this CT exam including automated exposure control and adjustment of the mA and/or kV according to patient size. COMPARISON:  None. FINDINGS: The posterior circulation shows the distal vertebral arteries to be patent. The vertebrobasilar confluence is intact. The basilar artery is patent. No aneurysm or occlusive lesion is evident. The anterior circulation shows no stenotic or occlusive lesion. No aneurysm is evident about the [place]. There is a 4.0 x 3.0 cm mass lesion in the left frontotemporal lobe. No definitive enhancement is seen within the lesion. Associated marked edema in the left hemisphere, with mass effect on the left lateral ventricle. Mild dilatation of the right lateral  ventricle, with periventricular white matter hypoattenuation. No other mass lesions are detected. 10 mm left-to-right midline shift. No CT evidence of acute infarct or hemorrhage. Moderate mucosal thickening of the maxillary sinuses with frothy secretions. The mastoid air cells are clear. Clear frontal sinuses. 3D angiographic/MIP images of the vasculature confirm the vascular findings as described above.     1.  No large vessel occlusion, high-grade stenosis or aneurysm of  the [place]. 2.  A 4 cm mass lesion in the left frontotemporal region. Imaging features favor an abscess over a solid lesion. However, MRI is needed for definitive characterization. 3.  Extensive edema associated with the mass, resulting in 10 mm left-to-right midline shift. 4.  Effacement of the left and enlargement of the right lateral ventricles may be due to developing hydrocephalus. Findings were discussed with JOSE ALTAMIRANO on 5/21/2019 8:39 PM.    Ct-cta Neck With & W/o-post Processing    Result Date: 5/21/2019 5/21/2019 8:06 PM HISTORY/REASON FOR EXAM:  SAH suspected, initial exam TECHNIQUE/EXAM DESCRIPTION: CT angiogram of the neck with contrast. Postcontrast images were obtained of the neck from the great vessels through the skull base following the power injection of nonionic contrast at 5.0 mL/sec. Thin-section helical images were obtained with overlapping reconstruction interval. Coronal and oblique multiplanar volume reformats were generated. Cervical internal carotid artery percent stenosis is calculated using the standard method according to the NASCET criteria wherein a segment of uniform caliber mid or distal cervical internal carotid is used as the reference denominator. 3D angiographic images were reviewed on PACS.  Maximum intensity projection (MIP) images were generated and reviewed 100 mL of Omnipaque 350 nonionic contrast was injected intravenously. Low dose optimization technique was utilized for this CT exam including automated exposure control and adjustment of the mA and/or kV according to patient size. COMPARISON:  None. FINDINGS: Left-sided aortic arch with patent great vessel origins. The right common carotid artery, cervical carotid bifurcation, and cervical internal carotid artery show no significant stenosis. There is no evidence of dissection or aneurysm. The left common carotid artery, cervical carotid bifurcation, and cervical internal carotid artery show no  significant stenosis. There is no evidence of dissection or aneurysm. The cervical vertebral arteries are patent bilaterally. Prominent right paratracheal node measuring 1 cm short axis, statistically reactive. Otherwise, the neck soft tissues and lung apices in the field of view are unremarkable. 3D angiographic/MIP images of the vasculature confirm the vascular findings as described above.     Unremarkable CT angiogram of the neck. No high-grade stenosis, large vessel occlusion, aneurysm or dissection.    Dx-chest-2 Views    Result Date: 5/12/2019 5/12/2019 2:28 AM HISTORY/REASON FOR EXAM:  Cough TECHNIQUE/EXAM DESCRIPTION AND NUMBER OF VIEWS: Two views of the chest. COMPARISON:  None. FINDINGS: LUNGS: Linear atelectasis in the left midlung. No focal consolidation. No effusions. PNEUMOTHORAX: None. LINES AND TUBES: None. MEDIASTINUM: No cardiomegaly. Atherosclerosis. MUSCULOSKELETAL STRUCTURES: No acute fracture. Hiatal hernia.     1.  No focal consolidation or pleural effusions. 2.  Hiatal hernia.    Dx-chest-portable (1 View)    Result Date: 5/23/2019 5/23/2019 12:59 AM HISTORY/REASON FOR EXAM:  For indication of respiratory failure. TECHNIQUE/EXAM DESCRIPTION AND NUMBER OF VIEWS: Single portable view of the chest. COMPARISON: 5/22/2019 FINDINGS: LUNGS: Slight hypoinflation. Bibasilar atelectasis. No large pleural effusions. PNEUMOTHORAX: None. LINES AND TUBES: Stable right IJ central catheter. Stable ETT. Enteric tube tip is distal to the GE junction. MEDIASTINUM: Stable cardiac silhouette. MUSCULOSKELETAL STRUCTURES: Unchanged.     1.  Lines and tubes as above. 2.  Hypoinflation with bibasilar atelectasis. No new consolidation or large pleural effusions.    Dx-chest-portable (1 View)    Result Date: 5/22/2019 5/22/2019 3:56 AM HISTORY/REASON FOR EXAM:  POST INTUBATION. TECHNIQUE/EXAM DESCRIPTION AND NUMBER OF VIEWS: Single portable view of the chest. COMPARISON: 5/21/2019 FINDINGS: LUNGS: No new  consolidation. No effusions. PNEUMOTHORAX: None. LINES AND TUBES: Right IJ central catheter tip is in the mid SVC. Well-positioned ETT. MEDIASTINUM: No cardiomegaly. MUSCULOSKELETAL STRUCTURES: No acute fracture.     1.  Well-positioned lines and tubes. 2.  No new consolidation or pleural effusions.    Dx-chest-portable (1 View)    Result Date: 5/21/2019 5/21/2019 8:21 PM HISTORY/REASON FOR EXAM:  Shortness of Breath. TECHNIQUE/EXAM DESCRIPTION AND NUMBER OF VIEWS: Single portable view of the chest. COMPARISON: 5/12/2019 FINDINGS: LUNGS: Clear. No effusions. PNEUMOTHORAX: None. LINES AND TUBES: None. MEDIASTINUM: Borderline cardiomegaly. Atherosclerosis. MUSCULOSKELETAL STRUCTURES: No acute fracture. Hiatal hernia.     1. Borderline cardiomegaly. No focal consolidation or pleural effusions. 2. Hiatal hernia.    Mr-stealth Brain With & W/o    Result Date: 5/22/2019 5/22/2019 8:05 AM HISTORY/REASON FOR EXAM:  Altered level of consciousness (LOC), unexplained; Headache Mass seen on CTA of the head. TECHNIQUE/EXAM DESCRIPTION AND NUMBER OF VIEWS: MRI of the brain without and with contrast, [employer] protocol. Fiducial markers for the Stealth stereotactic system were placed on the scalp. Thin-section 2 mm T2 fast spin-echo true axial images were obtained of the whole brain. Thin-section 1.5 mm T1-weighted postcontrast axial 3D BRAVO images were obtained of the whole brain. 3D reconstructions in the Coronal and Sagittal planes were generated from the axial 3D BRAVO postcontrast sequence. The study was performed on a Arstasisa 1.5 Nory MRI scanner. 10 mL Gadavist contrast was administered intravenously. COMPARISON:  CTA of the head 5/21/2019 FINDINGS: No fiducial markers are appreciated. The calvaria are unremarkable. There are no extra-axial fluid collections. The left temporal lobe, there is a thick-walled ring-enhancing mass which measures about 47 mm x 32 mm x 43 mm. Central hypoenhancement may represent tumor  necrosis or cystic change. There is marked surrounding vasogenic edema extending into the basal ganglia, subinsular white matter, internal capsule, corona radiata, and left frontal deep white matter. There is effacement of the left lateral ventricle and moderate dilatation of the right lateral ventricle including the temporal horn due to trapping. There is left-to-right shift of midline structures of about 11 mm (T2 axial image 54, series 3). There are no other enhancing lesions. The brainstem and posterior fossa structures are unremarkable for marked compression of the midbrain with flattening of the left cerebral peduncle and effacement of perimesencephalic cisterns. There is some vasogenic edema extending down into the left side of the midbrain as well as the left thalamus and subthalamic nucleus. The major vessels including the dural venous sinuses appear intact. Paranasal sinuses show moderate mucosal thickening with small air-fluid/debris levels in the maxillary antra. Moderate mucosal thickening in the sphenoid sinus and among the ethmoid air cells. Minimal mucosal thickening in the frontal air cells.     1.  Large peripherally enhancing intra-axial mass in the left temporal lobe with marked surrounding vasogenic edema. Findings are most consistent with high-grade primary brain tumor such as glioblastoma. No other enhancing lesions elsewhere to suggest metastasis, however, a solitary brain metastasis would not be excluded. Brain abscess would not be excluded. There are no diffusion weighted images to determine whether contents of this lesion show restricted diffusion. There are no prior MRI scans for comparison. 2.  Marked left-to-right shift of midline structures along with midbrain compression and displacement.      ASSESSMENT/PLAN:     Robert Dorantes is a 53 y.o.  admitted 5/21/2019. Pt has a past medical history of meth abuse came to the emergency department per notes with approximately 10 days of  confusion.  He previously been in the ED on May 12 and was diagnosed with acute suppurative otitis media.  He was given a 7-day course of Cefdinir.  Per report he continued to have ear pain and then increasing confusion and word finding aphasia.  On the day of the ED visit he had new onset right-sided facial droop per notes.     Hospital Course:   He is been afebrile, some low blood pressures.  He underwent CTA head in the ED with no stenosis or aneurysm.  A 4 cm mass lesion in the left frontotemporal region was noted.  Imaging features consistent with abscess.  Extensive edema was associated with a mass resulting in a 10 m left to right midline shift.  Concern for early developing hydrocephalus.  CT neck soft tissue unremarkable.  MRI head was done on 5/22 also showing a large peripherally enhancing intra-axial mass in the left temporal lobe with marked surrounding edema.  Per MRI findings are consistent with high-grade primary brain tumor such as glioblastoma.  No metastasis were noted.  Brain abscess not excluded.  He was taken the OR on 5/22.  He was initially started on vancomycin, ceftriaxone and Flagyl.  Ceftriaxone changed to cefepime on 5/22.     Brain mass and new onset right facial droop  -See above for imaging findings, tumor versus abscess-in setting of recent diagnosis of supportive otitis media and raises suspicion for infectious etiology   -Patient was taken to the OR on 5/22, cultures and biopsy were taken, he remains ventilated  -EEG today   -Syphilis screen negative     Hypotensive, ongoing    Leukocytosis, new  Recent diagnosis of acute suppurative otitis  -Given a 7-day course of Cefdinir on 5/12/19     Methamphetamine abuse, recent for notes   -Drug screen on admission - negative  -HIV negative     Antibiotic allergies: Reported as penicillins, has tolerated cephalosporins    --- Continue cefepime, vanco and flagyl while awaiting culture results  --- F/up blood and OR cultures   --- No known hx  of diabetes -  ordered Hgb A1C   --- Acute hepatitis panel as ongoing drug use - ordered   --- Consider CT sinus if this is confirmed as an infectious brain abscess when feasible - as reports from ED of nasal discharge, unsure if he uses drugs intertarsally to assess for sinusitis/lesions   --- Defer to primary regarding the use of steroids- if needed for brain edema I have no objections         Plan of care discussed with nurse.  Will continue to follow    Deyanira Hines M.D.

## 2019-05-23 NOTE — FLOWSHEET NOTE
Ventilator Weaning Update    Patient is on vent day 2.  SBT was tolerated for a minimum of (P) .75 hours on settings of 5/8.    Wean parameters for this SBT were:  #FVC / Vital Capacity (liters) : (P)  (unable to do, uncooperative) (05/23/19 1340)  NIF (cm H2O) : (P)  (unable to do, uncooperative) (05/23/19 1340)  Rapid Shallow Breathing Index (RR/VT): (P) 54 (05/23/19 1340)  RR (bpm): (P) 24 (05/23/19 1340)  Spontaneous VE: (P) 12 (05/23/19 1340)  Spontaneous VT: (P) 450 (05/23/19 1340)      Weaning Trial Stopped due to:: (P) Change in neurologic condition (i.e. diaphoresis, seizures, somnolence, agitation, etc) (very agitated, uncooperative)

## 2019-05-23 NOTE — CARE PLAN
Problem: Safety  Goal: Will remain free from injury  Outcome: PROGRESSING AS EXPECTED      Problem: Infection  Goal: Will remain free from infection  Outcome: PROGRESSING AS EXPECTED      Problem: Knowledge Deficit  Goal: Knowledge of disease process/condition, treatment plan, diagnostic tests, and medications will improve  Outcome: PROGRESSING SLOWER THAN EXPECTED      Problem: Fluid Volume:  Goal: Will maintain balanced intake and output  Outcome: PROGRESSING AS EXPECTED      Problem: Respiratory:  Goal: Respiratory status will improve  Outcome: PROGRESSING AS EXPECTED      Problem: Psychosocial Needs:  Goal: Level of anxiety will decrease  Outcome: PROGRESSING AS EXPECTED

## 2019-05-23 NOTE — PROGRESS NOTES
[facility] ICU Progress Note      Admit Date: 5/21/2019  ICU Day: 2  LOS: 2    Resident(s): Angeline Trinidad   Attending: NETO RAMIREZ/ Dr. Reaves    Date & Time:   5/23/2019   1:45 PM       Patient ID:    Name:             Robert Dorantes     YOB: 1965  Age:                 53 y.o.  male   MRN:               [id number]    Diagnosis:  Principal Problem:    Brain abscess POA: Yes  Active Problems:    Acute respiratory failure with hypoxia (HCC) POA: Unknown    Methamphetamine abuse (HCC) POA: Yes    Delirium POA: Unknown    Smoker POA: Yes  Resolved Problems:    * No resolved hospital problems. *      HPI:  53 y.o male presented to the ER with 90 history of confusion.  Patient was seen in the ED on May 12 and diagnosed with acute supportive otitis media; treated with Ceftin ear for 7 days.  Patient's mother reported that the patient has been complaining of left-sided ear ache, increased drowsiness, increased fatigue and confusion since that time and over the past 2 days he has had difficulty recognizing family members, speech difficulty, and word finding difficulties.  In the ED, CT head showed 4 cm mass lesion in the left frontotemporal lobe with extensive edema and mass-effect as well as a 10 mm shift to the right; imaging favors an abscess.  Neurosurgery was consulted and the patient was admitted to the ICU for further treatment and close monitoring.    Consultants:  Neurosurgery  Infectious disease  PMA    Interval Events:  No acute overnight events.  Underwent craniotomy yesterday without complications.  Patient following commands off sedation.  Vented.  ID to see today.  EEG today.  Cultures pending.  New Na goals noted, orders changed.      Review of Systems   Unable to perform ROS: Intubated       Physical Exam   Constitutional: He is well-developed, well-nourished, and in no distress. No distress.   HENT:   Right Ear: External ear normal.   Left Ear: External ear normal.   Head bandaged with LUIS  drain with sanguinous fluid.   Eyes: Pupils are equal, round, and reactive to light.   Neck: No JVD present.   Cardiovascular: Normal rate, regular rhythm and normal heart sounds.  Exam reveals no gallop and no friction rub.    No murmur heard.  Pulmonary/Chest: Effort normal. He has no wheezes. He has no rales.   Abdominal: Soft. There is no tenderness. There is no rebound.   Musculoskeletal: He exhibits no edema.   Lymphadenopathy:     He has no cervical adenopathy.   Neurological:   Follows commands off sedation.   Skin: He is not diaphoretic.         Respiratory:  Taylor Vent Mode: APVCMV  Respiration: 18, Pulse Oximetry: 98 %    Chest Tube Drains:    Recent Labs      05/22/19   0427  05/23/19   0424   ISTATAPH  7.309*  7.406   ISTATAPCO2  42.6*  35.4   ISTATAPO2  135*  69   ISTATATCO2  23  23   [id number]  99  94   ISTATARTHCO3  21.4  22.2   ISTATARTBE  -5*  -2   ISTATTEMP  98.5 F  37.3 C   ISTATFIO2  100  40   ISTATSPEC  Arterial  Arterial   ISTATAPHTC  7.310*  7.402   [id number]  134*  70       HemoDynamics:  Pulse: 60, Heart Rate (Monitored): (!) 57 Arterial BP: (!) 94/57, NIBP: (!) 81/49      Neuro:      Fluids:    Date 05/23/19 0700 - 05/24/19 0659   Shift 8063-4453 8453-0353 6467-6264 24 Hour Total   I  N  T  A  K  E   I.V. 931.9   931.9      Magnesium Sulfate Volume 50   50      Propofol Volume 62.1   62.1      Volume (mL) (3% sodium chloride (HYPERTONIC SALINE) 500mL infusion 500 mL) 219.8   219.8      Volume (mL) (NS infusion) 600   600    Other 100   100      Medications (PO/Enteral Liquids) 100   100    Shift Total 1031.9   1031.9   O  U  T  P  U  T   Urine 355   355      Output (mL) (Urethral Catheter Latex;Temperature probe) 355   355    Drains 18   18      Output (mL) (Closed/Suction Drain 1 Left Scalp Yariel Frias 10 Fr.) 18   18    Emesis/NG output 50   50      Output (mL) (Enteral Tube 05/22/19 Orogastric Oral) 50   50    Shift Total 423   423   .9   608.9        Intake/Output  Summary (Last 24 hours) at 19 1345  Last data filed at 19 1200   Gross per 24 hour   Intake          4830.55 ml   Output             2103 ml   Net          2727.55 ml       Weight: 105.7 kg (233 lb 0.4 oz)  Body mass index is 32.5 kg/m².    Recent Labs      193   SODIUM  143  148*  149*   POTASSIUM  4.2  3.9  3.8   CHLORIDE  112  117*  119*   CO2  24     BUN  15  19  23*   CREATININE  0.92  0.90  0.84   MAGNESIUM   --   1.8   --    PHOSPHORUS   --   3.1   --    CALCIUM  8.4*  8.4*  8.1*       GI/Nutrition:  Recent Labs      193   ALTSGPT  23   --    --    --    --    ASTSGOT  16   --    --    --    --    ALKPHOSPHAT  48   --    --    --    --    TBILIRUBIN  0.9   --    --    --    --    GLUCOSE  110*   < >  146*  145*  143*    < > = values in this interval not displayed.       Heme:  Recent Labs      19   RBC  6.33*  6.38*  5.01   HEMOGLOBIN  14.7  14.7  11.4*   HEMATOCRIT  48.2  48.2  39.2*   PLATELETCT  350  333  329   PROTHROMBTM  15.4*  14.7*   --    APTT  33.7   --    --    INR  1.21*  1.14*   --        Infectious Disease:  Monitored Temp 2  Av.5 °C (99.5 °F)  Min: 37.1 °C (98.8 °F)  Max: 37.8 °C (100 °F)  Recent Labs      19   WBC  8.2  8.3  14.6*   NEUTSPOLYS  76.50*   --    --    LYMPHOCYTES  14.90*   --    --    MONOCYTES  6.70   --    --    EOSINOPHILS  0.50   --    --    BASOPHILS  1.00   --    --    ASTSGOT  16   --    --    ALTSGPT  23   --    --    ALKPHOSPHAT  48   --    --    TBILIRUBIN  0.9   --    --        Meds:  • dexamethasone  10 mg      Followed by   • [START ON 2019] dexamethasone  6 mg      Followed by   • [START ON 2019] dexamethasone  4 mg      Followed by   • [START ON 2019] dexamethasone  4 mg      Followed by   • [START ON 2019] dexamethasone   2 mg      Followed by   • [START ON 6/1/2019] dexamethasone  2 mg     • metroNIDAZOLE (FLAGYL) IV  500 mg Stopped (05/23/19 0600)   • insulin regular  1-6 Units      And   • dextrose 10% bolus  125 mL     • Respiratory Care per Protocol       • MD Alert...Adult ICU Electrolyte Replacement per Pharmacy       • Pharmacy       • fentaNYL  25 mcg      Or   • fentaNYL  50 mcg      Or   • fentaNYL  100 mcg     • 3% sodium chloride  500 mL 500 mL (05/23/19 1220)   • NS   100 mL/hr at 05/22/19 1510   • propofol  0-80 mcg/kg/min 20 mcg/kg/min (05/23/19 1342)   • MD Alert...Vancomycin per Pharmacy       • vancomycin  2,000 mg Stopped (05/23/19 1503)   • acetaminophen  1,000 mg     • docusate sodium 100mg/10mL  100 mg     • famotidine  20 mg      Or   • famotidine  20 mg     • senna-docusate  1 Tab     • cloNIDine  0.1 mg     • magnesium hydroxide  30 mL     • oxyCODONE immediate-release  2.5 mg     • polyethylene glycol/lytes  1 Packet     • senna-docusate  1 Tab     • cefepime  2 g Stopped (05/23/19 0530)   • Pharmacy Consult Request  1 Each     • oxyCODONE immediate-release  5 mg     • MD ALERT...DO NOT ADMINISTER NSAIDS or ASPIRIN unless ORDERED By Neurosurgery  1 Each     • ondansetron  4 mg     • scopolamine  1 Patch     • labetalol  10 mg     • hydrALAZINE  10 mg     • niCARdipine infusion  0-15 mg/hr Stopped (05/21/19 2214)   • levETIRAcetam (KEPPRA) IV  500 mg Stopped (05/23/19 0515)   • bisacodyl  10 mg     • fleet  1 Each          Imaging:  DX-CHEST-PORTABLE (1 VIEW)   Final Result      1.  Lines and tubes as above.   2.  Hypoinflation with bibasilar atelectasis. No new consolidation or large pleural effusions.      MR-STEALTH BRAIN WITH & W/O   Final Result      1.  Large peripherally enhancing intra-axial mass in the left temporal lobe with marked surrounding vasogenic edema. Findings are most consistent with high-grade primary brain tumor such as glioblastoma. No other enhancing lesions elsewhere to suggest     metastasis, however, a solitary brain metastasis would not be excluded. Brain abscess would not be excluded. There are no diffusion weighted images to determine whether contents of this lesion show restricted diffusion. There are no prior MRI scans for    comparison.   2.  Marked left-to-right shift of midline structures along with midbrain compression and displacement.      DX-CHEST-PORTABLE (1 VIEW)   Final Result      1.  Well-positioned lines and tubes.   2.  No new consolidation or pleural effusions.      DX-CHEST-PORTABLE (1 VIEW)   Final Result         1. Borderline cardiomegaly. No focal consolidation or pleural effusions.   2. Hiatal hernia.      CT-CTA NECK WITH & W/O-POST PROCESSING   Final Result      Unremarkable CT angiogram of the neck. No high-grade stenosis, large vessel occlusion, aneurysm or dissection.      CT-CTA HEAD WITH & W/O-POST PROCESS   Final Result      1.  No large vessel occlusion, high-grade stenosis or aneurysm of the [place].   2.  A 4 cm mass lesion in the left frontotemporal region. Imaging features favor an abscess over a solid lesion. However, MRI is needed for definitive characterization.   3.  Extensive edema associated with the mass, resulting in 10 mm left-to-right midline shift.   4.  Effacement of the left and enlargement of the right lateral ventricles may be due to developing hydrocephalus.      Findings were discussed with JOSE ALTAMIRANO on 5/21/2019 8:39 PM.      EC-ECHOCARDIOGRAM COMPLETE W/O CONT    (Results Pending)       Procedures:  Intubation 5/22 -  Craniotomy 5/22  EEG 5/23      Problem and Plan:  * Brain abscess- (present on admission)   Assessment & Plan    Treated for acute suppurative otitis media 9 days ago.  9 days history of increasing left ear pain, confusion, difficulty finding words.  History of meth abuse.  CT scan of head with contrast showed fluid collection most likely abscess over the left frontotemporal area with mass-effect, midline  shift and evidence of developing hydrocephalus.  Neurosurgery consulted, recommendations appreciated.  S/p craniotomy 5/22/19  ID consulted, recommendations appreciated.  Continue Cefepime, Vanco, Flagyl  Bacterial culture, fungal culture, and AFB in process.  Na goals noted.  EEG.     Acute respiratory failure with hypoxia (HCC)- (present on admission)   Assessment & Plan    Intubated for airway protection in ED.  RT/O2 protocol.  Wean as tolerated.     Methamphetamine abuse (HCC)- (present on admission)   Assessment & Plan    History of meth abuse per nephew, smoking.  No history of IV use.  Consider patient education and counseling.     S/P craniotomy   Assessment & Plan    No complications.  Follows commands off sedation.     Smoker- (present on admission)   Assessment & Plan    Patient confused, per mom smokes about 3 cigarettes/day.  Consider education and counseling.         DISPO: ICU    CODE STATUS: FULL    Quality Measures:  Richards Catheter: yes.  DVT Prophylaxis: SCD.  Ulcer Prophylaxis: Pepsid.  Antibiotics: Cefepime/Vanco/Flagyl  Lines: CVC

## 2019-05-23 NOTE — PROGRESS NOTES
Neurosurgery Progress Note    Subjective:  No acute events. Remains intubated, sedated.     Exam:  Intubated/Sedated.  Rouses to stimulation  3 PERRL  Following simple commands.   CASEY, decreased movement of RUE.   Sensation intact  Incision: dressing c/d/i with staples intact  LUIS drain: 30cc/8hrs. Bloody      Pulse  Av.3  Min: 55  Max: 81  Resp  Av.1  Min: 5  Max: 26  Monitored Temp 2  Av.5 °C (99.5 °F)  Min: 37.1 °C (98.8 °F)  Max: 37.8 °C (100 °F)  SpO2  Av.7 %  Min: 91 %  Max: 100 %    No Data Recorded    Recent Labs      19   WBC  8.2  8.3  14.6*   RBC  6.33*  6.38*  5.01   HEMOGLOBIN  14.7  14.7  11.4*   HEMATOCRIT  48.2  48.2  39.2*   MCV  76.1*  75.5*  77.8*   MCH  23.2*  23.0*  23.0*   MCHC  30.5*  30.5*  29.5*   RDW  50.4*  50.3*  52.9*   PLATELETCT  350  333  329   MPV  10.3  9.8  10.4     Recent Labs      19   1114  19   SODIUM  145  143  148*   POTASSIUM  4.1  4.2  3.9   CHLORIDE  113*  112  117*   CO2  24  24  25   GLUCOSE  116*  146*  145*   BUN  13  15  19   CREATININE  0.81  0.92  0.90   CALCIUM  8.7  8.4*  8.4*     Recent Labs      19   APTT  33.7   --    INR  1.21*  1.14*     Recent Labs      19   0012   REACTMIN  5.5   CLOTKINET  2.2   CLOTANGL  60.7   MAXCLOTS  64.2   [id number]  0.0   PRCINADP  67.1   PRCINAA  10.8       Intake/Output       19 0700 - 19 0659 19 0700 - 19 0659      8930-2991 2916-3507 Total 2134-2705 9871-7841 Total       Intake    I.V.  1093.7  2546.3 3640  --  -- --    Propofol Volume 53.7 33.8 87.5 -- -- --    Volume (mL) (3% sodium chloride (HYPERTONIC SALINE) 500mL infusion 500 mL) 240 412.5 652.5 -- -- --    Volume (mL) (NS infusion) 800 1100 1900 -- -- --    Volume (mL) (lactated ringers infusion) -- 1000 1000 -- -- --    NG/GT  --  80 80  --  -- --    Intake (mL) (Enteral Tube 19 Orogastric Oral) -- 80 80 --  -- --    IV Piggyback  100  900 1000  --  -- --    Volume (mL) (metroNIDAZOLE (FLAGYL) IVPB 500 mg) 100 200 300 -- -- --    Volume (mL) (vancomycin 2,000 mg in  mL IVPB) -- 500 500 -- -- --    Volume (mL) (cefepime (MAXIPIME) 2 g in  mL IVPB) -- 200 200 -- -- --    Total Intake 1193.7 3526.3 4720 -- -- --       Output    Urine  1065  1000 2065  --  -- --    Urine 20 -- 20 -- -- --    Output (mL) (Urethral Catheter Latex;Temperature probe) 1045 1000 2045 -- -- --    Drains  --  40 40  --  -- --    Output (mL) (Closed/Suction Drain 1 Left Scalp Yariel Frias 10 Fr.) -- 40 40 -- -- --    Emesis/NG output  --  400 400  --  -- --    Output (mL) (Enteral Tube 05/22/19 Orogastric Oral) -- 400 400 -- -- --    Blood  20  50 70  --  -- --    Est. Blood Loss 20 50 70 -- -- --    Total Output 1085 1490 2575 -- -- --       Net I/O     108.7 2036.3 2145 -- -- --            Intake/Output Summary (Last 24 hours) at 05/23/19 0859  Last data filed at 05/23/19 0600   Gross per 24 hour   Intake          4343.47 ml   Output             2150 ml   Net          2193.47 ml            • magnesium sulfate  2 g Once   • metroNIDAZOLE (FLAGYL) IV  500 mg Q8HRS   • insulin regular  1-6 Units Q6HRS    And   • dextrose 10% bolus  125 mL Q15 MIN PRN   • Respiratory Care per Protocol   Continuous RT   • MD Alert...Adult ICU Electrolyte Replacement per Pharmacy   PHARMACY TO DOSE   • Pharmacy   PHARMACY TO DOSE   • fentaNYL  25 mcg Q HOUR PRN    Or   • fentaNYL  50 mcg Q HOUR PRN    Or   • fentaNYL  100 mcg Q HOUR PRN   • 3% sodium chloride  500 mL Continuous   • NS   Continuous   • propofol  0-80 mcg/kg/min Continuous   • MD Alert...Vancomycin per Pharmacy   PHARMACY TO DOSE   • vancomycin  2,000 mg Q12HR   • acetaminophen  1,000 mg Q6HRS   • docusate sodium 100mg/10mL  100 mg BID   • famotidine  20 mg Q12HRS    Or   • famotidine  20 mg Q12HRS   • senna-docusate  1 Tab Nightly   • cloNIDine  0.1 mg Q4HRS PRN   • magnesium hydroxide  30  mL QDAY PRN   • oxyCODONE immediate-release  2.5 mg Q3HRS PRN   • polyethylene glycol/lytes  1 Packet BID PRN   • senna-docusate  1 Tab Q24HRS PRN   • dexamethasone  10 mg Q6HRS   • cefepime  2 g Q8HRS   • Pharmacy Consult Request  1 Each PHARMACY TO DOSE   • oxyCODONE immediate-release  5 mg Q3HRS PRN   • MD ALERT...DO NOT ADMINISTER NSAIDS or ASPIRIN unless ORDERED By Neurosurgery  1 Each PRN   • ondansetron  4 mg Q4HRS PRN   • dexamethasone  4 mg Once PRN   • scopolamine  1 Patch Q72HRS PRN   • labetalol  10 mg Q HOUR PRN   • hydrALAZINE  10 mg Q HOUR PRN   • niCARdipine infusion  0-15 mg/hr Continuous   • levETIRAcetam (KEPPRA) IV  500 mg Q12HRS   • bisacodyl  10 mg Q24HRS PRN   • fleet  1 Each Once PRN       Assessment and Plan:  Hospital Day# 2  POD #2 Left crani for surgical drainage of cyst  Prophylactic anticoagulation: no         Start date/time: tbd    Plan:  Stable neuro exam  LUIS drain to remain today  Decadron taper placed  Na goal 135-145. Na 148 this am. On 3% managed by intensivist.  Wean from vent as tolerated.   Ok to mobilize  Kwame dc'd on POD# 14

## 2019-05-23 NOTE — ASSESSMENT & PLAN NOTE
S/p craniotomy on 5/22, 6/16.  SCDs for mechanical DVT prophylaxis.  Restart chemical DVT prophylaxis as per Neurosurgery.

## 2019-05-24 ENCOUNTER — APPOINTMENT (OUTPATIENT)
Dept: CARDIOLOGY | Facility: MEDICAL CENTER | Age: 54
DRG: 025 | End: 2019-05-24
Attending: INTERNAL MEDICINE
Payer: MEDICAID

## 2019-05-24 ENCOUNTER — APPOINTMENT (OUTPATIENT)
Dept: RADIOLOGY | Facility: MEDICAL CENTER | Age: 54
DRG: 025 | End: 2019-05-24
Attending: INTERNAL MEDICINE
Payer: MEDICAID

## 2019-05-24 PROBLEM — R45.1 AGITATION: Status: ACTIVE | Noted: 2019-05-24

## 2019-05-24 LAB
ACTION RANGE TRIGGERED IACRT: NO
ANION GAP SERPL CALC-SCNC: 8 MMOL/L (ref 0–11.9)
BASE EXCESS BLDA CALC-SCNC: -3 MMOL/L (ref -4–3)
BODY TEMPERATURE: ABNORMAL DEGREES
BUN SERPL-MCNC: 28 MG/DL (ref 8–22)
BUN SERPL-MCNC: 30 MG/DL (ref 8–22)
BUN SERPL-MCNC: 31 MG/DL (ref 8–22)
CALCIUM SERPL-MCNC: 8.1 MG/DL (ref 8.5–10.5)
CALCIUM SERPL-MCNC: 8.3 MG/DL (ref 8.5–10.5)
CALCIUM SERPL-MCNC: 8.6 MG/DL (ref 8.5–10.5)
CHLORIDE SERPL-SCNC: 116 MMOL/L (ref 96–112)
CHLORIDE SERPL-SCNC: 116 MMOL/L (ref 96–112)
CHLORIDE SERPL-SCNC: 117 MMOL/L (ref 96–112)
CO2 BLDA-SCNC: 22 MMOL/L (ref 20–33)
CO2 SERPL-SCNC: 23 MMOL/L (ref 20–33)
CREAT SERPL-MCNC: 0.79 MG/DL (ref 0.5–1.4)
CREAT SERPL-MCNC: 0.84 MG/DL (ref 0.5–1.4)
CREAT SERPL-MCNC: 0.91 MG/DL (ref 0.5–1.4)
ERYTHROCYTE [DISTWIDTH] IN BLOOD BY AUTOMATED COUNT: 53.3 FL (ref 35.9–50)
EST. AVERAGE GLUCOSE BLD GHB EST-MCNC: 123 MG/DL
GLUCOSE BLD-MCNC: 111 MG/DL (ref 65–99)
GLUCOSE BLD-MCNC: 114 MG/DL (ref 65–99)
GLUCOSE SERPL-MCNC: 135 MG/DL (ref 65–99)
GLUCOSE SERPL-MCNC: 148 MG/DL (ref 65–99)
GLUCOSE SERPL-MCNC: 151 MG/DL (ref 65–99)
HAV IGM SERPL QL IA: NEGATIVE
HBA1C MFR BLD: 5.9 % (ref 0–5.6)
HBV CORE IGM SER QL: NEGATIVE
HBV SURFACE AG SER QL: NEGATIVE
HCO3 BLDA-SCNC: 20.6 MMOL/L (ref 17–25)
HCT VFR BLD AUTO: 38.9 % (ref 42–52)
HCV AB SER QL: NEGATIVE
HGB BLD-MCNC: 11.2 G/DL (ref 14–18)
HOROWITZ INDEX BLDA+IHG-RTO: 337 MM[HG]
INR PPP: 1.23 (ref 0.87–1.13)
INST. QUALIFIED PATIENT IIQPT: YES
MAGNESIUM SERPL-MCNC: 2.1 MG/DL (ref 1.5–2.5)
MCH RBC QN AUTO: 22.6 PG (ref 27–33)
MCHC RBC AUTO-ENTMCNC: 28.8 G/DL (ref 33.7–35.3)
MCV RBC AUTO: 78.6 FL (ref 81.4–97.8)
O2/TOTAL GAS SETTING VFR VENT: 30 %
PCO2 BLDA: 30.9 MMHG (ref 26–37)
PCO2 TEMP ADJ BLDA: 30.2 MMHG (ref 26–37)
PH BLDA: 7.43 [PH] (ref 7.4–7.5)
PH TEMP ADJ BLDA: 7.44 [PH] (ref 7.4–7.5)
PHOSPHATE SERPL-MCNC: 3.7 MG/DL (ref 2.5–4.5)
PLATELET # BLD AUTO: 313 K/UL (ref 164–446)
PMV BLD AUTO: 10.8 FL (ref 9–12.9)
PO2 BLDA: 101 MMHG (ref 64–87)
PO2 TEMP ADJ BLDA: 98 MMHG (ref 64–87)
POTASSIUM SERPL-SCNC: 3.6 MMOL/L (ref 3.6–5.5)
POTASSIUM SERPL-SCNC: 3.8 MMOL/L (ref 3.6–5.5)
POTASSIUM SERPL-SCNC: 3.9 MMOL/L (ref 3.6–5.5)
PROTHROMBIN TIME: 15.6 SEC (ref 12–14.6)
RBC # BLD AUTO: 4.95 M/UL (ref 4.7–6.1)
RHODAMINE-AURAMINE STN SPEC: NORMAL
SAO2 % BLDA: 98 % (ref 93–99)
SIGNIFICANT IND 70042: NORMAL
SITE SITE: NORMAL
SODIUM SERPL-SCNC: 147 MMOL/L (ref 135–145)
SODIUM SERPL-SCNC: 147 MMOL/L (ref 135–145)
SODIUM SERPL-SCNC: 148 MMOL/L (ref 135–145)
SOURCE SOURCE: NORMAL
SPECIMEN DRAWN FROM PATIENT: ABNORMAL
TRIGL SERPL-MCNC: 102 MG/DL (ref 0–149)
WBC # BLD AUTO: 12.6 K/UL (ref 4.8–10.8)

## 2019-05-24 PROCEDURE — 99233 SBSQ HOSP IP/OBS HIGH 50: CPT | Performed by: INTERNAL MEDICINE

## 2019-05-24 PROCEDURE — 700111 HCHG RX REV CODE 636 W/ 250 OVERRIDE (IP): Performed by: INTERNAL MEDICINE

## 2019-05-24 PROCEDURE — 85610 PROTHROMBIN TIME: CPT

## 2019-05-24 PROCEDURE — 700111 HCHG RX REV CODE 636 W/ 250 OVERRIDE (IP): Performed by: STUDENT IN AN ORGANIZED HEALTH CARE EDUCATION/TRAINING PROGRAM

## 2019-05-24 PROCEDURE — 37799 UNLISTED PX VASCULAR SURGERY: CPT

## 2019-05-24 PROCEDURE — 71045 X-RAY EXAM CHEST 1 VIEW: CPT

## 2019-05-24 PROCEDURE — 83735 ASSAY OF MAGNESIUM: CPT

## 2019-05-24 PROCEDURE — 93306 TTE W/DOPPLER COMPLETE: CPT

## 2019-05-24 PROCEDURE — 700101 HCHG RX REV CODE 250: Performed by: INTERNAL MEDICINE

## 2019-05-24 PROCEDURE — 85027 COMPLETE CBC AUTOMATED: CPT

## 2019-05-24 PROCEDURE — 700105 HCHG RX REV CODE 258: Performed by: NEUROLOGICAL SURGERY

## 2019-05-24 PROCEDURE — 700102 HCHG RX REV CODE 250 W/ 637 OVERRIDE(OP): Performed by: NEUROLOGICAL SURGERY

## 2019-05-24 PROCEDURE — 700111 HCHG RX REV CODE 636 W/ 250 OVERRIDE (IP): Performed by: NEUROLOGICAL SURGERY

## 2019-05-24 PROCEDURE — 700102 HCHG RX REV CODE 250 W/ 637 OVERRIDE(OP): Performed by: INTERNAL MEDICINE

## 2019-05-24 PROCEDURE — 700111 HCHG RX REV CODE 636 W/ 250 OVERRIDE (IP): Performed by: NURSE PRACTITIONER

## 2019-05-24 PROCEDURE — 700111 HCHG RX REV CODE 636 W/ 250 OVERRIDE (IP)

## 2019-05-24 PROCEDURE — 83036 HEMOGLOBIN GLYCOSYLATED A1C: CPT

## 2019-05-24 PROCEDURE — 700105 HCHG RX REV CODE 258: Performed by: INTERNAL MEDICINE

## 2019-05-24 PROCEDURE — 97535 SELF CARE MNGMENT TRAINING: CPT

## 2019-05-24 PROCEDURE — 80074 ACUTE HEPATITIS PANEL: CPT

## 2019-05-24 PROCEDURE — 94003 VENT MGMT INPAT SUBQ DAY: CPT

## 2019-05-24 PROCEDURE — 84100 ASSAY OF PHOSPHORUS: CPT

## 2019-05-24 PROCEDURE — 80048 BASIC METABOLIC PNL TOTAL CA: CPT

## 2019-05-24 PROCEDURE — A9270 NON-COVERED ITEM OR SERVICE: HCPCS | Performed by: INTERNAL MEDICINE

## 2019-05-24 PROCEDURE — 82962 GLUCOSE BLOOD TEST: CPT

## 2019-05-24 PROCEDURE — 700112 HCHG RX REV CODE 229: Performed by: INTERNAL MEDICINE

## 2019-05-24 PROCEDURE — 82803 BLOOD GASES ANY COMBINATION: CPT

## 2019-05-24 PROCEDURE — 51798 US URINE CAPACITY MEASURE: CPT

## 2019-05-24 PROCEDURE — 700101 HCHG RX REV CODE 250: Performed by: STUDENT IN AN ORGANIZED HEALTH CARE EDUCATION/TRAINING PROGRAM

## 2019-05-24 PROCEDURE — 770022 HCHG ROOM/CARE - ICU (200)

## 2019-05-24 PROCEDURE — A9270 NON-COVERED ITEM OR SERVICE: HCPCS | Performed by: NEUROLOGICAL SURGERY

## 2019-05-24 RX ORDER — LORAZEPAM 2 MG/ML
INJECTION INTRAMUSCULAR
Status: COMPLETED
Start: 2019-05-24 | End: 2019-05-24

## 2019-05-24 RX ORDER — HALOPERIDOL 5 MG/ML
INJECTION INTRAMUSCULAR
Status: ACTIVE
Start: 2019-05-24 | End: 2019-05-24

## 2019-05-24 RX ORDER — HALOPERIDOL 5 MG/ML
1-5 INJECTION INTRAMUSCULAR EVERY 4 HOURS PRN
Status: DISCONTINUED | OUTPATIENT
Start: 2019-05-24 | End: 2019-05-27

## 2019-05-24 RX ORDER — LORAZEPAM 2 MG/ML
.5-2 INJECTION INTRAMUSCULAR EVERY 4 HOURS PRN
Status: DISCONTINUED | OUTPATIENT
Start: 2019-05-24 | End: 2019-05-27

## 2019-05-24 RX ADMIN — DEXAMETHASONE SODIUM PHOSPHATE 6 MG: 4 INJECTION, SOLUTION INTRA-ARTICULAR; INTRALESIONAL; INTRAMUSCULAR; INTRAVENOUS; SOFT TISSUE at 05:37

## 2019-05-24 RX ADMIN — LORAZEPAM 1 MG: 2 INJECTION INTRAMUSCULAR; INTRAVENOUS at 12:00

## 2019-05-24 RX ADMIN — METRONIDAZOLE 500 MG: 500 INJECTION, SOLUTION INTRAVENOUS at 20:44

## 2019-05-24 RX ADMIN — METRONIDAZOLE 500 MG: 500 INJECTION, SOLUTION INTRAVENOUS at 14:10

## 2019-05-24 RX ADMIN — OXYCODONE HYDROCHLORIDE 5 MG: 5 TABLET ORAL at 23:43

## 2019-05-24 RX ADMIN — FENTANYL CITRATE 100 MCG: 50 INJECTION INTRAMUSCULAR; INTRAVENOUS at 02:04

## 2019-05-24 RX ADMIN — VANCOMYCIN HYDROCHLORIDE 2000 MG: 100 INJECTION, POWDER, LYOPHILIZED, FOR SOLUTION INTRAVENOUS at 00:10

## 2019-05-24 RX ADMIN — CEFEPIME 2 G: 2 INJECTION, POWDER, FOR SOLUTION INTRAVENOUS at 20:43

## 2019-05-24 RX ADMIN — DEXAMETHASONE SODIUM PHOSPHATE 6 MG: 4 INJECTION, SOLUTION INTRA-ARTICULAR; INTRALESIONAL; INTRAMUSCULAR; INTRAVENOUS; SOFT TISSUE at 11:37

## 2019-05-24 RX ADMIN — FENTANYL CITRATE 100 MCG: 50 INJECTION INTRAMUSCULAR; INTRAVENOUS at 09:47

## 2019-05-24 RX ADMIN — ACETAMINOPHEN 1000 MG: 500 TABLET ORAL at 05:36

## 2019-05-24 RX ADMIN — VANCOMYCIN HYDROCHLORIDE 1600 MG: 100 INJECTION, POWDER, LYOPHILIZED, FOR SOLUTION INTRAVENOUS at 16:58

## 2019-05-24 RX ADMIN — ACETAMINOPHEN 1000 MG: 500 TABLET ORAL at 17:33

## 2019-05-24 RX ADMIN — FAMOTIDINE 20 MG: 20 TABLET ORAL at 05:36

## 2019-05-24 RX ADMIN — SODIUM CHLORIDE 500 MG: 9 INJECTION, SOLUTION INTRAVENOUS at 05:37

## 2019-05-24 RX ADMIN — CEFEPIME 2 G: 2 INJECTION, POWDER, FOR SOLUTION INTRAVENOUS at 14:10

## 2019-05-24 RX ADMIN — DEXAMETHASONE SODIUM PHOSPHATE 6 MG: 4 INJECTION, SOLUTION INTRA-ARTICULAR; INTRALESIONAL; INTRAMUSCULAR; INTRAVENOUS; SOFT TISSUE at 16:58

## 2019-05-24 RX ADMIN — OXYCODONE HYDROCHLORIDE 5 MG: 5 TABLET ORAL at 16:58

## 2019-05-24 RX ADMIN — METRONIDAZOLE 500 MG: 500 INJECTION, SOLUTION INTRAVENOUS at 05:37

## 2019-05-24 RX ADMIN — VANCOMYCIN HYDROCHLORIDE 1600 MG: 100 INJECTION, POWDER, LYOPHILIZED, FOR SOLUTION INTRAVENOUS at 23:44

## 2019-05-24 RX ADMIN — LORAZEPAM 1 MG: 2 INJECTION INTRAMUSCULAR; INTRAVENOUS at 20:00

## 2019-05-24 RX ADMIN — FENTANYL CITRATE 100 MCG: 50 INJECTION INTRAMUSCULAR; INTRAVENOUS at 04:39

## 2019-05-24 RX ADMIN — VANCOMYCIN HYDROCHLORIDE 1600 MG: 100 INJECTION, POWDER, LYOPHILIZED, FOR SOLUTION INTRAVENOUS at 08:30

## 2019-05-24 RX ADMIN — ACETAMINOPHEN 1000 MG: 500 TABLET ORAL at 00:10

## 2019-05-24 RX ADMIN — CEFEPIME 2 G: 2 INJECTION, POWDER, FOR SOLUTION INTRAVENOUS at 05:37

## 2019-05-24 RX ADMIN — ACETAMINOPHEN 1000 MG: 500 TABLET ORAL at 23:43

## 2019-05-24 RX ADMIN — DOCUSATE SODIUM 100 MG: 50 LIQUID ORAL at 05:37

## 2019-05-24 RX ADMIN — HALOPERIDOL LACTATE 2.5 MG: 5 INJECTION, SOLUTION INTRAMUSCULAR at 11:40

## 2019-05-24 RX ADMIN — OXYCODONE HYDROCHLORIDE 5 MG: 5 TABLET ORAL at 20:00

## 2019-05-24 RX ADMIN — SODIUM CHLORIDE 500 MG: 9 INJECTION, SOLUTION INTRAVENOUS at 16:58

## 2019-05-24 RX ADMIN — SENNOSIDES, DOCUSATE SODIUM 1 TABLET: 50; 8.6 TABLET, FILM COATED ORAL at 20:00

## 2019-05-24 RX ADMIN — DEXMEDETOMIDINE HYDROCHLORIDE 0.4 MCG/KG/HR: 100 INJECTION, SOLUTION INTRAVENOUS at 00:11

## 2019-05-24 RX ADMIN — OXYCODONE HYDROCHLORIDE 5 MG: 5 TABLET ORAL at 08:55

## 2019-05-24 RX ADMIN — DEXMEDETOMIDINE HYDROCHLORIDE 0.4 MCG/KG/HR: 100 INJECTION, SOLUTION INTRAVENOUS at 11:38

## 2019-05-24 ASSESSMENT — LIFESTYLE VARIABLES: EVER_SMOKED: YES

## 2019-05-24 ASSESSMENT — PATIENT HEALTH QUESTIONNAIRE - PHQ9
1. LITTLE INTEREST OR PLEASURE IN DOING THINGS: NOT AT ALL
SUM OF ALL RESPONSES TO PHQ9 QUESTIONS 1 AND 2: 0
2. FEELING DOWN, DEPRESSED, IRRITABLE, OR HOPELESS: NOT AT ALL

## 2019-05-24 ASSESSMENT — ACTIVITIES OF DAILY LIVING (ADL): TOILETING: INDEPENDENT

## 2019-05-24 ASSESSMENT — ENCOUNTER SYMPTOMS: NAUSEA: 0

## 2019-05-24 ASSESSMENT — PAIN SCALES - WONG BAKER: WONGBAKER_NUMERICALRESPONSE: HURTS EVEN MORE

## 2019-05-24 NOTE — PROGRESS NOTES
Neurosurgery Progress Note    Subjective:  No acute events. Remains intubated, sedated.     Exam:  Intubated/Sedated.  Rouses to stimulation  3 PERRL  Following simple commands.   CASEY w/full strength   Sensation intact  Incision: dressing c/d/i with staples intact  LUIS drain: 30cc/8hrs. Bloody      Pulse  Av.4  Min: 40  Max: 72  Resp  Av  Min: 0  Max: 35  Monitored Temp 2  Av.9 °C (98.4 °F)  Min: 36.4 °C (97.5 °F)  Max: 37.2 °C (99 °F)  SpO2  Av.6 %  Min: 94 %  Max: 100 %    No Data Recorded    Recent Labs      19   0424  19   0450  19   WBC  8.3  14.6*  12.6*   RBC  6.38*  5.01  4.95   HEMOGLOBIN  14.7  11.4*  11.2*   HEMATOCRIT  48.2  39.2*  38.9*   MCV  75.5*  77.8*  78.6*   MCH  23.0*  23.0*  22.6*   MCHC  30.5*  29.5*  28.8*   RDW  50.3*  52.9*  53.3*   PLATELETCT  333  329  313   MPV  9.8  10.4  10.8     Recent Labs      19   1717  19   2315  19   SODIUM  145  147*  147*   POTASSIUM  3.9  3.8  3.9   CHLORIDE  118*  116*  116*   CO2  22  23  23   GLUCOSE  146*  148*  151*   BUN  26*  28*  30*   CREATININE  0.81  0.84  0.91   CALCIUM  8.3*  8.3*  8.6     Recent Labs      19   1944  19   0424  19   APTT  33.7   --    --    INR  1.21*  1.14*  1.23*     Recent Labs      19   0012   REACTMIN  5.5   CLOTKINET  2.2   CLOTANGL  60.7   MAXCLOTS  64.2   [id number]  0.0   PRCINADP  67.1   PRCINAA  10.8       Intake/Output       19 0700 - 19 0659 19 0700 - 19 Total  Total       Intake    I.V.  1709.7  1567.2 3276.9  --  -- --    Magnesium Sulfate Volume 50 -- 50 -- -- --    Precedex Volume 27.7 127.2 154.9 -- -- --    Propofol Volume 88.8 -- 88.8 -- -- --    Volume (mL) (3% sodium chloride (HYPERTONIC SALINE) 500mL infusion 500 mL) 343.2 240 583.2 -- -- --    Volume (mL) (NS infusion) 1200 1200 2400 -- -- --    Other  100  100 200  --  -- --     Medications (PO/Enteral Liquids) 100 100 200 -- -- --    NG/GT  --  60 60  --  -- --    Intake (mL) (Enteral Tube 05/22/19 Orogastric Oral) -- 60 60 -- -- --    Total Intake 1809.7 1727.2 3536.9 -- -- --       Output    Urine  705  360 1065  --  -- --    Output (mL) (Urethral Catheter Latex;Temperature probe)  -- -- --    Drains  33  40 73  --  -- --    Output (mL) (Closed/Suction Drain 1 Left Scalp Yariel Frias 10 Fr.) 33 40 73 -- -- --    Emesis/NG output  150  -- 150  --  -- --    Output (mL) (Enteral Tube 05/22/19 Orogastric Oral) 150 -- 150 -- -- --    Total Output  -- -- --       Net I/O     921.7 1327.2 2248.9 -- -- --            Intake/Output Summary (Last 24 hours) at 05/24/19 0918  Last data filed at 05/24/19 0600   Gross per 24 hour   Intake          3234.89 ml   Output             1109 ml   Net          2125.89 ml            • vancomycin  1,600 mg Q8HR   • dexamethasone  6 mg TID    Followed by   • [START ON 5/26/2019] dexamethasone  4 mg TID    Followed by   • [START ON 5/28/2019] dexamethasone  4 mg BID    Followed by   • [START ON 5/30/2019] dexamethasone  2 mg BID    Followed by   • [START ON 6/1/2019] dexamethasone  2 mg DAILY   • dexmedetomidine (PRECEDEX) infusion  0.1-1.5 mcg/kg/hr Continuous   • metroNIDAZOLE (FLAGYL) IV  500 mg Q8HRS   • insulin regular  1-6 Units Q6HRS    And   • dextrose 10% bolus  125 mL Q15 MIN PRN   • Respiratory Care per Protocol   Continuous RT   • MD Alert...Adult ICU Electrolyte Replacement per Pharmacy   PHARMACY TO DOSE   • Pharmacy   PHARMACY TO DOSE   • fentaNYL  25 mcg Q HOUR PRN    Or   • fentaNYL  50 mcg Q HOUR PRN    Or   • fentaNYL  100 mcg Q HOUR PRN   • 3% sodium chloride  500 mL Continuous   • NS   Continuous   • propofol  0-80 mcg/kg/min Continuous   • MD Alert...Vancomycin per Pharmacy   PHARMACY TO DOSE   • acetaminophen  1,000 mg Q6HRS   • docusate sodium 100mg/10mL  100 mg BID   • famotidine  20 mg Q12HRS    Or   • famotidine   20 mg Q12HRS   • senna-docusate  1 Tab Nightly   • cloNIDine  0.1 mg Q4HRS PRN   • magnesium hydroxide  30 mL QDAY PRN   • oxyCODONE immediate-release  2.5 mg Q3HRS PRN   • polyethylene glycol/lytes  1 Packet BID PRN   • senna-docusate  1 Tab Q24HRS PRN   • cefepime  2 g Q8HRS   • Pharmacy Consult Request  1 Each PHARMACY TO DOSE   • oxyCODONE immediate-release  5 mg Q3HRS PRN   • MD ALERT...DO NOT ADMINISTER NSAIDS or ASPIRIN unless ORDERED By Neurosurgery  1 Each PRN   • ondansetron  4 mg Q4HRS PRN   • scopolamine  1 Patch Q72HRS PRN   • labetalol  10 mg Q HOUR PRN   • hydrALAZINE  10 mg Q HOUR PRN   • niCARdipine infusion  0-15 mg/hr Continuous   • levETIRAcetam (KEPPRA) IV  500 mg Q12HRS   • bisacodyl  10 mg Q24HRS PRN   • fleet  1 Each Once PRN       Assessment and Plan:  Hospital Day# 3  POD #3 Left crani for surgical drainage of cyst  Prophylactic anticoagulation: no         Start date/time: tbd    Plan:  Stable neuro exam  LUIS drain to remain today. Continues to drain >30cc/hrs. Can dc when less than 30cc/8hrs   Continue Decadron taper   Na goal 135-145. Na 147 this am. On 3% managed by intensivist.  Wean from vent as tolerated.   Ok to mobilize  Keep scalp incision clean and dry. Ok to leave open to air. Kwame dc'd on POD# 14

## 2019-05-24 NOTE — PROGRESS NOTES
Critical Care Progress Note    Date of admission  5/21/2019    Chief Complaint  53 y.o. male admitted 5/21/2019 with altered LOC, possible brain abcess by imaging     Hospital Course    53 y.o. male who presented 5/21/2019 with a past medical history significant for methamphetamine abuse who was seen in the emergency department 9 days ago and prescribed Cefdinir at discharge.  Apparently over the last several days he has had worsening pain, headache and developing confusion and unsteady gait with word finding difficulty.  No reported fevers or chills and he reportedly completed his antibiotics as prescribed.  In the emergency department patient underwent CT imaging of his brain which revealed a left frontotemporal mass favoring abscess over a solid lesion with extensive edema and a 10 mm left to right midline shift with effacement of the lateral ventricle.  Neurosurgery was consulted recommending an MRI of his brain and he was started on IV Keppra as well as Decadron.  Infectious disease was consulted and recommended IV antibiotics including Rocephin, vancomycin, and Flagyl.  Patient is being admitted to the intensive care unit and I was consulted for his critical care management.      Interval Problem Update  Reviewed last 24 hour events:    POD#2 - craniotomy for brain abcess  Micro pending - everything neg so far  Vent day#4  CXR improved ATX  PEEP 8 - 30%  RSBI 50s  Good cough  Secretions min  Awake and aggressive - follows some  SB/SR  SBp 150s  UO adequate  FENT no drip  DEX 0.4  3% 10cc/hr  Na 137  No VTE  Cefepime/Vanco/Flagyl  Decadron    Stop 3%  Etiology?  Micro negative so far    Patient extubated with the help of multiple staff, patient quite agitated/angry/combative but after were able to get the tube out safely he improved       YESTERDAY  POD#1 Cranio for CNS abcess - gramstain neg  Agitated with suctioning  Follows bilat L>R, purposeful  SB/SR 50-60s  SBp 90-110s  Tm 37.3  Vent day #2  PEEP 8 -  30%  CXR worse ATX BB  Min secretions  NPO - OG to suction  400/12hrs  UO good  CVC  Prop 20  EEG about to start    3% saline 40 - new goal 135-145  NO Pharm VTE  Vanco/Cefepimne/Flagyl  Decadron    Replete Mg  SAT/SBT not tolerated  Switching propofol over to dexmedetomidine although bradycardia at times    Successfully off propofol and on dexmedetomidine with heart rate running in the 50s with normal blood pressure  Repeat SBT when clinically appropriate    Review of Systems  Review of Systems   Unable to perform ROS: Acuity of condition   Remains agitated and not following    Vital Signs for last 24 hours   Temp:  [37 °C (98.6 °F)-37.2 °C (99 °F)] 37.2 °C (99 °F)  Pulse:  [40-73] 58  Resp:  [0-35] 26  SpO2:  [92 %-100 %] 96 %    Hemodynamic parameters for last 24 hours       Respiratory Information for the last 24 hours  Taylor Vent Mode: Spont  Rate (breaths/min): 20  Vt Target (mL): 450  PEEP/CPAP: 8  FiO2: 30  P Support: 5  P MEAN: 10  Control VTE (exp VT): 520    Physical Exam   Physical Exam   Constitutional: He appears well-developed and well-nourished. He is uncooperative.  Non-toxic appearance. No distress. He is sedated, intubated and restrained.   HENT:   Head: Normocephalic.   Mouth/Throat: Oropharynx is clear and moist. Mucous membranes are not dry and not cyanotic.   CNS drain and craniotomy scar noted on the left, clean and dry   Eyes: No scleral icterus.   Small but reactive pupils, not disconjugate   Neck: Neck supple. Normal carotid pulses and no JVD present. No neck rigidity. No Brudzinski's sign and no Kernig's sign noted.   Cardiovascular: Normal rate, regular rhythm and intact distal pulses.   No extrasystoles are present. Exam reveals no gallop and no friction rub.    No murmur heard.  Pulmonary/Chest: No accessory muscle usage. He is intubated. No respiratory distress (On full vent support). He has no decreased breath sounds. He has no wheezes. He has no rhonchi. He has no rales.    Abdominal: Soft. Bowel sounds are normal. He exhibits no distension. There is no hepatosplenomegaly. There is no tenderness. There is no rigidity, no guarding and no CVA tenderness.   Musculoskeletal: He exhibits no edema.   Neurological: He is alert. He displays no tremor. No cranial nerve deficit. He exhibits normal muscle tone. He displays no seizure activity. Coordination normal. GCS eye subscore is 4. GCS verbal subscore is 1. GCS motor subscore is 6.   Cranial nerves intact, moves all 4, starting to follow commands, no tremor or seizure activity   Skin: Skin is warm and dry. He is not diaphoretic (Improved). No cyanosis or erythema. Nails show no clubbing.   Psychiatric: His mood appears anxious. His affect is angry and labile. He is agitated, aggressive and combative. Cognition and memory are impaired.   Improved after extubation   Vitals reviewed.      Medications  Current Facility-Administered Medications   Medication Dose Route Frequency Provider Last Rate Last Dose   • vancomycin 1,600 mg in  mL IVPB  1,600 mg Intravenous Q8HR Deyanira Hines M.D.   Stopped at 05/24/19 1858   • haloperidol lactate (HALDOL) injection 1-5 mg  1-5 mg Intravenous Q4HRS PRN Angeline Trinidad M.D.   2.5 mg at 05/24/19 1140   • LORazepam (ATIVAN) injection 0.5-2 mg  0.5-2 mg Intravenous Q4HRS PRN Angeline Trinidad M.D.   1 mg at 05/24/19 2000   • insulin regular (HUMULIN R) injection 1-6 Units  1-6 Units Subcutaneous 4X/DAY ACHS Jeremy M Gonda, M.D.   Stopped at 05/24/19 2100    And   • DEXTROSE 10% BOLUS 125 mL  125 mL Intravenous Q15 MIN PRN Jeremy M Gonda, M.D.       • dexamethasone (DECADRON) injection 6 mg  6 mg Intravenous TID Catherine Magallon, A.P.R.N.   6 mg at 05/24/19 1658    Followed by   • [START ON 5/26/2019] dexamethasone (DECADRON) injection 4 mg  4 mg Intravenous TID Catherine Magallon, A.P.R.N.        Followed by   • [START ON 5/28/2019] dexamethasone (DECADRON) injection 4 mg  4 mg Intravenous BID  SHAWANDA GilPMgRMARCIN        Followed by   • [START ON 5/30/2019] dexamethasone (DECADRON) injection 2 mg  2 mg Intravenous BID IVELISSE GilRMARCIN        Followed by   • [START ON 6/1/2019] dexamethasone (DECADRON) injection 2 mg  2 mg Intravenous DAILY IVELISSE GilRMARCIN       • dexmedetomidine (PRECEDEX) 400 mcg/100mL NS premix infusion  0.1-1.5 mcg/kg/hr Intravenous Continuous Amilcar Reaves M.D.   Stopped at 05/24/19 1400   • metroNIDAZOLE (FLAGYL) IVPB 500 mg  500 mg Intravenous Q8HRS Sadiq Guerin M.D.   Stopped at 05/24/19 2144   • Respiratory Care per Protocol   Nebulization Continuous RT Jeremy M Gonda, M.D.       • MD Alert...ICU Electrolyte Replacement per Pharmacy   Other PHARMACY TO DOSE Jeremy M Gonda, M.D.       • Pharmacy Consult: Enteral tube insertion - review meds/change route/product selection   Other PHARMACY TO DOSE Jeremy M Gonda, M.D.       • fentaNYL (SUBLIMAZE) injection 25 mcg  25 mcg Intravenous Q HOUR PRN Jeremy M Gonda, M.D.   Stopped at 05/22/19 0752    Or   • fentaNYL (SUBLIMAZE) injection 50 mcg  50 mcg Intravenous Q HOUR PRN Jeremy M Gonda, M.D.   50 mcg at 05/23/19 0734    Or   • fentaNYL (SUBLIMAZE) injection 100 mcg  100 mcg Intravenous Q HOUR PRN Jeremy M Gonda, M.D.   100 mcg at 05/24/19 0947   • NS infusion   Intravenous Continuous Jeremy M Gonda, M.D. 100 mL/hr at 05/24/19 1930 100 mL at 05/24/19 1930   • MD Alert...Vancomycin per Pharmacy   Other PHARMACY TO DOSE Amilcar Reaves M.D.       • acetaminophen (TYLENOL) tablet 1,000 mg  1,000 mg Enteral Tube Q6HRS Amilcar Reaves M.D.   1,000 mg at 05/24/19 1733   • docusate sodium 100mg/10mL (COLACE) solution 100 mg  100 mg Enteral Tube BID Amilcar Reaves M.D.   100 mg at 05/24/19 0537   • senna-docusate (PERICOLACE or SENOKOT S) 8.6-50 MG per tablet 1 Tab  1 Tab Enteral Tube Nightly Amilcar Reaves M.D.   1 Tab at 05/24/19 2000   • cloNIDine (CATAPRES) tablet 0.1 mg  0.1 mg  Enteral Tube Q4HRS PRN Amilcar Reaves M.D.       • magnesium hydroxide (MILK OF MAGNESIA) suspension 30 mL  30 mL Enteral Tube QDAY PRN Amilcar Reaves M.D.       • oxyCODONE immediate-release (ROXICODONE) tablet 2.5 mg  2.5 mg Enteral Tube Q3HRS PRN Amilcar Reaves M.D.       • polyethylene glycol/lytes (MIRALAX) PACKET 1 Packet  1 Packet Enteral Tube BID PRN Amilcar Reaves M.D.       • senna-docusate (PERICOLACE or SENOKOT S) 8.6-50 MG per tablet 1 Tab  1 Tab Enteral Tube Q24HRS PRN Amilcar Reaves M.D.       • cefepime (MAXIPIME) 2 g in  mL IVPB  2 g Intravenous Q8HRS Deyanira Hines M.D.   Stopped at 05/24/19 2113   • Pharmacy Consult Request ...Pain Management Review 1 Each  1 Each Other PHARMACY TO DOSE Yanelis Austin M.D.       • oxyCODONE immediate-release (ROXICODONE) tablet 5 mg  5 mg Oral Q3HRS PRN Yanelis Austin M.D.   5 mg at 05/24/19 2000   • MD ALERT...DO NOT ADMINISTER NSAIDS or ASPIRIN unless ORDERED By Neurosurgery 1 Each  1 Each Other PRN Yanelis Austin M.D.       • ondansetron (ZOFRAN) syringe/vial injection 4 mg  4 mg Intravenous Q4HRS PRN Yanelis Austin M.D.       • scopolamine (TRANSDERM-SCOP) patch 1 Patch  1 Patch Transdermal Q72HRS PRN Yanelis Austin M.D.       • labetalol (NORMODYNE,TRANDATE) injection 10 mg  10 mg Intravenous Q HOUR PRN Yanelis Austin M.D.       • hydrALAZINE (APRESOLINE) injection 10 mg  10 mg Intravenous Q HOUR PRN Yanelis Austin M.D.       • niCARdipine (CARDENE) 25 mg in  mL Infusion  0-15 mg/hr Intravenous Continuous Yanelis Austin M.D.   Stopped at 05/21/19 0922   • levETIRAcetam (KEPPRA) 500 mg in  mL IVPB  500 mg Intravenous Q12HRS Yanelis Austin M.D.   Stopped at 05/24/19 1713   • bisacodyl (DULCOLAX) suppository 10 mg  10 mg Rectal Q24HRS PRN Yanelis Austin M.D.       • fleet enema 133 mL  1 Each Rectal Once PRN Yanelis Austin M.D.           Fluids    Intake/Output Summary (Last 24 hours) at 05/24/19  2210  Last data filed at 05/24/19 1858   Gross per 24 hour   Intake          3489.88 ml   Output              510 ml   Net          2979.88 ml       Laboratory  Recent Labs      05/22/19 0427 05/23/19 0424 05/24/19 0422   ISTATAPH  7.309*  7.406  7.432   ISTATAPCO2  42.6*  35.4  30.9   ISTATAPO2  135*  69  101*   ISTATATCO2  23  23  22   NFRWVNA3DNV  99  94  98   ISTATARTHCO3  21.4  22.2  20.6   ISTATARTBE  -5*  -2  -3   ISTATTEMP  98.5 F  37.3 C  36.5 C   ISTATFIO2  100  40  30   ISTATSPEC  Arterial  Arterial  Arterial   ISTATAPHTC  7.310*  7.402  7.439   IIIKSKTV6MV  134*  70  98*         Recent Labs      05/23/19 0450 05/23/19 2315 05/24/19 0429 05/24/19   1218   SODIUM  148*   < >  147*  147*  148*   POTASSIUM  3.9   < >  3.8  3.9  3.6   CHLORIDE  117*   < >  116*  116*  117*   CO2  25   < >  23  23  23   BUN  19   < >  28*  30*  31*   CREATININE  0.90   < >  0.84  0.91  0.79   MAGNESIUM  1.8   --    --   2.1   --    PHOSPHORUS  3.1   --    --   3.7   --    CALCIUM  8.4*   < >  8.3*  8.6  8.1*    < > = values in this interval not displayed.     Recent Labs      05/23/19 2315 05/24/19 0429 05/24/19   1218   GLUCOSE  148*  151*  135*     Recent Labs      05/22/19 0424 05/23/19 0450 05/24/19 0426   WBC  8.3  14.6*  12.6*     Recent Labs      05/22/19 0424 05/23/19 0450 05/24/19 0426 05/24/19 0429   RBC  6.38*  5.01  4.95   --    HEMOGLOBIN  14.7  11.4*  11.2*   --    HEMATOCRIT  48.2  39.2*  38.9*   --    PLATELETCT  333  329  313   --    PROTHROMBTM  14.7*   --    --   15.6*   INR  1.14*   --    --   1.23*       Imaging  X-Ray:  I have personally reviewed the images and compared with prior images.  EKG:  I have personally reviewed the images and compared with prior images.  CT:    Reviewed    Assessment/Plan  * Brain abscess- (present on admission)   Assessment & Plan    Presumed based on imaging/craniotomy politics and recent left otitis media  MRI most consistent with  abscess, reviewed with neurosurgery  IV antibiotics including Rocephin, Flagyl, vancomycin  Cultures pending, further cultures from CNS surgery 5/22, all still negative  Keppra prophylaxis ongoing, continue/will review duration with NS  Decadron ongoing, de-escalate postop after discussion with neurosurgery, weaning program in the chart  Neurosurgical evaluation ongoing and infectious disease consultation noted  Status post craniotomy p.m. 5/22  Will check HIV and RPR given abscess and meth abuse, ?IVDA, pending  Strict BP control (goal SBP<160) with nicardipine gtt as needed     Acute respiratory failure with hypoxia (HCC)- (present on admission)   Assessment & Plan    Intubated a.m. 5/22  Extubated 5/24  RT protocols  Incentive spirometry  Mobilize/pulmonary toilet     Delirium   Assessment & Plan    Hyperactive delirium, secondary CNS infection  Intubated 5/22  Physical restraints as needed for patient and staff's safety  Titrate dexmedetomidine, hopefully wean off  As needed for safety of patient and staff soft restraints and IV Haldol/lorazepam at low dose  Mobilize  Limit overstimulation  Family helping to calm this patient     Methamphetamine abuse (HCC)- (present on admission)   Assessment & Plan    Eventual drug cessation education to be provided once able to converse with patient  Monitor for withdrawal syndrome  Hepatitis panel, syphilis study and HIV negative  UDS sent?     Agitation   Assessment & Plan    Secondary to delirium/frontal lobe CNS infection status post [place]  As needed Ativan/Haldol intravenously  Titrating dexmedetomidine now, hopefully wean off over the course of the day have switched over from propofol for weaning purposes     S/P craniotomy- (present on admission)   Assessment & Plan    Wound site looks good  Await pathology and microbiology results, all micro negative so far  Initial path interpretation no GBM and or carcinoma, specimen sent to [place] for formal second opinion,  pending  Frequent neurochecks ongoing, improving, now every 2 hours     Smoker- (present on admission)   Assessment & Plan    Nicotine replacement patch  Tobacco cessation education to be provided eventually  Query COPD, RT protocols ongoing          VTE:  Contraindicated  Ulcer: H2 Antagonist  Lines: None    I have performed a physical exam and reviewed and updated ROS and Plan today (5/24/2019). In review of yesterday's note (5/23/2019), there are no changes except as documented above.       Discussed patient condition and risk of morbidity and/or mortality with Family, RN, RT, Pharmacy, [employer] [facility] resident, Charge nurse / hot rounds and neurosurgery

## 2019-05-24 NOTE — THERAPY
Occupational Therapy Contact Note:    OT vianney initiated and attempted, pt originally agreeable to participate, PLOF information obtained from mother bedside. As bedding was being re-adjusted pt became very agitated and wanted to drink a cup of water. Ed he needed to wait for SLP evaluation. Pt started pulling at lines and shaking fists. Terminated attempt at this time. Will attempt again as able and appropriate    ALLISON Chan/L  Pager: [phone]

## 2019-05-24 NOTE — PROGRESS NOTES
· 2 RN skin check complete with Serafin   · Devices in place ETT, right TL IJ, rojo, OGT-LIS, and left LUIS Drain.  · Skin assessed under devices: yes  · Confirmed pressure ulcers found on N/A.  · The following interventions in place Q2H turns, pillows to support elbows and float heels, waffle cushion in place, reposition pulse ox and BP cuff, mepilex to bottom, barrier wipes in use.

## 2019-05-24 NOTE — PROGRESS NOTES
"Pharmacy Kinetics 53 y.o. male on vancomycin day # 4 5/24/2019    Currently on Vancomycin 2000 mg iv q12hr    Indication for Treatment: Brain Abscess    Pertinent history per medical record: Admitted on 5/21/2019 for confusion for the past week and a half.  He recently completed treatment with cefdinir for otitis media.  PMH of methamphetamine abuse.  ID and Neurosurgery are consulting. S/p craniotomy.    Other antibiotics: cefepime 2 grams iv q8h, metronidazole 500 mg iv q8h    Allergies: Pcn [penicillins] and Sulfa drugs     List concerns for renal function: obesity, CT with contrast 5/21  Pertinent cultures to date:   5/21/19 blood-peripheral x 2:  NGTD  No organisms seen on gram stain from OR, cultures remain in process    Recent Labs      05/21/19 1944 05/22/19   0424  05/23/19   0450  05/24/19   0426   WBC  8.2  8.3  14.6*  12.6*   NEUTSPOLYS  76.50*   --    --    --      Recent Labs      05/21/19   1944 05/23/19   1133  05/23/19   1717  05/23/19   2315  05/24/19   0429  05/24/19   1218   BUN  20   < >  23*  26*  28*  30*  31*   CREATININE  0.79   < >  0.84  0.81  0.84  0.91  0.79   ALBUMIN  4.5   --    --    --    --    --    --     < > = values in this interval not displayed.     Recent Labs      05/23/19   2315   VANCOTROUGH  14.9     Intake/Output Summary (Last 24 hours) at 05/24/19 1541  Last data filed at 05/24/19 1000   Gross per 24 hour   Intake          2315.89 ml   Output              980 ml   Net          1335.89 ml      /88   Pulse (!) 48   Temp 37.7 °C (99.9 °F) ([place])   Resp 15   Ht 1.803 m (5' 11\")   Wt 108.8 kg (239 lb 13.8 oz)   SpO2 99%  No data recorded.      A/P   1. Vancomycin dose change: Change to 1600mg q8hr  2. Next vancomycin level: 5/25 @0730 - prior to 4th dose on new interval  3. Goal trough: 18-22 mcg/mL  4. Comments: Renal stable at this time. Level collected today prior to the 5th dose. Subtherapeutic. Will change to q8hr dosing. Expect a trough of ~19 on above " dose. Dose aggressively due to possible brain abscess. At risk for accumulation due to body habitus. Will monitor renal closely. Pharmacy will follow. Narrow therapy as able.    Herberth Camara, PharmD, BCPS

## 2019-05-24 NOTE — CARE PLAN
Problem: Ventilation Defect:  Goal: Ability to achieve and maintain unassisted ventilation or tolerate decreased levels of ventilator support    Intervention: Support and monitor invasive and noninvasive mechanical ventilation  Adult Ventilation Update    Total Vent Days: 3   RR 20  Peep 8 FIO2 30%    Patient Lines/Drains/Airways Status    Active Airway     Name: Placement date: Placement time: Site: Days:    Airway ETT Oral 8.0 05/22/19   0305   Oral   1              In the last 24 hours, the patient tolerated SBT for 45min on settings of 5/8.    #FVC / Vital Capacity (liters) :  (unable to do, uncooperative) (05/23/19 1340)  NIF (cm H2O) :  (unable to do, uncooperative) (05/23/19 1340)  Rapid Shallow Breathing Index (RR/VT): 54 (05/23/19 1340)  Plateau Pressure (Q Shift): 16 (05/23/19 1852)  Static Compliance (ml / cm H2O): 67.1 (05/23/19 2214)    Patient failed trials because of Barriers to Wean: Other (Comments) (pending transport to [facility] this am) (05/22/19 0705)  Barriers to SBT Weaning Trial Stopped due to:: Change in neurologic condition (i.e. diaphoresis, seizures, somnolence, agitation, etc) (very agitated, uncooperative) (05/23/19 1340)  Length of Weaning Trial Length of Weaning Trial (Hours): .75 (05/23/19 1340)    Cough: Productive (05/24/19 0000)  Sputum Amount: Small (05/24/19 0000)  Sputum Color: White;Clear (05/24/19 0000)  Sputum Consistency: Thin (05/24/19 0000)    Mobility  Level of Mobility: Level I (05/24/19 0000)  Activity Performed: Unable to mobilize (05/24/19 0000)  Assistance: Assistance of Two or More (05/23/19 2031)  Assistive Devices: None (05/23/19 2031)  Reason Not Mobilized: Unstable condition (05/24/19 0000)  Mobilization Comments: aggressive, respiratory decline (05/22/19 0200)    Events/Summary/Plan: continue daily SBTs

## 2019-05-24 NOTE — CARE PLAN
Problem: Communication  Goal: The ability to communicate needs accurately and effectively will improve  Outcome: PROGRESSING SLOWER THAN EXPECTED  Sedation titrated to lowest amount. Patient is responding and following commands appropriately. Denies pain.  Intervention: Educate patient and significant other/support system about the plan of care, procedures, treatments, medications and allow for questions  POC reviewed with patients  - [place]      Problem: Safety  Goal: Will remain free from injury  Outcome: PROGRESSING AS EXPECTED    Goal: Will remain free from falls  Outcome: PROGRESSING AS EXPECTED      Problem: Pain Management  Goal: Pain level will decrease to patient's comfort goal  Denies pain

## 2019-05-24 NOTE — THERAPY
Physical Therapy: PT evaluation attempted, education done, PLF obtained, pt became agitated when trying to answer questions, wanted drink of water. Cleared for ice chips only, pt became more agitated with education about waiting for swallow eval. Hold today, try another day.-Devorah Alonso, PT

## 2019-05-24 NOTE — THERAPY
"Speech Language Therapy Clinical Swallow Evaluation completed.  Functional Status: The patient was seen for clinical swallow evaluation this date. The patient was awake, alert and pleasant with signs of expressive aphasia. The patient was able to follow oral motor directives with mild right facial asymmetry and which appeared symmetrical on motor tasks. The patient was noted to have perseverations and neologisms during attempts at verbalization. The patient was given PO trials of ice chips, nectars, purees, thin liquids and soft solids. The patient presented with signs of oropharyngeal dysphagia as evidenced by slow/prolonged mastication with slight oral residue noted on puree and soft solid trials. Patient presented with overt s/s concerning for penetration/aspiration on thin liquids as evidenced by delayed cough response in 3 of 4 trials. No overt s/s of aspiration noted on modified PO trials of NTL, purees or soft solids. At this time, recommend patient begin D2/NTL meal items with swallow strategies and standby feeding assistance 2/2 impulsivity during PO intake.     Recommendations - Diet:  Dysphagia 2/ nectar thick liquids.                           Strategies: No Straws and Head of Bed at 90 Degrees                          Medication Administration:  Whole in puree or whole in nectars    Plan of Care: Will benefit from Speech Therapy 5 times per week  Post-Acute Therapy: Recommend inpatient transitional care services for continued speech therapy services.        See \"Rehab Therapy-Acute\" Patient Summary Report for complete documentation.   "

## 2019-05-24 NOTE — CARE PLAN
Problem: Ventilation Defect:  Goal: Ability to achieve and maintain unassisted ventilation or tolerate decreased levels of ventilator support  Outcome: PROGRESSING AS EXPECTED  Adult Ventilation Update    Total Vent Days: 2    Patient Lines/Drains/Airways Status    Active Airway     Name: Placement date: Placement time: Site: Days:    Airway ETT Oral 8.0 05/22/19 0305   Oral   1              In the last 24 hours, the patient tolerated SBT for 45 minutes on settings of 5/8.    #FVC / Vital Capacity (liters) :  (unable to do, uncooperative) (05/23/19 1340)  NIF (cm H2O) :  (unable to do, uncooperative) (05/23/19 1340)  Rapid Shallow Breathing Index (RR/VT): 54 (05/23/19 1340)  Plateau Pressure (Q Shift): 18 (05/23/19 0649)  Static Compliance (ml / cm H2O): 69 (05/23/19 1459)    Barriers to SBT Weaning Trial Stopped due to:: Change in neurologic condition (i.e. diaphoresis, seizures, somnolence, agitation, etc) (very agitated, uncooperative) (05/23/19 1340)  Length of Weaning Trial Length of Weaning Trial (Hours): .75 (05/23/19 1340)    Sputum/Suction   Cough: Productive (05/23/19 0208)  Sputum Amount: Small (05/23/19 1600)  Sputum Color: White;Clear (05/23/19 1600)  Sputum Consistency: Thin (05/23/19 1600)    Mobility  Level of Mobility: Level I (05/23/19 1600)  Activity Performed: Unable to mobilize (05/23/19 1600)  Assistance: Assistance of Two or More (05/23/19 1600)  Reason Not Mobilized: Unstable condition (05/23/19 0800)  Mobilization Comments: aggressive, respiratory decline (05/22/19 0200)    Events/Summary/Plan: SBT aborted, placed back on rest settings (05/23/19 1340)

## 2019-05-24 NOTE — PROGRESS NOTES
[facility] Progress Note      Admit Date: 5/21/2019  LOS: 3    Resident(s): Angeline Trinidad   Attending: NETO RAMIREZ/ Dr. Reaves    Date & Time:   5/24/2019   10:23 AM       Patient ID:    Name:             Robert Dorantes     YOB: 1965  Age:                 53 y.o.  male   MRN:               [id number]    Diagnosis:  Principal Problem:    Brain abscess POA: Yes  Active Problems:    Acute respiratory failure with hypoxia (HCC) POA: Yes    Methamphetamine abuse (HCC) POA: Yes    Delirium POA: Unknown    Smoker POA: Yes    S/P craniotomy POA: Unknown  Resolved Problems:    * No resolved hospital problems. *      HPI:  53 y.o male presented to the ER with 90 history of confusion.  Patient was seen in the ED on May 12 and diagnosed with acute supportive otitis media; treated with Ceftin ear for 7 days.  Patient's mother reported that the patient has been complaining of left-sided ear ache, increased drowsiness, increased fatigue and confusion since that time and over the past 2 days he has had difficulty recognizing family members, speech difficulty, and word finding difficulties.  In the ED, CT head showed 4 cm mass lesion in the left frontotemporal lobe with extensive edema and mass-effect as well as a 10 mm shift to the right; imaging favors an abscess.  Neurosurgery was consulted and the patient was admitted to the ICU for further treatment and close monitoring.    Consultants:  Neurosurgery  Infectious disease  PMA    Interval Events:  No acute overnight events.  LUIS drain with sanguinous fluids.  Patient following commands intermittently.  Extubated today. Tolerating room air.  Preliminary cultures negative.    Review of Systems   Unable to perform ROS: Intubated       Physical Exam   Constitutional: He is well-developed, well-nourished, and in no distress. No distress.   HENT:   Right Ear: External ear normal.   Left Ear: External ear normal.   Head bandaged with LUIS drain with sanguinous fluid.    Eyes: Pupils are equal, round, and reactive to light.   Neck: No JVD present.   Cardiovascular: Normal rate, regular rhythm and normal heart sounds.  Exam reveals no gallop and no friction rub.    No murmur heard.  Pulmonary/Chest: Effort normal. He has no wheezes. He has no rales.   Abdominal: Soft. There is no tenderness. There is no rebound.   Musculoskeletal: He exhibits no edema.   Lymphadenopathy:     He has no cervical adenopathy.   Neurological:   Follows commands off sedation.   Skin: He is not diaphoretic.         Respiratory:  Taylor Vent Mode: APVCMV  Respiration: 16, Pulse Oximetry: 96 %    Chest Tube Drains:    Recent Labs      05/22/19   0427  05/23/19   0424  05/24/19   0422   ISTATAPH  7.309*  7.406  7.432   ISTATAPCO2  42.6*  35.4  30.9   ISTATAPO2  135*  69  101*   ISTATATCO2  23  23  22   [id number]  99  94  98   ISTATARTHCO3  21.4  22.2  20.6   ISTATARTBE  -5*  -2  -3   ISTATTEMP  98.5 F  37.3 C  36.5 C   ISTATFIO2  100  40  30   ISTATSPEC  Arterial  Arterial  Arterial   ISTATAPHTC  7.310*  7.402  7.439   [id number]  134*  70  98*       HemoDynamics:  Pulse: 73, Heart Rate (Monitored): 72 Arterial BP: 138/74, NIBP: 128/77      Neuro:      Fluids:        Intake/Output Summary (Last 24 hours) at 05/23/19 1345  Last data filed at 05/23/19 1200   Gross per 24 hour   Intake          4830.55 ml   Output             2103 ml   Net          2727.55 ml       Weight: 108.8 kg (239 lb 13.8 oz)  Body mass index is 33.45 kg/m².    Recent Labs      05/23/19   0450   05/23/19   1717  05/23/19   2315  05/24/19   0429   SODIUM  148*   < >  145  147*  147*   POTASSIUM  3.9   < >  3.9  3.8  3.9   CHLORIDE  117*   < >  118*  116*  116*   CO2  25   < >  22  23  23   BUN  19   < >  26*  28*  30*   CREATININE  0.90   < >  0.81  0.84  0.91   MAGNESIUM  1.8   --    --    --   2.1   PHOSPHORUS  3.1   --    --    --   3.7   CALCIUM  8.4*   < >  8.3*  8.3*  8.6    < > = values in this interval not displayed.        GI/Nutrition:  Recent Labs      19   1717  195  19   ALTSGPT  23   --    --    --    --    ASTSGOT  16   --    --    --    --    ALKPHOSPHAT  48   --    --    --    --    TBILIRUBIN  0.9   --    --    --    --    GLUCOSE  110*   < >  146*  148*  151*    < > = values in this interval not displayed.       Heme:  Recent Labs      19   RBC  6.33*  6.38*  5.01  4.95   --    HEMOGLOBIN  14.7  14.7  11.4*  11.2*   --    HEMATOCRIT  48.2  48.2  39.2*  38.9*   --    PLATELETCT  350  333  329  313   --    PROTHROMBTM  15.4*  14.7*   --    --   15.6*   APTT  33.7   --    --    --    --    INR  1.21*  1.14*   --    --   1.23*       Infectious Disease:  Monitored Temp 2  Av.9 °C (98.4 °F)  Min: 36.4 °C (97.5 °F)  Max: 37.2 °C (99 °F)  Recent Labs      19   WBC  8.2  8.3  14.6*  12.6*   NEUTSPOLYS  76.50*   --    --    --    LYMPHOCYTES  14.90*   --    --    --    MONOCYTES  6.70   --    --    --    EOSINOPHILS  0.50   --    --    --    BASOPHILS  1.00   --    --    --    ASTSGOT  16   --    --    --    ALTSGPT  23   --    --    --    ALKPHOSPHAT  48   --    --    --    TBILIRUBIN  0.9   --    --    --        Meds:  • haloperidol lactate       • LORazepam       • vancomycin  1,600 mg 1,600 mg (19 0830)   • haloperidol lactate  1-5 mg     • LORazepam  0.5-2 mg     • dexamethasone  6 mg      Followed by   • [START ON 2019] dexamethasone  4 mg      Followed by   • [START ON 2019] dexamethasone  4 mg      Followed by   • [START ON 2019] dexamethasone  2 mg      Followed by   • [START ON 2019] dexamethasone  2 mg     • dexmedetomidine (PRECEDEX) infusion  0.1-1.5 mcg/kg/hr 0.4 mcg/kg/hr (19 7954)   • metroNIDAZOLE (FLAGYL) IV  500 mg Stopped (19 2861)   • insulin regular  1-6 Units      And   •  dextrose 10% bolus  125 mL     • Respiratory Care per Protocol       • MD Alert...Adult ICU Electrolyte Replacement per Pharmacy       • Pharmacy       • fentaNYL  25 mcg      Or   • fentaNYL  50 mcg      Or   • fentaNYL  100 mcg     • NS   100 mL/hr at 05/22/19 1510   • propofol  0-80 mcg/kg/min Stopped (05/23/19 1625)   • MD Alert...Vancomycin per Pharmacy       • acetaminophen  1,000 mg     • docusate sodium 100mg/10mL  100 mg     • famotidine  20 mg      Or   • famotidine  20 mg     • senna-docusate  1 Tab     • cloNIDine  0.1 mg     • magnesium hydroxide  30 mL     • oxyCODONE immediate-release  2.5 mg     • polyethylene glycol/lytes  1 Packet     • senna-docusate  1 Tab     • cefepime  2 g Stopped (05/24/19 0607)   • Pharmacy Consult Request  1 Each     • oxyCODONE immediate-release  5 mg     • MD ALERT...DO NOT ADMINISTER NSAIDS or ASPIRIN unless ORDERED By Neurosurgery  1 Each     • ondansetron  4 mg     • scopolamine  1 Patch     • labetalol  10 mg     • hydrALAZINE  10 mg     • niCARdipine infusion  0-15 mg/hr Stopped (05/21/19 2214)   • levETIRAcetam (KEPPRA) IV  500 mg Stopped (05/24/19 0545)   • bisacodyl  10 mg     • fleet  1 Each          Imaging:  DX-CHEST-PORTABLE (1 VIEW)   Final Result      Slight improvement in lung aeration.      DX-CHEST-PORTABLE (1 VIEW)   Final Result      1.  Lines and tubes as above.   2.  Hypoinflation with bibasilar atelectasis. No new consolidation or large pleural effusions.      MR-Pending sale to [facility] BRAIN WITH & W/O   Final Result      1.  Large peripherally enhancing intra-axial mass in the left temporal lobe with marked surrounding vasogenic edema. Findings are most consistent with high-grade primary brain tumor such as glioblastoma. No other enhancing lesions elsewhere to suggest    metastasis, however, a solitary brain metastasis would not be excluded. Brain abscess would not be excluded. There are no diffusion weighted images to determine whether contents of this lesion  show restricted diffusion. There are no prior MRI scans for    comparison.   2.  Marked left-to-right shift of midline structures along with midbrain compression and displacement.      DX-CHEST-PORTABLE (1 VIEW)   Final Result      1.  Well-positioned lines and tubes.   2.  No new consolidation or pleural effusions.      DX-CHEST-PORTABLE (1 VIEW)   Final Result         1. Borderline cardiomegaly. No focal consolidation or pleural effusions.   2. Hiatal hernia.      CT-CTA NECK WITH & W/O-POST PROCESSING   Final Result      Unremarkable CT angiogram of the neck. No high-grade stenosis, large vessel occlusion, aneurysm or dissection.      CT-CTA HEAD WITH & W/O-POST PROCESS   Final Result      1.  No large vessel occlusion, high-grade stenosis or aneurysm of the [place].   2.  A 4 cm mass lesion in the left frontotemporal region. Imaging features favor an abscess over a solid lesion. However, MRI is needed for definitive characterization.   3.  Extensive edema associated with the mass, resulting in 10 mm left-to-right midline shift.   4.  Effacement of the left and enlargement of the right lateral ventricles may be due to developing hydrocephalus.      Findings were discussed with JOSE ALTAMIRANO on 5/21/2019 8:39 PM.      EC-ECHOCARDIOGRAM COMPLETE W/O CONT    (Results Pending)       Procedures:  Intubation 5/22 - 5/24  Craniotomy 5/22  EEG 5/23      Problem and Plan:  * Brain abscess- (present on admission)   Assessment & Plan    Treated for acute suppurative otitis media 9 days ago.  9 days history of increasing left ear pain, confusion, difficulty finding words.  History of meth abuse.  CT scan of head with contrast showed fluid collection most likely abscess over the left frontotemporal area with mass-effect, midline shift and evidence of developing hydrocephalus.  Neurosurgery consulted, recommendations appreciated.  S/p craniotomy 5/22/19  ID consulted, recommendations appreciated.  Continue Cefepime,  Vanco, Flagyl  Bacterial culture, fungal culture, and AFB with negative preliminary results.     Acute respiratory failure with hypoxia (HCC)- (present on admission)   Assessment & Plan    Intubated for airway protection in ED 5/22, extubated 5/24.  RT/O2 protocol.     Methamphetamine abuse (HCC)- (present on admission)   Assessment & Plan    History of meth abuse per nephew, smoking.  No history of IV use.  Consider patient education and counseling.     Agitation   Assessment & Plan    Patient intermittently agitated and aggressive towards staff.  Attempted to self extubate.  Continue Precedex. PRN fentanyl, ativan, and haldol ordered.     S/P craniotomy- (present on admission)   Assessment & Plan    No complications.  Follows commands off sedation.     Smoker- (present on admission)   Assessment & Plan    Patient confused, per mom smokes about 3 cigarettes/day.  Consider education and counseling.         DISPO: ICU    CODE STATUS: FULL    Quality Measures:  Richards Catheter: yes.  DVT Prophylaxis: SCD.  Ulcer Prophylaxis: Pepsid.  Antibiotics: Cefepime/Vanco/Flagyl  Lines: CVC

## 2019-05-24 NOTE — PROGRESS NOTES
Pt. Vitally stable throughout day. HR in the high 40's to low 60's. Sedation switched to precedex and HR sitting in low 50's with -120's. Dr. Reaves aware and is comfortable with sedation plan for the night. Pt. Following commands and laying in bed with bilateral wrist restraints to protect ETT. LUIS drain is clean and intact with Head incision AMRIK per Neuro surgery recommendations. Care handed over to night shift RN

## 2019-05-24 NOTE — PROGRESS NOTES
Infectious Disease Progress Note    Author: Coreen Martinez M.D. Date & Time of service: 2019  1:42 PM    Chief Complaint:  Brain mass       Interval History:  53 y.o. male  admitted 2019 for AMS due to above. +meth abuse.  Recently in the ED on May 12 and was diagnosed with acute suppurative otitis media   AF WBC 12.6 extubated this am-pain controlled-decreased strength RUE +delerium  Labs Reviewed, Medications Reviewed, Radiology Reviewed and Wound Reviewed.    Review of Systems:  Review of Systems   Unable to perform ROS: Mental status change   Gastrointestinal: Negative for nausea.       Hemodynamics:  No data recorded.  Monitored Temp: 36.8 °C (98.2 °F)  Pulse  Av.4  Min: 40  Max: 98Heart Rate (Monitored): (!) 47  Arterial BP: 151/83, NIBP: 145/84       Physical Exam:  Physical Exam   Constitutional: No distress.   HENT:   Mouth/Throat: No oropharyngeal exudate.   Left craniotomy site with staples and drain-bloody  +edema inferior to site   Eyes: Pupils are equal, round, and reactive to light. EOM are normal. No scleral icterus.   Neck: JVD present.   Cardiovascular:   kevin   Pulmonary/Chest: Effort normal. No stridor. No respiratory distress. He has no wheezes.   Abdominal: Soft. He exhibits no distension. There is no tenderness.   Musculoskeletal: He exhibits edema.   Neurological:   Awake-intermittently following commands  Decreased strength/hand  RUE   Skin: Skin is warm. No rash noted. He is not diaphoretic.   Nursing note and vitals reviewed.      Meds:    Current Facility-Administered Medications:   •  vancomycin  •  haloperidol lactate  •  LORazepam  •  haloperidol lactate  •  LORazepam  •  [COMPLETED] dexamethasone **FOLLOWED BY** dexamethasone **FOLLOWED BY** [START ON 2019] dexamethasone **FOLLOWED BY** [START ON 2019] dexamethasone **FOLLOWED BY** [START ON 2019] dexamethasone **FOLLOWED BY** [START ON 2019] dexamethasone  •  dexmedetomidine (PRECEDEX)  infusion  •  metroNIDAZOLE (FLAGYL) IV  •  insulin regular **AND** Accu-Chek Q6 if NPO **AND** NOTIFY MD and PharmD **AND** [DISCONTINUED] glucose **AND** dextrose 10% bolus  •  Respiratory Care per Protocol  •  MD Alert...Adult ICU Electrolyte Replacement per Pharmacy  •  Pharmacy  •  fentaNYL **OR** fentaNYL **OR** fentaNYL  •  NS  •  propofol **AND** Triglycerides Starting now and then Every 3 Days  •  MD Alert...Vancomycin per Pharmacy  •  acetaminophen  •  docusate sodium 100mg/10mL  •  famotidine **OR** famotidine  •  senna-docusate  •  cloNIDine  •  magnesium hydroxide  •  oxyCODONE immediate-release  •  polyethylene glycol/lytes  •  senna-docusate  •  cefepime  •  Pharmacy Consult Request  •  oxyCODONE immediate-release  •  MD ALERT...DO NOT ADMINISTER NSAIDS or ASPIRIN unless ORDERED By Neurosurgery  •  ondansetron  •  scopolamine  •  labetalol  •  hydrALAZINE  •  niCARdipine infusion  •  levETIRAcetam (KEPPRA) IV  •  bisacodyl  •  fleet    Labs:  Recent Labs      05/21/19   1944 05/22/19 0424 05/23/19   0450  05/24/19   0426   WBC  8.2  8.3  14.6*  12.6*   RBC  6.33*  6.38*  5.01  4.95   HEMOGLOBIN  14.7  14.7  11.4*  11.2*   HEMATOCRIT  48.2  48.2  39.2*  38.9*   MCV  76.1*  75.5*  77.8*  78.6*   MCH  23.2*  23.0*  23.0*  22.6*   RDW  50.4*  50.3*  52.9*  53.3*   PLATELETCT  350  333  329  313   MPV  10.3  9.8  10.4  10.8   NEUTSPOLYS  76.50*   --    --    --    LYMPHOCYTES  14.90*   --    --    --    MONOCYTES  6.70   --    --    --    EOSINOPHILS  0.50   --    --    --    BASOPHILS  1.00   --    --    --      Recent Labs      05/23/19   2315  05/24/19   0429  05/24/19   1218   SODIUM  147*  147*  148*   POTASSIUM  3.8  3.9  3.6   CHLORIDE  116*  116*  117*   CO2  23 23 23   GLUCOSE  148*  151*  135*   BUN  28*  30*  31*     Recent Labs      05/21/19   1944 05/23/19 2315 05/24/19 0429  05/24/19   1218   ALBUMIN  4.5   --    --    --    --    TBILIRUBIN  0.9   --    --    --    --     ALKPHOSPHAT  48   --    --    --    --    TOTPROTEIN  7.6   --    --    --    --    ALTSGPT  23   --    --    --    --    ASTSGOT  16   --    --    --    --    CREATININE  0.79   < >  0.84  0.91  0.79    < > = values in this interval not displayed.       Imaging:  Ct-cta Head With & W/o-post Process  1.  No large vessel occlusion, high-grade stenosis or aneurysm of the [place]. 2.  A 4 cm mass lesion in the left frontotemporal region. Imaging features favor an abscess over a solid lesion. However, MRI is needed for definitive characterization. 3.  Extensive edema associated with the mass, resulting in 10 mm left-to-right midline shift. 4.  Effacement of the left and enlargement of the right lateral ventricles may be due to developing hydrocephalus. Findings were discussed with JOSE ALTAMIRANO on 5/21/2019 8:39 PM.    Ct-cta Neck With & W/o-post Processing  Result Date: 5/21/2019 5/21/2019 8:06 PM HISTORY/REASON FOR EXAM:  SAH suspected, initial exam TECHNIQUE/EXAM DESCRIPTION: CT angiogram of the neck with contrast. Postcontrast images were obtained of the neck from the great vessels through the skull base following the power injection of nonionic contrast at 5.0 mL/sec. Thin-section helical images were obtained with overlapping reconstruction interval. Coronal and oblique multiplanar volume reformats were generated. Cervical internal carotid artery percent stenosis is calculated using the standard method according to the NASCET criteria wherein a segment of uniform caliber mid or distal cervical internal carotid is used as the reference denominator. 3D angiographic images were reviewed on PACS.  Maximum intensity projection (MIP) images were generated and reviewed 100 mL of Omnipaque 350 nonionic contrast was injected intravenously. Low dose optimization technique was utilized for this CT exam including automated exposure control and adjustment of the mA and/or kV according to patient size. COMPARISON:   None. FINDINGS: Left-sided aortic arch with patent great vessel origins. The right common carotid artery, cervical carotid bifurcation, and cervical internal carotid artery show no significant stenosis. There is no evidence of dissection or aneurysm. The left common carotid artery, cervical carotid bifurcation, and cervical internal carotid artery show no significant stenosis. There is no evidence of dissection or aneurysm. The cervical vertebral arteries are patent bilaterally. Prominent right paratracheal node measuring 1 cm short axis, statistically reactive. Otherwise, the neck soft tissues and lung apices in the field of view are unremarkable. 3D angiographic/MIP images of the vasculature confirm the vascular findings as described above.     Unremarkable CT angiogram of the neck. No high-grade stenosis, large vessel occlusion, aneurysm or dissection.    Dx-chest-2 Views  Result Date: 5/12/2019 5/12/2019 2:28 AM HISTORY/REASON FOR EXAM:  Cough TECHNIQUE/EXAM DESCRIPTION AND NUMBER OF VIEWS: Two views of the chest. COMPARISON:  None. FINDINGS: LUNGS: Linear atelectasis in the left midlung. No focal consolidation. No effusions. PNEUMOTHORAX: None. LINES AND TUBES: None. MEDIASTINUM: No cardiomegaly. Atherosclerosis. MUSCULOSKELETAL STRUCTURES: No acute fracture. Hiatal hernia.   1.  No focal consolidation or pleural effusions. 2.  Hiatal hernia.    Dx-chest-portable (1 View)  Result Date: 5/24/2019 5/24/2019 1:08 AM HISTORY/REASON FOR EXAM:  For indication of respiratory failure. TECHNIQUE/EXAM DESCRIPTION AND NUMBER OF VIEWS: Single portable view of the chest. COMPARISON: Yesterday FINDINGS: Endotracheal tube, NG tube and right IJ central line mean in place. There is bibasilar atelectasis or consolidation. There are probable trace effusions. No pneumothorax. Lung aeration appears slightly improved.   Slight improvement in lung aeration.      Mr-stealth Brain With & W/o  Result Date: 5/22/2019  COMPARISON:  CTA  of the head 5/21/2019 FINDINGS: No fiducial markers are appreciated. The calvaria are unremarkable. There are no extra-axial fluid collections. The left temporal lobe, there is a thick-walled ring-enhancing mass which measures about 47 mm x 32 mm x 43 mm. Central hypoenhancement may represent tumor necrosis or cystic change. There is marked surrounding vasogenic edema extending into the basal ganglia, subinsular white matter, internal capsule, corona radiata, and left frontal deep white matter. There is effacement of the left lateral ventricle and moderate dilatation of the right lateral ventricle including the temporal horn due to trapping. There is left-to-right shift of midline structures of about 11 mm (T2 axial image 54, series 3). There are no other enhancing lesions. The brainstem and posterior fossa structures are unremarkable for marked compression of the midbrain with flattening of the left cerebral peduncle and effacement of perimesencephalic cisterns. There is some vasogenic edema extending down into the left side of the midbrain as well as the left thalamus and subthalamic nucleus. The major vessels including the dural venous sinuses appear intact. Paranasal sinuses show moderate mucosal thickening with small air-fluid/debris levels in the maxillary antra. Moderate mucosal thickening in the sphenoid sinus and among the ethmoid air cells. Minimal mucosal thickening in the frontal air cells.     1.  Large peripherally enhancing intra-axial mass in the left temporal lobe with marked surrounding vasogenic edema. Findings are most consistent with high-grade primary brain tumor such as glioblastoma. No other enhancing lesions elsewhere to suggest metastasis, however, a solitary brain metastasis would not be excluded. Brain abscess would not be excluded. There are no diffusion weighted images to determine whether contents of this lesion show restricted diffusion. There are no prior MRI scans for comparison. 2.   Marked left-to-right shift of midline structures along with midbrain compression and displacement.      Micro:  Results     Procedure Component Value Units Date/Time    Anaerobic Culture [[id number]] Collected:  05/22/19 1818    Order Status:  Completed Specimen:  Wound Updated:  05/24/19 0740     Significant Indicator NEG     Source WND     Site Brain Abscess     Culture Result Culture in progress.    Narrative:       CALL  Watson  RST2 tel. ,  CALLED  RST2 tel.  05/22/2019, 19:14, RB PERF. RESULTS CALLED TO:[id number].  Surgery Specimen    CULTURE WOUND W/ GRAM STAIN [[id number]] Collected:  05/22/19 1818    Order Status:  Completed Specimen:  Wound Updated:  05/24/19 0740     Significant Indicator NEG     Source WND     Site Brain Abscess     Culture Result No growth at 48 hours.     Gram Stain Result Rare WBCs.  No organisms seen.      Narrative:       CALL  Watson  RST2 tel. ,  CALLED  RST2 tel.  05/22/2019, 19:14, RB PERF. RESULTS CALLED TO:[id number].  Surgery Specimen    AFB Culture [[id number]] Collected:  05/22/19 1700    Order Status:  Completed Specimen:  Wound from Cyst Updated:  05/23/19 1427     Significant Indicator NEG     Source WND     Site BRAIN ABSCESS     Culture Result Culture in progress.     AFB Smear Results -    Narrative:       Collected By:[id number] KIMBERLEEAttila TechnologiesROBERT YIFAN  Brain cyst.  Surgery Specimen    Fungal Culture [[id number]] Collected:  05/22/19 1700    Order Status:  Completed Specimen:  Wound from Cyst Updated:  05/23/19 1427     Significant Indicator NEG     Source WND     Site BRAIN ABSCESS     Culture Result Culture in progress.    Narrative:       Collected By:[id number] PROSHKINA YIFAN  Brain cyst.  Surgery Specimen    GRAM STAIN [[id number]] Collected:  05/22/19 1818    Order Status:  Completed Specimen:  Wound Updated:  05/22/19 1914     Significant Indicator .     Source WND     Site Brain Abscess     Gram Stain Result Rare WBCs.  No organisms seen.      Narrative:       CALL  [facility]  RST2 tel.  ",  CALLED  RST2 tel.  05/22/2019, 19:14, RB PERF. RESULTS CALLED TO:[id number].  Surgery Specimen    FLUID CULTURE W/GRAM STAIN [[id number]]     Order Status:  No result Specimen:  Cyst from Other Body Fluid     BLOOD CULTURE [[id number]] Collected:  05/21/19 2126    Order Status:  Completed Specimen:  Blood from Peripheral Updated:  05/22/19 0833     Significant Indicator NEG     Source BLD     Site PERIPHERAL     Culture Result No Growth  Note: Blood cultures are incubated for 5 days and  are monitored continuously.Positive blood cultures  are called to the RN and reported as soon as  they are identified.      Narrative:       Per Hospital Policy: Only change Specimen Src: to \"Line\" if  specified by physician order.  Left AC    BLOOD CULTURE [[id number]] Collected:  05/21/19 2120    Order Status:  Completed Specimen:  Blood from Peripheral Updated:  05/22/19 0833     Significant Indicator NEG     Source BLD     Site PERIPHERAL     Culture Result No Growth  Note: Blood cultures are incubated for 5 days and  are monitored continuously.Positive blood cultures  are called to the RN and reported as soon as  they are identified.      Narrative:       Per Hospital Policy: Only change Specimen Src: to \"Line\" if  specified by physician order.  Right Wrist          Assessment:  Active Hospital Problems    Diagnosis   • *Brain abscess [G06.0]   • Acute respiratory failure with hypoxia (HCC) [J96.01]   • Delirium [R41.0]   • Methamphetamine abuse (HCC) [F15.10]   • S/P craniotomy [Z98.890]       Plan:  Brain mass   Necrotic tumor vs brain abscess  Afebrile  MRI with 4.7 cm left temporal lobe mass with midline shift  S/p craniotomy 5/22-no op note available for review as yet  Gram stain neg  Path pending  Cultures pending  Blood cxs neg  HIV neg  Continue vanco/cefepime/flagyl for now    Leukocytosis  Multifactorial  On steroids  Monitor    Meth abuse  Will needs monitored setting for IV abx if confirmed abscess    DW RN/OT  "

## 2019-05-24 NOTE — ASSESSMENT & PLAN NOTE
Wound site looks good, drain out 5/25  Await pathology and microbiology results, all micro negative so far  Initial path interpretation no GBM and or carcinoma, specimen sent to [facility] for formal second opinion, pending still  Frequent neurochecks ongoing, improving, now every 2 hours  Keep in ICU for another day

## 2019-05-24 NOTE — CARE PLAN
Problem: Knowledge Deficit  Goal: Knowledge of disease process/condition, treatment plan, diagnostic tests, and medications will improve    Intervention: Assess knowledge level of disease process/condition, treatment plan, diagnostic tests, and medications  Plan of care reviewed with patient for today.      Problem: Pain Management  Goal: Pain level will decrease to patient's comfort goal    Intervention: Follow pain managment plan developed in collaboration with patient and Interdisciplinary Team  Assess and medicate as needed for signs/symptoms of acute pain

## 2019-05-24 NOTE — FLOWSHEET NOTE
Extubation    Cuff leak noted : yes  Stridor present : no       O2 Daily Delivery Respiratory : Room Air with O2 Available (05/24/19 1203)    Patient toleration : yes  RCP Complete? : yes    Events/Summary/Plan:Pt very agitated and combative, extubated with Dr. Reaves at bedside. No oxygen needed at this time.

## 2019-05-24 NOTE — DIETARY
"Nutrition services: Day 3 of admit.  Robert Dorantes is a 53 y.o. male with admitting DX of brain abscess.    Consult received for NPO x 3.      Assessment:  Height: 180.3 cm (5' 11\")  Weight: 108.8 kg (239 lb 13.8 oz)  Body mass index is 33.45 kg/m².   Diet/Intake: NPO    Evaluation:   1. POD#2 craniotomy for drainage of brain cyst.   2. Pt extubated today.  3. Consult for swallow evaluation pending.  4. Sodium 148, Glucose 135, BUN 31  5. Pertinent meds:  Maxipime, Decadron, Colace, SSI, Flagyl, Pericolace    Malnutrition Risk: No risk identified at this time.    Recommendations/Plan:  1. Advance diet as tolerated per SLP/MD.  2. If unable to advance diet within 48 hours, consider cortrak placement and initiation of nutrition support.  3. Encourage intake of meals  4. Document intake of all meals as % taken in ADLs to provide interdisciplinary communication across all shifts.   5. Monitor weight.  6. Nutrition rep will continue to see patient for ongoing meal and snack preferences.     RD will continue to follow.      "

## 2019-05-25 ENCOUNTER — APPOINTMENT (OUTPATIENT)
Dept: RADIOLOGY | Facility: MEDICAL CENTER | Age: 54
DRG: 025 | End: 2019-05-25
Attending: INTERNAL MEDICINE
Payer: MEDICAID

## 2019-05-25 ENCOUNTER — APPOINTMENT (OUTPATIENT)
Dept: RADIOLOGY | Facility: MEDICAL CENTER | Age: 54
DRG: 025 | End: 2019-05-25
Attending: STUDENT IN AN ORGANIZED HEALTH CARE EDUCATION/TRAINING PROGRAM
Payer: MEDICAID

## 2019-05-25 PROBLEM — E66.09 CLASS 1 OBESITY DUE TO EXCESS CALORIES WITHOUT SERIOUS COMORBIDITY WITH BODY MASS INDEX (BMI) OF 33.0 TO 33.9 IN ADULT: Status: ACTIVE | Noted: 2019-05-25

## 2019-05-25 PROBLEM — D50.9 MICROCYTIC ANEMIA: Status: ACTIVE | Noted: 2019-05-25

## 2019-05-25 LAB
ANION GAP SERPL CALC-SCNC: 7 MMOL/L (ref 0–11.9)
BACTERIA WND AEROBE CULT: NORMAL
BUN SERPL-MCNC: 27 MG/DL (ref 8–22)
CALCIUM SERPL-MCNC: 7.7 MG/DL (ref 8.5–10.5)
CHLORIDE SERPL-SCNC: 113 MMOL/L (ref 96–112)
CO2 SERPL-SCNC: 24 MMOL/L (ref 20–33)
CREAT SERPL-MCNC: 0.68 MG/DL (ref 0.5–1.4)
ERYTHROCYTE [DISTWIDTH] IN BLOOD BY AUTOMATED COUNT: 53.2 FL (ref 35.9–50)
GLUCOSE BLD-MCNC: 101 MG/DL (ref 65–99)
GLUCOSE BLD-MCNC: 112 MG/DL (ref 65–99)
GLUCOSE SERPL-MCNC: 91 MG/DL (ref 65–99)
GRAM STN SPEC: NORMAL
HCT VFR BLD AUTO: 35.8 % (ref 42–52)
HGB BLD-MCNC: 10.4 G/DL (ref 14–18)
INR PPP: 1.29 (ref 0.87–1.13)
LV EJECT FRACT  99904: 65
LV EJECT FRACT MOD 2C 99903: 64.12
LV EJECT FRACT MOD 4C 99902: 63.42
LV EJECT FRACT MOD BP 99901: 63.49
MAGNESIUM SERPL-MCNC: 1.8 MG/DL (ref 1.5–2.5)
MCH RBC QN AUTO: 22.8 PG (ref 27–33)
MCHC RBC AUTO-ENTMCNC: 29.1 G/DL (ref 33.7–35.3)
MCV RBC AUTO: 78.5 FL (ref 81.4–97.8)
PHOSPHATE SERPL-MCNC: 3.1 MG/DL (ref 2.5–4.5)
PLATELET # BLD AUTO: 294 K/UL (ref 164–446)
PMV BLD AUTO: 10.1 FL (ref 9–12.9)
POTASSIUM SERPL-SCNC: 3.8 MMOL/L (ref 3.6–5.5)
PROTHROMBIN TIME: 16.2 SEC (ref 12–14.6)
RBC # BLD AUTO: 4.56 M/UL (ref 4.7–6.1)
SIGNIFICANT IND 70042: NORMAL
SITE SITE: NORMAL
SODIUM SERPL-SCNC: 144 MMOL/L (ref 135–145)
SOURCE SOURCE: NORMAL
VANCOMYCIN TROUGH SERPL-MCNC: 20.5 UG/ML (ref 10–20)
WBC # BLD AUTO: 12.2 K/UL (ref 4.8–10.8)

## 2019-05-25 PROCEDURE — 700105 HCHG RX REV CODE 258: Performed by: INTERNAL MEDICINE

## 2019-05-25 PROCEDURE — 700111 HCHG RX REV CODE 636 W/ 250 OVERRIDE (IP): Performed by: NURSE PRACTITIONER

## 2019-05-25 PROCEDURE — 700105 HCHG RX REV CODE 258: Performed by: NEUROLOGICAL SURGERY

## 2019-05-25 PROCEDURE — 700111 HCHG RX REV CODE 636 W/ 250 OVERRIDE (IP): Performed by: INTERNAL MEDICINE

## 2019-05-25 PROCEDURE — 85610 PROTHROMBIN TIME: CPT

## 2019-05-25 PROCEDURE — 70450 CT HEAD/BRAIN W/O DYE: CPT

## 2019-05-25 PROCEDURE — 99232 SBSQ HOSP IP/OBS MODERATE 35: CPT | Performed by: INTERNAL MEDICINE

## 2019-05-25 PROCEDURE — 700112 HCHG RX REV CODE 229: Performed by: INTERNAL MEDICINE

## 2019-05-25 PROCEDURE — 700102 HCHG RX REV CODE 250 W/ 637 OVERRIDE(OP): Performed by: NEUROLOGICAL SURGERY

## 2019-05-25 PROCEDURE — 82962 GLUCOSE BLOOD TEST: CPT | Mod: 91

## 2019-05-25 PROCEDURE — 93306 TTE W/DOPPLER COMPLETE: CPT | Mod: 26 | Performed by: INTERNAL MEDICINE

## 2019-05-25 PROCEDURE — 700102 HCHG RX REV CODE 250 W/ 637 OVERRIDE(OP): Performed by: INTERNAL MEDICINE

## 2019-05-25 PROCEDURE — 85027 COMPLETE CBC AUTOMATED: CPT

## 2019-05-25 PROCEDURE — 99233 SBSQ HOSP IP/OBS HIGH 50: CPT | Performed by: INTERNAL MEDICINE

## 2019-05-25 PROCEDURE — A9270 NON-COVERED ITEM OR SERVICE: HCPCS | Performed by: INTERNAL MEDICINE

## 2019-05-25 PROCEDURE — 71045 X-RAY EXAM CHEST 1 VIEW: CPT

## 2019-05-25 PROCEDURE — A9270 NON-COVERED ITEM OR SERVICE: HCPCS | Performed by: NEUROLOGICAL SURGERY

## 2019-05-25 PROCEDURE — 84100 ASSAY OF PHOSPHORUS: CPT

## 2019-05-25 PROCEDURE — 700111 HCHG RX REV CODE 636 W/ 250 OVERRIDE (IP): Performed by: STUDENT IN AN ORGANIZED HEALTH CARE EDUCATION/TRAINING PROGRAM

## 2019-05-25 PROCEDURE — 83735 ASSAY OF MAGNESIUM: CPT

## 2019-05-25 PROCEDURE — 700101 HCHG RX REV CODE 250: Performed by: STUDENT IN AN ORGANIZED HEALTH CARE EDUCATION/TRAINING PROGRAM

## 2019-05-25 PROCEDURE — 770022 HCHG ROOM/CARE - ICU (200)

## 2019-05-25 PROCEDURE — 80048 BASIC METABOLIC PNL TOTAL CA: CPT

## 2019-05-25 PROCEDURE — 700111 HCHG RX REV CODE 636 W/ 250 OVERRIDE (IP): Performed by: NEUROLOGICAL SURGERY

## 2019-05-25 PROCEDURE — 80202 ASSAY OF VANCOMYCIN: CPT

## 2019-05-25 RX ORDER — MAGNESIUM SULFATE HEPTAHYDRATE 40 MG/ML
2 INJECTION, SOLUTION INTRAVENOUS ONCE
Status: COMPLETED | OUTPATIENT
Start: 2019-05-25 | End: 2019-05-25

## 2019-05-25 RX ADMIN — METRONIDAZOLE 500 MG: 500 INJECTION, SOLUTION INTRAVENOUS at 15:02

## 2019-05-25 RX ADMIN — METRONIDAZOLE 500 MG: 500 INJECTION, SOLUTION INTRAVENOUS at 05:37

## 2019-05-25 RX ADMIN — DEXAMETHASONE SODIUM PHOSPHATE 6 MG: 4 INJECTION, SOLUTION INTRA-ARTICULAR; INTRALESIONAL; INTRAMUSCULAR; INTRAVENOUS; SOFT TISSUE at 17:42

## 2019-05-25 RX ADMIN — OXYCODONE HYDROCHLORIDE 5 MG: 5 TABLET ORAL at 17:47

## 2019-05-25 RX ADMIN — ACETAMINOPHEN 1000 MG: 500 TABLET ORAL at 17:42

## 2019-05-25 RX ADMIN — POLYETHYLENE GLYCOL 3350 1 PACKET: 17 POWDER, FOR SOLUTION ORAL at 05:37

## 2019-05-25 RX ADMIN — CEFEPIME 2 G: 2 INJECTION, POWDER, FOR SOLUTION INTRAVENOUS at 15:02

## 2019-05-25 RX ADMIN — OXYCODONE HYDROCHLORIDE 5 MG: 5 TABLET ORAL at 10:08

## 2019-05-25 RX ADMIN — SENNOSIDES, DOCUSATE SODIUM 1 TABLET: 50; 8.6 TABLET, FILM COATED ORAL at 20:17

## 2019-05-25 RX ADMIN — HALOPERIDOL LACTATE 5 MG: 5 INJECTION, SOLUTION INTRAMUSCULAR at 06:18

## 2019-05-25 RX ADMIN — ACETAMINOPHEN 1000 MG: 500 TABLET ORAL at 05:37

## 2019-05-25 RX ADMIN — DOCUSATE SODIUM 100 MG: 50 LIQUID ORAL at 05:37

## 2019-05-25 RX ADMIN — LORAZEPAM 1 MG: 2 INJECTION INTRAMUSCULAR; INTRAVENOUS at 10:26

## 2019-05-25 RX ADMIN — DEXAMETHASONE SODIUM PHOSPHATE 6 MG: 4 INJECTION, SOLUTION INTRA-ARTICULAR; INTRALESIONAL; INTRAMUSCULAR; INTRAVENOUS; SOFT TISSUE at 05:56

## 2019-05-25 RX ADMIN — HALOPERIDOL LACTATE 2.5 MG: 5 INJECTION, SOLUTION INTRAMUSCULAR at 12:04

## 2019-05-25 RX ADMIN — VANCOMYCIN HYDROCHLORIDE 1500 MG: 100 INJECTION, POWDER, LYOPHILIZED, FOR SOLUTION INTRAVENOUS at 17:41

## 2019-05-25 RX ADMIN — DOCUSATE SODIUM 100 MG: 50 LIQUID ORAL at 17:43

## 2019-05-25 RX ADMIN — METRONIDAZOLE 500 MG: 500 INJECTION, SOLUTION INTRAVENOUS at 21:30

## 2019-05-25 RX ADMIN — ACETAMINOPHEN 1000 MG: 500 TABLET ORAL at 12:04

## 2019-05-25 RX ADMIN — SODIUM CHLORIDE 500 MG: 9 INJECTION, SOLUTION INTRAVENOUS at 05:38

## 2019-05-25 RX ADMIN — SODIUM CHLORIDE 500 MG: 9 INJECTION, SOLUTION INTRAVENOUS at 17:42

## 2019-05-25 RX ADMIN — FENTANYL CITRATE 100 MCG: 50 INJECTION INTRAMUSCULAR; INTRAVENOUS at 08:19

## 2019-05-25 RX ADMIN — CEFEPIME 2 G: 2 INJECTION, POWDER, FOR SOLUTION INTRAVENOUS at 21:00

## 2019-05-25 RX ADMIN — CEFEPIME 2 G: 2 INJECTION, POWDER, FOR SOLUTION INTRAVENOUS at 05:56

## 2019-05-25 RX ADMIN — MAGNESIUM SULFATE 2 G: 2 INJECTION INTRAVENOUS at 08:19

## 2019-05-25 RX ADMIN — DEXAMETHASONE SODIUM PHOSPHATE 6 MG: 4 INJECTION, SOLUTION INTRA-ARTICULAR; INTRALESIONAL; INTRAMUSCULAR; INTRAVENOUS; SOFT TISSUE at 15:03

## 2019-05-25 RX ADMIN — VANCOMYCIN HYDROCHLORIDE 1600 MG: 100 INJECTION, POWDER, LYOPHILIZED, FOR SOLUTION INTRAVENOUS at 08:19

## 2019-05-25 ASSESSMENT — COGNITIVE AND FUNCTIONAL STATUS - GENERAL
PERSONAL GROOMING: A LOT
DRESSING REGULAR UPPER BODY CLOTHING: A LOT
HELP NEEDED FOR BATHING: A LOT
SUGGESTED CMS G CODE MODIFIER DAILY ACTIVITY: CL
DAILY ACTIVITIY SCORE: 12
STANDING UP FROM CHAIR USING ARMS: A LOT
MOBILITY SCORE: 13
WALKING IN HOSPITAL ROOM: A LOT
TOILETING: A LOT
TURNING FROM BACK TO SIDE WHILE IN FLAT BAD: A LITTLE
DRESSING REGULAR LOWER BODY CLOTHING: A LOT
MOVING TO AND FROM BED TO CHAIR: A LOT
SUGGESTED CMS G CODE MODIFIER MOBILITY: CL
EATING MEALS: A LOT
MOVING FROM LYING ON BACK TO SITTING ON SIDE OF FLAT BED: A LOT
CLIMB 3 TO 5 STEPS WITH RAILING: A LOT

## 2019-05-25 ASSESSMENT — ENCOUNTER SYMPTOMS
NAUSEA: 0
HEADACHES: 1
CHILLS: 0
FEVER: 0

## 2019-05-25 NOTE — PROGRESS NOTES
[facility] Progress Note      Admit Date: 5/21/2019  LOS: 4    Resident(s): Angeline Trinidad   Attending: NETO RAMIREZ/ Dr. Reaves    Date & Time:   5/25/2019   9:53 AM       Patient ID:    Name:             Robert Dorantes     YOB: 1965  Age:                 53 y.o.  male   MRN:               [id number]    Diagnosis:  Principal Problem:    Brain abscess POA: Yes  Active Problems:    Acute respiratory failure with hypoxia (HCC) POA: Yes    Methamphetamine abuse (HCC) POA: Yes    Delirium POA: Unknown    Smoker POA: Yes    S/P craniotomy POA: Yes    Agitation POA: Unknown  Resolved Problems:    * No resolved hospital problems. *      HPI:  53 y.o male presented to the ER with 90 history of confusion.  Patient was seen in the ED on May 12 and diagnosed with acute supportive otitis media; treated with Ceftin ear for 7 days.  Patient's mother reported that the patient has been complaining of left-sided ear ache, increased drowsiness, increased fatigue and confusion since that time and over the past 2 days he has had difficulty recognizing family members, speech difficulty, and word finding difficulties.  In the ED, CT head showed 4 cm mass lesion in the left frontotemporal lobe with extensive edema and mass-effect as well as a 10 mm shift to the right; imaging favors an abscess.  Neurosurgery was consulted and the patient was admitted to the ICU for further treatment and close monitoring.    Consultants:  Neurosurgery  Infectious disease  PMA    Interval Events:  Overnight intermittently agitated and aggressive towards staff.  Received PRN Ativan and Haldol.  Pulled LUIS by a few inches - neurosurgery to see.  Intermittently oriented; requires frequent re-orientation.  Incontinent of urine.  Pathology specimen from brain sent to [place].    Review of Systems   Unable to perform ROS: Acuity of condition       Physical Exam   Constitutional: He is well-developed, well-nourished, and in no distress. No  distress.   HENT:   Right Ear: External ear normal.   Left Ear: External ear normal.   LUIS drain with sanguinous fluid.   Eyes: Pupils are equal, round, and reactive to light.   Neck: No JVD present.   Cardiovascular: Normal rate, regular rhythm and normal heart sounds.  Exam reveals no gallop and no friction rub.    No murmur heard.  Pulmonary/Chest: Effort normal. He has no wheezes. He has no rales.   Abdominal: Soft. There is no tenderness. There is no rebound.   Musculoskeletal: He exhibits no edema.   Lymphadenopathy:     He has no cervical adenopathy.   Neurological:   Follows commands intermittently.   Skin: He is not diaphoretic.     Respiratory:     Respiration: (!) 26, Pulse Oximetry: 98 %, O2 Daily Delivery Respiratory : Room Air with O2 Available    Chest Tube Drains:    Recent Labs      05/23/19   0424  05/24/19   0422   ISTATAPH  7.406  7.432   ISTATAPCO2  35.4  30.9   ISTATAPO2  69  101*   ISTATATCO2  23  22   [id number]  94  98   ISTATARTHCO3  22.2  20.6   ISTATARTBE  -2  -3   ISTATTEMP  37.3 C  36.5 C   ISTATFIO2  40  30   ISTATSPEC  Arterial  Arterial   ISTATAPHTC  7.402  7.439   [id number]  70  98*       HemoDynamics:  Pulse: (!) 57, Heart Rate (Monitored): 64 Arterial BP: 151/83, NIBP: 129/74      Fluids:        Intake/Output Summary (Last 24 hours) at 05/23/19 1345  Last data filed at 05/23/19 1200   Gross per 24 hour   Intake          4830.55 ml   Output             2103 ml   Net          2727.55 ml       Weight: 108 kg (238 lb 1.6 oz)  Body mass index is 33.21 kg/m².    Recent Labs      05/23/19   0450   05/24/19   0429  05/24/19   1218  05/25/19   0405   SODIUM  148*   < >  147*  148*  144   POTASSIUM  3.9   < >  3.9  3.6  3.8   CHLORIDE  117*   < >  116*  117*  113*   CO2  25   < >  23  23  24   BUN  19   < >  30*  31*  27*   CREATININE  0.90   < >  0.91  0.79  0.68   MAGNESIUM  1.8   --   2.1   --   1.8   PHOSPHORUS  3.1   --   3.7   --   3.1   CALCIUM  8.4*   < >  8.6  8.1*  7.7*    < > =  values in this interval not displayed.       GI/Nutrition:  Recent Labs      19   0429  19   1218  19   0405   GLUCOSE  151*  135*  91       Heme:  Recent Labs      196  19   0405   RBC  5.01  4.95   --   4.56*   HEMOGLOBIN  11.4*  11.2*   --   10.4*   HEMATOCRIT  39.2*  38.9*   --   35.8*   PLATELETCT  329  313   --   294   PROTHROMBTM   --    --   15.6*  16.2*   INR   --    --   1.23*  1.29*       Infectious Disease:  Monitored Temp 2  Av.8 °C (98.2 °F)  Min: 36.8 °C (98.2 °F)  Max: 36.8 °C (98.2 °F)  Temp  Av °C (98.6 °F)  Min: 36.9 °C (98.4 °F)  Max: 37.2 °C (99 °F)  Recent Labs      19   0405   WBC  14.6*  12.6*  12.2*       Meds:  • Influenza Vaccine Quad pf  0.5 mL     • magnesium sulfate  2 g 2 g (19)   • vancomycin  1,600 mg 1,600 mg (19)   • haloperidol lactate  1-5 mg     • LORazepam  0.5-2 mg     • insulin regular  1-6 Units      And   • dextrose 10% bolus  125 mL     • dexamethasone  6 mg      Followed by   • [START ON 2019] dexamethasone  4 mg      Followed by   • [START ON 2019] dexamethasone  4 mg      Followed by   • [START ON 2019] dexamethasone  2 mg      Followed by   • [START ON 2019] dexamethasone  2 mg     • metroNIDAZOLE (FLAGYL) IV  500 mg Stopped (19)   • Respiratory Care per Protocol       • MD Alert...Adult ICU Electrolyte Replacement per Pharmacy       • Pharmacy       • fentaNYL  25 mcg      Or   • fentaNYL  50 mcg      Or   • fentaNYL  100 mcg     • MD Alert...Vancomycin per Pharmacy       • acetaminophen  1,000 mg     • docusate sodium 100mg/10mL  100 mg     • senna-docusate  1 Tab     • cloNIDine  0.1 mg     • magnesium hydroxide  30 mL     • oxyCODONE immediate-release  2.5 mg     • polyethylene glycol/lytes  1 Packet     • senna-docusate  1 Tab     • cefepime  2 g Stopped (19 06)   • Pharmacy Consult  Request  1 Each     • oxyCODONE immediate-release  5 mg     • MD ALERT...DO NOT ADMINISTER NSAIDS or ASPIRIN unless ORDERED By Neurosurgery  1 Each     • ondansetron  4 mg     • scopolamine  1 Patch     • labetalol  10 mg     • hydrALAZINE  10 mg     • levETIRAcetam (KEPPRA) IV  500 mg Stopped (05/25/19 0553)   • bisacodyl  10 mg     • fleet  1 Each          Imaging:  DX-CHEST-PORTABLE (1 VIEW)   Final Result      1.  Interval extubation      2.  Mild bibasilar atelectasis.      EC-ECHOCARDIOGRAM COMPLETE W/O CONT         DX-CHEST-PORTABLE (1 VIEW)   Final Result      Slight improvement in lung aeration.      DX-CHEST-PORTABLE (1 VIEW)   Final Result      1.  Lines and tubes as above.   2.  Hypoinflation with bibasilar atelectasis. No new consolidation or large pleural effusions.      MR-STEALTH BRAIN WITH & W/O   Final Result      1.  Large peripherally enhancing intra-axial mass in the left temporal lobe with marked surrounding vasogenic edema. Findings are most consistent with high-grade primary brain tumor such as glioblastoma. No other enhancing lesions elsewhere to suggest    metastasis, however, a solitary brain metastasis would not be excluded. Brain abscess would not be excluded. There are no diffusion weighted images to determine whether contents of this lesion show restricted diffusion. There are no prior MRI scans for    comparison.   2.  Marked left-to-right shift of midline structures along with midbrain compression and displacement.      DX-CHEST-PORTABLE (1 VIEW)   Final Result      1.  Well-positioned lines and tubes.   2.  No new consolidation or pleural effusions.      DX-CHEST-PORTABLE (1 VIEW)   Final Result         1. Borderline cardiomegaly. No focal consolidation or pleural effusions.   2. Hiatal hernia.      CT-CTA NECK WITH & W/O-POST PROCESSING   Final Result      Unremarkable CT angiogram of the neck. No high-grade stenosis, large vessel occlusion, aneurysm or dissection.      CT-CTA HEAD  WITH & W/O-POST PROCESS   Final Result      1.  No large vessel occlusion, high-grade stenosis or aneurysm of the [place].   2.  A 4 cm mass lesion in the left frontotemporal region. Imaging features favor an abscess over a solid lesion. However, MRI is needed for definitive characterization.   3.  Extensive edema associated with the mass, resulting in 10 mm left-to-right midline shift.   4.  Effacement of the left and enlargement of the right lateral ventricles may be due to developing hydrocephalus.      Findings were discussed with JOSE ALTAMIRANO on 5/21/2019 8:39 PM.          Procedures:  Intubation 5/22 - 5/24  Craniotomy 5/22  EEG 5/23      Problem and Plan:  * Brain abscess- (present on admission)   Assessment & Plan    Treated for acute suppurative otitis media 9 days ago.  9 days history of increasing left ear pain, confusion, difficulty finding words.  History of meth abuse.  CT scan of head with contrast showed fluid collection most likely abscess over the left frontotemporal area with mass-effect, midline shift and evidence of developing hydrocephalus.  Neurosurgery consulted, recommendations appreciated.  S/p craniotomy 5/22/19  ID consulted, recommendations appreciated.  Continue Cefepime, Vanco, Flagyl  Bacterial culture, fungal culture, and AFB with negative preliminary results.  Pathology sent to [place].     Acute respiratory failure with hypoxia (HCC)- (present on admission)   Assessment & Plan    RESOLVED.  Intubated for airway protection in ED 5/22 - extubated 5/24.  RT/O2 protocol.     Methamphetamine abuse (HCC)- (present on admission)   Assessment & Plan    History of meth abuse per nephew, smoking.  No history of IV use.  Consider patient education and counseling.     Agitation- (present on admission)   Assessment & Plan    Patient intermittently agitated and aggressive towards staff.  PRN fentanyl, ativan, and haldol ordered.     S/P craniotomy- (present on admission)    Assessment & Plan    No complications.  Follows commands off sedation.     Smoker- (present on admission)   Assessment & Plan    Patient confused, per mom smokes about 3 cigarettes/day.  Consider education and counseling.         DISPO: ICU    CODE STATUS: FULL    Quality Measures:  [place] Catheter: yes.  DVT Prophylaxis: SCD.  Ulcer Prophylaxis: Pepsid.  Antibiotics: Cefepime/Vanco/Flagyl  Lines: CVC

## 2019-05-25 NOTE — CARE PLAN
Problem: Safety  Goal: Will remain free from falls    Intervention: Assess risk factors for falls  Patient instructed to call for assistance before attempting to get up out of bed.      Problem: Pain Management  Goal: Pain level will decrease to patient's comfort goal    Intervention: Follow pain managment plan developed in collaboration with patient and Interdisciplinary Team  Assess and medicate as needed for complaints/ sign or symptoms of acute pain

## 2019-05-25 NOTE — ASSESSMENT & PLAN NOTE
Behavioral modification and weight loss recommended  Dietary consult/education  MONTRELL?,  Monitor for apnea and nocturnal hypoxemia, airway body habitus put him at risk  Patient having nocturnal desaturations, O2 required, crowded airway, elevated BMI  Outpatient sleep study for probable MONTRELL

## 2019-05-25 NOTE — PROGRESS NOTES
Infectious Disease Progress Note    Author: Coreen Martinez M.D. Date & Time of service: 2019  11:59 AM    Chief Complaint:  Brain mass       Interval History:  53 y.o. male  admitted 2019 for AMS due to above. +meth abuse.  Recently in the ED on May 12 and was diagnosed with acute suppurative otitis media   AF WBC 12.6 extubated this am-pain controlled-decreased strength RUE +delerium   AF improved strength RUE-intermittently combative and cooperative  Labs Reviewed, Medications Reviewed, Radiology Reviewed and Wound Reviewed.    Review of Systems:  Review of Systems   Constitutional: Negative for chills and fever.   Cardiovascular: Negative for chest pain.   Gastrointestinal: Negative for nausea.   Neurological: Positive for headaches.   All other systems reviewed and are negative.      Hemodynamics:  Temp (24hrs), Av.1 °C (98.7 °F), Min:36.9 °C (98.4 °F), Max:37.2 °C (99 °F)  Temperature: 37.1 °C (98.8 °F)  Pulse  Av.9  Min: 40  Max: 98Heart Rate (Monitored): 63  NIBP: 134/81       Physical Exam:  Physical Exam   Constitutional: No distress.   HENT:   Mouth/Throat: No oropharyngeal exudate.   Left craniotomy site with staples and drain-bloody  +edema inferior to site unchanged   Eyes: Pupils are equal, round, and reactive to light. EOM are normal. No scleral icterus.   Neck: No JVD present.   Cardiovascular:   kevin   Pulmonary/Chest: Effort normal. No stridor. No respiratory distress. He has no wheezes.   Abdominal: Soft. He exhibits no distension. There is no tenderness.   Musculoskeletal: He exhibits edema.   Neurological: He is alert.   Awake following commands  increased strength/hand  RUE   Skin: Skin is warm. No rash noted. He is not diaphoretic.   Nursing note and vitals reviewed.      Meds:    Current Facility-Administered Medications:   •  Influenza Vaccine Quad pf  •  vancomycin  •  haloperidol lactate  •  LORazepam  •  insulin regular **AND** Accu-Chek Q6 if NPO  **AND** NOTIFY MD and PharmD **AND** [DISCONTINUED] glucose **AND** dextrose 10% bolus  •  [COMPLETED] dexamethasone **FOLLOWED BY** dexamethasone **FOLLOWED BY** [START ON 5/26/2019] dexamethasone **FOLLOWED BY** [START ON 5/28/2019] dexamethasone **FOLLOWED BY** [START ON 5/30/2019] dexamethasone **FOLLOWED BY** [START ON 6/1/2019] dexamethasone  •  metroNIDAZOLE (FLAGYL) IV  •  Respiratory Care per Protocol  •  MD Alert...Adult ICU Electrolyte Replacement per Pharmacy  •  Pharmacy  •  fentaNYL **OR** fentaNYL **OR** fentaNYL  •  MD Alert...Vancomycin per Pharmacy  •  acetaminophen  •  docusate sodium 100mg/10mL  •  senna-docusate  •  cloNIDine  •  magnesium hydroxide  •  oxyCODONE immediate-release  •  polyethylene glycol/lytes  •  senna-docusate  •  cefepime  •  Pharmacy Consult Request  •  oxyCODONE immediate-release  •  MD ALERT...DO NOT ADMINISTER NSAIDS or ASPIRIN unless ORDERED By Neurosurgery  •  ondansetron  •  scopolamine  •  labetalol  •  hydrALAZINE  •  levETIRAcetam (KEPPRA) IV  •  bisacodyl  •  fleet    Labs:  Recent Labs      05/23/19   0450  05/24/19   0426  05/25/19   0405   WBC  14.6*  12.6*  12.2*   RBC  5.01  4.95  4.56*   HEMOGLOBIN  11.4*  11.2*  10.4*   HEMATOCRIT  39.2*  38.9*  35.8*   MCV  77.8*  78.6*  78.5*   MCH  23.0*  22.6*  22.8*   RDW  52.9*  53.3*  53.2*   PLATELETCT  329  313  294   MPV  10.4  10.8  10.1     Recent Labs      05/24/19   0429 05/24/19   1218  05/25/19   0405   SODIUM  147*  148*  144   POTASSIUM  3.9  3.6  3.8   CHLORIDE  116*  117*  113*   CO2  23 23 24   GLUCOSE  151*  135*  91   BUN  30*  31*  27*     Recent Labs      05/24/19 0429 05/24/19   1218  05/25/19   0405   CREATININE  0.91  0.79  0.68       Imaging:  Ct-cta Head With & W/o-post Process  1.  No large vessel occlusion, high-grade stenosis or aneurysm of the [place]. 2.  A 4 cm mass lesion in the left frontotemporal region. Imaging features favor an abscess over a solid lesion. However, MRI  is needed for definitive characterization. 3.  Extensive edema associated with the mass, resulting in 10 mm left-to-right midline shift. 4.  Effacement of the left and enlargement of the right lateral ventricles may be due to developing hydrocephalus. Findings were discussed with JOSE ALTAMIRANO on 5/21/2019 8:39 PM.    Ct-cta Neck With & W/o-post Processing  Result Date: 5/21/2019 5/21/2019 8:06 PM HISTORY/REASON FOR EXAM:  SAH suspected, initial exam TECHNIQUE/EXAM DESCRIPTION: CT angiogram of the neck with contrast. Postcontrast images were obtained of the neck from the great vessels through the skull base following the power injection of nonionic contrast at 5.0 mL/sec. Thin-section helical images were obtained with overlapping reconstruction interval. Coronal and oblique multiplanar volume reformats were generated. Cervical internal carotid artery percent stenosis is calculated using the standard method according to the NASCET criteria wherein a segment of uniform caliber mid or distal cervical internal carotid is used as the reference denominator. 3D angiographic images were reviewed on PACS.  Maximum intensity projection (MIP) images were generated and reviewed 100 mL of Omnipaque 350 nonionic contrast was injected intravenously. Low dose optimization technique was utilized for this CT exam including automated exposure control and adjustment of the mA and/or kV according to patient size. COMPARISON:  None. FINDINGS: Left-sided aortic arch with patent great vessel origins. The right common carotid artery, cervical carotid bifurcation, and cervical internal carotid artery show no significant stenosis. There is no evidence of dissection or aneurysm. The left common carotid artery, cervical carotid bifurcation, and cervical internal carotid artery show no significant stenosis. There is no evidence of dissection or aneurysm. The cervical vertebral arteries are patent bilaterally. Prominent right paratracheal node  measuring 1 cm short axis, statistically reactive. Otherwise, the neck soft tissues and lung apices in the field of view are unremarkable. 3D angiographic/MIP images of the vasculature confirm the vascular findings as described above.     Unremarkable CT angiogram of the neck. No high-grade stenosis, large vessel occlusion, aneurysm or dissection.    Dx-chest-2 Views  Result Date: 5/12/2019 5/12/2019 2:28 AM HISTORY/REASON FOR EXAM:  Cough TECHNIQUE/EXAM DESCRIPTION AND NUMBER OF VIEWS: Two views of the chest. COMPARISON:  None. FINDINGS: LUNGS: Linear atelectasis in the left midlung. No focal consolidation. No effusions. PNEUMOTHORAX: None. LINES AND TUBES: None. MEDIASTINUM: No cardiomegaly. Atherosclerosis. MUSCULOSKELETAL STRUCTURES: No acute fracture. Hiatal hernia.   1.  No focal consolidation or pleural effusions. 2.  Hiatal hernia.    Dx-chest-portable (1 View)  Result Date: 5/24/2019 5/24/2019 1:08 AM HISTORY/REASON FOR EXAM:  For indication of respiratory failure. TECHNIQUE/EXAM DESCRIPTION AND NUMBER OF VIEWS: Single portable view of the chest. COMPARISON: Yesterday FINDINGS: Endotracheal tube, NG tube and right IJ central line mean in place. There is bibasilar atelectasis or consolidation. There are probable trace effusions. No pneumothorax. Lung aeration appears slightly improved.   Slight improvement in lung aeration.      Mr-stealth Brain With & W/o  Result Date: 5/22/2019  COMPARISON:  CTA of the head 5/21/2019 FINDINGS: No fiducial markers are appreciated. The calvaria are unremarkable. There are no extra-axial fluid collections. The left temporal lobe, there is a thick-walled ring-enhancing mass which measures about 47 mm x 32 mm x 43 mm. Central hypoenhancement may represent tumor necrosis or cystic change. There is marked surrounding vasogenic edema extending into the basal ganglia, subinsular white matter, internal capsule, corona radiata, and left frontal deep white matter. There is  effacement of the left lateral ventricle and moderate dilatation of the right lateral ventricle including the temporal horn due to trapping. There is left-to-right shift of midline structures of about 11 mm (T2 axial image 54, series 3). There are no other enhancing lesions. The brainstem and posterior fossa structures are unremarkable for marked compression of the midbrain with flattening of the left cerebral peduncle and effacement of perimesencephalic cisterns. There is some vasogenic edema extending down into the left side of the midbrain as well as the left thalamus and subthalamic nucleus. The major vessels including the dural venous sinuses appear intact. Paranasal sinuses show moderate mucosal thickening with small air-fluid/debris levels in the maxillary antra. Moderate mucosal thickening in the sphenoid sinus and among the ethmoid air cells. Minimal mucosal thickening in the frontal air cells.     1.  Large peripherally enhancing intra-axial mass in the left temporal lobe with marked surrounding vasogenic edema. Findings are most consistent with high-grade primary brain tumor such as glioblastoma. No other enhancing lesions elsewhere to suggest metastasis, however, a solitary brain metastasis would not be excluded. Brain abscess would not be excluded. There are no diffusion weighted images to determine whether contents of this lesion show restricted diffusion. There are no prior MRI scans for comparison. 2.  Marked left-to-right shift of midline structures along with midbrain compression and displacement.      Micro:  Results     Procedure Component Value Units Date/Time    CULTURE WOUND W/ GRAM STAIN [[id number]] Collected:  05/22/19 1818    Order Status:  Completed Specimen:  Wound Updated:  05/25/19 1056     Significant Indicator NEG     Source WND     Site Brain Abscess     Culture Result No growth at 72 hours.     Gram Stain Result Rare WBCs.  No organisms seen.      Narrative:       CALL  [facility]  RST2  tel. ,  CALLED  RST2 tel.  05/22/2019, 19:14, RB PERF. RESULTS CALLED TO:[id number].  Surgery Specimen    Anaerobic Culture [[id number]] Collected:  05/22/19 1818    Order Status:  Completed Specimen:  Wound Updated:  05/25/19 1056     Significant Indicator NEG     Source WND     Site Brain Abscess     Culture Result Culture in progress.    Narrative:       CALL  [facility]  RST2 tel. ,  CALLED  RST2 tel.  05/22/2019, 19:14, RB PERF. RESULTS CALLED TO:[id number].  Surgery Specimen    Acid Fast Stain [[id number]] Collected:  05/22/19 1700    Order Status:  Completed Specimen:  Wound Updated:  05/24/19 2057     Significant Indicator NEG     Source WND     Site BRAIN ABSCESS     AFB Smear Results No acid fast bacilli seen.    Narrative:       Collected By:[id number] PROSHKINA YIFAN  Brain cyst.  Surgery Specimen    AFB Culture [[id number]] Collected:  05/22/19 1700    Order Status:  Completed Specimen:  Wound from Cyst Updated:  05/24/19 2056     Significant Indicator NEG     Source WND     Site BRAIN ABSCESS     Culture Result Culture in progress.     AFB Smear Results No acid fast bacilli seen.    Narrative:       Collected By:[id number] PROSHKINA YIFAN  Brain cyst.  Surgery Specimen    Fungal Culture [[id number]] Collected:  05/22/19 1700    Order Status:  Completed Specimen:  Wound from Cyst Updated:  05/24/19 2056     Significant Indicator NEG     Source WND     Site BRAIN ABSCESS     Culture Result Culture in progress.    Narrative:       Collected By:[id number] PROSHKINA YIFAN  Brain cyst.  Surgery Specimen    GRAM STAIN [[id number]] Collected:  05/22/19 1818    Order Status:  Completed Specimen:  Wound Updated:  05/22/19 1914     Significant Indicator .     Source WND     Site Brain Abscess     Gram Stain Result Rare WBCs.  No organisms seen.      Narrative:       CALL  [facility]  RST2 tel. ,  CALLED  RST2 tel.  05/22/2019, 19:14, RB PERF. RESULTS CALLED TO:[id number].  Surgery Specimen    FLUID CULTURE W/GRAM STAIN [[id number]]     Order  "Status:  No result Specimen:  Cyst from Other Body Fluid     BLOOD CULTURE [[id number]] Collected:  05/21/19 2126    Order Status:  Completed Specimen:  Blood from Peripheral Updated:  05/22/19 0833     Significant Indicator NEG     Source BLD     Site PERIPHERAL     Culture Result No Growth  Note: Blood cultures are incubated for 5 days and  are monitored continuously.Positive blood cultures  are called to the RN and reported as soon as  they are identified.      Narrative:       Per Hospital Policy: Only change Specimen Src: to \"Line\" if  specified by physician order.  Left AC    BLOOD CULTURE [[id number]] Collected:  05/21/19 2120    Order Status:  Completed Specimen:  Blood from Peripheral Updated:  05/22/19 0833     Significant Indicator NEG     Source BLD     Site PERIPHERAL     Culture Result No Growth  Note: Blood cultures are incubated for 5 days and  are monitored continuously.Positive blood cultures  are called to the RN and reported as soon as  they are identified.      Narrative:       Per Hospital Policy: Only change Specimen Src: to \"Line\" if  specified by physician order.  Right Wrist          Assessment:  Active Hospital Problems    Diagnosis   • *Brain abscess [G06.0]   • Acute respiratory failure with hypoxia (HCC) [J96.01]   • Delirium [R41.0]   • Methamphetamine abuse (HCC) [F15.10]   • S/P craniotomy [Z98.890]       Plan:  Brain mass   Necrotic tumor vs brain abscess  Afebrile  +leukocytosis  MRI with 4.7 cm left temporal lobe mass with midline shift  S/p craniotomy 5/22-no op note available for review as yet  Gram stain neg  Path pending-sent to [facility]  Cultures pending  Blood cxs neg  HIV neg  Continue vanco/cefepime/flagyl for now    Leukocytosis  Multifactorial  On steroids  Monitor    Meth abuse  Will needs monitored setting for IV abx if confirmed abscess    DW RN/OT  "

## 2019-05-25 NOTE — ASSESSMENT & PLAN NOTE
Oh secondary to delirium/left temporal lobe CNS infection versus atypical tumor status post Cranio, improved  As needed Ativan/Haldol intravenously, trying to limit  Dexmedetomidine discontinued  Restraint for safety  Keep in the ICU for safety

## 2019-05-25 NOTE — PROGRESS NOTES
"Neuro: Exam waxes and wanes based on mood, agitation, and willingness to participate.  Pt. Fluctuates from calm and compliant saying \"thank you\", etc. To being verbally aggressive and frustrated and pulling off monitoring equipment and other items while being rude to staff.  Oriented to place and self.  PERRL, EOM intact, denies changes in vision and wears glasses.  Dysphagia diet ordered by speech.  5 strength all extremities, sensation intact when willing to participate.  Intermittent pain in left head, managed with rest and 5mg Oxy per MAR.  Afebrile.  Cardiac: Sinus bradycardia lowest noted 38, not sustained to sinus rhythm 70.  BP WDL.  Resp: Room air.  GI: Dysphagia puree diet with nectar thick.  Some coughing noted when pt. Drinks to fast, monitor.  : Incontinent every 1-2 hours overnight.  Unable to consistently use urinal and pulls condom catheter off.  Skin: Left temporal crani site with LUIS drain.  Access: Right IJ triple lumen, 18G left hand PIV.    Overnight Events:  2000: On neuro assessment, pt. Very frustrated and irritable.  Mumbles under his breath when asked questions and needs consistent reinforcement to participate.  Will grab lines and other items within reach forcefully and will not return items to caregivers.  Ativan 1 mg given per MAR for agitation.  Will continue to monitor for pt. And staff safety.  0400: On assessment,LUIS Drain appears to be slightly pulled out 2-3 inches based on tape on LUIS drain.  Message left for neuro surgery.  0600: Pt. Very agitated and aggressive towards staff.  Hitting, kicking, yelling profanities.  Bilateral soft wrist restraints placed and MD notified.  Haldol given per MAR.    Samina Kate R.N.  "

## 2019-05-25 NOTE — PROGRESS NOTES
2 RN Skin Check Completed with GRACIELA Parnell.      Problems Noted:  1) Left temporal crani site.  Clean, dry, intact.  LUIS drain in place.    No evidence of pressure injury.    Interventions: Q2hr turns, educated about pressure injury prevention, nutrition consult in place.    Samina Kate R.N.

## 2019-05-25 NOTE — PROGRESS NOTES
"Pharmacy Kinetics 53 y.o. male on vancomycin day #5 2019    Currently on Vancomycin 1600 mg iv q8hr    Indication for Treatment: rule out Brain abscess    Pertinent history per medical record: Admitted on 2019 for confusion for the past week and a half.  He recently completed treatment with cefdinir for otitis media.  PMH of methamphetamine abuse.  ID and Neurosurgery are consulting. S/p craniotomy.     Other antibiotics: cefepime 2 grams iv q8h, metronidazole 500 mg iv q8h    Allergies: Pcn [penicillins] and Sulfa drugs     List concerns for renal function: obesity    Pertinent cultures to date:   19 blood-peripheral x 2:  NGTD  19 No organisms seen on gram stain from OR, cultures remain in process  Pathology sent to [facility].    Recent Labs      19   0450  19   0426  19   0405   WBC  14.6*  12.6*  12.2*     Recent Labs      19   1717  19   2315  19   0429  19   1218  19   0405   BUN  26*  28*  30*  31*  27*   CREATININE  0.81  0.84  0.91  0.79  0.68     Recent Labs      19   2315  19   0749   VANCOTROUGH  14.9  20.5*     Intake/Output Summary (Last 24 hours) at 19 1258  Last data filed at 19 0637   Gross per 24 hour   Intake          5780.91 ml   Output               60 ml   Net          5720.91 ml      /88   Pulse 63   Temp 37.1 °C (98.8 °F) (Temporal)   Resp 15   Ht 1.803 m (5' 11\")   Wt 108 kg (238 lb 1.6 oz)   SpO2 97%  Temp (24hrs), Av.1 °C (98.7 °F), Min:36.9 °C (98.4 °F), Max:37.2 °C (99 °F)      A/P   1. Vancomycin dose change: Decrease to 1500mg q8hr  2. Next vancomycin level: 1-2 days or sooner if clinically indicated  3. Goal trough: 18-22 mcg/mL   4. Comments: Renal stable at this time. Level collected appropriately prior to 4th dose on this new interval. Level is therapeutic. Expect there will be some continued accumulation on q8hr dosing. Will slightly decrease dose as above. Dose aggressively " due to possible brain abscess. Will monitor renal closely. Pharmacy will follow. Narrow therapy as able.    Herberth Camara, PharmD, BCPS

## 2019-05-25 NOTE — OP REPORT
NEUROSURGERY OPERATIVE NOTE  DATE:  8:20 PM 2018    PATIENT NAME:  Robert Dorantes   1965 MRN [id number]      PROCEDURE:  1.  Left temporal cranitotomy  2.  Evacuation left temporal cystic mass  3.  Dural augmentation  4.  STEALTH intraoperative navigation    Surgeon:  Chase Young MD, PhD  Assistant: Catherine SHER    Anesthesia:  GETA    Diagnosis:  Left temporal intraparenchymal cystic mass    Indication:  53 year old male with progressive confusion and mental status changes.  He has been intubated for respiratory protection.  MRI demonstrates a peripherally enhancing cystic lesion in the left temporal lobe with adjacent edema.      Procedure:  The patient was identified in the holding area, and the surgery site marked, consent was obtained from his family.  The patient was brought back to the operating room; he is already receiving IV antibiotics.  He was transferred to the operating room table in a supoine manner and all pressure points well padded.  His head was secured in a [place] pin headholder, a gel roll placed under his left shoulder and head turned right with the left temporal region near parallel to the floor.  His head was registered to the [employer] intraoperative navigation system using the tracer methodology; good registration was verified.  His left scalp was prepped with hair clipping, chlorhexidine and betadine scrub, and Chloroprep.  Sterile drapes were applied including a layer of [place]an.   A curved incision beginning just anterior to the tragus, running posterior to the ear and then coursing anteriorly just above the superior temporal line was planned.  The skin was infiltrated with 0.5% Marcaine with epinephrine.  The skin was incised sharply and dissection carried deeply to the temporalis muscle using monopolar cautery.  Trang clips were placed circumferentially.  The temporalis muscle was then divided just beneath the superior temporal line and posteriorly using  monopolar cautery.  The temporalis muscle was elevated off the underlying skull using monopolar cautery.  The myocutaneous flap was wrapped in a damp lap pad and retracted inferiorly.  The temporalis muscle was very thick, and quite vascular.  The underlying cystic region was localized using Stealth.  2 bur holes were placed, one anteriorly one posteriorly using the high-speed drill fit with a skull  bit.  The dura was dissected free from the overlying skull using a Penfield 3 dissector.  A craniotomy was created using the high-speed drill fit with the B1 footplate attachment.  The skull flap was removed using a Penfield 3 dissector and stored in normal saline bacitracin irrigation.  Surgifoam and thin strips woven Surgicel were then placed in the epidural space, and 4-0 Nurolon tack up sutures placed.  The dura was quite tense at this time.  70 g of mannitol was administered intravenously by anesthesia service.  The dura was opened sharply in a cruciate manner.  The underlying brain was quite erythematous and swollen.  The corticotomy was planned after the shortest trajectory to the underlying cyst was identified using Stealth.  The abdoul and arachnoid were cauterized with bipolar cautery and opened sharply using microscissors.  Dissection was carried deeply using gentle suction and bipolar cautery.  A thick rind of gray-tan material was encountered, consistent with the cyst wall.  Specimen was obtained and sent for frozen pathology, this demonstrated no evidence of malignancy.  Dissection was then carried somewhat deeper and a gray amorphous somewhat gelatinous material was encountered with the extruded out of the corticotomy.  Specimen was sent for aerobic, anaerobic culture as well as stat Gram stain.  Stat Gram stain results came back with white blood cells but no obvious organisms.  The wall of the cystic cavity was further biopsied to be sent for permanent pathology.  At this time, with no evidence of  neoplasia on frozen pathology and visual examination most consistent with abscess, further resection was not pursued.  The operative site was copiously irrigated normal saline irrigation.  Good hemostasis was noted.  The operative cavity was lined with woven Surgicel.  The dura was unable to be reapproximated at this time, although significant decrease in the cerebral extrusion through the dural opening had occurred with drainage of the cyst cavity.  Dural augmentation was created using Dura repair (Ornicept) synthetic dura.  This was held in position using 4-0 Nurolon suture in a running manner.  Gelfoam was placed over the dura, and the cranial flap secured in position using the [employer] mini plating system.  The operative site was closely irrigated normal saline bacitracin irrigation.  A flat drain was placed in the subgaleal position, brought out through a separate incision.  The drain was secured to the skin using 2-0 nylon suture.  The drain was attached to a LUIS bulb.  The temporalis muscle and fascia were reapproximated using 2-0 Vicryl suture in interrupted manner.  The dermis was reapproximated using 3-0 Vicryl suture in an interrupted inverted manner.  The skin was reapproximated using staples.  Sterile dressing was applied.  Final counts were correct.    FINDINGS: Gray amorphous gelatinous material, frozen pathology demonstrates no evidence of malignancy.  Most consistent with abscess.  SPECIMEN: Left intraparenchymal cystic mass for frozen pathology, aerobic, anaerobic culture, Gram stain  DRAINS:  #7 flat to LUIS bulb  EBL:  <50 CC  COMPLICATIONS:  None apparent at end of procedure.

## 2019-05-25 NOTE — PROGRESS NOTES
Critical Care Progress Note    Date of admission  5/21/2019    Chief Complaint  53 y.o. male admitted 5/21/2019 with altered LOC, possible brain abcess by imaging     Hospital Course    53 y.o. male who presented 5/21/2019 with a past medical history significant for methamphetamine abuse who was seen in the emergency department 9 days ago and prescribed Cefdinir at discharge.  Apparently over the last several days he has had worsening pain, headache and developing confusion and unsteady gait with word finding difficulty.  No reported fevers or chills and he reportedly completed his antibiotics as prescribed.  In the emergency department patient underwent CT imaging of his brain which revealed a left frontotemporal mass favoring abscess over a solid lesion with extensive edema and a 10 mm left to right midline shift with effacement of the lateral ventricle.  Neurosurgery was consulted recommending an MRI of his brain and he was started on IV Keppra as well as Decadron.  Infectious disease was consulted and recommended IV antibiotics including Rocephin, vancomycin, and Flagyl.  Patient is being admitted to the intensive care unit and I was consulted for his critical care management.      Interval Problem Update  Reviewed last 24 hour events:    POD #3 for brain abcess  LUIS 35  Follows - GCS 14  More agitated last night  DEX  Weaned off  PRN Haldol x5 and Ativan 1 mg  Extubated yesterday  Weaned to RA    SB/SR 50-60s  SBp 110s  UO good  Tm 99.0  WBC 12.2  CVC  Vanco/Cefepime/Flagyl  Keppra  Decadron taper  Modified diet  Na 144  Muicro still neg  Micro  Path noted again - [place] second opinion pending still    TKO IVF  D/c CXR  Reduce neuro checks  D/c DEX  Keep in ICU     YESTERDAY  POD#2 - craniotomy for brain abcess  Micro pending - everything neg so far  Vent day#4  CXR improved ATX  PEEP 8 - 30%  RSBI 50s  Good cough  Secretions min  Awake and aggressive - follows some  SB/SR  SBp 150s  UO adequate  FENT no  drip  DEX 0.4  3% 10cc/hr  Na 137  No VTE  Cefepime/Vanco/Flagyl  Decadron    Stop 3%  Etiology?  Micro negative so far    Patient extubated with the help of multiple staff, patient quite agitated/angry/combative but after were able to get the tube out safely he improved    Review of Systems  Review of Systems   Unable to perform ROS: Acuity of condition   Still unable to do ROS    Vital Signs for last 24 hours   Temp:  [36.9 °C (98.4 °F)-37.2 °C (99 °F)] 37.1 °C (98.8 °F)  Pulse:  [46-72] 63  Resp:  [13-48] 15  SpO2:  [90 %-99 %] 97 %    Hemodynamic parameters for last 24 hours       Respiratory Information for the last 24 hours       Physical Exam   Physical Exam   Constitutional: He appears well-developed and well-nourished. He appears lethargic. He is uncooperative.  Non-toxic appearance. No distress.   HENT:   Head: Normocephalic.   Mouth/Throat: Oropharynx is clear and moist. Mucous membranes are not dry and not cyanotic.   CNS drain and craniotomy scar noted on the left, clean and dry old blood   Eyes: Pupils are equal, round, and reactive to light. EOM are normal. No scleral icterus.   Neck: Phonation normal. Neck supple. Normal carotid pulses and no JVD present. No neck rigidity. No Brudzinski's sign and no Kernig's sign noted.   Cardiovascular: Normal rate, regular rhythm and intact distal pulses.   No extrasystoles are present. Exam reveals no gallop and no friction rub.    No murmur heard.  Pulmonary/Chest: No accessory muscle usage. No respiratory distress. He has no decreased breath sounds. He has no wheezes. He has no rhonchi. He has no rales.   Abdominal: Soft. Bowel sounds are normal. He exhibits no distension. There is no hepatosplenomegaly. There is no tenderness. There is no rigidity, no guarding and no CVA tenderness.   Musculoskeletal: He exhibits no edema.   Neurological: He appears lethargic. He displays no tremor. No cranial nerve deficit. He exhibits normal muscle tone. He displays no  seizure activity. Coordination normal. GCS eye subscore is 4. GCS verbal subscore is 4. GCS motor subscore is 6.   Cranial nerves grossly intact, a bit more lethargic and sleepy than yesterday but easily aroused and follows, remains confused and agitated at times   Skin: Skin is warm and dry. He is not diaphoretic (Improved). No cyanosis or erythema. Nails show no clubbing.   Psychiatric: His speech is normal. His mood appears anxious. His affect is labile. He is agitated (At times). Cognition and memory are impaired. He expresses impulsivity.   Vitals reviewed.      Medications  Current Facility-Administered Medications   Medication Dose Route Frequency Provider Last Rate Last Dose   • Influenza Vaccine Quad pf injection 0.5 mL  0.5 mL Intramuscular Once Jeremy M Gonda, M.D.       • vancomycin 1,500 mg in  mL IVPB  1,500 mg Intravenous Q8HR Coreen Martinez M.D.       • haloperidol lactate (HALDOL) injection 1-5 mg  1-5 mg Intravenous Q4HRS PRN Angeline Trinidad M.D.   2.5 mg at 05/25/19 1204   • LORazepam (ATIVAN) injection 0.5-2 mg  0.5-2 mg Intravenous Q4HRS PRN Angeline Trinidad M.D.   1 mg at 05/25/19 1026   • insulin regular (HUMULIN R) injection 1-6 Units  1-6 Units Subcutaneous 4X/DAY ACHS Jeremy M Gonda, M.D.   Stopped at 05/24/19 2100    And   • DEXTROSE 10% BOLUS 125 mL  125 mL Intravenous Q15 MIN PRN Jeremy M Gonda, M.D.       • dexamethasone (DECADRON) injection 6 mg  6 mg Intravenous TID Catherine Magallon, A.P.R.N.   6 mg at 05/25/19 0556    Followed by   • [START ON 5/26/2019] dexamethasone (DECADRON) injection 4 mg  4 mg Intravenous TID Catherine Magallon, A.P.R.N.        Followed by   • [START ON 5/28/2019] dexamethasone (DECADRON) injection 4 mg  4 mg Intravenous BID Catherine Magallon, A.P.R.N.        Followed by   • [START ON 5/30/2019] dexamethasone (DECADRON) injection 2 mg  2 mg Intravenous BID Catherine Magallon A.P.R.NMg        Followed by   • [START ON 6/1/2019] dexamethasone  (DECADRON) injection 2 mg  2 mg Intravenous DAILY NGOZI Gil       • metroNIDAZOLE (FLAGYL) IVPB 500 mg  500 mg Intravenous Q8HRS Sadiq Guerin M.D.   Stopped at 05/25/19 0637   • Respiratory Care per Protocol   Nebulization Continuous RT Jeremy M Gonda, M.D.       • MD Alert...ICU Electrolyte Replacement per Pharmacy   Other PHARMACY TO DOSE Jeremy M Gonda, M.D.       • Pharmacy Consult: Enteral tube insertion - review meds/change route/product selection   Other PHARMACY TO DOSE Jeremy M Gonda, M.D.       • fentaNYL (SUBLIMAZE) injection 25 mcg  25 mcg Intravenous Q HOUR PRN Jeremy M Gonda, M.D.   Stopped at 05/22/19 0752    Or   • fentaNYL (SUBLIMAZE) injection 50 mcg  50 mcg Intravenous Q HOUR PRN Jeremy M Gonda, M.D.   50 mcg at 05/23/19 0734    Or   • fentaNYL (SUBLIMAZE) injection 100 mcg  100 mcg Intravenous Q HOUR PRN Jeremy M Gonda, M.D.   100 mcg at 05/25/19 0819   • MD Alert...Vancomycin per Pharmacy   Other PHARMACY TO DOSE Amilcar Reaves M.D.       • acetaminophen (TYLENOL) tablet 1,000 mg  1,000 mg Enteral Tube Q6HRS Amilcar Reaves M.D.   1,000 mg at 05/25/19 1204   • docusate sodium 100mg/10mL (COLACE) solution 100 mg  100 mg Enteral Tube BID Amilcar Reaves M.D.   100 mg at 05/25/19 0537   • senna-docusate (PERICOLACE or SENOKOT S) 8.6-50 MG per tablet 1 Tab  1 Tab Enteral Tube Nightly Amilcar Reaves M.D.   1 Tab at 05/24/19 2000   • cloNIDine (CATAPRES) tablet 0.1 mg  0.1 mg Enteral Tube Q4HRS PRN Amilcar Reaves M.D.       • magnesium hydroxide (MILK OF MAGNESIA) suspension 30 mL  30 mL Enteral Tube QDAY PRN Amilcar Reaves M.D.       • oxyCODONE immediate-release (ROXICODONE) tablet 2.5 mg  2.5 mg Enteral Tube Q3HRS PRN Amilcar Reaves M.D.       • polyethylene glycol/lytes (MIRALAX) PACKET 1 Packet  1 Packet Enteral Tube BID PRN Amilcar Reaves M.D.   1 Packet at 05/25/19 0537   • senna-docusate (PERICOLACE or SENOKOT S) 8.6-50 MG per tablet 1  Tab  1 Tab Enteral Tube Q24HRS PRN Amilcar Reaves M.D.       • cefepime (MAXIPIME) 2 g in  mL IVPB  2 g Intravenous Q8HRS Deyanira Hines M.D.   Stopped at 05/25/19 0626   • Pharmacy Consult Request ...Pain Management Review 1 Each  1 Each Other PHARMACY TO DOSE Yanelis Austin M.D.       • oxyCODONE immediate-release (ROXICODONE) tablet 5 mg  5 mg Oral Q3HRS PRN Yanelis Austin M.D.   5 mg at 05/25/19 1008   • MD ALERT...DO NOT ADMINISTER NSAIDS or ASPIRIN unless ORDERED By Neurosurgery 1 Each  1 Each Other PRN Yanelis Austin M.D.       • ondansetron (ZOFRAN) syringe/vial injection 4 mg  4 mg Intravenous Q4HRS PRN Yanelis Austin M.D.       • scopolamine (TRANSDERM-SCOP) patch 1 Patch  1 Patch Transdermal Q72HRS PRN Yanelis Austin M.D.       • labetalol (NORMODYNE,TRANDATE) injection 10 mg  10 mg Intravenous Q HOUR PRN Yanelis Austin M.D.       • hydrALAZINE (APRESOLINE) injection 10 mg  10 mg Intravenous Q HOUR PRN Yanelis Austin M.D.       • levETIRAcetam (KEPPRA) 500 mg in  mL IVPB  500 mg Intravenous Q12HRS Yanelis Austin M.D.   Stopped at 05/25/19 0553   • bisacodyl (DULCOLAX) suppository 10 mg  10 mg Rectal Q24HRS PRN Yanelis Austin M.D.       • fleet enema 133 mL  1 Each Rectal Once PRN Yanelis Austin M.D.           Fluids    Intake/Output Summary (Last 24 hours) at 05/25/19 1330  Last data filed at 05/25/19 0637   Gross per 24 hour   Intake          5780.91 ml   Output               60 ml   Net          5720.91 ml       Laboratory  Recent Labs      05/23/19   0424  05/24/19   0422   ISTATAPH  7.406  7.432   ISTATAPCO2  35.4  30.9   ISTATAPO2  69  101*   ISTATATCO2  23  22   [id number]  94  98   ISTATARTHCO3  22.2  20.6   ISTATARTBE  -2  -3   ISTATTEMP  37.3 C  36.5 C   ISTATFIO2  40  30   ISTATSPEC  Arterial  Arterial   ISTATAPHTC  7.402  7.439   [id number]  70  98*         Recent Labs      05/23/19   0450   05/24/19   0429  05/24/19   1218  05/25/19   0405   SODIUM   148*   < >  147*  148*  144   POTASSIUM  3.9   < >  3.9  3.6  3.8   CHLORIDE  117*   < >  116*  117*  113*   CO2  25   < >  23  23  24   BUN  19   < >  30*  31*  27*   CREATININE  0.90   < >  0.91  0.79  0.68   MAGNESIUM  1.8   --   2.1   --   1.8   PHOSPHORUS  3.1   --   3.7   --   3.1   CALCIUM  8.4*   < >  8.6  8.1*  7.7*    < > = values in this interval not displayed.     Recent Labs      05/24/19   0429  05/24/19   1218  05/25/19   0405   GLUCOSE  151*  135*  91     Recent Labs      05/23/19   0450  05/24/19   0426 05/25/19   0405   WBC  14.6*  12.6*  12.2*     Recent Labs      05/23/19   0450  05/24/19   0426 05/24/19   0429  05/25/19   0405   RBC  5.01  4.95   --   4.56*   HEMOGLOBIN  11.4*  11.2*   --   10.4*   HEMATOCRIT  39.2*  38.9*   --   35.8*   PLATELETCT  329  313   --   294   PROTHROMBTM   --    --   15.6*  16.2*   INR   --    --   1.23*  1.29*       Imaging  X-Ray:  I have personally reviewed the images and compared with prior images.  EKG:  I have personally reviewed the images and compared with prior images.  CT:    Reviewed    Assessment/Plan  * Brain abscess- (present on admission)   Assessment & Plan    Presumed based on imaging/craniotomy politics and recent left otitis media  MRI most consistent with abscess versus tumor, reviewed with neurosurgery  Pathology not consistent with carcinoma or glioblastoma, tissue sent off to [place] for second opinion, still pending  IV antibiotics including Rocephin, Flagyl, vancomycin  Cultures pending, further cultures from CNS surgery 5/22, all still negative times several days  Keppra prophylaxis ongoing, continue/will review duration with NS  Decadron ongoing, de-escalate postop after discussion with neurosurgery, weaning program in the chart  Neurosurgical evaluation ongoing and infectious disease consultation noted  Status post craniotomy p.m. 5/22  Will check HIV and RPR given abscess and meth abuse, ?IVDA, pending  Strict BP control (goal SBP<160)  with nicardipine gtt as needed     S/P craniotomy- (present on admission)   Assessment & Plan    Wound site looks good  Await pathology and microbiology results, all micro negative so far  Initial path interpretation no GBM and or carcinoma, specimen sent to [facility] for formal second opinion, pending still  Frequent neurochecks ongoing, improving, now every 2 hours  Keep in ICU for now     Agitation- (present on admission)   Assessment & Plan    Secondary to delirium/left temporal lobe CNS infection versus atypical tumor status post Cranio  As needed Ativan/Haldol intravenously  Dexmedetomidine discontinued  Restraint for safety  Keep in the ICU for safety     Acute respiratory failure with hypoxia (HCC)- (present on admission)   Assessment & Plan    Intubated a.m. 5/22  Extubated 5/24  RT protocols  Incentive spirometry  Mobilize/pulmonary toilet  Room air trial     Delirium   Assessment & Plan    Hyperactive delirium, secondary CNS infection/tumor status post craniotomy  Intubated 5/22 -extubated 5/24  Physical restraints as needed for patient and staff's safety  Off dexmedetomidine, was not super helpful  As needed for safety of patient and staff soft restraints and IV Haldol/lorazepam at low dose  Mobilize if he will allow  Limit overstimulation  Family helping to calm this patient  Monitoring for withdrawals     Methamphetamine abuse (HCC)- (present on admission)   Assessment & Plan    Eventual drug cessation education to be provided once able to converse with patient  Monitor for withdrawal syndrome  Hepatitis panel, syphilis study and HIV negative  UDS completely -5/22     Microcytic anemia   Assessment & Plan    No clear evidence of GI bleeding  Iron studies  Serial CBC  Stool for occult blood  Transfuse per conservative policy     Class 1 obesity due to excess calories without serious comorbidity with body mass index (BMI) of 33.0 to 33.9 in adult   Assessment & Plan    Behavioral modification and weight  loss recommended  Dietary consult/education  MONTRELL?,  Monitor for apnea and nocturnal hypoxemia, airway body habitus put him at risk  Outpatient sleep study?     Smoker- (present on admission)   Assessment & Plan    Nicotine replacement patch  Tobacco cessation education to be provided eventually  Query COPD, RT protocols ongoing  Mobilize  Assess spirometry          VTE:  Contraindicated  Ulcer: H2 Antagonist  Lines: None    I have performed a physical exam and reviewed and updated ROS and Plan today (5/25/2019). In review of yesterday's note (5/24/2019), there are no changes except as documented above.       Discussed patient condition and risk of morbidity and/or mortality with Family, RN, RT, Pharmacy, [facility] resident and Charge nurse / hot rounds

## 2019-05-25 NOTE — ASSESSMENT & PLAN NOTE
No clear evidence of GI bleeding  Iron studies  Serial CBC  Stool for occult blood  Transfuse per conservative policy

## 2019-05-26 ENCOUNTER — APPOINTMENT (OUTPATIENT)
Dept: RADIOLOGY | Facility: MEDICAL CENTER | Age: 54
DRG: 025 | End: 2019-05-26
Attending: INTERNAL MEDICINE
Payer: MEDICAID

## 2019-05-26 LAB
ANION GAP SERPL CALC-SCNC: 5 MMOL/L (ref 0–11.9)
BUN SERPL-MCNC: 19 MG/DL (ref 8–22)
CALCIUM SERPL-MCNC: 8.1 MG/DL (ref 8.5–10.5)
CHLORIDE SERPL-SCNC: 105 MMOL/L (ref 96–112)
CO2 SERPL-SCNC: 27 MMOL/L (ref 20–33)
CREAT SERPL-MCNC: 0.63 MG/DL (ref 0.5–1.4)
ERYTHROCYTE [DISTWIDTH] IN BLOOD BY AUTOMATED COUNT: 49.6 FL (ref 35.9–50)
GLUCOSE BLD-MCNC: 103 MG/DL (ref 65–99)
GLUCOSE SERPL-MCNC: 113 MG/DL (ref 65–99)
HCT VFR BLD AUTO: 37.7 % (ref 42–52)
HGB BLD-MCNC: 11.7 G/DL (ref 14–18)
HGB RETIC QN AUTO: 30.6 PG/CELL (ref 29–35)
IMM RETICS NFR: 15.1 % (ref 9.3–17.4)
IRON SATN MFR SERPL: 4 % (ref 15–55)
IRON SERPL-MCNC: 17 UG/DL (ref 50–180)
MCH RBC QN AUTO: 23.4 PG (ref 27–33)
MCHC RBC AUTO-ENTMCNC: 31 G/DL (ref 33.7–35.3)
MCV RBC AUTO: 75.4 FL (ref 81.4–97.8)
MORPHOLOGY BLD-IMP: NORMAL
PLATELET # BLD AUTO: 279 K/UL (ref 164–446)
PMV BLD AUTO: 10.3 FL (ref 9–12.9)
POTASSIUM SERPL-SCNC: 3.7 MMOL/L (ref 3.6–5.5)
RBC # BLD AUTO: 5 M/UL (ref 4.7–6.1)
RETICS # AUTO: 0.04 M/UL (ref 0.04–0.06)
RETICS/RBC NFR: 0.8 % (ref 0.8–2.1)
SODIUM SERPL-SCNC: 137 MMOL/L (ref 135–145)
TIBC SERPL-MCNC: 386 UG/DL (ref 250–450)
WBC # BLD AUTO: 9.7 K/UL (ref 4.8–10.8)

## 2019-05-26 PROCEDURE — 700112 HCHG RX REV CODE 229: Performed by: INTERNAL MEDICINE

## 2019-05-26 PROCEDURE — 700111 HCHG RX REV CODE 636 W/ 250 OVERRIDE (IP): Performed by: NEUROLOGICAL SURGERY

## 2019-05-26 PROCEDURE — 700111 HCHG RX REV CODE 636 W/ 250 OVERRIDE (IP): Performed by: NURSE PRACTITIONER

## 2019-05-26 PROCEDURE — 83540 ASSAY OF IRON: CPT

## 2019-05-26 PROCEDURE — 99233 SBSQ HOSP IP/OBS HIGH 50: CPT | Performed by: INTERNAL MEDICINE

## 2019-05-26 PROCEDURE — 82962 GLUCOSE BLOOD TEST: CPT

## 2019-05-26 PROCEDURE — 700102 HCHG RX REV CODE 250 W/ 637 OVERRIDE(OP): Performed by: INTERNAL MEDICINE

## 2019-05-26 PROCEDURE — 85046 RETICYTE/HGB CONCENTRATE: CPT

## 2019-05-26 PROCEDURE — 80048 BASIC METABOLIC PNL TOTAL CA: CPT

## 2019-05-26 PROCEDURE — 700102 HCHG RX REV CODE 250 W/ 637 OVERRIDE(OP): Performed by: NEUROLOGICAL SURGERY

## 2019-05-26 PROCEDURE — 700105 HCHG RX REV CODE 258: Performed by: INTERNAL MEDICINE

## 2019-05-26 PROCEDURE — 83550 IRON BINDING TEST: CPT

## 2019-05-26 PROCEDURE — 700111 HCHG RX REV CODE 636 W/ 250 OVERRIDE (IP): Performed by: INTERNAL MEDICINE

## 2019-05-26 PROCEDURE — 770001 HCHG ROOM/CARE - MED/SURG/GYN PRIV*

## 2019-05-26 PROCEDURE — A9270 NON-COVERED ITEM OR SERVICE: HCPCS | Performed by: INTERNAL MEDICINE

## 2019-05-26 PROCEDURE — A9270 NON-COVERED ITEM OR SERVICE: HCPCS | Performed by: NEUROLOGICAL SURGERY

## 2019-05-26 PROCEDURE — 85027 COMPLETE CBC AUTOMATED: CPT

## 2019-05-26 PROCEDURE — 700101 HCHG RX REV CODE 250: Performed by: STUDENT IN AN ORGANIZED HEALTH CARE EDUCATION/TRAINING PROGRAM

## 2019-05-26 PROCEDURE — 700105 HCHG RX REV CODE 258: Performed by: NEUROLOGICAL SURGERY

## 2019-05-26 RX ORDER — DOCUSATE SODIUM 100 MG/1
100 CAPSULE, LIQUID FILLED ORAL 2 TIMES DAILY
Status: DISCONTINUED | OUTPATIENT
Start: 2019-05-26 | End: 2019-06-20

## 2019-05-26 RX ORDER — LEVETIRACETAM 500 MG/1
500 TABLET ORAL 2 TIMES DAILY
Status: COMPLETED | OUTPATIENT
Start: 2019-05-26 | End: 2019-05-28

## 2019-05-26 RX ORDER — CLONIDINE HYDROCHLORIDE 0.1 MG/1
0.1 TABLET ORAL EVERY 4 HOURS PRN
Status: DISCONTINUED | OUTPATIENT
Start: 2019-05-26 | End: 2019-06-18

## 2019-05-26 RX ORDER — AMOXICILLIN 250 MG
1 CAPSULE ORAL NIGHTLY
Status: DISCONTINUED | OUTPATIENT
Start: 2019-05-26 | End: 2019-06-20

## 2019-05-26 RX ORDER — ACETAMINOPHEN 500 MG
1000 TABLET ORAL EVERY 6 HOURS
Status: COMPLETED | OUTPATIENT
Start: 2019-05-26 | End: 2019-05-26

## 2019-05-26 RX ORDER — POLYETHYLENE GLYCOL 3350 17 G/17G
1 POWDER, FOR SOLUTION ORAL 2 TIMES DAILY
Status: DISCONTINUED | OUTPATIENT
Start: 2019-05-26 | End: 2019-06-20

## 2019-05-26 RX ORDER — AMOXICILLIN 250 MG
1 CAPSULE ORAL
Status: DISCONTINUED | OUTPATIENT
Start: 2019-05-26 | End: 2019-06-18

## 2019-05-26 RX ORDER — POTASSIUM CHLORIDE 20 MEQ/1
40 TABLET, EXTENDED RELEASE ORAL ONCE
Status: COMPLETED | OUTPATIENT
Start: 2019-05-26 | End: 2019-05-26

## 2019-05-26 RX ADMIN — VANCOMYCIN HYDROCHLORIDE 1500 MG: 100 INJECTION, POWDER, LYOPHILIZED, FOR SOLUTION INTRAVENOUS at 16:33

## 2019-05-26 RX ADMIN — CEFEPIME 2 G: 2 INJECTION, POWDER, FOR SOLUTION INTRAVENOUS at 21:00

## 2019-05-26 RX ADMIN — METRONIDAZOLE 500 MG: 500 INJECTION, SOLUTION INTRAVENOUS at 21:00

## 2019-05-26 RX ADMIN — SODIUM CHLORIDE 500 MG: 9 INJECTION, SOLUTION INTRAVENOUS at 05:31

## 2019-05-26 RX ADMIN — METRONIDAZOLE 500 MG: 500 INJECTION, SOLUTION INTRAVENOUS at 13:40

## 2019-05-26 RX ADMIN — METRONIDAZOLE 500 MG: 500 INJECTION, SOLUTION INTRAVENOUS at 05:24

## 2019-05-26 RX ADMIN — ACETAMINOPHEN 1000 MG: 500 TABLET ORAL at 16:33

## 2019-05-26 RX ADMIN — DEXAMETHASONE SODIUM PHOSPHATE 4 MG: 4 INJECTION, SOLUTION INTRA-ARTICULAR; INTRALESIONAL; INTRAMUSCULAR; INTRAVENOUS; SOFT TISSUE at 13:40

## 2019-05-26 RX ADMIN — LEVETIRACETAM 500 MG: 500 TABLET ORAL at 16:33

## 2019-05-26 RX ADMIN — VANCOMYCIN HYDROCHLORIDE 1500 MG: 100 INJECTION, POWDER, LYOPHILIZED, FOR SOLUTION INTRAVENOUS at 00:02

## 2019-05-26 RX ADMIN — OXYCODONE HYDROCHLORIDE 5 MG: 5 TABLET ORAL at 16:52

## 2019-05-26 RX ADMIN — OXYCODONE HYDROCHLORIDE 5 MG: 5 TABLET ORAL at 03:48

## 2019-05-26 RX ADMIN — POTASSIUM CHLORIDE 40 MEQ: 1500 TABLET, EXTENDED RELEASE ORAL at 10:40

## 2019-05-26 RX ADMIN — VANCOMYCIN HYDROCHLORIDE 1500 MG: 100 INJECTION, POWDER, LYOPHILIZED, FOR SOLUTION INTRAVENOUS at 08:10

## 2019-05-26 RX ADMIN — POLYETHYLENE GLYCOL 3350 1 PACKET: 17 POWDER, FOR SOLUTION ORAL at 20:30

## 2019-05-26 RX ADMIN — DEXAMETHASONE SODIUM PHOSPHATE 4 MG: 4 INJECTION, SOLUTION INTRA-ARTICULAR; INTRALESIONAL; INTRAMUSCULAR; INTRAVENOUS; SOFT TISSUE at 16:53

## 2019-05-26 RX ADMIN — CEFEPIME 2 G: 2 INJECTION, POWDER, FOR SOLUTION INTRAVENOUS at 05:24

## 2019-05-26 RX ADMIN — OXYCODONE HYDROCHLORIDE 5 MG: 5 TABLET ORAL at 08:28

## 2019-05-26 RX ADMIN — MAGNESIUM HYDROXIDE 30 ML: 400 SUSPENSION ORAL at 16:34

## 2019-05-26 RX ADMIN — SENNOSIDES,DOCUSATE SODIUM 1 TABLET: 8.6; 5 TABLET, FILM COATED ORAL at 21:00

## 2019-05-26 RX ADMIN — ACETAMINOPHEN 1000 MG: 500 TABLET ORAL at 05:24

## 2019-05-26 RX ADMIN — ACETAMINOPHEN 1000 MG: 500 TABLET ORAL at 00:02

## 2019-05-26 RX ADMIN — OXYCODONE HYDROCHLORIDE 5 MG: 5 TABLET ORAL at 13:38

## 2019-05-26 RX ADMIN — CEFEPIME 2 G: 2 INJECTION, POWDER, FOR SOLUTION INTRAVENOUS at 13:40

## 2019-05-26 RX ADMIN — DOCUSATE SODIUM 100 MG: 50 LIQUID ORAL at 05:24

## 2019-05-26 RX ADMIN — DEXAMETHASONE SODIUM PHOSPHATE 4 MG: 4 INJECTION, SOLUTION INTRA-ARTICULAR; INTRALESIONAL; INTRAMUSCULAR; INTRAVENOUS; SOFT TISSUE at 05:25

## 2019-05-26 RX ADMIN — DOCUSATE SODIUM 100 MG: 100 CAPSULE, LIQUID FILLED ORAL at 16:33

## 2019-05-26 ASSESSMENT — ENCOUNTER SYMPTOMS
VOMITING: 0
NAUSEA: 1
FOCAL WEAKNESS: 0
PALPITATIONS: 0
SORE THROAT: 0
SPUTUM PRODUCTION: 0
BACK PAIN: 0
SHORTNESS OF BREATH: 0
FEVER: 0
NAUSEA: 0
COUGH: 0
HEADACHES: 1
CHILLS: 0
ABDOMINAL PAIN: 0

## 2019-05-26 ASSESSMENT — PATIENT HEALTH QUESTIONNAIRE - PHQ9
2. FEELING DOWN, DEPRESSED, IRRITABLE, OR HOPELESS: NOT AT ALL
1. LITTLE INTEREST OR PLEASURE IN DOING THINGS: NOT AT ALL
SUM OF ALL RESPONSES TO PHQ9 QUESTIONS 1 AND 2: 0

## 2019-05-26 NOTE — PROGRESS NOTES
2 RN Skin Check Completed with GRACIELA Donato.       Problems Noted:  1) Left temporal crani site.  Clean, dry, intact.  LUIS drain in place.     No evidence of pressure injury.    Pt. Incontinent of urine every 30 minutes to 1 hour.  Pt. Unable to keep condom catheter on, unable to use urinal with or without assistance and will try to get out of bed when urinating.  Briefs ordered due to frequent soaking on linens.  Skin was assessed prior to placement to ensure briefs would not cause skin breakdown.     Interventions: Q2hr turns, educated about pressure injury prevention, nutrition consult in place.     Samina Kate R.N.

## 2019-05-26 NOTE — PROGRESS NOTES
Neurosurgery Progress Note    Subjective:  Patient extubated.  Has variable behavior, sometimes cooperative, sometimes combative    Exam:  Perrl  Moves all extremities with mild right hemiparesis  Follows commands with some encouragement  Incision well healed.  Drain in place.       Pulse  Av.3  Min: 43  Max: 72  Resp  Av.2  Min: 11  Max: 57  Temp  Av.9 °C (98.4 °F)  Min: 36.2 °C (97.2 °F)  Max: 37.2 °C (98.9 °F)  SpO2  Av.3 %  Min: 90 %  Max: 98 %    No Data Recorded    Recent Labs      19   0450  19   0426  19   0405   WBC  14.6*  12.6*  12.2*   RBC  5.01  4.95  4.56*   HEMOGLOBIN  11.4*  11.2*  10.4*   HEMATOCRIT  39.2*  38.9*  35.8*   MCV  77.8*  78.6*  78.5*   MCH  23.0*  22.6*  22.8*   MCHC  29.5*  28.8*  29.1*   RDW  52.9*  53.3*  53.2*   PLATELETCT  329  313  294   MPV  10.4  10.8  10.1     Recent Labs      19   0429  19   1218  19   0405   SODIUM  147*  148*  144   POTASSIUM  3.9  3.6  3.8   CHLORIDE  116*  117*  113*   CO2  23  23  24   GLUCOSE  151*  135*  91   BUN  30*  31*  27*   CREATININE  0.91  0.79  0.68   CALCIUM  8.6  8.1*  7.7*     Recent Labs      19   0429  19   0405   INR  1.23*  1.29*           Intake/Output       19 07 - 19 0659 19 07 - 19 0659       Total  Total       Intake    P.O.  --  100 100  370  -- 370    P.O. -- 100 100 370 -- 370    I.V.  1345.1  1200 2545.1  350  -- 350    Magnesium Sulfate Volume -- -- -- 50 -- 50    Precedex Volume 80.9 -- 80.9 -- -- --    Volume (mL) (3% sodium chloride (HYPERTONIC SALINE) 500mL infusion 500 mL) 64.2 -- 64.2 -- -- --    Volume (mL) (NS infusion) 1200 1200 2400 300 -- 300    IV Piggyback  1000  2200 3200  800  450 1250    Volume (mL) (vancomycin 1,500 mg in  mL IVPB) -- -- -- -- 250 250    Volume (mL) (levETIRAcetam (KEPPRA) 500 mg in  mL IVPB) -- 600 600 -- -- --    Volume (mL) (metroNIDAZOLE (FLAGYL)  IVPB 500 mg) -- 600 600 100 100 200    Volume (mL) (cefepime (MAXIPIME) 2 g in  mL IVPB) -- 500 500 200 100 300    Volume (mL) (vancomycin 1,600 mg in  mL IVPB) 2622 384 9721 500 -- 500    Total Intake 2345.1 3500 5845.1 7882 549 1733       Output    Urine  220  -- 220  200  -- 200    Number of Times Voided -- 7 x 7 x 1 x -- 1 x    Number of Times Incontinent of Urine 1 x 7 x 8 x 2 x -- 2 x    Urine Void (mL) -- -- -- 200 -- 200    Output (mL) ([REMOVED] Urethral Catheter Latex;Temperature probe) 220 -- 220 -- -- --    Drains  35  25 60  25  -- 25    Output (mL) (Closed/Suction Drain 1 Left Scalp Yariel Frias 10 Fr.) 35 25 60 25 -- 25    Stool  --  -- --  --  -- --    Number of Times Stooled -- 0 x 0 x -- 0 x 0 x    Total Output 255 25 280 225 -- 225       Net I/O     2090.1 3475 5565.1 0292 274 7774            Intake/Output Summary (Last 24 hours) at 05/25/19 2129  Last data filed at 05/25/19 1941   Gross per 24 hour   Intake             4070 ml   Output              250 ml   Net             3820 ml            • Influenza Vaccine Quad pf  0.5 mL Once   • vancomycin  1,500 mg Q8HR   • haloperidol lactate  1-5 mg Q4HRS PRN   • LORazepam  0.5-2 mg Q4HRS PRN   • insulin regular  1-6 Units 4X/DAY ACHS    And   • dextrose 10% bolus  125 mL Q15 MIN PRN   • [START ON 5/26/2019] dexamethasone  4 mg TID    Followed by   • [START ON 5/28/2019] dexamethasone  4 mg BID    Followed by   • [START ON 5/30/2019] dexamethasone  2 mg BID    Followed by   • [START ON 6/1/2019] dexamethasone  2 mg DAILY   • metroNIDAZOLE (FLAGYL) IV  500 mg Q8HRS   • Respiratory Care per Protocol   Continuous RT   • MD Alert...Adult ICU Electrolyte Replacement per Pharmacy   PHARMACY TO DOSE   • Pharmacy   PHARMACY TO DOSE   • fentaNYL  25 mcg Q HOUR PRN    Or   • fentaNYL  50 mcg Q HOUR PRN    Or   • fentaNYL  100 mcg Q HOUR PRN   • MD Alert...Vancomycin per Pharmacy   PHARMACY TO DOSE   • acetaminophen  1,000 mg Q6HRS   • docusate sodium  100mg/10mL  100 mg BID   • senna-docusate  1 Tab Nightly   • cloNIDine  0.1 mg Q4HRS PRN   • magnesium hydroxide  30 mL QDAY PRN   • oxyCODONE immediate-release  2.5 mg Q3HRS PRN   • polyethylene glycol/lytes  1 Packet BID PRN   • senna-docusate  1 Tab Q24HRS PRN   • cefepime  2 g Q8HRS   • Pharmacy Consult Request  1 Each PHARMACY TO DOSE   • oxyCODONE immediate-release  5 mg Q3HRS PRN   • MD ALERT...DO NOT ADMINISTER NSAIDS or ASPIRIN unless ORDERED By Neurosurgery  1 Each PRN   • ondansetron  4 mg Q4HRS PRN   • scopolamine  1 Patch Q72HRS PRN   • labetalol  10 mg Q HOUR PRN   • hydrALAZINE  10 mg Q HOUR PRN   • levETIRAcetam (KEPPRA) IV  500 mg Q12HRS   • bisacodyl  10 mg Q24HRS PRN   • fleet  1 Each Once PRN       Assessment and Plan:  Hospital day #4  POD #4  Prophylactic anticoagulation:no         Start date/time; tbd    Patient weaned from vent and is stable neurologically.   He will follow and has a mild right hemiparesis.   Pathology pending.   Cultures neg at 72 hours.

## 2019-05-26 NOTE — PROGRESS NOTES
"0630: 10ml Output from LUIS in 8 hours.  See LIP order to remove drain.  When attempting to remove, met resistance and severe pain for pt.  Neuro surgery paged.  5145: Page returned by Erickson Perez.  Informed to \"notify team during rounds and that he wound not be rushing in to remove the drain\".  Bulb left deflated, will continue to monitor.    Samina Kate R.N.  "

## 2019-05-26 NOTE — PROGRESS NOTES
Neurosurgery Progress Note    Subjective:  Patient has become more appropriate today.  He is more talkative and follows commands.   Drain was difficult to pull by the nursing staff, but was removed with out difficulty- but painful for patient      Exam:  Alert  Perrl  Speech clearer today  Follow commands x 4 with no drift this am.    Pulse  Av.4  Min: 43  Max: 65  Resp  Av.3  Min: 11  Max: 57  Temp  Av.8 °C (98.3 °F)  Min: 36.2 °C (97.2 °F)  Max: 37.2 °C (98.9 °F)  SpO2  Av.3 %  Min: 86 %  Max: 98 %    No Data Recorded    Recent Labs      19   0426  19   0405  19   0215   WBC  12.6*  12.2*  9.7   RBC  4.95  4.56*  5.00   HEMOGLOBIN  11.2*  10.4*  11.7*   HEMATOCRIT  38.9*  35.8*  37.7*   MCV  78.6*  78.5*  75.4*   MCH  22.6*  22.8*  23.4*   MCHC  28.8*  29.1*  31.0*   RDW  53.3*  53.2*  49.6   PLATELETCT  313  294  279   MPV  10.8  10.1  10.3     Recent Labs      19   1218  19   0405  19   0215   SODIUM  148*  144  137   POTASSIUM  3.6  3.8  3.7   CHLORIDE  117*  113*  105   CO2  23  24  27   GLUCOSE  135*  91  113*   BUN  31*  27*  19   CREATININE  0.79  0.68  0.63   CALCIUM  8.1*  7.7*  8.1*     Recent Labs      19   0429  19   0405   INR  1.23*  1.29*           Intake/Output       19 07 - 1959 19 - 19 Total  Total       Intake    P.O.  370  50 420  --  -- --    P.O. 370 50 420 -- -- --    I.V.  350  -- 350  --  -- --    Magnesium Sulfate Volume 50 -- 50 -- -- --    Volume (mL) (NS infusion) 300 -- 300 -- -- --    IV Piggyback  800  1100 1900  --  -- --    Volume (mL) (vancomycin 1,500 mg in  mL IVPB) -- 500 500 -- -- --    Volume (mL) (levETIRAcetam (KEPPRA) 500 mg in  mL IVPB) -- 200 200 -- -- --    Volume (mL) (metroNIDAZOLE (FLAGYL) IVPB 500 mg) 100 200 300 -- -- --    Volume (mL) (cefepime (MAXIPIME) 2 g in  mL IVPB) 200 200 400 -- -- --     Volume (mL) (vancomycin 1,600 mg in  mL IVPB) 500 -- 500 -- -- --    Total Intake 1520 1150 2670 -- -- --       Output    Urine  200  -- 200  --  -- --    Number of Times Voided 1 x 9 x 10 x -- -- --    Number of Times Incontinent of Urine 2 x 9 x 11 x -- -- --    Urine Void (mL) 200 -- 200 -- -- --    Drains  25  40 65  --  -- --    Output (mL) (Closed/Suction Drain 1 Left Scalp Yariel Frias 10 Fr.) 25 40 65 -- -- --    Stool  --  -- --  --  -- --    Number of Times Stooled -- 0 x 0 x -- -- --    Total Output 225 40 265 -- -- --       Net I/O     1295 1110 2405 -- -- --            Intake/Output Summary (Last 24 hours) at 05/26/19 0957  Last data filed at 05/26/19 0624   Gross per 24 hour   Intake             2170 ml   Output              265 ml   Net             1905 ml            • potassium chloride SA  40 mEq Once   • polyethylene glycol/lytes  1 Packet BID   • Influenza Vaccine Quad pf  0.5 mL Once   • vancomycin  1,500 mg Q8HR   • haloperidol lactate  1-5 mg Q4HRS PRN   • LORazepam  0.5-2 mg Q4HRS PRN   • insulin regular  1-6 Units 4X/DAY ACHS    And   • dextrose 10% bolus  125 mL Q15 MIN PRN   • dexamethasone  4 mg TID    Followed by   • [START ON 5/28/2019] dexamethasone  4 mg BID    Followed by   • [START ON 5/30/2019] dexamethasone  2 mg BID    Followed by   • [START ON 6/1/2019] dexamethasone  2 mg DAILY   • metroNIDAZOLE (FLAGYL) IV  500 mg Q8HRS   • Respiratory Care per Protocol   Continuous RT   • MD Alert...Adult ICU Electrolyte Replacement per Pharmacy   PHARMACY TO DOSE   • Pharmacy   PHARMACY TO DOSE   • fentaNYL  25 mcg Q HOUR PRN    Or   • fentaNYL  50 mcg Q HOUR PRN    Or   • fentaNYL  100 mcg Q HOUR PRN   • MD Alert...Vancomycin per Pharmacy   PHARMACY TO DOSE   • acetaminophen  1,000 mg Q6HRS   • docusate sodium 100mg/10mL  100 mg BID   • senna-docusate  1 Tab Nightly   • cloNIDine  0.1 mg Q4HRS PRN   • magnesium hydroxide  30 mL QDAY PRN   • oxyCODONE immediate-release  2.5 mg Q3HRS  PRN   • senna-docusate  1 Tab Q24HRS PRN   • cefepime  2 g Q8HRS   • Pharmacy Consult Request  1 Each PHARMACY TO DOSE   • oxyCODONE immediate-release  5 mg Q3HRS PRN   • MD ALERT...DO NOT ADMINISTER NSAIDS or ASPIRIN unless ORDERED By Neurosurgery  1 Each PRN   • ondansetron  4 mg Q4HRS PRN   • scopolamine  1 Patch Q72HRS PRN   • labetalol  10 mg Q HOUR PRN   • hydrALAZINE  10 mg Q HOUR PRN   • levETIRAcetam (KEPPRA) IV  500 mg Q12HRS   • bisacodyl  10 mg Q24HRS PRN   • fleet  1 Each Once PRN       Assessment and Plan:  Hospital day #5  POD #5  Prophylactic anticoagulation: no    Start date/time: tbd by Dr. Young    Patient has improved over the last day.   He is more responsive and appropriate.   Strength on the right appears improved.     Drain out.   Path pending.  Cultures appear neg so far.

## 2019-05-26 NOTE — PROGRESS NOTES
Infectious Disease Progress Note    Author: Coreen Martinez M.D. Date & Time of service: 2019  9:42 AM    Chief Complaint:  Brain mass       Interval History:  53 y.o. male  admitted 2019 for AMS due to above. +meth abuse.  Recently in the ED on May 12 and was diagnosed with acute suppurative otitis media   AF WBC 12.6 extubated this am-pain controlled-decreased strength RUE +delerium   AF improved strength RUE-intermittently combative and cooperative   AF WBC 9.7 drain removed-somnolent this am-per RN strength continues to improve  Labs Reviewed, Medications Reviewed, Radiology Reviewed and Wound Reviewed.    Review of Systems:  Review of Systems   Constitutional: Negative for chills and fever.   Respiratory: Negative for cough and shortness of breath.    Cardiovascular: Negative for chest pain.   Gastrointestinal: Negative for nausea.   Skin: Negative for rash.   Neurological: Positive for headaches.   All other systems reviewed and are negative.      Hemodynamics:  Temp (24hrs), Av.8 °C (98.3 °F), Min:36.2 °C (97.2 °F), Max:37.2 °C (98.9 °F)  Temperature: 37 °C (98.6 °F)  Pulse  Av.3  Min: 40  Max: 98Heart Rate (Monitored): (!) 46  NIBP: 131/70       Physical Exam:  Physical Exam   Constitutional: No distress.   HENT:   Left craniotomy site with staples   +edema inferior to site no change   Eyes: Right eye exhibits no discharge. Left eye exhibits no discharge.   Neck: No JVD present.   Cardiovascular:   bradycardic   Pulmonary/Chest: Effort normal. No stridor. No respiratory distress.   Abdominal: Soft. He exhibits no distension. There is no tenderness.   Musculoskeletal: He exhibits edema.   Lymphadenopathy:     He has no cervical adenopathy.   Neurological:   somnolent   Skin: Skin is warm. No rash noted. He is not diaphoretic.   Nursing note and vitals reviewed.      Meds:    Current Facility-Administered Medications:   •  potassium chloride SA  •  polyethylene  glycol/lytes  •  Influenza Vaccine Quad pf  •  vancomycin  •  haloperidol lactate  •  LORazepam  •  insulin regular **AND** Accu-Chek Q6 if NPO **AND** NOTIFY MD and PharmD **AND** [DISCONTINUED] glucose **AND** dextrose 10% bolus  •  [COMPLETED] dexamethasone **FOLLOWED BY** [COMPLETED] dexamethasone **FOLLOWED BY** dexamethasone **FOLLOWED BY** [START ON 5/28/2019] dexamethasone **FOLLOWED BY** [START ON 5/30/2019] dexamethasone **FOLLOWED BY** [START ON 6/1/2019] dexamethasone  •  metroNIDAZOLE (FLAGYL) IV  •  Respiratory Care per Protocol  •  MD Alert...Adult ICU Electrolyte Replacement per Pharmacy  •  Pharmacy  •  fentaNYL **OR** fentaNYL **OR** fentaNYL  •  MD Alert...Vancomycin per Pharmacy  •  acetaminophen  •  docusate sodium 100mg/10mL  •  senna-docusate  •  cloNIDine  •  magnesium hydroxide  •  oxyCODONE immediate-release  •  senna-docusate  •  cefepime  •  Pharmacy Consult Request  •  oxyCODONE immediate-release  •  MD ALERT...DO NOT ADMINISTER NSAIDS or ASPIRIN unless ORDERED By Neurosurgery  •  ondansetron  •  scopolamine  •  labetalol  •  hydrALAZINE  •  levETIRAcetam (KEPPRA) IV  •  bisacodyl  •  fleet    Labs:  Recent Labs      05/24/19 0426 05/25/19 0405 05/26/19 0215   WBC  12.6*  12.2*  9.7   RBC  4.95  4.56*  5.00   HEMOGLOBIN  11.2*  10.4*  11.7*   HEMATOCRIT  38.9*  35.8*  37.7*   MCV  78.6*  78.5*  75.4*   MCH  22.6*  22.8*  23.4*   RDW  53.3*  53.2*  49.6   PLATELETCT  313  294  279   MPV  10.8  10.1  10.3     Recent Labs      05/24/19 1218 05/25/19   0405  05/26/19   0215   SODIUM  148*  144  137   POTASSIUM  3.6  3.8  3.7   CHLORIDE  117*  113*  105   CO2  23  24  27   GLUCOSE  135*  91  113*   BUN  31*  27*  19     Recent Labs      05/24/19 1218 05/25/19 0405  05/26/19   0215   CREATININE  0.79  0.68  0.63       Imaging:  Ct-cta Head With & W/o-post Process  1.  No large vessel occlusion, high-grade stenosis or aneurysm of the [place]. 2.  A 4 cm mass lesion in  the left frontotemporal region. Imaging features favor an abscess over a solid lesion. However, MRI is needed for definitive characterization. 3.  Extensive edema associated with the mass, resulting in 10 mm left-to-right midline shift. 4.  Effacement of the left and enlargement of the right lateral ventricles may be due to developing hydrocephalus. Findings were discussed with JOSE ALTAMIRANO on 5/21/2019 8:39 PM.    Ct-cta Neck With & W/o-post Processing  Result Date: 5/21/2019 5/21/2019 8:06 PM HISTORY/REASON FOR EXAM:  SAH suspected, initial exam TECHNIQUE/EXAM DESCRIPTION: CT angiogram of the neck with contrast. Postcontrast images were obtained of the neck from the great vessels through the skull base following the power injection of nonionic contrast at 5.0 mL/sec. Thin-section helical images were obtained with overlapping reconstruction interval. Coronal and oblique multiplanar volume reformats were generated. Cervical internal carotid artery percent stenosis is calculated using the standard method according to the NASCET criteria wherein a segment of uniform caliber mid or distal cervical internal carotid is used as the reference denominator. 3D angiographic images were reviewed on PACS.  Maximum intensity projection (MIP) images were generated and reviewed 100 mL of Omnipaque 350 nonionic contrast was injected intravenously. Low dose optimization technique was utilized for this CT exam including automated exposure control and adjustment of the mA and/or kV according to patient size. COMPARISON:  None. FINDINGS: Left-sided aortic arch with patent great vessel origins. The right common carotid artery, cervical carotid bifurcation, and cervical internal carotid artery show no significant stenosis. There is no evidence of dissection or aneurysm. The left common carotid artery, cervical carotid bifurcation, and cervical internal carotid artery show no significant stenosis. There is no evidence of dissection or  aneurysm. The cervical vertebral arteries are patent bilaterally. Prominent right paratracheal node measuring 1 cm short axis, statistically reactive. Otherwise, the neck soft tissues and lung apices in the field of view are unremarkable. 3D angiographic/MIP images of the vasculature confirm the vascular findings as described above.     Unremarkable CT angiogram of the neck. No high-grade stenosis, large vessel occlusion, aneurysm or dissection.    Dx-chest-2 Views  Result Date: 5/12/2019 5/12/2019 2:28 AM HISTORY/REASON FOR EXAM:  Cough TECHNIQUE/EXAM DESCRIPTION AND NUMBER OF VIEWS: Two views of the chest. COMPARISON:  None. FINDINGS: LUNGS: Linear atelectasis in the left midlung. No focal consolidation. No effusions. PNEUMOTHORAX: None. LINES AND TUBES: None. MEDIASTINUM: No cardiomegaly. Atherosclerosis. MUSCULOSKELETAL STRUCTURES: No acute fracture. Hiatal hernia.   1.  No focal consolidation or pleural effusions. 2.  Hiatal hernia.    Dx-chest-portable (1 View)  Result Date: 5/24/2019 5/24/2019 1:08 AM HISTORY/REASON FOR EXAM:  For indication of respiratory failure. TECHNIQUE/EXAM DESCRIPTION AND NUMBER OF VIEWS: Single portable view of the chest. COMPARISON: Yesterday FINDINGS: Endotracheal tube, NG tube and right IJ central line mean in place. There is bibasilar atelectasis or consolidation. There are probable trace effusions. No pneumothorax. Lung aeration appears slightly improved.   Slight improvement in lung aeration.      Mr-stealth Brain With & W/o  Result Date: 5/22/2019  COMPARISON:  CTA of the head 5/21/2019 FINDINGS: No fiducial markers are appreciated. The calvaria are unremarkable. There are no extra-axial fluid collections. The left temporal lobe, there is a thick-walled ring-enhancing mass which measures about 47 mm x 32 mm x 43 mm. Central hypoenhancement may represent tumor necrosis or cystic change. There is marked surrounding vasogenic edema extending into the basal ganglia, subinsular  white matter, internal capsule, corona radiata, and left frontal deep white matter. There is effacement of the left lateral ventricle and moderate dilatation of the right lateral ventricle including the temporal horn due to trapping. There is left-to-right shift of midline structures of about 11 mm (T2 axial image 54, series 3). There are no other enhancing lesions. The brainstem and posterior fossa structures are unremarkable for marked compression of the midbrain with flattening of the left cerebral peduncle and effacement of perimesencephalic cisterns. There is some vasogenic edema extending down into the left side of the midbrain as well as the left thalamus and subthalamic nucleus. The major vessels including the dural venous sinuses appear intact. Paranasal sinuses show moderate mucosal thickening with small air-fluid/debris levels in the maxillary antra. Moderate mucosal thickening in the sphenoid sinus and among the ethmoid air cells. Minimal mucosal thickening in the frontal air cells.     1.  Large peripherally enhancing intra-axial mass in the left temporal lobe with marked surrounding vasogenic edema. Findings are most consistent with high-grade primary brain tumor such as glioblastoma. No other enhancing lesions elsewhere to suggest metastasis, however, a solitary brain metastasis would not be excluded. Brain abscess would not be excluded. There are no diffusion weighted images to determine whether contents of this lesion show restricted diffusion. There are no prior MRI scans for comparison. 2.  Marked left-to-right shift of midline structures along with midbrain compression and displacement.      Micro:  Results     Procedure Component Value Units Date/Time    CULTURE WOUND W/ GRAM STAIN [[id number]] Collected:  05/22/19 1810    Order Status:  Completed Specimen:  Wound Updated:  05/25/19 1052     Significant Indicator NEG     Source WND     Site Brain Abscess     Culture Result No growth at 72 hours.      Gram Stain Result Rare WBCs.  No organisms seen.      Narrative:       CALL  Watson  RST2 tel. ,  CALLED  RST2 tel.  05/22/2019, 19:14, RB PERF. RESULTS CALLED TO:[id number].  Surgery Specimen    Anaerobic Culture [[id number]] Collected:  05/22/19 1818    Order Status:  Completed Specimen:  Wound Updated:  05/25/19 1056     Significant Indicator NEG     Source WND     Site Brain Abscess     Culture Result Culture in progress.    Narrative:       CALL  Watson  RST2 tel. ,  CALLED  RST2 tel.  05/22/2019, 19:14, RB PERF. RESULTS CALLED TO:[id number].  Surgery Specimen    Acid Fast Stain [[id number]] Collected:  05/22/19 1700    Order Status:  Completed Specimen:  Wound Updated:  05/24/19 2057     Significant Indicator NEG     Source WND     Site BRAIN ABSCESS     AFB Smear Results No acid fast bacilli seen.    Narrative:       Collected By:[id number] PROSHKINA YIFAN  Brain cyst.  Surgery Specimen    AFB Culture [[id number]] Collected:  05/22/19 1700    Order Status:  Completed Specimen:  Wound from Cyst Updated:  05/24/19 2056     Significant Indicator NEG     Source WND     Site BRAIN ABSCESS     Culture Result Culture in progress.     AFB Smear Results No acid fast bacilli seen.    Narrative:       Collected By:[id number] PROSHKINA YIFAN  Brain cyst.  Surgery Specimen    Fungal Culture [[id number]] Collected:  05/22/19 1700    Order Status:  Completed Specimen:  Wound from Cyst Updated:  05/24/19 2056     Significant Indicator NEG     Source WND     Site BRAIN ABSCESS     Culture Result Culture in progress.    Narrative:       Collected By:[id number] PROSHKINA YIFAN  Brain cyst.  Surgery Specimen    GRAM STAIN [[id number]] Collected:  05/22/19 1818    Order Status:  Completed Specimen:  Wound Updated:  05/22/19 1914     Significant Indicator .     Source WND     Site Brain Abscess     Gram Stain Result Rare WBCs.  No organisms seen.      Narrative:       CALL  Watson  RST2 tel. ,  CALLED  RST2 tel.  05/22/2019, 19:14, RB PERF. RESULTS  "CALLED TO:[id number].  Surgery Specimen    FLUID CULTURE W/GRAM STAIN [[id number]]     Order Status:  No result Specimen:  Cyst from Other Body Fluid     BLOOD CULTURE [[id number]] Collected:  05/21/19 2126    Order Status:  Completed Specimen:  Blood from Peripheral Updated:  05/22/19 0833     Significant Indicator NEG     Source BLD     Site PERIPHERAL     Culture Result No Growth  Note: Blood cultures are incubated for 5 days and  are monitored continuously.Positive blood cultures  are called to the RN and reported as soon as  they are identified.      Narrative:       Per Hospital Policy: Only change Specimen Src: to \"Line\" if  specified by physician order.  Left AC    BLOOD CULTURE [[id number]] Collected:  05/21/19 2120    Order Status:  Completed Specimen:  Blood from Peripheral Updated:  05/22/19 0833     Significant Indicator NEG     Source BLD     Site PERIPHERAL     Culture Result No Growth  Note: Blood cultures are incubated for 5 days and  are monitored continuously.Positive blood cultures  are called to the RN and reported as soon as  they are identified.      Narrative:       Per Hospital Policy: Only change Specimen Src: to \"Line\" if  specified by physician order.  Right Wrist          Assessment:  Active Hospital Problems    Diagnosis   • *Brain abscess [G06.0]   • Acute respiratory failure with hypoxia (HCC) [J96.01]   • Delirium [R41.0]   • Methamphetamine abuse (HCC) [F15.10]   • S/P craniotomy [Z98.890]       Plan:  Brain mass   Necrotic tumor vs brain abscess  Afebrile  MRI with 4.7 cm left temporal lobe mass with midline shift  S/p craniotomy 5/22-no op note available for review as yet  Gram stain neg; cxs neg  Path pending-sent to [facility]  Blood cxs neg  HIV neg  Continue vanco/cefepime/flagyl for now  Monitor renal function    Leukocytosis, improved  Multifactorial  On steroids  Monitor    Meth abuse  Will needs monitored setting for IV abx if confirmed abscess    I have performed a physical exam " and reviewed and updated ROS as of today.  In review of yesterday's note dated  5/25/2019, there are no changes except as documented above.

## 2019-05-26 NOTE — PROGRESS NOTES
[facility] Progress Note      Admit Date: 5/21/2019  LOS: 5    Resident(s): Angeline Trinidad   Attending: NETO RAMIREZ/ Dr. Reaves    Date & Time:   5/26/2019   2:14 PM       Patient ID:    Name:             Robert Dorantes     YOB: 1965  Age:                 53 y.o.  male   MRN:               [id number]    Diagnosis:  Principal Problem:    Brain abscess POA: Yes  Active Problems:    S/P craniotomy POA: Yes    Methamphetamine abuse (HCC) POA: Yes    Delirium POA: Unknown    Acute respiratory failure with hypoxia (HCC) POA: Yes    Agitation POA: Yes    Smoker POA: Yes    Class 1 obesity due to excess calories without serious comorbidity with body mass index (BMI) of 33.0 to 33.9 in adult POA: Unknown    Microcytic anemia POA: Unknown  Resolved Problems:    * No resolved hospital problems. *      HPI:  53 y.o male presented to the ER with 90 history of confusion.  Patient was seen in the ED on May 12 and diagnosed with acute supportive otitis media; treated with Ceftin ear for 7 days.  Patient's mother reported that the patient has been complaining of left-sided ear ache, increased drowsiness, increased fatigue and confusion since that time and over the past 2 days he has had difficulty recognizing family members, speech difficulty, and word finding difficulties.  In the ED, CT head showed 4 cm mass lesion in the left frontotemporal lobe with extensive edema and mass-effect as well as a 10 mm shift to the right; imaging favors an abscess.  Neurosurgery was consulted and the patient was admitted to the ICU for further treatment and close monitoring.    Consultants:  Neurosurgery  Infectious disease  PMA    Interval Events:  Less aggression overnight  Some confusion, improved, less pulling behavior  Drain pulled out by neurosurgery this AM  AAOx3  Pathology specimen from brain sent to [place]  Begin to transition meds to PO  Mobilize, IS  D/C accucheck, ISS  No BM since admission, more aggressive bowel  regimen  Transfer to floor    Review of Systems   Unable to perform ROS: Acuity of condition       Physical Exam   Constitutional: He is well-developed, well-nourished, and in no distress. No distress.   HENT:   Right Ear: External ear normal.   Left Ear: External ear normal.   Clean, dry post-op wound with staples left head   Eyes: Pupils are equal, round, and reactive to light.   Neck: No JVD present.   Cardiovascular: Normal rate, regular rhythm and normal heart sounds.  Exam reveals no gallop and no friction rub.    No murmur heard.  Pulmonary/Chest: Effort normal. He has no wheezes. He has no rales.   Abdominal: Soft. There is no tenderness. There is no rebound.   Musculoskeletal: He exhibits no edema.   Lymphadenopathy:     He has no cervical adenopathy.   Neurological:   Follows commands intermittently.   Skin: He is not diaphoretic.     Respiratory:     Respiration: 20, Pulse Oximetry: 96 %    Chest Tube Drains:    Recent Labs      05/24/19   0422   ISTATAPH  7.432   ISTATAPCO2  30.9   ISTATAPO2  101*   ISTATATCO2  22   [id number]  98   ISTATARTHCO3  20.6   ISTATARTBE  -3   ISTATTEMP  36.5 C   ISTATFIO2  30   ISTATSPEC  Arterial   ISTATAPHTC  7.439   [id number]  98*       HemoDynamics:  Pulse: (!) 51, Heart Rate (Monitored): (!) 54 NIBP: 148/82      Fluids:        Intake/Output Summary (Last 24 hours) at 05/23/19 1345  Last data filed at 05/23/19 1200   Gross per 24 hour   Intake          4830.55 ml   Output             2103 ml   Net          2727.55 ml       Weight: 106.5 kg (234 lb 12.6 oz)  Body mass index is 32.75 kg/m².    Recent Labs      05/24/19   0429  05/24/19   1218  05/25/19   0405  05/26/19   0215   SODIUM  147*  148*  144  137   POTASSIUM  3.9  3.6  3.8  3.7   CHLORIDE  116*  117*  113*  105   CO2  23 23 24  27   BUN  30*  31*  27*  19   CREATININE  0.91  0.79  0.68  0.63   MAGNESIUM  2.1   --   1.8   --    PHOSPHORUS  3.7   --   3.1   --    CALCIUM  8.6  8.1*  7.7*  8.1*        GI/Nutrition:  Recent Labs      19   1218  19   0405  19   021   GLUCOSE  135*  91  113*       Heme:  Recent Labs      19   0426  19   0429  19   021   RBC  4.95   --   4.56*  5.00   HEMOGLOBIN  11.2*   --   10.4*  11.7*   HEMATOCRIT  38.9*   --   35.8*  37.7*   PLATELETCT  313   --   294  279   PROTHROMBTM   --   15.6*  16.2*   --    INR   --   1.23*  1.29*   --    IRON   --    --    --   17*   TOTIRONBC   --    --    --   386       Infectious Disease:  Temp  Av.9 °C (98.4 °F)  Min: 36.2 °C (97.2 °F)  Max: 37.2 °C (98.9 °F)  Recent Labs      19   04219   WBC  12.6*  12.2*  9.7       Meds:  • polyethylene glycol/lytes  1 Packet     • levETIRAcetam  500 mg     • acetaminophen  1,000 mg     • docusate sodium  100 mg     • senna-docusate  1 Tab     • cloNIDine  0.1 mg     • magnesium hydroxide  30 mL     • senna-docusate  1 Tab     • vancomycin  1,500 mg Stopped (19 1010)   • haloperidol lactate  1-5 mg     • LORazepam  0.5-2 mg     • dexamethasone  4 mg      Followed by   • [START ON 2019] dexamethasone  4 mg      Followed by   • [START ON 2019] dexamethasone  2 mg      Followed by   • [START ON 2019] dexamethasone  2 mg     • metroNIDAZOLE (FLAGYL) IV  500 mg 500 mg (19 1340)   • Respiratory Care per Protocol       • MD Alert...Adult ICU Electrolyte Replacement per Pharmacy       • MD Alert...Vancomycin per Pharmacy       • oxyCODONE immediate-release  2.5 mg     • cefepime  2 g Stopped (19 1410)   • Pharmacy Consult Request  1 Each     • oxyCODONE immediate-release  5 mg     • MD ALERT...DO NOT ADMINISTER NSAIDS or ASPIRIN unless ORDERED By Neurosurgery  1 Each     • ondansetron  4 mg     • scopolamine  1 Patch     • labetalol  10 mg     • hydrALAZINE  10 mg     • bisacodyl  10 mg     • fleet  1 Each          Imaging:  CT-HEAD W/O   Final Result      1.  No significant change       2.  Large left temporal mass with surrounding white matter edema      3.  Associated mass effect with effacement left lateral ventricle, left-to-right shift, and subfalcine herniation, unchanged      EC-ECHOCARDIOGRAM COMPLETE W/O CONT   Final Result      MR-STEALTH BRAIN WITH & W/O   Final Result      1.  Large peripherally enhancing intra-axial mass in the left temporal lobe with marked surrounding vasogenic edema. Findings are most consistent with high-grade primary brain tumor such as glioblastoma. No other enhancing lesions elsewhere to suggest    metastasis, however, a solitary brain metastasis would not be excluded. Brain abscess would not be excluded. There are no diffusion weighted images to determine whether contents of this lesion show restricted diffusion. There are no prior MRI scans for    comparison.   2.  Marked left-to-right shift of midline structures along with midbrain compression and displacement.      DX-CHEST-PORTABLE (1 VIEW)   Final Result      1.  Well-positioned lines and tubes.   2.  No new consolidation or pleural effusions.      DX-CHEST-PORTABLE (1 VIEW)   Final Result         1. Borderline cardiomegaly. No focal consolidation or pleural effusions.   2. Hiatal hernia.      CT-CTA NECK WITH & W/O-POST PROCESSING   Final Result      Unremarkable CT angiogram of the neck. No high-grade stenosis, large vessel occlusion, aneurysm or dissection.      CT-CTA HEAD WITH & W/O-POST PROCESS   Final Result      1.  No large vessel occlusion, high-grade stenosis or aneurysm of the [place].   2.  A 4 cm mass lesion in the left frontotemporal region. Imaging features favor an abscess over a solid lesion. However, MRI is needed for definitive characterization.   3.  Extensive edema associated with the mass, resulting in 10 mm left-to-right midline shift.   4.  Effacement of the left and enlargement of the right lateral ventricles may be due to developing hydrocephalus.      Findings were  discussed with JOSE ALTAMIRANO on 5/21/2019 8:39 PM.          Procedures:  Intubation 5/22 - 5/24  Craniotomy 5/22  EEG 5/23      Problem and Plan:  * Brain abscess- (present on admission)   Assessment & Plan    Treated for acute suppurative otitis media 9 days ago.  9 days history of increasing left ear pain, confusion, difficulty finding words.  History of meth abuse.  CT scan of head with contrast showed fluid collection most likely abscess over the left frontotemporal area with mass-effect, midline shift and evidence of developing hydrocephalus.  Neurosurgery consulted, recommendations appreciated.  S/p craniotomy 5/22/19  ID consulted, recommendations appreciated.  Continue Cefepime, Vanco, Flagyl  Bacterial culture, fungal culture, and AFB with negative preliminary results.  Pathology sent to [place].     S/P craniotomy- (present on admission)   Assessment & Plan    No complications.  Follows commands off sedation.     Agitation- (present on admission)   Assessment & Plan    Patient intermittently agitated and aggressive towards staff.  PRN fentanyl, ativan, and haldol ordered.     Acute respiratory failure with hypoxia (HCC)- (present on admission)   Assessment & Plan    RESOLVED.  Intubated for airway protection in ED 5/22 - extubated 5/24.  RT/O2 protocol.     Methamphetamine abuse (HCC)- (present on admission)   Assessment & Plan    History of meth abuse per nephew, smoking.  No history of IV use.  Consider patient education and counseling.     Smoker- (present on admission)   Assessment & Plan    Patient confused, per mom smokes about 3 cigarettes/day.  Consider education and counseling.         DISPO: ICU    CODE STATUS: FULL    Quality Measures:  [place] Catheter: yes.  DVT Prophylaxis: SCD.  Ulcer Prophylaxis: Pepsid.  Antibiotics: Cefepime/Vanco/Flagyl  Lines: CVC

## 2019-05-26 NOTE — PROGRESS NOTES
"Pharmacy Kinetics 53 y.o. male on vancomycin day # 6 2019    Currently on Vancomycin 1500 mg iv q8hr (,,)    Indication for Treatment: Brain Abscess    Pertinent history per medical record: Admitted on 2019 for confusion for the past week and a half.  He recently completed treatment with cefdinir for otitis media.  PMH of methamphetamine abuse.  ID and Neurosurgery are consulting. S/p craniotomy.  Pathology specimen collected in OR and sent to [facility].    Other antibiotics: cefepime 2 grams iv q8h, metronidazole 500 mg iv q8h    Allergies: Pcn [penicillins] and Sulfa drugs     List concerns for renal function: obesity (BMI 32.75)    Pertinent cultures to date:   19 brain abscess: no organisms seen  19 blood-peripheral x 2:  NGTD    MRSA nares swab if pneumonia is a concern (ordered/positive/negative/n-a): n-a    Recent Labs      19   0426  19   0405  19   0215   WBC  12.6*  12.2*  9.7     Recent Labs      19   2315  19   0429  19   1218  19   0405  19   0215   BUN  28*  30*  31*  27*  19   CREATININE  0.84  0.91  0.79  0.68  0.63     Recent Labs      19   2315  19   0749   VANCOTROUGH  14.9  20.5*     Intake/Output Summary (Last 24 hours) at 19 1612  Last data filed at 19 1410   Gross per 24 hour   Intake             2020 ml   Output              160 ml   Net             1860 ml      /88   Pulse (!) 51   Temp 37.1 °C (98.8 °F) (Temporal)   Resp 20   Ht 1.803 m (5' 11\")   Wt 106.5 kg (234 lb 12.6 oz)   SpO2 96%  Temp (24hrs), Av.8 °C (98.3 °F), Min:36.2 °C (97.2 °F), Max:37.1 °C (98.8 °F)      A/P   1. Vancomycin dose change: continue current regimen  2. Next vancomycin level: 2-3 days  3. Goal trough: 18-22 mcg/mL  4. Assessment: Renal indices remain stable.  No growth from OR specimen.  Waiting for path report from [facility].    5. Plan:  Continue current regimen.  Follow for antibiotic " de-escalation.    Kailey Melo, Pharm.D., BCPS

## 2019-05-26 NOTE — CARE PLAN
Problem: Safety  Goal: Will remain free from falls  Outcome: PROGRESSING AS EXPECTED      Problem: Infection  Goal: Will remain free from infection    Intervention: Implement standard precautions and perform hand washing before and after patient contact  Standard infection prevention protocols in place.

## 2019-05-26 NOTE — PROGRESS NOTES
Critical Care Progress Note    Date of admission  5/21/2019    Chief Complaint  53 y.o. male admitted 5/21/2019 with altered LOC, possible brain abcess by imaging     Hospital Course    53 y.o. male who presented 5/21/2019 with a past medical history significant for methamphetamine abuse who was seen in the emergency department 9 days ago and prescribed Cefdinir at discharge.  Apparently over the last several days he has had worsening pain, headache and developing confusion and unsteady gait with word finding difficulty.  No reported fevers or chills and he reportedly completed his antibiotics as prescribed.  In the emergency department patient underwent CT imaging of his brain which revealed a left frontotemporal mass favoring abscess over a solid lesion with extensive edema and a 10 mm left to right midline shift with effacement of the lateral ventricle.  Neurosurgery was consulted recommending an MRI of his brain and he was started on IV Keppra as well as Decadron.  Infectious disease was consulted and recommended IV antibiotics including Rocephin, vancomycin, and Flagyl.  Patient is being admitted to the intensive care unit and I was consulted for his critical care management.      Interval Problem Update  Reviewed last 24 hour events:    POD#4 for ABN CCT/MRI felt to be abcess initially but all micro neg - not GBM/Carcinoma by out path - [place] 2nd opinion pending    No PRN sedation - Agitation better  A&O x2  Follows better  SB/SR 40-60  SBp 130s  UO good  1-2 lpm NC O2 - dips with sleep - MONTRELL  IS not done yet  Tm 98.9  WBC 9.7  Vanco/Flagyl/Cefepime  EOB  SCDs  Keppra      Pull  CVC  Transfer  LUIS pulled by NS  D/c FS  Mobilize/IS  Clean MAR  Up bowel care protocols       YESTERDAY  POD #3 for brain abcess  LUIS 35  Follows - GCS 14  More agitated last night  DEX  Weaned off  PRN Haldol x5 and Ativan 1 mg  Extubated yesterday  Weaned to RA    SB/SR 50-60s  SBp 110s  UO good  Tm 99.0  WBC  12.2  CVC  Vanco/Cefepime/Flagyl  Keppra  Decadron taper  Modified diet  Na 144  Muicro still neg  Micro  Path noted again - [place] second opinion pending still    TKO IVF  D/c CXR  Reduce neuro checks  D/c DEX  Keep in ICU    Review of Systems  Review of Systems   Constitutional: Positive for malaise/fatigue. Negative for chills and fever.   HENT: Negative for congestion and sore throat.    Respiratory: Negative for sputum production and shortness of breath.    Cardiovascular: Negative for chest pain and palpitations.   Gastrointestinal: Positive for nausea. Negative for abdominal pain and vomiting.   Genitourinary: Negative for dysuria.   Musculoskeletal: Negative for back pain.   Neurological: Positive for headaches. Negative for focal weakness.   Still unable to do ROS    Vital Signs for last 24 hours   Temp:  [36.8 °C (98.2 °F)-37.1 °C (98.8 °F)] 37.1 °C (98.8 °F)  Pulse:  [44-63] 56  Resp:  [11-31] 20  SpO2:  [86 %-98 %] 96 %    Hemodynamic parameters for last 24 hours       Respiratory Information for the last 24 hours       Physical Exam   Physical Exam   Constitutional: He appears well-developed and well-nourished. He appears lethargic. He is cooperative.  Non-toxic appearance. No distress.   HENT:   Head: Normocephalic.   Mouth/Throat: Oropharynx is clear and moist. Mucous membranes are not dry and not cyanotic.   CNS drain removed, craniotomy scar noted on the left, clean and dry old blood   Eyes: Pupils are equal, round, and reactive to light. EOM are normal. No scleral icterus.   Neck: Phonation normal. Neck supple. Normal carotid pulses and no JVD present. No neck rigidity. No Brudzinski's sign and no Kernig's sign noted.   Cardiovascular: Normal rate, regular rhythm and intact distal pulses.   No extrasystoles are present. Exam reveals no gallop and no friction rub.    No murmur heard.  Pulmonary/Chest: No accessory muscle usage. No respiratory distress. He has no decreased breath sounds. He has no  wheezes. He has no rhonchi. He has no rales.   Abdominal: Soft. Bowel sounds are normal. He exhibits no distension. There is no hepatosplenomegaly. There is no tenderness. There is no rigidity, no guarding and no CVA tenderness.   Obese   Musculoskeletal: He exhibits no edema.   Neurological: He appears lethargic. He displays no tremor. No cranial nerve deficit. He exhibits normal muscle tone. He displays no seizure activity. Coordination normal. GCS eye subscore is 4. GCS verbal subscore is 4. GCS motor subscore is 6.   Cranial nerves intact, lethargy improved, agitation improved, starting to follow commands and answer questions   Skin: Skin is warm. He is not diaphoretic (Resolved). No cyanosis or erythema. Nails show no clubbing.   Psychiatric: His speech is normal. His mood appears anxious. His affect is labile. He is agitated (At times). Cognition and memory are impaired. He expresses impulsivity.   Vitals reviewed.  Repeat PE performed, see above changes    Medications  Current Facility-Administered Medications   Medication Dose Route Frequency Provider Last Rate Last Dose   • polyethylene glycol/lytes (MIRALAX) PACKET 1 Packet  1 Packet Oral BID Amilcar Reaves M.D.   1 Packet at 05/26/19 2030   • levETIRAcetam (KEPPRA) tablet 500 mg  500 mg Oral BID Amilcar Reaves M.D.   500 mg at 05/26/19 1633   • docusate sodium (COLACE) capsule 100 mg  100 mg Oral BID Amilcar Reaves M.D.   100 mg at 05/26/19 1633   • senna-docusate (PERICOLACE or SENOKOT S) 8.6-50 MG per tablet 1 Tab  1 Tab Oral Nightly Amilcar Reaves M.D.   1 Tab at 05/26/19 2100   • cloNIDine (CATAPRES) tablet 0.1 mg  0.1 mg Oral Q4HRS PRN Amilcar Reaves M.D.       • magnesium hydroxide (MILK OF MAGNESIA) suspension 30 mL  30 mL Oral QDAY PRN Amilcar Reaves M.D.   30 mL at 05/26/19 1634   • senna-docusate (PERICOLACE or SENOKOT S) 8.6-50 MG per tablet 1 Tab  1 Tab Oral Q24HRS PRN Amilcar B Richeson, M.D.       • vancomycin 1,500 mg  in  mL IVPB  1,500 mg Intravenous Q8HR Coreen Martinez M.D.   Stopped at 05/26/19 1833   • haloperidol lactate (HALDOL) injection 1-5 mg  1-5 mg Intravenous Q4HRS PRN Angeline Trinidad M.D.   2.5 mg at 05/25/19 1204   • LORazepam (ATIVAN) injection 0.5-2 mg  0.5-2 mg Intravenous Q4HRS PRN Angeline Trinidad M.D.   1 mg at 05/25/19 1026   • dexamethasone (DECADRON) injection 4 mg  4 mg Intravenous TID Catherine Magallon, A.P.R.N.   4 mg at 05/26/19 1653    Followed by   • [START ON 5/28/2019] dexamethasone (DECADRON) injection 4 mg  4 mg Intravenous BID Catherine Magallon, A.P.R.N.        Followed by   • [START ON 5/30/2019] dexamethasone (DECADRON) injection 2 mg  2 mg Intravenous BID Catherine Magallon, A.P.R.N.        Followed by   • [START ON 6/1/2019] dexamethasone (DECADRON) injection 2 mg  2 mg Intravenous DAILY Catherine Magallon, A.P.R.N.       • metroNIDAZOLE (FLAGYL) IVPB 500 mg  500 mg Intravenous Q8HRS Sadiq Guerin M.D.   Stopped at 05/26/19 2200   • Respiratory Care per Protocol   Nebulization Continuous RT Jeremy M Gonda, M.D.       • MD Alert...ICU Electrolyte Replacement per Pharmacy   Other PHARMACY TO DOSE Jeremy M Gonda, M.D.       • MD Alert...Vancomycin per Pharmacy   Other PHARMACY TO DOSE Amilcar Reaves M.D.       • oxyCODONE immediate-release (ROXICODONE) tablet 2.5 mg  2.5 mg Enteral Tube Q3HRS PRN Amilcar Reaves M.D.       • cefepime (MAXIPIME) 2 g in  mL IVPB  2 g Intravenous Q8HRS Deyanira Hines M.D.   Stopped at 05/26/19 2130   • Pharmacy Consult Request ...Pain Management Review 1 Each  1 Each Other PHARMACY TO DOSE Yanelis Austin M.D.       • oxyCODONE immediate-release (ROXICODONE) tablet 5 mg  5 mg Oral Q3HRS PRN Yanelis Austin M.D.   5 mg at 05/26/19 1652   • MD ALERT...DO NOT ADMINISTER NSAIDS or ASPIRIN unless ORDERED By Neurosurgery 1 Each  1 Each Other PRN Yanelis Austin M.D.       • ondansetron (ZOFRAN) syringe/vial injection 4 mg  4 mg  Intravenous Q4HRS PRN Yanelis Austin M.D.       • scopolamine (TRANSDERM-SCOP) patch 1 Patch  1 Patch Transdermal Q72HRS PRN Yanelis Austin M.D.       • labetalol (NORMODYNE,TRANDATE) injection 10 mg  10 mg Intravenous Q HOUR PRN Yanelis Austin M.D.       • hydrALAZINE (APRESOLINE) injection 10 mg  10 mg Intravenous Q HOUR PRN Yanelis Austin M.D.       • bisacodyl (DULCOLAX) suppository 10 mg  10 mg Rectal Q24HRS PRN Yanelis Austin M.D.       • fleet enema 133 mL  1 Each Rectal Once PRN Yanelis Austin M.D.           Fluids    Intake/Output Summary (Last 24 hours) at 05/26/19 2032  Last data filed at 05/26/19 2000   Gross per 24 hour   Intake             2670 ml   Output              360 ml   Net             2310 ml       Laboratory  Recent Labs      05/24/19   0422   ISTATAPH  7.432   ISTATAPCO2  30.9   ISTATAPO2  101*   ISTATATCO2  22   OMHJCMN2WRK  98   ISTATARTHCO3  20.6   ISTATARTBE  -3   ISTATTEMP  36.5 C   ISTATFIO2  30   ISTATSPEC  Arterial   ISTATAPHTC  7.439   [id number]  98*         Recent Labs      05/24/19 0429 05/24/19 1218  05/25/19   0405  05/26/19   0215   SODIUM  147*  148*  144  137   POTASSIUM  3.9  3.6  3.8  3.7   CHLORIDE  116*  117*  113*  105   CO2  23  23  24  27   BUN  30*  31*  27*  19   CREATININE  0.91  0.79  0.68  0.63   MAGNESIUM  2.1   --   1.8   --    PHOSPHORUS  3.7   --   3.1   --    CALCIUM  8.6  8.1*  7.7*  8.1*     Recent Labs      05/24/19   1218  05/25/19   0405  05/26/19   0215   GLUCOSE  135*  91  113*     Recent Labs      05/24/19   0426  05/25/19   0405  05/26/19   0215   WBC  12.6*  12.2*  9.7     Recent Labs      05/24/19   0426  05/24/19   0429  05/25/19   0405  05/26/19   0215   RBC  4.95   --   4.56*  5.00   HEMOGLOBIN  11.2*   --   10.4*  11.7*   HEMATOCRIT  38.9*   --   35.8*  37.7*   PLATELETCT  313   --   294  279   PROTHROMBTM   --   15.6*  16.2*   --    INR   --   1.23*  1.29*   --    IRON   --    --    --   17*   TOTIRONBC   --    --    --    386       Imaging  X-Ray:  I have personally reviewed the images and compared with prior images.  EKG:  I have personally reviewed the images and compared with prior images.  CT:    Reviewed    Assessment/Plan  * Brain abscess- (present on admission)   Assessment & Plan    Presumed based on imaging/craniotomy findings and recent left otitis media  MRI most consistent with abscess versus tumor, reviewed with neurosurgery  Pathology not consistent with carcinoma or glioblastoma, tissue sent off to [facility] for second opinion, still pending  IV antibiotics including Rocephin, Flagyl, vancomycin  Cultures pending, further cultures from CNS surgery 5/22, all still negative times several days  Keppra prophylaxis ongoing, continue/will review duration with NS  Decadron ongoing, de-escalate postop after discussion with neurosurgery, weaning program in the chart  Neurosurgical evaluation ongoing and infectious disease consultation noted  Status post craniotomy p.m. 5/22, drain removed 5/25  Will check HIV and RPR given abscess and meth abuse, ?IVDA, negative  Strict BP control (goal SBP<160) with nicardipine gtt as needed     S/P craniotomy- (present on admission)   Assessment & Plan    Wound site looks good, drain out 5/25  Await pathology and microbiology results, all micro negative so far  Initial path interpretation no GBM and or carcinoma, specimen sent to [facility] for formal second opinion, pending still  Frequent neurochecks ongoing, improving, now every 2 hours  Keep in ICU for another day     Agitation- (present on admission)   Assessment & Plan    Oh secondary to delirium/left temporal lobe CNS infection versus atypical tumor status post Cranio, improved  As needed Ativan/Haldol intravenously, trying to limit  Dexmedetomidine discontinued  Restraint for safety  Keep in the ICU for safety     Acute respiratory failure with hypoxia (HCC)- (present on admission)   Assessment & Plan    Intubated a.m. 5/22  Extubated  5/24  RT protocols  Incentive spirometry  Mobilize/pulmonary toilet  Room air trial okay so far     Delirium   Assessment & Plan    Hyperactive delirium, secondary CNS infection/tumor status post craniotomy  Intubated 5/22 -extubated 5/24  Physical restraints as needed for patient and staff's safety  Off dexmedetomidine, was not super helpful  As needed for safety of patient and staff soft restraints and IV Haldol/lorazepam at low dose  Mobilize if he will allow, patient improving  Limit overstimulation  Family continuing to help to calm this patient  Monitoring for withdrawals, no so far     Methamphetamine abuse (HCC)- (present on admission)   Assessment & Plan    Eventual drug cessation education to be provided once able to converse with patient  Monitor for withdrawal syndrome, no so far  Hepatitis panel, syphilis study and HIV negative, all negative  UDS completely negative-5/22     Microcytic anemia   Assessment & Plan    No clear evidence of GI bleeding  Iron studies  Serial CBC  Stool for occult blood  Transfuse per conservative policy     Class 1 obesity due to excess calories without serious comorbidity with body mass index (BMI) of 33.0 to 33.9 in adult   Assessment & Plan    Behavioral modification and weight loss recommended  Dietary consult/education  MONTRELL?,  Monitor for apnea and nocturnal hypoxemia, airway body habitus put him at risk  Patient having nocturnal desaturations, O2 required, crowded airway, elevated BMI  Outpatient sleep study for probable MONTRELL     Smoker- (present on admission)   Assessment & Plan    Nicotine replacement patch  Tobacco cessation education to be provided eventually  Query COPD, RT protocols ongoing  Mobilize  Incentive spirometry in the hospital  Diagnostic spirometry as an outpatient          VTE:  Contraindicated  Ulcer: H2 Antagonist  Lines: None    I have performed a physical exam and reviewed and updated ROS and Plan today (5/26/2019). In review of yesterday's note  (5/25/2019), there are no changes except as documented above.     All discussed patient condition and risk of morbidity and/or mortality with Family, RN, RT, Pharmacy, [facility] resident and Charge nurse / hot rounds

## 2019-05-26 NOTE — PROGRESS NOTES
[facility] ICU Transfer Note                                                                              Attending: Dr. Reaves  Resident: Dnaiele Lara  Date of admission: 5/21/2019  Date of transferring out from ICU:  05/26/19        Primary Diagnosis:   Brain mass vs abscess  Confusion     Secondary diagnoses:  Acute respiratory failure     HPI and ICU Course Summary (Brief Narrative):  53 year old male presented to the ER with 90 history of confusion.  Patient was seen in the ED on May 12 and diagnosed with acute supportive otitis media. He was treated with Ceftin ear for 7 days.  Patient's mother reported that the patient has been complaining of left-sided ear ache, increased drowsiness, increased fatigue and confusion since that time and over the past 2 days he has had difficulty recognizing family members, speech difficulty, and word finding difficulties.  In the ED, CT head showed 4 cm mass lesion in the left frontotemporal lobe with extensive edema and mass-effect as well as a 10 mm shift to the right; imaging favors an abscess. Patient was admitted to the ICU for further treatment and close monitoring. He was intubated for a short time from 5/22-5/24 for airway protection. Neurosurgery was consulted and patient was taken to the OR for a craniotomy with brain mass resection on 5/22 woth pathology results pending. The pathology results have been sent to [facility] for aid in diagnosis. ID was consulted and patient in on vanc, cefepime and flagyl per ID recommendations.         Important Events in the ICU:   Intubation 5/22 - 5/24  Craniotomy 5/22  EEG 5/23       Things to follow:   - Neurosurgery recommendations  - Speech therapy, PT/OT recommedations

## 2019-05-27 ENCOUNTER — APPOINTMENT (OUTPATIENT)
Dept: RADIOLOGY | Facility: MEDICAL CENTER | Age: 54
DRG: 025 | End: 2019-05-27
Attending: STUDENT IN AN ORGANIZED HEALTH CARE EDUCATION/TRAINING PROGRAM
Payer: MEDICAID

## 2019-05-27 ENCOUNTER — APPOINTMENT (OUTPATIENT)
Dept: RADIOLOGY | Facility: MEDICAL CENTER | Age: 54
DRG: 025 | End: 2019-05-27
Attending: INTERNAL MEDICINE
Payer: MEDICAID

## 2019-05-27 LAB
ALBUMIN SERPL BCP-MCNC: 3.4 G/DL (ref 3.2–4.9)
ALBUMIN/GLOB SERPL: 1.5 G/DL
ALP SERPL-CCNC: 35 U/L (ref 30–99)
ALT SERPL-CCNC: 30 U/L (ref 2–50)
ANION GAP SERPL CALC-SCNC: 6 MMOL/L (ref 0–11.9)
AST SERPL-CCNC: 20 U/L (ref 12–45)
BACTERIA BLD CULT: NORMAL
BACTERIA BLD CULT: NORMAL
BACTERIA SPEC ANAEROBE CULT: NORMAL
BILIRUB SERPL-MCNC: 0.8 MG/DL (ref 0.1–1.5)
BUN SERPL-MCNC: 19 MG/DL (ref 8–22)
CALCIUM SERPL-MCNC: 8.2 MG/DL (ref 8.5–10.5)
CHLORIDE SERPL-SCNC: 104 MMOL/L (ref 96–112)
CO2 SERPL-SCNC: 26 MMOL/L (ref 20–33)
CREAT SERPL-MCNC: 0.57 MG/DL (ref 0.5–1.4)
ERYTHROCYTE [DISTWIDTH] IN BLOOD BY AUTOMATED COUNT: 48.1 FL (ref 35.9–50)
GLOBULIN SER CALC-MCNC: 2.2 G/DL (ref 1.9–3.5)
GLUCOSE SERPL-MCNC: 102 MG/DL (ref 65–99)
HCT VFR BLD AUTO: 39.3 % (ref 42–52)
HEMOCCULT STL QL: NEGATIVE
HGB BLD-MCNC: 12 G/DL (ref 14–18)
MCH RBC QN AUTO: 22.9 PG (ref 27–33)
MCHC RBC AUTO-ENTMCNC: 30.5 G/DL (ref 33.7–35.3)
MCV RBC AUTO: 75 FL (ref 81.4–97.8)
PLATELET # BLD AUTO: 279 K/UL (ref 164–446)
PMV BLD AUTO: 11 FL (ref 9–12.9)
POTASSIUM SERPL-SCNC: 3.7 MMOL/L (ref 3.6–5.5)
PROT SERPL-MCNC: 5.6 G/DL (ref 6–8.2)
RBC # BLD AUTO: 5.24 M/UL (ref 4.7–6.1)
SIGNIFICANT IND 70042: NORMAL
SITE SITE: NORMAL
SODIUM SERPL-SCNC: 136 MMOL/L (ref 135–145)
SOURCE SOURCE: NORMAL
WBC # BLD AUTO: 11.7 K/UL (ref 4.8–10.8)

## 2019-05-27 PROCEDURE — A9270 NON-COVERED ITEM OR SERVICE: HCPCS | Performed by: STUDENT IN AN ORGANIZED HEALTH CARE EDUCATION/TRAINING PROGRAM

## 2019-05-27 PROCEDURE — A9270 NON-COVERED ITEM OR SERVICE: HCPCS | Performed by: NEUROLOGICAL SURGERY

## 2019-05-27 PROCEDURE — 80053 COMPREHEN METABOLIC PANEL: CPT

## 2019-05-27 PROCEDURE — 700102 HCHG RX REV CODE 250 W/ 637 OVERRIDE(OP): Performed by: STUDENT IN AN ORGANIZED HEALTH CARE EDUCATION/TRAINING PROGRAM

## 2019-05-27 PROCEDURE — 700117 HCHG RX CONTRAST REV CODE 255: Performed by: INTERNAL MEDICINE

## 2019-05-27 PROCEDURE — 71260 CT THORAX DX C+: CPT

## 2019-05-27 PROCEDURE — 99232 SBSQ HOSP IP/OBS MODERATE 35: CPT | Performed by: INTERNAL MEDICINE

## 2019-05-27 PROCEDURE — 700101 HCHG RX REV CODE 250: Performed by: STUDENT IN AN ORGANIZED HEALTH CARE EDUCATION/TRAINING PROGRAM

## 2019-05-27 PROCEDURE — 82272 OCCULT BLD FECES 1-3 TESTS: CPT

## 2019-05-27 PROCEDURE — A9270 NON-COVERED ITEM OR SERVICE: HCPCS | Performed by: INTERNAL MEDICINE

## 2019-05-27 PROCEDURE — 700105 HCHG RX REV CODE 258: Performed by: INTERNAL MEDICINE

## 2019-05-27 PROCEDURE — 700102 HCHG RX REV CODE 250 W/ 637 OVERRIDE(OP): Performed by: NEUROLOGICAL SURGERY

## 2019-05-27 PROCEDURE — 92526 ORAL FUNCTION THERAPY: CPT

## 2019-05-27 PROCEDURE — 99233 SBSQ HOSP IP/OBS HIGH 50: CPT | Mod: GC | Performed by: INTERNAL MEDICINE

## 2019-05-27 PROCEDURE — 85027 COMPLETE CBC AUTOMATED: CPT

## 2019-05-27 PROCEDURE — 97166 OT EVAL MOD COMPLEX 45 MIN: CPT

## 2019-05-27 PROCEDURE — 700111 HCHG RX REV CODE 636 W/ 250 OVERRIDE (IP): Performed by: INTERNAL MEDICINE

## 2019-05-27 PROCEDURE — 700102 HCHG RX REV CODE 250 W/ 637 OVERRIDE(OP): Performed by: INTERNAL MEDICINE

## 2019-05-27 PROCEDURE — 97161 PT EVAL LOW COMPLEX 20 MIN: CPT

## 2019-05-27 PROCEDURE — 700105 HCHG RX REV CODE 258

## 2019-05-27 PROCEDURE — 770001 HCHG ROOM/CARE - MED/SURG/GYN PRIV*

## 2019-05-27 PROCEDURE — 700111 HCHG RX REV CODE 636 W/ 250 OVERRIDE (IP): Performed by: NURSE PRACTITIONER

## 2019-05-27 RX ORDER — POTASSIUM CHLORIDE 20 MEQ/1
40 TABLET, EXTENDED RELEASE ORAL ONCE
Status: COMPLETED | OUTPATIENT
Start: 2019-05-27 | End: 2019-05-27

## 2019-05-27 RX ORDER — SODIUM CHLORIDE 9 MG/ML
INJECTION, SOLUTION INTRAVENOUS
Status: COMPLETED
Start: 2019-05-27 | End: 2019-05-27

## 2019-05-27 RX ORDER — ACETAMINOPHEN 325 MG/1
650 TABLET ORAL EVERY 8 HOURS PRN
Status: DISCONTINUED | OUTPATIENT
Start: 2019-05-27 | End: 2019-06-20

## 2019-05-27 RX ADMIN — SENNOSIDES,DOCUSATE SODIUM 1 TABLET: 8.6; 5 TABLET, FILM COATED ORAL at 21:00

## 2019-05-27 RX ADMIN — LEVETIRACETAM 500 MG: 500 TABLET ORAL at 05:00

## 2019-05-27 RX ADMIN — LEVETIRACETAM 500 MG: 500 TABLET ORAL at 18:03

## 2019-05-27 RX ADMIN — CEFEPIME 2 G: 2 INJECTION, POWDER, FOR SOLUTION INTRAVENOUS at 12:26

## 2019-05-27 RX ADMIN — SODIUM CHLORIDE 1000 ML: 9 INJECTION, SOLUTION INTRAVENOUS at 16:12

## 2019-05-27 RX ADMIN — METRONIDAZOLE 500 MG: 500 INJECTION, SOLUTION INTRAVENOUS at 16:13

## 2019-05-27 RX ADMIN — CEFEPIME 2 G: 2 INJECTION, POWDER, FOR SOLUTION INTRAVENOUS at 05:00

## 2019-05-27 RX ADMIN — DEXAMETHASONE SODIUM PHOSPHATE 4 MG: 4 INJECTION, SOLUTION INTRA-ARTICULAR; INTRALESIONAL; INTRAMUSCULAR; INTRAVENOUS; SOFT TISSUE at 05:00

## 2019-05-27 RX ADMIN — CEFEPIME 2 G: 2 INJECTION, POWDER, FOR SOLUTION INTRAVENOUS at 20:22

## 2019-05-27 RX ADMIN — VANCOMYCIN HYDROCHLORIDE 1500 MG: 100 INJECTION, POWDER, LYOPHILIZED, FOR SOLUTION INTRAVENOUS at 07:56

## 2019-05-27 RX ADMIN — VANCOMYCIN HYDROCHLORIDE 1500 MG: 100 INJECTION, POWDER, LYOPHILIZED, FOR SOLUTION INTRAVENOUS at 01:30

## 2019-05-27 RX ADMIN — IOHEXOL 100 ML: 350 INJECTION, SOLUTION INTRAVENOUS at 14:06

## 2019-05-27 RX ADMIN — POTASSIUM CHLORIDE 40 MEQ: 1500 TABLET, EXTENDED RELEASE ORAL at 07:54

## 2019-05-27 RX ADMIN — ACETAMINOPHEN 650 MG: 325 TABLET, FILM COATED ORAL at 07:53

## 2019-05-27 RX ADMIN — OXYCODONE HYDROCHLORIDE 5 MG: 5 TABLET ORAL at 04:21

## 2019-05-27 RX ADMIN — METRONIDAZOLE 500 MG: 500 INJECTION, SOLUTION INTRAVENOUS at 05:00

## 2019-05-27 RX ADMIN — DEXAMETHASONE SODIUM PHOSPHATE 4 MG: 4 INJECTION, SOLUTION INTRA-ARTICULAR; INTRALESIONAL; INTRAMUSCULAR; INTRAVENOUS; SOFT TISSUE at 18:03

## 2019-05-27 RX ADMIN — METRONIDAZOLE 500 MG: 500 INJECTION, SOLUTION INTRAVENOUS at 21:25

## 2019-05-27 RX ADMIN — DEXAMETHASONE SODIUM PHOSPHATE 4 MG: 4 INJECTION, SOLUTION INTRA-ARTICULAR; INTRALESIONAL; INTRAMUSCULAR; INTRAVENOUS; SOFT TISSUE at 12:20

## 2019-05-27 ASSESSMENT — ENCOUNTER SYMPTOMS
COUGH: 0
SHORTNESS OF BREATH: 0
VOMITING: 0
CHILLS: 0
ABDOMINAL PAIN: 0
FEVER: 0
NAUSEA: 0
HEADACHES: 1

## 2019-05-27 ASSESSMENT — GAIT ASSESSMENTS
GAIT LEVEL OF ASSIST: MINIMAL ASSIST
ASSISTIVE DEVICE: FRONT WHEEL WALKER
DISTANCE (FEET): 150

## 2019-05-27 ASSESSMENT — COGNITIVE AND FUNCTIONAL STATUS - GENERAL
SUGGESTED CMS G CODE MODIFIER MOBILITY: CK
MOVING FROM LYING ON BACK TO SITTING ON SIDE OF FLAT BED: A LITTLE
SUGGESTED CMS G CODE MODIFIER DAILY ACTIVITY: CK
PERSONAL GROOMING: A LITTLE
WALKING IN HOSPITAL ROOM: A LITTLE
DAILY ACTIVITIY SCORE: 19
TURNING FROM BACK TO SIDE WHILE IN FLAT BAD: A LITTLE
TOILETING: A LITTLE
HELP NEEDED FOR BATHING: A LITTLE
CLIMB 3 TO 5 STEPS WITH RAILING: A LITTLE
DRESSING REGULAR UPPER BODY CLOTHING: A LITTLE
MOBILITY SCORE: 18
DRESSING REGULAR LOWER BODY CLOTHING: A LITTLE
MOVING TO AND FROM BED TO CHAIR: A LITTLE
STANDING UP FROM CHAIR USING ARMS: A LITTLE

## 2019-05-27 NOTE — PROGRESS NOTES
Pt left S136 via wheelchair and transport tech. Pt to go to [facility] CT and then to new room S172. Pt left with chart, meds and all belongings. Report to Tori OWENS. All questions answered. Pt's mother Virginia updated to room change.

## 2019-05-27 NOTE — CARE PLAN
Problem: Safety  Goal: Will remain free from falls  Outcome: PROGRESSING AS EXPECTED      Problem: Infection  Goal: Will remain free from infection    Intervention: Implement standard precautions and perform hand washing before and after patient contact  Standard/CL[facility] infection prevention protocols in place.

## 2019-05-27 NOTE — PROGRESS NOTES
Patient refused SCDs, educated regarding importance.  Education reinforced by GRACIELA Valle.  Patient continues to refuse.

## 2019-05-27 NOTE — PROGRESS NOTES
[facility] Progress Note      Admit Date: 5/21/2019  LOS: 6    Resident(s): Lou Taylor  Attending: NETO RAMIREZ/      Date & Time:   5/27/2019   10:36 AM       Patient ID:    Name:             Robert Dorantes     YOB: 1965  Age:                 53 y.o.  male   MRN:               [id number]    Diagnosis:  Principal Problem:    Brain abscess POA: Yes  Active Problems:    S/P craniotomy POA: Yes    Methamphetamine abuse (HCC) POA: Yes    Delirium POA: Unknown    Acute respiratory failure with hypoxia (HCC) POA: Yes    Agitation POA: Yes    Smoker POA: Yes    Class 1 obesity due to excess calories without serious comorbidity with body mass index (BMI) of 33.0 to 33.9 in adult POA: Unknown    Microcytic anemia POA: Unknown      HPI:  53 y.o male presented to the ER with 90 history of confusion.  Patient was seen in the ED on May 12 and diagnosed with acute supportive otitis media; treated with Ceftin ear for 7 days.  Patient's mother reported that the patient has been complaining of left-sided ear ache, increased drowsiness, increased fatigue and confusion since that time and over the past 2 days he has had difficulty recognizing family members, speech difficulty, and word finding difficulties.  In the ED, CT head showed 4 cm mass lesion in the left frontotemporal lobe with extensive edema and mass-effect as well as a 10 mm shift to the right; imaging favors an abscess.  Neurosurgery was consulted and the patient was admitted to the ICU for further treatment and close monitoring.    Consultants:  Neurosurgery  Infectious disease  PMA    Interval Events:  Overnight: Patient was found by the staff on the floor, No Head injury at that time. Placed on lap belt.  AAOx3  Pathology specimen from brain sent to [facility]  Begin to transition meds to PO  Mobilize, IS  Last BM : 5/26  CT chest abdomen and Pelvis is ordered   Transfer to floor pending   PT/OT ordered    Review of Systems   Unable to perform  ROS: Acuity of condition     Physical Exam   Constitutional: He is well-developed, well-nourished, and in no distress. No distress.   HENT:   Right Ear: External ear normal.   Left Ear: External ear normal.   Clean, dry post-op wound with staples left head   Eyes: Pupils are equal, round, and reactive to light.   Neck: No JVD present.   Cardiovascular: Normal rate, regular rhythm and normal heart sounds.  Exam reveals no gallop and no friction rub.    No murmur heard.  Pulmonary/Chest: Effort normal. He has no wheezes. He has no rales.   Abdominal: Soft. There is no tenderness. There is no rebound.   Musculoskeletal: He exhibits no edema.   Lymphadenopathy:     He has no cervical adenopathy.   Neurological:   Follows commands intermittently.   Skin: He is not diaphoretic.     Respiratory:     Respiration: 20, Pulse Oximetry: 94 %    Chest Tube Drains:          HemoDynamics:  Pulse: 70, Heart Rate (Monitored): (!) 54 NIBP: 108/68      Fluids:    Date 05/27/19 0700 - 05/28/19 0659   Shift 7424-5486 1090-7936 9733-3517 24 Hour Total   I  N  T  A  K  E   P.O. 200   200      P.O. 200   200    IV Piggyback 8.3   8.3      Volume (mL) (vancomycin 1,500 mg in  mL IVPB) 8.3   8.3      Volume (mL) (metroNIDAZOLE (FLAGYL) IVPB 500 mg) 0   0      Volume (mL) (cefepime (MAXIPIME) 2 g in  mL IVPB) 0   0    Shift Total 208.3   208.3   O  U  T  P  U  T   Urine 550   550      Number of Times Voided 2 x   2 x      Urine Void (mL) 550   550    Shift Total 550   550   NET -341.7   -341.7        Intake/Output Summary (Last 24 hours) at 05/23/19 1345  Last data filed at 05/23/19 1200   Gross per 24 hour   Intake          4830.55 ml   Output             2103 ml   Net          2727.55 ml          Body mass index is 32.75 kg/m².    Recent Labs      05/25/19   0405  05/26/19   0215  05/27/19   0345   SODIUM  144  137  136   POTASSIUM  3.8  3.7  3.7   CHLORIDE  113*  105  104   CO2  24  27  26   BUN  27* 19  19   CREATININE  0.68   0.63  0.57   MAGNESIUM  1.8   --    --    PHOSPHORUS  3.1   --    --    CALCIUM  7.7*  8.1*  8.2*       GI/Nutrition:  Recent Labs      19   ALTSGPT   --    --   30   ASTSGOT   --    --   20   ALKPHOSPHAT   --    --   35   TBILIRUBIN   --    --   0.8   GLUCOSE  91  113*  102*       Heme:  Recent Labs      19   RBC  4.56*  5.00  5.24   HEMOGLOBIN  10.4*  11.7*  12.0*   HEMATOCRIT  35.8*  37.7*  39.3*   PLATELETCT  294  279  279   PROTHROMBTM  16.2*   --    --    INR  1.29*   --    --    IRON   --   17*   --    TOTIRONBC   --   386   --        Infectious Disease:  Temp  Av.9 °C (98.5 °F)  Min: 36.7 °C (98.1 °F)  Max: 37.1 °C (98.8 °F)  Recent Labs      19   WBC  12.2*  9.7  11.7*   ASTSGOT   --    --   20   ALTSGPT   --    --   30   ALKPHOSPHAT   --    --   35   TBILIRUBIN   --    --   0.8       Meds:  • acetaminophen  650 mg     • polyethylene glycol/lytes  1 Packet     • levETIRAcetam  500 mg     • docusate sodium  100 mg     • senna-docusate  1 Tab     • cloNIDine  0.1 mg     • magnesium hydroxide  30 mL     • senna-docusate  1 Tab     • vancomycin  1,500 mg 1,500 mg (19 0756)   • dexamethasone  4 mg      Followed by   • [START ON 2019] dexamethasone  4 mg      Followed by   • [START ON 2019] dexamethasone  2 mg      Followed by   • [START ON 2019] dexamethasone  2 mg     • metroNIDAZOLE (FLAGYL) IV  500 mg Stopped (19 0600)   • Respiratory Care per Protocol       • MD Alert...Adult ICU Electrolyte Replacement per Pharmacy       • MD Alert...Vancomycin per Pharmacy       • oxyCODONE immediate-release  2.5 mg     • cefepime  2 g Stopped (19 0530)   • Pharmacy Consult Request  1 Each     • oxyCODONE immediate-release  5 mg     • MD ALERT...DO NOT ADMINISTER NSAIDS or ASPIRIN unless ORDERED By Neurosurgery  1 Each     • ondansetron  4 mg     •  scopolamine  1 Patch     • labetalol  10 mg     • hydrALAZINE  10 mg     • bisacodyl  10 mg     • fleet  1 Each          Imaging:  CT-HEAD W/O   Final Result      1.  No significant change      2.  Large left temporal mass with surrounding white matter edema      3.  Associated mass effect with effacement left lateral ventricle, left-to-right shift, and subfalcine herniation, unchanged      EC-ECHOCARDIOGRAM COMPLETE W/O CONT   Final Result      MR-STEALTH BRAIN WITH & W/O   Final Result      1.  Large peripherally enhancing intra-axial mass in the left temporal lobe with marked surrounding vasogenic edema. Findings are most consistent with high-grade primary brain tumor such as glioblastoma. No other enhancing lesions elsewhere to suggest    metastasis, however, a solitary brain metastasis would not be excluded. Brain abscess would not be excluded. There are no diffusion weighted images to determine whether contents of this lesion show restricted diffusion. There are no prior MRI scans for    comparison.   2.  Marked left-to-right shift of midline structures along with midbrain compression and displacement.      DX-CHEST-PORTABLE (1 VIEW)   Final Result      1.  Well-positioned lines and tubes.   2.  No new consolidation or pleural effusions.      DX-CHEST-PORTABLE (1 VIEW)   Final Result         1. Borderline cardiomegaly. No focal consolidation or pleural effusions.   2. Hiatal hernia.      CT-CTA NECK WITH & W/O-POST PROCESSING   Final Result      Unremarkable CT angiogram of the neck. No high-grade stenosis, large vessel occlusion, aneurysm or dissection.      CT-CTA HEAD WITH & W/O-POST PROCESS   Final Result      1.  No large vessel occlusion, high-grade stenosis or aneurysm of the [place].   2.  A 4 cm mass lesion in the left frontotemporal region. Imaging features favor an abscess over a solid lesion. However, MRI is needed for definitive characterization.   3.  Extensive edema associated with the  mass, resulting in 10 mm left-to-right midline shift.   4.  Effacement of the left and enlargement of the right lateral ventricles may be due to developing hydrocephalus.      Findings were discussed with JOSE ALTAMIRANO on 5/21/2019 8:39 PM.      CT-ABDOMEN-PELVIS WITH    (Results Pending)   CT-CHEST (THORAX) WITH    (Results Pending)       Procedures:  Intubation 5/22 - 5/24  Craniotomy 5/22  EEG 5/23      Problem and Plan:  * Brain abscess- (present on admission)   Assessment & Plan    Treated for acute suppurative otitis media 9 days ago.  9 days history of increasing left ear pain, confusion, difficulty finding words.  History of meth abuse.  CT scan of head with contrast showed fluid collection most likely abscess over the left frontotemporal area with mass-effect, midline shift and evidence of developing hydrocephalus.  Neurosurgery consulted, recommendations appreciated.  S/p craniotomy 5/22/19  ID consulted, recommendations appreciated.  Continue Cefepime, Vanco, Flagyl  Bacterial culture, fungal culture, and AFB with negative preliminary results.  Pathology sent to [facility].     S/P craniotomy- (present on admission)   Assessment & Plan    No complications.  Follows commands off sedation.     Agitation- (present on admission)   Assessment & Plan    Patient intermittently agitated and aggressive towards staff.  PRN fentanyl, ativan, and haldol ordered.     Acute respiratory failure with hypoxia (HCC)- (present on admission)   Assessment & Plan    RESOLVED.  Intubated for airway protection in ED 5/22 - extubated 5/24.  RT/O2 protocol.     Methamphetamine abuse (HCC)- (present on admission)   Assessment & Plan    History of meth abuse per nephew, smoking.  No history of IV use.  Consider patient education and counseling.     Smoker- (present on admission)   Assessment & Plan    Patient confused, per mom smokes about 3 cigarettes/day.  Consider education and counseling.         DISPO: Stable to be transferred  to the Floor     CODE STATUS: FULL    Quality Measures:  Richards Catheter: yes.  DVT Prophylaxis: SCD.  Ulcer Prophylaxis: Pepsid.  Antibiotics: Cefepime/Vanco/Flagyl  Lines: CVC

## 2019-05-27 NOTE — CARE PLAN
Problem: Nutritional:  Goal: Achieve adequate nutritional intake  Patient will consume 50% of meals   Outcome: PROGRESSING AS EXPECTED  PO intake improving (>50% x3 meals yesterday). RD following.

## 2019-05-27 NOTE — THERAPY
"Occupational Therapy Evaluation completed.   Functional Status:  Min A toileting, Standing grooming, functional mobility, close SPV LB dressing  Plan of Care: Will benefit from Occupational Therapy 5 times per week  Discharge Recommendations:  Equipment: Will Continue to Assess for Equipment Needs. Post-acute therapy Recommend inpatient transitional care services for continued occupational therapy services.     See \"Rehab Therapy-Acute\" Patient Summary Report for complete documentation.      Pt is a 52 y/o male who presents to acute due to brain mass vs abscess is now s/p left temporal craniotomy. Pt much more pleasant and cooperative on this date, motivated to return to [facility]. Pt's R UE appears slightly delayed and FMC appears impaired bilaterally, unclear if it is due to attention at this time. Pt performed toileting w/ min A for personal hygiene and standing grooming w/ cues to not wash sx site at this time. Pt very emotional about new scar. Will continue to follow for Acute OT services as pt demo'd impaired functional independence. Recommend DC to post acute placement at this time.   "

## 2019-05-27 NOTE — PROGRESS NOTES
Infectious Disease Progress Note    Author: Coreen Martinez M.D. Date & Time of service: 2019  2:00 PM    Chief Complaint:  Brain mass       Interval History:  53 y.o. male  admitted 2019 for AMS due to above. +meth abuse.  Recently in the ED on May 12 and was diagnosed with acute suppurative otitis media   AF WBC 12.6 extubated this am-pain controlled-decreased strength RUE +delerium   AF improved strength RUE-intermittently combative and cooperative   AF WBC 9.7 drain removed-somnolent this am-per RN strength continues to improve   AF had HA earlier but now improved-no new neurologic deficits. Plan for C/A/P today  Labs Reviewed, Medications Reviewed, Radiology Reviewed and Wound Reviewed.    Review of Systems:  Review of Systems   Constitutional: Negative for chills and fever.   Respiratory: Negative for cough and shortness of breath.    Cardiovascular: Negative for chest pain.   Gastrointestinal: Negative for abdominal pain, nausea and vomiting.   Genitourinary: Negative for dysuria.   Skin: Negative for rash.   Neurological: Positive for headaches.   All other systems reviewed and are negative.      Hemodynamics:  Temp (24hrs), Av.9 °C (98.5 °F), Min:36.7 °C (98.1 °F), Max:37.1 °C (98.8 °F)  Temperature: 36.7 °C (98.1 °F)  Pulse  Av.1  Min: 40  Max: 98  NIBP: 108/68       Physical Exam:  Physical Exam   Constitutional: He is oriented to person, place, and time. No distress.   HENT:   Head: Normocephalic and atraumatic.   Left craniotomy site with staples   +edema inferior to site no change   Eyes: Pupils are equal, round, and reactive to light. EOM are normal. No scleral icterus.   Neck: Neck supple. No JVD present.   Cardiovascular:   bradycardic   Pulmonary/Chest: Effort normal. No stridor. No respiratory distress.   Abdominal: Soft. He exhibits no distension. There is no tenderness.   Musculoskeletal: He exhibits edema.   Neurological: He is alert and oriented to  person, place, and time. No cranial nerve deficit.   Skin: Skin is warm. No rash noted. He is not diaphoretic. No erythema.   Nursing note and vitals reviewed.      Meds:    Current Facility-Administered Medications:   •  acetaminophen  •  polyethylene glycol/lytes  •  levETIRAcetam  •  docusate sodium  •  senna-docusate  •  cloNIDine  •  magnesium hydroxide  •  senna-docusate  •  [COMPLETED] dexamethasone **FOLLOWED BY** [COMPLETED] dexamethasone **FOLLOWED BY** dexamethasone **FOLLOWED BY** [START ON 5/28/2019] dexamethasone **FOLLOWED BY** [START ON 5/30/2019] dexamethasone **FOLLOWED BY** [START ON 6/1/2019] dexamethasone  •  metroNIDAZOLE (FLAGYL) IV  •  Respiratory Care per Protocol  •  MD Alert...Adult ICU Electrolyte Replacement per Pharmacy  •  oxyCODONE immediate-release  •  cefepime  •  Pharmacy Consult Request  •  oxyCODONE immediate-release  •  MD ALERT...DO NOT ADMINISTER NSAIDS or ASPIRIN unless ORDERED By Neurosurgery  •  ondansetron  •  scopolamine  •  labetalol  •  hydrALAZINE  •  bisacodyl  •  fleet    Labs:  Recent Labs      05/25/19 0405 05/26/19 0215 05/27/19   0345   WBC  12.2*  9.7  11.7*   RBC  4.56*  5.00  5.24   HEMOGLOBIN  10.4*  11.7*  12.0*   HEMATOCRIT  35.8*  37.7*  39.3*   MCV  78.5*  75.4*  75.0*   MCH  22.8*  23.4*  22.9*   RDW  53.2*  49.6  48.1   PLATELETCT  294  279  279   MPV  10.1  10.3  11.0     Recent Labs      05/25/19 0405 05/26/19 0215 05/27/19   0345   SODIUM  144  137  136   POTASSIUM  3.8  3.7  3.7   CHLORIDE  113*  105  104   CO2  24  27 26   GLUCOSE  91  113*  102*   BUN  27* 19 19     Recent Labs      05/25/19 0405 05/26/19 0215 05/27/19   0345   ALBUMIN   --    --   3.4   TBILIRUBIN   --    --   0.8   ALKPHOSPHAT   --    --   35   TOTPROTEIN   --    --   5.6*   ALTSGPT   --    --   30   ASTSGOT   --    --   20   CREATININE  0.68  0.63  0.57       Imaging:  Ct-cta Head With & W/o-post Process  1.  No large vessel occlusion, high-grade stenosis  or aneurysm of the [place]. 2.  A 4 cm mass lesion in the left frontotemporal region. Imaging features favor an abscess over a solid lesion. However, MRI is needed for definitive characterization. 3.  Extensive edema associated with the mass, resulting in 10 mm left-to-right midline shift. 4.  Effacement of the left and enlargement of the right lateral ventricles may be due to developing hydrocephalus. Findings were discussed with JOSE ALTAMIRANO on 5/21/2019 8:39 PM.    Ct-cta Neck With & W/o-post Processing  Result Date: 5/21/2019 5/21/2019 8:06 PM HISTORY/REASON FOR EXAM:  SAH suspected, initial exam TECHNIQUE/EXAM DESCRIPTION: CT angiogram of the neck with contrast. Postcontrast images were obtained of the neck from the great vessels through the skull base following the power injection of nonionic contrast at 5.0 mL/sec. Thin-section helical images were obtained with overlapping reconstruction interval. Coronal and oblique multiplanar volume reformats were generated. Cervical internal carotid artery percent stenosis is calculated using the standard method according to the NASCET criteria wherein a segment of uniform caliber mid or distal cervical internal carotid is used as the reference denominator. 3D angiographic images were reviewed on PACS.  Maximum intensity projection (MIP) images were generated and reviewed 100 mL of Omnipaque 350 nonionic contrast was injected intravenously. Low dose optimization technique was utilized for this CT exam including automated exposure control and adjustment of the mA and/or kV according to patient size. COMPARISON:  None. FINDINGS: Left-sided aortic arch with patent great vessel origins. The right common carotid artery, cervical carotid bifurcation, and cervical internal carotid artery show no significant stenosis. There is no evidence of dissection or aneurysm. The left common carotid artery, cervical carotid bifurcation, and cervical internal carotid artery show  no significant stenosis. There is no evidence of dissection or aneurysm. The cervical vertebral arteries are patent bilaterally. Prominent right paratracheal node measuring 1 cm short axis, statistically reactive. Otherwise, the neck soft tissues and lung apices in the field of view are unremarkable. 3D angiographic/MIP images of the vasculature confirm the vascular findings as described above.     Unremarkable CT angiogram of the neck. No high-grade stenosis, large vessel occlusion, aneurysm or dissection.    Dx-chest-2 Views  Result Date: 5/12/2019 5/12/2019 2:28 AM HISTORY/REASON FOR EXAM:  Cough TECHNIQUE/EXAM DESCRIPTION AND NUMBER OF VIEWS: Two views of the chest. COMPARISON:  None. FINDINGS: LUNGS: Linear atelectasis in the left midlung. No focal consolidation. No effusions. PNEUMOTHORAX: None. LINES AND TUBES: None. MEDIASTINUM: No cardiomegaly. Atherosclerosis. MUSCULOSKELETAL STRUCTURES: No acute fracture. Hiatal hernia.   1.  No focal consolidation or pleural effusions. 2.  Hiatal hernia.    Dx-chest-portable (1 View)  Result Date: 5/24/2019 5/24/2019 1:08 AM HISTORY/REASON FOR EXAM:  For indication of respiratory failure. TECHNIQUE/EXAM DESCRIPTION AND NUMBER OF VIEWS: Single portable view of the chest. COMPARISON: Yesterday FINDINGS: Endotracheal tube, NG tube and right IJ central line mean in place. There is bibasilar atelectasis or consolidation. There are probable trace effusions. No pneumothorax. Lung aeration appears slightly improved.   Slight improvement in lung aeration.      Mr-stealth Brain With & W/o  Result Date: 5/22/2019  COMPARISON:  CTA of the head 5/21/2019 FINDINGS: No fiducial markers are appreciated. The calvaria are unremarkable. There are no extra-axial fluid collections. The left temporal lobe, there is a thick-walled ring-enhancing mass which measures about 47 mm x 32 mm x 43 mm. Central hypoenhancement may represent tumor necrosis or cystic change. There is marked surrounding  vasogenic edema extending into the basal ganglia, subinsular white matter, internal capsule, corona radiata, and left frontal deep white matter. There is effacement of the left lateral ventricle and moderate dilatation of the right lateral ventricle including the temporal horn due to trapping. There is left-to-right shift of midline structures of about 11 mm (T2 axial image 54, series 3). There are no other enhancing lesions. The brainstem and posterior fossa structures are unremarkable for marked compression of the midbrain with flattening of the left cerebral peduncle and effacement of perimesencephalic cisterns. There is some vasogenic edema extending down into the left side of the midbrain as well as the left thalamus and subthalamic nucleus. The major vessels including the dural venous sinuses appear intact. Paranasal sinuses show moderate mucosal thickening with small air-fluid/debris levels in the maxillary antra. Moderate mucosal thickening in the sphenoid sinus and among the ethmoid air cells. Minimal mucosal thickening in the frontal air cells.     1.  Large peripherally enhancing intra-axial mass in the left temporal lobe with marked surrounding vasogenic edema. Findings are most consistent with high-grade primary brain tumor such as glioblastoma. No other enhancing lesions elsewhere to suggest metastasis, however, a solitary brain metastasis would not be excluded. Brain abscess would not be excluded. There are no diffusion weighted images to determine whether contents of this lesion show restricted diffusion. There are no prior MRI scans for comparison. 2.  Marked left-to-right shift of midline structures along with midbrain compression and displacement.      Micro:  Results     Procedure Component Value Units Date/Time    Anaerobic Culture [[id number]] Collected:  05/22/19 6528    Order Status:  Completed Specimen:  Wound Updated:  05/27/19 1156     Significant Indicator NEG     Source WND     Site Brain  "Abscess     Culture Result No Anaerobes isolated.    Narrative:       CALL  Watson  RST2 tel. ,  CALLED  RST2 tel.  05/22/2019, 19:14, RB PERF. RESULTS CALLED TO:[id number].  Surgery Specimen    CULTURE WOUND W/ GRAM STAIN [[id number]] Collected:  05/22/19 1818    Order Status:  Completed Specimen:  Wound Updated:  05/27/19 1159     Significant Indicator NEG     Source WND     Site Brain Abscess     Culture Result No growth at 72 hours.     Gram Stain Result Rare WBCs.  No organisms seen.      Narrative:       CALL  [facility]son  RST2 tel. ,  CALLED  RST2 tel.  05/22/2019, 19:14, RB PERF. RESULTS CALLED TO:[id number].  Surgery Specimen    BLOOD CULTURE [[id number]] Collected:  05/21/19 2126    Order Status:  Completed Specimen:  Blood from Peripheral Updated:  05/27/19 0100     Significant Indicator NEG     Source BLD     Site PERIPHERAL     Culture Result No growth after 5 days of incubation.    Narrative:       Per Hospital Policy: Only change Specimen Src: to \"Line\" if  specified by physician order.  Left AC    BLOOD CULTURE [[id number]] Collected:  05/21/19 2120    Order Status:  Completed Specimen:  Blood from Peripheral Updated:  05/27/19 0100     Significant Indicator NEG     Source BLD     Site PERIPHERAL     Culture Result No growth after 5 days of incubation.    Narrative:       Per Hospital Policy: Only change Specimen Src: to \"Line\" if  specified by physician order.  Right Wrist    Acid Fast Stain [[id number]] Collected:  05/22/19 1700    Order Status:  Completed Specimen:  Wound Updated:  05/24/19 2057     Significant Indicator NEG     Source WND     Site BRAIN ABSCESS     AFB Smear Results No acid fast bacilli seen.    Narrative:       Collected By:[id number] RADHA SAHA  Brain cyst.  Surgery Specimen    AFB Culture [[id number]] Collected:  05/22/19 1700    Order Status:  Completed Specimen:  Wound from Cyst Updated:  05/24/19 2056     Significant Indicator NEG     Source WND     Site BRAIN ABSCESS     Culture Result " Culture in progress.     AFB Smear Results No acid fast bacilli seen.    Narrative:       Collected By:[id number] RADHA SAHA  Brain cyst.  Surgery Specimen    Fungal Culture [[id number]] Collected:  05/22/19 1700    Order Status:  Completed Specimen:  Wound from Cyst Updated:  05/24/19 2056     Significant Indicator NEG     Source WND     Site BRAIN ABSCESS     Culture Result Culture in progress.    Narrative:       Collected By:[id number] RENHKALLIE SAHA  Brain cyst.  Surgery Specimen    GRAM STAIN [[id number]] Collected:  05/22/19 1818    Order Status:  Completed Specimen:  Wound Updated:  05/22/19 1914     Significant Indicator .     Source WND     Site Brain Abscess     Gram Stain Result Rare WBCs.  No organisms seen.      Narrative:       CALL  [facility]  RST2 tel. ,  CALLED  RST2 tel.  05/22/2019, 19:14, RB PERF. RESULTS CALLED TO:[id number].  Surgery Specimen    FLUID CULTURE W/GRAM STAIN [[id number]]     Order Status:  No result Specimen:  Cyst from Other Body Fluid           Assessment:  Active Hospital Problems    Diagnosis   • *Brain abscess [G06.0]   • Acute respiratory failure with hypoxia (HCC) [J96.01]   • Delirium [R41.0]   • Methamphetamine abuse (HCC) [F15.10]   • S/P craniotomy [Z98.890]       Plan:  Brain mass   Cystic  Necrotic tumor vs brain abscess  Afebrile  MRI with 4.7 cm left temporal lobe mass with midline shift  S/p craniotomy 5/22-no op note available for review as yet  Gram stain neg; cxs neg at 72 hours  Path pending-sent to [facility]  Blood cxs neg  HIV neg  DC vancomycin  Continue cefepime/flagyl for now    Leukocytosis, mild  Multifactorial  On steroids  Monitor    Meth abuse  Will needs monitored setting for IV abx if confirmed abscess    I have performed a physical exam and reviewed and updated ROS as of today.  In review of yesterday's note dated 5/26/2019, there are no changes except as documented above.

## 2019-05-27 NOTE — PROGRESS NOTES
2 RN Skin Check Completed with GRACIELA Donato.       Problems Noted:  1) Left temporal crani site.  Clean, dry, intact.  LUIS drain removed 5/26 during day shift.     No evidence of pressure injury.    Interventions: Q2hr turns, educated about pressure injury prevention, nutrition consult in place.     Samina Kate R.N.

## 2019-05-27 NOTE — PROGRESS NOTES
Received report from ICU RN Melody, and assumed care when pt arrived to unit.  A&O x3, disoriented to time.  Difficulty with word-finding and impulsive.  Bed alarm and treaded socks on.  Call light and personal belongings within reach.  Educated on safety and fall precautions.  Bed locked and at lowest position.  WILY PALMER.

## 2019-05-27 NOTE — PROGRESS NOTES
0025: RN had just been at bedside and pt. Asked to be changed, when getting linens from cart, primary RN Samina heard bed alarm and returned to room.  RN Zoë at bedside, stated pt. Had fallen over bed rail onto hands and knees.  Pt. Standing at time Samina RN returned to room.  Pt. Stood and returned to bed, unsteady when ambulating.  Head to toe assessment completed (see flowsheet), no change from previous examination at 0000.  Pt. Denies hitting head and has no sign of injury, neuro status remains stable.    Vitals stable.  Pt. Changed for incontinence and assessed for other needs.  Dr. Gonda notified, charge nurse Mary notified.  Mother of pt. notified.  Pt. Placed in lap belt due to concern for flipping self out of bed.    Samina Kate R.N.

## 2019-05-27 NOTE — PROGRESS NOTES
"  Neurosurgery Progress Note    Subjective:  Sitting in chair, complains of headache    Exam:  Speech slow, says \"it hurts\"  No apparent distress  Cooperative  Pupils 3 mm midline reactive conjugate gaze  Right  bicep tricep 4/5, deltoid 3/5 IP DF PF 4+/5  Left scalp incision c/d/i with staples    CT head good evacuation left temporal cyst with persistent cerebral edema    Pulse  Av  Min: 50  Max: 70  Resp  Av.5  Min: 17  Max: 20  Temp  Av.9 °C (98.5 °F)  Min: 36.7 °C (98.1 °F)  Max: 37.1 °C (98.8 °F)  SpO2  Av.6 %  Min: 94 %  Max: 98 %    No Data Recorded    Recent Labs      19   034   WBC  12.2*  9.7  11.7*   RBC  4.56*  5.00  5.24   HEMOGLOBIN  10.4*  11.7*  12.0*   HEMATOCRIT  35.8*  37.7*  39.3*   MCV  78.5*  75.4*  75.0*   MCH  22.8*  23.4*  22.9*   MCHC  29.1*  31.0*  30.5*   RDW  53.2*  49.6  48.1   PLATELETCT  294  279  279   MPV  10.1  10.3  11.0     Recent Labs      19   0405  195  19   0345   SODIUM  144  137  136   POTASSIUM  3.8  3.7  3.7   CHLORIDE  113*  105  104   CO2  24     GLUCOSE  91  113*  102*   BUN    19   CREATININE  0.68  0.63  0.57   CALCIUM  7.7*  8.1*  8.2*     Recent Labs      19   040   INR  1.29*           Intake/Output       19 - 19 - 19 0659       Total  Total       Intake    P.O.  970  200 1170  200  -- 200    P.O.  200 -- 200    IV Piggyback  700  650 1350  8.3  -- 8.3    Volume (mL) (vancomycin 1,500 mg in  mL IVPB) 500 250 750 8.3 -- 8.3    Volume (mL) (metroNIDAZOLE (FLAGYL) IVPB 500 mg) 100 200 300 0 -- 0    Volume (mL) (cefepime (MAXIPIME) 2 g in  mL IVPB) 100 200 300 0 -- 0    Total Intake 2978 716 8338 208.3 -- 208.3       Output    Urine  120  2300 2420  550  -- 550    Number of Times Voided -- 9 x 9 x 2 x -- 2 x    Number of Times Incontinent of Urine 5 x 3 x 8 x " -- -- --    Urine Void (mL) 120 2300 2420 550 -- 550    Stool  --  -- --  --  -- --    Number of Times Stooled -- 2 x 2 x -- -- --    Total Output 120 2300 2420 550 -- 550       Net I/O     1550 -1450 100 -341.7 -- -341.7            Intake/Output Summary (Last 24 hours) at 05/27/19 1037  Last data filed at 05/27/19 0900   Gross per 24 hour   Intake          2358.33 ml   Output             2970 ml   Net          -611.67 ml            • acetaminophen  650 mg Q8HRS PRN   • polyethylene glycol/lytes  1 Packet BID   • levETIRAcetam  500 mg BID   • docusate sodium  100 mg BID   • senna-docusate  1 Tab Nightly   • cloNIDine  0.1 mg Q4HRS PRN   • magnesium hydroxide  30 mL QDAY PRN   • senna-docusate  1 Tab Q24HRS PRN   • vancomycin  1,500 mg Q8HR   • dexamethasone  4 mg TID    Followed by   • [START ON 5/28/2019] dexamethasone  4 mg BID    Followed by   • [START ON 5/30/2019] dexamethasone  2 mg BID    Followed by   • [START ON 6/1/2019] dexamethasone  2 mg DAILY   • metroNIDAZOLE (FLAGYL) IV  500 mg Q8HRS   • Respiratory Care per Protocol   Continuous RT   • MD Alert...Adult ICU Electrolyte Replacement per Pharmacy   PHARMACY TO DOSE   • MD Alert...Vancomycin per Pharmacy   PHARMACY TO DOSE   • oxyCODONE immediate-release  2.5 mg Q3HRS PRN   • cefepime  2 g Q8HRS   • Pharmacy Consult Request  1 Each PHARMACY TO DOSE   • oxyCODONE immediate-release  5 mg Q3HRS PRN   • MD ALERT...DO NOT ADMINISTER NSAIDS or ASPIRIN unless ORDERED By Neurosurgery  1 Each PRN   • ondansetron  4 mg Q4HRS PRN   • scopolamine  1 Patch Q72HRS PRN   • labetalol  10 mg Q HOUR PRN   • hydrALAZINE  10 mg Q HOUR PRN   • bisacodyl  10 mg Q24HRS PRN   • fleet  1 Each Once PRN       Assessment and Plan:  Hospital day #6 left temporal peripherally enhancing cystic lesion  POD #5 left temporal cranitomy, cyst evacuation/biopsy  Prophylactic anticoagulation: no         Start date/time: tbd  Cyst:  Pathology pending but frozen benign, cultures no growth,  unclear etiology.  Examination continues to improve  Continue ABX unless pathology demontrates malignancy; chest abdomen pelvis CT pending for further workup  Awaiting floor bed

## 2019-05-27 NOTE — THERAPY
"Physical Therapy Evaluation completed.   Bed Mobility:  Supine to Sit:  (up in chair)  Transfers: Sit to Stand: Minimal Assist  Gait: Level Of Assist: Minimal Assist with Front-Wheel Walker       Plan of Care: Will benefit from Physical Therapy 3 times per week  Discharge Recommendations: Equipment: Will Continue to Assess for Equipment Needs.   Pt presents s/p  Left temporal cranitotomy for Evacuation left temporal cystic mass. Today, pt needs min assist for transfers and ambulation with FWW x 150 feet. Pt becomes labile a few times during therapy, expressing appreciation for therapy. Pt will need inpt PT to address problems and may need a post acute stay before home depending on progress. See \"Rehab Therapy-Acute\" Patient Summary Report for complete documentation.     "

## 2019-05-28 PROBLEM — R47.01 EXPRESSIVE APHASIA: Status: ACTIVE | Noted: 2019-05-28

## 2019-05-28 LAB
ANION GAP SERPL CALC-SCNC: 7 MMOL/L (ref 0–11.9)
BUN SERPL-MCNC: 25 MG/DL (ref 8–22)
CALCIUM SERPL-MCNC: 8.4 MG/DL (ref 8.5–10.5)
CHLORIDE SERPL-SCNC: 105 MMOL/L (ref 96–112)
CO2 SERPL-SCNC: 23 MMOL/L (ref 20–33)
CREAT SERPL-MCNC: 0.67 MG/DL (ref 0.5–1.4)
ERYTHROCYTE [DISTWIDTH] IN BLOOD BY AUTOMATED COUNT: 49.1 FL (ref 35.9–50)
GLUCOSE SERPL-MCNC: 132 MG/DL (ref 65–99)
HCT VFR BLD AUTO: 39.9 % (ref 42–52)
HGB BLD-MCNC: 12.2 G/DL (ref 14–18)
MAGNESIUM SERPL-MCNC: 2 MG/DL (ref 1.5–2.5)
MCH RBC QN AUTO: 23.1 PG (ref 27–33)
MCHC RBC AUTO-ENTMCNC: 30.6 G/DL (ref 33.7–35.3)
MCV RBC AUTO: 75.4 FL (ref 81.4–97.8)
MORPHOLOGY BLD-IMP: NORMAL
PLATELET # BLD AUTO: 283 K/UL (ref 164–446)
PLATELET BLD QL SMEAR: NORMAL
PMV BLD AUTO: 11.1 FL (ref 9–12.9)
POTASSIUM SERPL-SCNC: 3.9 MMOL/L (ref 3.6–5.5)
RBC # BLD AUTO: 5.29 M/UL (ref 4.7–6.1)
SODIUM SERPL-SCNC: 135 MMOL/L (ref 135–145)
WBC # BLD AUTO: 13.4 K/UL (ref 4.8–10.8)

## 2019-05-28 PROCEDURE — 92526 ORAL FUNCTION THERAPY: CPT

## 2019-05-28 PROCEDURE — 36415 COLL VENOUS BLD VENIPUNCTURE: CPT

## 2019-05-28 PROCEDURE — A9270 NON-COVERED ITEM OR SERVICE: HCPCS | Performed by: INTERNAL MEDICINE

## 2019-05-28 PROCEDURE — 700105 HCHG RX REV CODE 258: Performed by: INTERNAL MEDICINE

## 2019-05-28 PROCEDURE — A9270 NON-COVERED ITEM OR SERVICE: HCPCS | Performed by: STUDENT IN AN ORGANIZED HEALTH CARE EDUCATION/TRAINING PROGRAM

## 2019-05-28 PROCEDURE — 700102 HCHG RX REV CODE 250 W/ 637 OVERRIDE(OP): Performed by: INTERNAL MEDICINE

## 2019-05-28 PROCEDURE — 700111 HCHG RX REV CODE 636 W/ 250 OVERRIDE (IP): Performed by: INTERNAL MEDICINE

## 2019-05-28 PROCEDURE — 99232 SBSQ HOSP IP/OBS MODERATE 35: CPT | Mod: GC | Performed by: INTERNAL MEDICINE

## 2019-05-28 PROCEDURE — 96105 ASSESSMENT OF APHASIA: CPT

## 2019-05-28 PROCEDURE — 700111 HCHG RX REV CODE 636 W/ 250 OVERRIDE (IP): Performed by: NURSE PRACTITIONER

## 2019-05-28 PROCEDURE — 99232 SBSQ HOSP IP/OBS MODERATE 35: CPT | Performed by: INTERNAL MEDICINE

## 2019-05-28 PROCEDURE — 770001 HCHG ROOM/CARE - MED/SURG/GYN PRIV*

## 2019-05-28 PROCEDURE — 85027 COMPLETE CBC AUTOMATED: CPT

## 2019-05-28 PROCEDURE — 700102 HCHG RX REV CODE 250 W/ 637 OVERRIDE(OP): Performed by: NEUROLOGICAL SURGERY

## 2019-05-28 PROCEDURE — 700101 HCHG RX REV CODE 250: Performed by: STUDENT IN AN ORGANIZED HEALTH CARE EDUCATION/TRAINING PROGRAM

## 2019-05-28 PROCEDURE — 80048 BASIC METABOLIC PNL TOTAL CA: CPT

## 2019-05-28 PROCEDURE — 700112 HCHG RX REV CODE 229: Performed by: INTERNAL MEDICINE

## 2019-05-28 PROCEDURE — 700102 HCHG RX REV CODE 250 W/ 637 OVERRIDE(OP): Performed by: STUDENT IN AN ORGANIZED HEALTH CARE EDUCATION/TRAINING PROGRAM

## 2019-05-28 PROCEDURE — 83735 ASSAY OF MAGNESIUM: CPT

## 2019-05-28 PROCEDURE — A9270 NON-COVERED ITEM OR SERVICE: HCPCS | Performed by: NEUROLOGICAL SURGERY

## 2019-05-28 RX ORDER — QUETIAPINE FUMARATE 25 MG/1
50 TABLET, FILM COATED ORAL EVERY 12 HOURS PRN
Status: DISCONTINUED | OUTPATIENT
Start: 2019-05-28 | End: 2019-05-29

## 2019-05-28 RX ORDER — HALOPERIDOL 2 MG/ML
1-5 SOLUTION ORAL EVERY 4 HOURS PRN
Status: DISCONTINUED | OUTPATIENT
Start: 2019-05-28 | End: 2019-06-14

## 2019-05-28 RX ADMIN — CEFEPIME 2 G: 2 INJECTION, POWDER, FOR SOLUTION INTRAVENOUS at 23:38

## 2019-05-28 RX ADMIN — CEFEPIME 2 G: 2 INJECTION, POWDER, FOR SOLUTION INTRAVENOUS at 04:36

## 2019-05-28 RX ADMIN — METRONIDAZOLE 500 MG: 500 INJECTION, SOLUTION INTRAVENOUS at 05:19

## 2019-05-28 RX ADMIN — DEXAMETHASONE SODIUM PHOSPHATE 4 MG: 4 INJECTION, SOLUTION INTRA-ARTICULAR; INTRALESIONAL; INTRAMUSCULAR; INTRAVENOUS; SOFT TISSUE at 16:09

## 2019-05-28 RX ADMIN — DOCUSATE SODIUM 100 MG: 100 CAPSULE, LIQUID FILLED ORAL at 16:09

## 2019-05-28 RX ADMIN — CEFEPIME 2 G: 2 INJECTION, POWDER, FOR SOLUTION INTRAVENOUS at 16:09

## 2019-05-28 RX ADMIN — OXYCODONE HYDROCHLORIDE 5 MG: 5 TABLET ORAL at 10:04

## 2019-05-28 RX ADMIN — QUETIAPINE FUMARATE 50 MG: 25 TABLET ORAL at 21:53

## 2019-05-28 RX ADMIN — LEVETIRACETAM 500 MG: 500 TABLET ORAL at 06:32

## 2019-05-28 RX ADMIN — METRONIDAZOLE 500 MG: 500 INJECTION, SOLUTION INTRAVENOUS at 21:50

## 2019-05-28 RX ADMIN — SENNOSIDES,DOCUSATE SODIUM 1 TABLET: 8.6; 5 TABLET, FILM COATED ORAL at 21:53

## 2019-05-28 RX ADMIN — METRONIDAZOLE 500 MG: 500 INJECTION, SOLUTION INTRAVENOUS at 14:01

## 2019-05-28 RX ADMIN — DEXAMETHASONE SODIUM PHOSPHATE 4 MG: 4 INJECTION, SOLUTION INTRA-ARTICULAR; INTRALESIONAL; INTRAMUSCULAR; INTRAVENOUS; SOFT TISSUE at 06:32

## 2019-05-28 ASSESSMENT — ENCOUNTER SYMPTOMS
WEAKNESS: 0
MEMORY LOSS: 1
SPEECH CHANGE: 1
PALPITATIONS: 0
PHOTOPHOBIA: 0
VOMITING: 0
MYALGIAS: 0
COUGH: 0
ABDOMINAL PAIN: 0
SORE THROAT: 0
CHILLS: 0
HEADACHES: 1
LOSS OF CONSCIOUSNESS: 0
FOCAL WEAKNESS: 0
DIZZINESS: 0
EYE REDNESS: 0
FEVER: 0
NAUSEA: 0
SHORTNESS OF BREATH: 0
BLURRED VISION: 0
SEIZURES: 0

## 2019-05-28 NOTE — THERAPY
"Speech Language Therapy dysphagia treatment completed.   Functional Status:  The patient was seen for dysphagia therapy this date. The patient was seen during his D2/NTL meal tray with PO trials of thin liquids. The patient was awake, alert with noted word finding and paraphasia's noted during speech. The patient consumed PO trials of D2/NTL meal items with no overt s/s of aspiration or difficulty. PO trials of thin liquids were consumed with no overt s/s of aspiration when patient followed swallow strategies of single sips. Serial sips of thin liquids resulted in immediate cough response. Patient followed swallow strategies independently following single cue. At this time, recommend patient upgrade to D2/thin liquids with swallow strategies. Monitor during meals to ensure follow through with swallow strategies.     Recommendations: 1)  upgrade to D2/thin liquids with swallow strategies. Monitor during meals to ensure follow through with swallow strategies.     Plan of Care: Will benefit from Speech Therapy 5 times per week  Post-Acute Therapy: Recommend inpatient transitional care services for continued speech therapy services.        See \"Rehab Therapy-Acute\" Patient Summary Report for complete documentation.     "

## 2019-05-28 NOTE — RESPIRATORY CARE
COPD EDUCATION by COPD CLINICAL EDUCATOR  5/28/2019 at 7:50 AM by Barbara Alcantar     Patient reviewed by COPD education team. Patient does not have a history or diagnosis of COPD, therefore does not qualify for the COPD program.

## 2019-05-28 NOTE — PROGRESS NOTES
Infectious Disease Progress Note    Author: Coreen Martinez M.D. Date & Time of service: 2019  12:57 PM    Chief Complaint:  Brain mass       Interval History:  53 y.o. male  admitted 2019 for AMS due to above. +meth abuse.  Recently in the ED on May 12 and was diagnosed with acute suppurative otitis media   AF WBC 12.6 extubated this am-pain controlled-decreased strength RUE +delerium   AF improved strength RUE-intermittently combative and cooperative   AF WBC 9.7 drain removed-somnolent this am-per RN strength continues to improve   AF had HA earlier but now improved-no new neurologic deficits. Plan for C/A/P today   AF WBC 13.4 remains alert-strength improved. Denies SE abx. HA controlled  Labs Reviewed, Medications Reviewed, Radiology Reviewed and Wound Reviewed.    Review of Systems:  Review of Systems   Constitutional: Negative for chills and fever.   Respiratory: Negative for cough and shortness of breath.    Cardiovascular: Negative for chest pain.   Gastrointestinal: Negative for abdominal pain, nausea and vomiting.   Genitourinary: Negative for dysuria.   Musculoskeletal: Negative for myalgias.   Skin: Negative for rash.   Neurological: Positive for headaches.   All other systems reviewed and are negative.      Hemodynamics:  Temp (24hrs), Av.8 °C (98.2 °F), Min:36.6 °C (97.9 °F), Max:36.9 °C (98.5 °F)  Temperature: 36.6 °C (97.9 °F)  Pulse  Av.2  Min: 40  Max: 98  Blood Pressure: 139/78       Physical Exam:  Physical Exam   Constitutional: He is oriented to person, place, and time. No distress.   HENT:   Head: Normocephalic and atraumatic.   Left craniotomy site with staples   +edema inferior to site no change from yesterday   Eyes: Pupils are equal, round, and reactive to light. EOM are normal. No scleral icterus.   Neck: Neck supple.   Cardiovascular:   bradycardic   Pulmonary/Chest: Effort normal. No respiratory distress. He has no wheezes. He has no rales.    Abdominal: Soft. There is no rebound and no guarding.   Musculoskeletal: He exhibits edema.   Lymphadenopathy:     He has no cervical adenopathy.   Neurological: He is alert and oriented to person, place, and time. No cranial nerve deficit.   Skin: Skin is warm. No rash noted. He is not diaphoretic. No erythema.   Nursing note and vitals reviewed.      Meds:    Current Facility-Administered Medications:   •  acetaminophen  •  polyethylene glycol/lytes  •  docusate sodium  •  senna-docusate  •  cloNIDine  •  magnesium hydroxide  •  senna-docusate  •  [COMPLETED] dexamethasone **FOLLOWED BY** [COMPLETED] dexamethasone **FOLLOWED BY** [COMPLETED] dexamethasone **FOLLOWED BY** dexamethasone **FOLLOWED BY** [START ON 5/30/2019] dexamethasone **FOLLOWED BY** [START ON 6/1/2019] dexamethasone  •  metroNIDAZOLE (FLAGYL) IV  •  Respiratory Care per Protocol  •  oxyCODONE immediate-release  •  cefepime  •  Pharmacy Consult Request  •  oxyCODONE immediate-release  •  MD ALERT...DO NOT ADMINISTER NSAIDS or ASPIRIN unless ORDERED By Neurosurgery  •  ondansetron  •  scopolamine  •  labetalol  •  hydrALAZINE  •  bisacodyl  •  fleet    Labs:  Recent Labs      05/26/19 0215 05/27/19 0345 05/28/19   0240   WBC  9.7  11.7*  13.4*   RBC  5.00  5.24  5.29   HEMOGLOBIN  11.7*  12.0*  12.2*   HEMATOCRIT  37.7*  39.3*  39.9*   MCV  75.4*  75.0*  75.4*   MCH  23.4*  22.9*  23.1*   RDW  49.6  48.1  49.1   PLATELETCT  279  279  283   MPV  10.3  11.0  11.1     Recent Labs      05/26/19 0215 05/27/19 0345 05/28/19   0240   SODIUM  137  136  135   POTASSIUM  3.7  3.7  3.9   CHLORIDE  105  104  105   CO2  27 26  23   GLUCOSE  113*  102*  132*   BUN  19 19 25*     Recent Labs      05/26/19 0215 05/27/19 0345 05/28/19   0240   ALBUMIN   --   3.4   --    TBILIRUBIN   --   0.8   --    ALKPHOSPHAT   --   35   --    TOTPROTEIN   --   5.6*   --    ALTSGPT   --   30   --    ASTSGOT   --   20   --    CREATININE  0.63  0.57  0.67        Imaging:  Ct-cta Head With & W/o-post Process  1.  No large vessel occlusion, high-grade stenosis or aneurysm of the [place]. 2.  A 4 cm mass lesion in the left frontotemporal region. Imaging features favor an abscess over a solid lesion. However, MRI is needed for definitive characterization. 3.  Extensive edema associated with the mass, resulting in 10 mm left-to-right midline shift. 4.  Effacement of the left and enlargement of the right lateral ventricles may be due to developing hydrocephalus. Findings were discussed with JOSE ALTAMIRANO on 5/21/2019 8:39 PM.    Ct-cta Neck With & W/o-post Processing  Result Date: 5/21/2019 5/21/2019 8:06 PM HISTORY/REASON FOR EXAM:  SAH suspected, initial exam TECHNIQUE/EXAM DESCRIPTION: CT angiogram of the neck with contrast. Postcontrast images were obtained of the neck from the great vessels through the skull base following the power injection of nonionic contrast at 5.0 mL/sec. Thin-section helical images were obtained with overlapping reconstruction interval. Coronal and oblique multiplanar volume reformats were generated. Cervical internal carotid artery percent stenosis is calculated using the standard method according to the NASCET criteria wherein a segment of uniform caliber mid or distal cervical internal carotid is used as the reference denominator. 3D angiographic images were reviewed on PACS.  Maximum intensity projection (MIP) images were generated and reviewed 100 mL of Omnipaque 350 nonionic contrast was injected intravenously. Low dose optimization technique was utilized for this CT exam including automated exposure control and adjustment of the mA and/or kV according to patient size. COMPARISON:  None. FINDINGS: Left-sided aortic arch with patent great vessel origins. The right common carotid artery, cervical carotid bifurcation, and cervical internal carotid artery show no significant stenosis. There is no evidence of dissection or aneurysm.  The left common carotid artery, cervical carotid bifurcation, and cervical internal carotid artery show no significant stenosis. There is no evidence of dissection or aneurysm. The cervical vertebral arteries are patent bilaterally. Prominent right paratracheal node measuring 1 cm short axis, statistically reactive. Otherwise, the neck soft tissues and lung apices in the field of view are unremarkable. 3D angiographic/MIP images of the vasculature confirm the vascular findings as described above.     Unremarkable CT angiogram of the neck. No high-grade stenosis, large vessel occlusion, aneurysm or dissection.    Dx-chest-2 Views  Result Date: 5/12/2019 5/12/2019 2:28 AM HISTORY/REASON FOR EXAM:  Cough TECHNIQUE/EXAM DESCRIPTION AND NUMBER OF VIEWS: Two views of the chest. COMPARISON:  None. FINDINGS: LUNGS: Linear atelectasis in the left midlung. No focal consolidation. No effusions. PNEUMOTHORAX: None. LINES AND TUBES: None. MEDIASTINUM: No cardiomegaly. Atherosclerosis. MUSCULOSKELETAL STRUCTURES: No acute fracture. Hiatal hernia.   1.  No focal consolidation or pleural effusions. 2.  Hiatal hernia.    Dx-chest-portable (1 View)  Result Date: 5/24/2019 5/24/2019 1:08 AM HISTORY/REASON FOR EXAM:  For indication of respiratory failure. TECHNIQUE/EXAM DESCRIPTION AND NUMBER OF VIEWS: Single portable view of the chest. COMPARISON: Yesterday FINDINGS: Endotracheal tube, NG tube and right IJ central line mean in place. There is bibasilar atelectasis or consolidation. There are probable trace effusions. No pneumothorax. Lung aeration appears slightly improved.   Slight improvement in lung aeration.      Mr-stealth Brain With & W/o  Result Date: 5/22/2019  COMPARISON:  CTA of the head 5/21/2019 FINDINGS: No fiducial markers are appreciated. The calvaria are unremarkable. There are no extra-axial fluid collections. The left temporal lobe, there is a thick-walled ring-enhancing mass which measures about 47 mm x 32 mm x 43  mm. Central hypoenhancement may represent tumor necrosis or cystic change. There is marked surrounding vasogenic edema extending into the basal ganglia, subinsular white matter, internal capsule, corona radiata, and left frontal deep white matter. There is effacement of the left lateral ventricle and moderate dilatation of the right lateral ventricle including the temporal horn due to trapping. There is left-to-right shift of midline structures of about 11 mm (T2 axial image 54, series 3). There are no other enhancing lesions. The brainstem and posterior fossa structures are unremarkable for marked compression of the midbrain with flattening of the left cerebral peduncle and effacement of perimesencephalic cisterns. There is some vasogenic edema extending down into the left side of the midbrain as well as the left thalamus and subthalamic nucleus. The major vessels including the dural venous sinuses appear intact. Paranasal sinuses show moderate mucosal thickening with small air-fluid/debris levels in the maxillary antra. Moderate mucosal thickening in the sphenoid sinus and among the ethmoid air cells. Minimal mucosal thickening in the frontal air cells.     1.  Large peripherally enhancing intra-axial mass in the left temporal lobe with marked surrounding vasogenic edema. Findings are most consistent with high-grade primary brain tumor such as glioblastoma. No other enhancing lesions elsewhere to suggest metastasis, however, a solitary brain metastasis would not be excluded. Brain abscess would not be excluded. There are no diffusion weighted images to determine whether contents of this lesion show restricted diffusion. There are no prior MRI scans for comparison. 2.  Marked left-to-right shift of midline structures along with midbrain compression and displacement.      Micro:  Results     Procedure Component Value Units Date/Time    Anaerobic Culture [[id number]] Collected:  05/22/19 0980    Order Status:   "Completed Specimen:  Wound Updated:  05/27/19 1159     Significant Indicator NEG     Source WND     Site Brain Abscess     Culture Result No Anaerobes isolated.    Narrative:       CALL  Watson  RST2 tel. ,  CALLED  RST2 tel.  05/22/2019, 19:14, RB PERF. RESULTS CALLED TO:[id number].  Surgery Specimen    CULTURE WOUND W/ GRAM STAIN [[id number]] Collected:  05/22/19 1818    Order Status:  Completed Specimen:  Wound Updated:  05/27/19 1159     Significant Indicator NEG     Source WND     Site Brain Abscess     Culture Result No growth at 72 hours.     Gram Stain Result Rare WBCs.  No organisms seen.      Narrative:       CALL  Watson  RST2 tel. ,  CALLED  RST2 tel.  05/22/2019, 19:14, RB PERF. RESULTS CALLED TO:[id number].  Surgery Specimen    BLOOD CULTURE [[id number]] Collected:  05/21/19 2126    Order Status:  Completed Specimen:  Blood from Peripheral Updated:  05/27/19 0100     Significant Indicator NEG     Source BLD     Site PERIPHERAL     Culture Result No growth after 5 days of incubation.    Narrative:       Per Hospital Policy: Only change Specimen Src: to \"Line\" if  specified by physician order.  Left AC    BLOOD CULTURE [[id number]] Collected:  05/21/19 2120    Order Status:  Completed Specimen:  Blood from Peripheral Updated:  05/27/19 0100     Significant Indicator NEG     Source BLD     Site PERIPHERAL     Culture Result No growth after 5 days of incubation.    Narrative:       Per Hospital Policy: Only change Specimen Src: to \"Line\" if  specified by physician order.  Right Wrist    Acid Fast Stain [[id number]] Collected:  05/22/19 1700    Order Status:  Completed Specimen:  Wound Updated:  05/24/19 2057     Significant Indicator NEG     Source WND     Site BRAIN ABSCESS     AFB Smear Results No acid fast bacilli seen.    Narrative:       Collected By:[id number] RADHA SAHA  Brain cyst.  Surgery Specimen    AFB Culture [[id number]] Collected:  05/22/19 1700    Order Status:  Completed Specimen:  Wound from Cyst " Updated:  05/24/19 2056     Significant Indicator NEG     Source WND     Site BRAIN ABSCESS     Culture Result Culture in progress.     AFB Smear Results No acid fast bacilli seen.    Narrative:       Collected By:[id number] RADHA SAHA  Brain cyst.  Surgery Specimen    Fungal Culture [[id number]] Collected:  05/22/19 1700    Order Status:  Completed Specimen:  Wound from Cyst Updated:  05/24/19 2056     Significant Indicator NEG     Source WND     Site BRAIN ABSCESS     Culture Result Culture in progress.    Narrative:       Collected By:[id number] RADHA SAHA  Brain cyst.  Surgery Specimen    GRAM STAIN [[id number]] Collected:  05/22/19 1818    Order Status:  Completed Specimen:  Wound Updated:  05/22/19 1914     Significant Indicator .     Source WND     Site Brain Abscess     Gram Stain Result Rare WBCs.  No organisms seen.      Narrative:       CALL  Watson  RST2 tel. ,  CALLED  RST2 tel.  05/22/2019, 19:14, RB PERF. RESULTS CALLED TO:[id number].  Surgery Specimen    FLUID CULTURE W/GRAM STAIN [[id number]]     Order Status:  No result Specimen:  Cyst from Other Body Fluid           Assessment:  Active Hospital Problems    Diagnosis   • *Brain abscess [G06.0]   • Acute respiratory failure with hypoxia (HCC) [J96.01]   • Delirium [R41.0]   • Methamphetamine abuse (HCC) [F15.10]   • S/P craniotomy [Z98.890]       Plan:  Brain mass   Cystic  Necrotic tumor vs brain abscess  No fever  MRI with 4.7 cm left temporal lobe mass with midline shift  S/p craniotomy 5/22-no op note available for review as yet  Gram stain neg; cxs neg at 72 hours  Path still pending-sent to [place]  Blood cxs neg  HIV neg  Continue cefepime/flagyl pending path results  CT chest/abd/pel neg    Leukocytosis, mild  Multifactorial  On steroids  Monitor    Meth abuse  Will needs monitored setting for IV abx if confirmed abscess    I have performed a physical exam and reviewed and updated ROS as of today.  In review of yesterday's note dated  5/27/2019,  there are no changes except as documented above.

## 2019-05-28 NOTE — PROGRESS NOTES
Pt aaox3, impulsive at times, does not call for assistance. Word finding issues. CASEY 5/5. Denies N/T. Up w/ SBA. Pt denies pain. +BS. Voiding via urinal/BR. Reviewed poc with pt-verbalized understanding- needs reinforcement. Bed alarm in use. Call light in reach.

## 2019-05-28 NOTE — DISCHARGE SUMMARY
Internal Medicine Discharge Summary  Note Author: Em Savage M.D.       Name Robert Dorantes 1965   Age/Sex 54 y.o. male   MRN [id number]       Admit Date: 2019       Discharge Date: 2019      Service: [facility] Internal Medicine Blue Team  Attending Physician(s): Dr. Aguilera     Senior Resident(s): Dr. Montelongo  Beck Resident(s): Dr. Savage  PCP: PCP to see patient on 2019      Primary Diagnosis:   Glioblastoma multiforme    Secondary Diagnoses:                Principal Problem:    Glioblastoma (HCC) POA: Yes  Active Problems:    Status post craniotomy POA: Yes    Acute respiratory failure with hypoxia (HCC) POA: Yes    Agitation POA: Yes    Swallowing impairment POA: Yes    Methamphetamine abuse (HCC) POA: Yes    Smoker POA: Yes    Delirium POA: Yes    Class 1 obesity due to excess calories without serious comorbidity with body mass index (BMI) of 33.0 to 33.9 in adult POA: Yes    Iron deficiency anemia POA: Yes  Resolved Problems:    * No resolved hospital problems. *      Hospital Summary:   53-year-old male with PMH of methamphetamine abuse and a recent ER admission for L ear pain with hearing loss that was diagnosed as suppurative otitis media and treated with Cefdinir in the outpatient setting who presented on 19 with worsening left ear pain with hearing loss, headache, confusion, unsteady gait with word finding difficulty. CT head showed a 4 cm mass lesion in the left frontotemporal lobe with extensive edema and mass effect with effacement of the left ventricle, as well as a 12 mm L to R midline shift and subfalcine herniation. Neurosurgery was consulted and recommended ICU admission with aggressive neuro checks, initiation of keppra prophylaxis and dexamethasone, as well ID consult for clinical suspicion of brain abscess. Infectious disease was consulted and, given the patient's history of acute suppurative otitis media that might have led to contigous spread of  infection leading to a brain abscess, started the patient on empiric treatment with IV vancomycin, flagyl and ceftriaxone. MRI of the brain confirmed 4 cm peripherally enhancing cystic lesion involving the L temporal lobe with surrounding vasogenic edema; findings were consistent with high grade glioblastoma versus brain abscess.  The patient required mechanical intubation from 5/22/2019 to 5/24/2019 for airway protection and was successfully extubated on 5/24/2019. EEG showed slowing in the left frontal region due to underlying structural abnormalities but no seizures were captured. On 5/22/2019, the patient underwent left temporal craniotomy with evacuation of the left temporal cystic mass with no surgical complications and LUIS drain was placed. Blood cultures, fluid cultures from LUIS drain were negative for bacteria, fungal and AFB. He was kept on hypertonic NS with decadron due to cerebral edema. He was noted to have mildly decreased right upper extremity strength with intermittent episodes of expressive aphasia, confusion, agitation and disorientation. He was started on Seroquel 25 mg twice daily. His delirium and agitation resolved. The patient was making progress with rehab. The final pathology report from [facility] demonstrated mild reactive gliosis. The patient was restarted on vancomycin, cefepime and Flagyl by ID; a 6-week course of antibiotics was recommended and the patient therefore underwent PICC line placement for outpatient IV antibiotic therapy in a skilled nursing facility.  Patient was awaiting Medicaid approval for SNF placement. On 6/11/2019, he started developing worsening expressive aphasia and ataxia.  Repeat head CT showed recurrence of his left frontotemporal mass with worsening midline shift and significant vasogenic edema. Neurosurgery was informed, repeat brain MRI confirmed recurrence of the patient's left frontotemporal mass. On 6/14/2019 the patient had difficulty swallowing and failed  a swallow evaluation by SLP, he was made NPO and Lovenox for DVT prophylaxis was discontinued. The patient's case was discussed with Dr. Young and the patient underwent repeat craniotomy on 6/16/2019. The patient was admitted to the ICU postoperatively and recovered well from the surgery, he was more alert and oriented with better memory and less expressive aphasia. Pathology from this second craniotomy demonstrated glioblastoma multiforme. ID discontinued the patient's antibiotics and his PICC line was removed. The patient was medically stable to be transferred out of the ICU to the medical floor on 6/19/2019. The patient and his family were informed of his diagnosis and prognosis. PT and OT had recommended discharge to SNF for the patient, however, the patient and his family requested that the patient be discharged home with home health as they would be able to provide the patient with adequate care in this setting. Oncology and radiation oncology was consulted on 6/20/2019 and evaluated the patient prior to discharge.  Radiation oncologist Dr. Lynne scheduled the patient a follow-up appointment on July 3 and the patient met with the oncology nurse navigator, who would be following up with the patient to schedule him an appointment with oncologist Dr. De Luna.  Patient was medically stable on 6/21/2019 to be discharged home with home health and close outpatient PCP, neurosurgery, oncology and radiation oncology follow-up.      Status post craniotomy   Assessment & Plan    S/p craniotomy on 5/22, 6/16.  SCDs for mechanical DVT prophylaxis.  Restart chemical DVT prophylaxis as per Neurosurgery.     * Glioblastoma (HCC)   Assessment & Plan    Craniotomy on 5/22/19 with negative cultures, mild reactive gliosis on pathology.   Repeat craniotomy with resection on 6/16/19 - pathology positive for glioblastoma.  Per Neurosurgery, okay for discharge.  Will wean dexamethasone: Decrease to 6 mg p.o. 3 times daily x3 days,  followed by 6 mg twice daily x3 days, followed by 4 mg twice daily x3 days.  Patient to follow-up with neurosurgery, medical oncology, radiation oncology.  Neurosurgery to complete taper outpatient.  SLP cleared for diet  Medical Oncology, Dr. De Luna, consulted on 6/20; pending recs; appreciated.    Radiation Oncology, Dr. Waters, consulted on 6/20 with specific question of whether he prefers mapping done in house or on outpatient follow-up as well as for outpatient management.   Case Management and Oncology Nurse Navigator on board for assistance with home health and oncology appointments, respectively.     Swallowing impairment   Assessment & Plan    Evaluated by SLP.  On dysphagia 2/nectar thick liquids.     Agitation   Assessment & Plan    Improved/resolved since 6/16 repeat craniotomy.    Monitor.      Acute respiratory failure with hypoxia (HCC)   Assessment & Plan    Resolved.  Intubated on admission for airway protection on 5/22/2019, extubated on 5/24/2019.  RT protocol, supplemental oxygen as needed.     Iron deficiency anemia   Assessment & Plan    FOBT negative, no records of EGD or colonoscopy per chart review, stool H. pylori test negative, celiac panel negative.  Continue ferrous sulfate daily  Recommend outpatient follow-up     Class 1 obesity due to excess calories without serious comorbidity with body mass index (BMI) of 33.0 to 33.9 in adult   Assessment & Plan    BMI 31.  Outpatient follow-up for weight loss management.  Concern for sleep apnea, recommend outpatient sleep study for further evaluation.     Delirium   Assessment & Plan    Resolved.     Smoker   Assessment & Plan    Smokes 3 cigarettes per day per his mother's report.  Smoking cessation counseling prior to discharge.     Methamphetamine abuse (HCC)   Assessment & Plan    History of meth abuse via smoking per the patient's nephew, no history of IV drug use.  Substance abuse counseling       Consultants:     Neurosurgery  Radiation  oncology  Oncology  Infectious disease    Procedures:        Procedure Laterality Date   • CRANIOTOMY STEALTH Left 6/16/2019    Procedure: LEFT TEMPORAL CRANIOTOMY, EXCISION OF BRAIN MASS, DURAL AUGMENTATION, STEALTH NAVIGATION ;  Surgeon: Chase Young M.D.;  Location: SURGERY [facility];  Service: Neurosurgery   • CRANIOTOMY [facility] Left 5/22/2019    Procedure: LEFT TEMPORAL CRANIOTOMY for DRAINAGE OF BRAIN CYST, RESECTION OF BRAIN MASS;  Surgeon: Chase Young M.D.;  Location: SURGERY [facility];  Service: Neurosurgery     Imaging:      MR-BRAIN-WITH & W/O   Final Result      1.  Recent left frontal temporal craniotomy for resection of intra-axial brain neoplasm.      2.  Irregular postsurgical cavity noted in the left anterior temporal lobe and extending superiorly near the lateral aspect of the basal ganglia. Further there is irregular peripheral enhancement about the resection site which may represent postoperative    change, however a small amount of residual neoplasm cannot be excluded with certainty especially involving the superior medial margin of the resection site.      3.  Postoperative hemorrhagic debris noted in the postsurgical cavity and about the resection site.      4.  There is a marked amount of postoperative edema surrounding the resection site in the left frontal and temporal lobes.      5.  Prominent persistent increased T2 signal intensity is noted involving the left thalamus and left parahippocampal gyrus which is suspicious for residual neoplasm if the pathology reveals a glioblastoma.      6.  Persistent marked mass effect on the left lateral ventricle and temporal horn causing 1 cm of left-to-right midline shift the ventricular system. Further there is mild dilatation of the right temporal horn and lateral ventricle.      DX-CHEST-FOR LINE PLACEMENT Perform procedure in: Other(comment f6 below): (PACU 2A)   Final Result      1.  Peripherally inserted catheter has been  placed and the tip projects appropriately over the superior vena cava.   2.  RIGHT neck catheter tip projects over the upper SVC   3.  No visible pneumothorax      MR-STEALTH BRAIN WITH & W/O   Final Result      1.  Status post recent left frontal craniotomy for biopsy of large left frontal temporal mass.      2.  There is irregular curvilinear region of postsurgical change which abuts the superior lateral aspect of the ring-enhancing left frontal temporal mass. Additionally, there is a 4 x 1.2 cm irregular subcutaneous seroma adjacent to the craniotomy site.      3.  There is a 4.5 x 4 x 3.3 cm irregular ring-enhancing mass with central necrosis arising in the left anterior temporal lobe and extending into the left lateral basal ganglia. There is a marked surrounding vasogenic edema in the left frontal and    temporal lobes. This mass likely represents a glioblastoma or metastatic deposit.      4.  There is marked effacement of the left lateral ventricle with 13 mm of left-to-right midline shift of the ventricular system. Further there is mild hydrocephalus and dilatation of the contralateral ventricle and temporal horn.      CT-HEAD W/O   Final Result      1.  No significant change      2.  Large left temporal mass with surrounding white matter edema, mass effect, left-to-right shift, and subfalcine herniation      MR-BRAIN-WITH & W/O   Final Result      1.  Postsurgical changes adjacent to the LEFT temporal mass involving the inferior LEFT frontal lobe which is grossly unchanged in size, with edema, fluid and enhancement likely related to surgical dissection rather than spread of disease   2.  Lung mm of LEFT-to-RIGHT midline shift, unchanged with midbrain compression and displacement      IR-PICC LINE PLACEMENT W/ GUIDANCE > AGE 5   Final Result                  Ultrasound-guided PICC placement performed by qualified nursing staff as    above.          CT-CHEST,ABDOMEN,PELVIS WITH   Final Result      1.  Minimal  groundglass opacification noted anteriorly in left upper lobe is likely inflammatory.      2.  Large hiatal hernia is identified.      3.  Minimal bilateral pleural fluid collections appear to be present.      4.  Small right renal cyst is identified.      5.  Small inguinal hernias containing only abdominal fat.         CT-HEAD W/O   Final Result      1.  No significant change      2.  Large left temporal mass with surrounding white matter edema      3.  Associated mass effect with effacement left lateral ventricle, left-to-right shift, and subfalcine herniation, unchanged      EC-ECHOCARDIOGRAM COMPLETE W/O CONT   Final Result      MR-STEALTH BRAIN WITH & W/O   Final Result      1.  Large peripherally enhancing intra-axial mass in the left temporal lobe with marked surrounding vasogenic edema. Findings are most consistent with high-grade primary brain tumor such as glioblastoma. No other enhancing lesions elsewhere to suggest    metastasis, however, a solitary brain metastasis would not be excluded. Brain abscess would not be excluded. There are no diffusion weighted images to determine whether contents of this lesion show restricted diffusion. There are no prior MRI scans for    comparison.   2.  Marked left-to-right shift of midline structures along with midbrain compression and displacement.      DX-CHEST-PORTABLE (1 VIEW)   Final Result      1.  Well-positioned lines and tubes.   2.  No new consolidation or pleural effusions.      DX-CHEST-PORTABLE (1 VIEW)   Final Result         1. Borderline cardiomegaly. No focal consolidation or pleural effusions.   2. Hiatal hernia.      CT-CTA NECK WITH & W/O-POST PROCESSING   Final Result      Unremarkable CT angiogram of the neck. No high-grade stenosis, large vessel occlusion, aneurysm or dissection.      CT-CTA HEAD WITH & W/O-POST PROCESS   Final Result      1.  No large vessel occlusion, high-grade stenosis or aneurysm of the [place].   2.  A 4 cm mass  lesion in the left frontotemporal region. Imaging features favor an abscess over a solid lesion. However, MRI is needed for definitive characterization.   3.  Extensive edema associated with the mass, resulting in 10 mm left-to-right midline shift.   4.  Effacement of the left and enlargement of the right lateral ventricles may be due to developing hydrocephalus.      Findings were discussed with JOSE ALTAMIRANO on 5/21/2019 8:39 PM.          Discharge Medications:        Medication Reconciliation: Completed       Medication List      START taking these medications      Instructions   * dexamethasone 6 MG Tabs  Commonly known as:  DECADRON   Doctor's comments:  Start first dose tonight. Last dose will be 6/23/19 evening.  Take 1 Tab by mouth 3 times a day for 7 doses.  Dose:  6 mg     * dexamethasone 6 MG Tabs  Start taking on:  6/24/2019  Commonly known as:  DECADRON   Take 1 Tab by mouth 2 Times a Day for 3 days.  Dose:  6 mg     * dexamethasone 4 MG Tabs  Start taking on:  6/27/2019  Commonly known as:  DECADRON   Take 1 Tab by mouth 2 times a day for 3 days.  Dose:  4 mg     ferrous sulfate 325 (65 Fe) MG tablet  Start taking on:  6/22/2019   Take 1 Tab by mouth every morning with breakfast for 90 days.  Dose:  325 mg        * This list has 3 medication(s) that are the same as other medications prescribed for you. Read the directions carefully, and ask your doctor or other care provider to review them with you.            CONTINUE taking these medications      Instructions   acetaminophen 325 MG Tabs  Commonly known as:  TYLENOL   Take 650 mg by mouth every four hours as needed.  Dose:  650 mg        STOP taking these medications    cefdinir 300 MG Caps  Commonly known as:  OMNICEF          Disposition: Home with home health    Diet: Diabetic    Activity: As tolerated and per home physical therapy and Occupational Therapy    Instructions:      The patient was instructed to return to the ER in the event of  worsening symptoms. I have counseled the patient on the importance of compliance and the patient has agreed to proceed with all medical recommendations and follow up plan indicated above.   The patient understands that all medications come with benefits and risks. Risks may include permanent injury or death and these risks can be minimized with close reassessment and monitoring.        Primary Care Provider: Scheduled to establish care with a PCP on Monday, 6/24/2019    Discharge summary faxed to primary care provider:  Deferred  Copy of discharge summary given to the patient: Deferred    Follow Appointments:        Date Time Provider Department Center   7/3/2019 11:00 AM RADIATION THERAPY [facility] None   8/13/2019 1:15 PM Karena Coto M.D. [facility] None     The patient met with Oncology nurse navigator Anupama Carpenter R.N.  Anupama Carpenter will be following up with the patient and arrange outpatient Oncology follow-up for him.    Neurosurgery will be calling the patient and scheduling him an appointment in 1 week for suture removal, neurosurgery will make recommendations on further monitoring and management at this time.    Pending Studies:        Per oncology, radiation oncology and neurosurgery.    Time spent on discharge day patient visit, preparing discharge paperwork and arranging for patient follow up.    Summary of follow up issues:   The patient was evaluated by oncology and radiation oncology during this hospitalization and has been scheduled to follow-up with the specialties in the outpatient setting for further evaluation and management of his glioblastoma multiforme he.  The patient has been discharged on a 9-day steroid taper with close neurosurgery follow-up per neurosurgery; neurosurgery to continue the patient's steroid taper in the outpatient setting.  The patient has been accepted by home health for home physical therapy, home occupational therapy and home speech therapy as recommended by our  inpatient therapy services.      Discharge Time: 80 minutes    Hospital Course Type:  Inpatient Stay >2 midnights    Condition on Discharge: Medically stable to be discharged home with home health and close outpatient oncology, radiation oncology and neurosurgery follow-up   ______________________________________________________________________    Interval History and Exam for Day of Discharge:    No acute events overnight.    6/16/2019 pathology of left temporal mass showed glioblastoma.    ID has stopped all antibiotics.   Cleared for dsyphagia 2 NTL diet by SLP.  Medical Oncology was consulted today; Dr. De Luna to see patient.    Radiation Oncology also consulted today with specific question of whether or not mapping was preferred to be done in house or as outpatient follow-up as well as for further outpatient management.    Case Management on board for assistance with home health.        Most Recent Labs:    Lab Results   Component Value Date/Time    WBC 14.0 (H) 06/21/2019 04:08 AM    RBC 4.90 06/21/2019 04:08 AM    HEMOGLOBIN 11.8 (L) 06/21/2019 04:08 AM    HEMATOCRIT 38.0 (L) 06/21/2019 04:08 AM    MCV 77.6 (L) 06/21/2019 04:08 AM    MCH 24.1 (L) 06/21/2019 04:08 AM    MCHC 31.1 (L) 06/21/2019 04:08 AM    MPV 10.9 06/21/2019 04:08 AM    NEUTSPOLYS 85.10 (H) 06/19/2019 04:15 AM    LYMPHOCYTES 6.30 (L) 06/19/2019 04:15 AM    MONOCYTES 7.40 06/19/2019 04:15 AM    EOSINOPHILS 0.00 06/19/2019 04:15 AM    BASOPHILS 0.10 06/19/2019 04:15 AM    HYPOCHROMIA 1+ 06/13/2019 05:05 AM    ANISOCYTOSIS 1+ 06/13/2019 05:05 AM      Lab Results   Component Value Date/Time    SODIUM 136 06/21/2019 04:08 AM    POTASSIUM 3.9 06/21/2019 04:08 AM    CHLORIDE 103 06/21/2019 04:08 AM    CO2 25 06/21/2019 04:08 AM    GLUCOSE 133 (H) 06/21/2019 04:08 AM    BUN 22 06/21/2019 04:08 AM    CREATININE 0.53 06/21/2019 04:08 AM      Lab Results   Component Value Date/Time    ALTSGPT 14 06/18/2019 04:34 AM    ASTSGOT 7 (L) 06/18/2019 04:34 AM     ALKPHOSPHAT 37 06/18/2019 04:34 AM    TBILIRUBIN 0.6 06/18/2019 04:34 AM    ALBUMIN 3.3 06/18/2019 04:34 AM    GLOBULIN 2.2 06/18/2019 04:34 AM    INR 1.08 06/14/2019 06:35 AM     Lab Results   Component Value Date/Time    PROTHROMBTM 14.3 06/14/2019 06:35 AM    INR 1.08 06/14/2019 06:35 AM

## 2019-05-28 NOTE — ASSESSMENT & PLAN NOTE
Stable.  Still with some difficulty identifying objects and recalling current events.  Likely secondary to lesion in frontotemporal lobe of left dominant brain.

## 2019-05-28 NOTE — THERAPY
"Speech Language Therapy Evaluation completed to address communication    Functional Status:  Pt was seen for an aphasia evaluation.  Pt was AAOx2, with confusion to reason and place.  When asked where he was, pt replied \"That's today, here in the office.\"  The Western Aphasia Battery was administered in part, however not 100% completed as pt became anxious and tearful.  Pt presents with a fluent-type aphasia, characterized by frequent use of paraphasias and occasional use of jargon, as well as frequent word finding difficulty.  Pt's receptive language appears to be stronger than his expressive language at this time. Pt had minimal deficits naming pictures, and was delayed when naming objects, however was 100% accurate.  He had moderate deficits with sentence completion tasks, and minimal deficits with responsive speech tasks.  Pt's repetition of words, phrases and sentences was WNL.  Pt had minimal deficits answering abstract Y/N questions, as well as following 3-step directives.  Pt had minimal difficulty reading short sentences, and moderate deficits reading short paragraphs.  He wrote his name and a sentence to dictation with 100% accuracy, although he reports difficulty with handwriting.  Pt also had severe deficits with simple calculations (subtraction, multiplication and division).  Pt became tearful throughout evaluation, stating, \"this is scary\", and demonstrating some insight into his deficits that he did not endorse at the beginning of the session.  Pt also endorses loss of memory.  RN reports impulsivity and lack of safety awareness/insight, which was also evident at times throughout evaluation.  Consider further cognitive testing when appropriate, however at this time pt will benefit from aphasia therapy in the acute care setting, as well as post acute SLP services.  Thank you for the consult.      Recommendations:  1) Pt will benefit from post acute SLP services to address aphasia, 2) SLP continues to " "follow in the acute care setting    Plan of Care: Will benefit from Speech Therapy 5 times per week    Post-Acute Therapy: Recommend inpatient transitional care services for continued speech therapy services.      See \"Rehab Therapy-Acute\" Patient Summary Report for complete documentation. Thank you for the consult.  "

## 2019-05-28 NOTE — ASSESSMENT & PLAN NOTE
FOBT negative, no records of EGD or colonoscopy per chart review, stool H. pylori test negative, celiac panel negative.  Continue ferrous sulfate daily  Recommend outpatient follow-up

## 2019-05-28 NOTE — PROGRESS NOTES
Neurosurgery Progress Note    Subjective:  No acute events. Sitting up in bed watching tv. Complains of HA and right scalp tenderness.    Exam: Seen in conjunction with Dr Young  Awake. Alert. Fluent speech, improved.  No apparent distress  Cooperative  Pupils 3 mm midline reactive conjugate gaze  Right  bicep tricep 4/5, deltoid 3/5 IP DF PF 4+/5  Left scalp incision c/d/i with staples  Eating/Drinking. No n/v  Voiding  Ambulatory    CT head good evacuation left temporal cyst with persistent cerebral edema    BP  Min: 119/77  Max: 139/78  Pulse  Av.3  Min: 57  Max: 69  Resp  Av.3  Min: 16  Max: 17  Temp  Av.8 °C (98.2 °F)  Min: 36.6 °C (97.9 °F)  Max: 36.9 °C (98.5 °F)  SpO2  Av %  Min: 97 %  Max: 99 %    No Data Recorded    Recent Labs      195  19   0240   WBC  9.7  11.7*  13.4*   RBC  5.00  5.24  5.29   HEMOGLOBIN  11.7*  12.0*  12.2*   HEMATOCRIT  37.7*  39.3*  39.9*   MCV  75.4*  75.0*  75.4*   MCH  23.4*  22.9*  23.1*   MCHC  31.0*  30.5*  30.6*   RDW  49.6  48.1  49.1   PLATELETCT  279  279  283   MPV  10.3  11.0  11.1     Recent Labs      19   0345  19   0240   SODIUM  137  136  135   POTASSIUM  3.7  3.7  3.9   CHLORIDE  105  104  105   CO2  27  26  23   GLUCOSE  113*  102*  132*   BUN  19  19  25*   CREATININE  0.63  0.57  0.67   CALCIUM  8.1*  8.2*  8.4*               Intake/Output       19 - 19 - 19 Total  Total       Intake    P.O.  200  -- 200  240  -- 240    P.O. 200 -- 200 240 -- 240    IV Piggyback  8.3  -- 8.3  --  -- --    Volume (mL) (vancomycin 1,500 mg in  mL IVPB) 8.3 -- 8.3 -- -- --    Volume (mL) (metroNIDAZOLE (FLAGYL) IVPB 500 mg) 0 -- 0 -- -- --    Volume (mL) (cefepime (MAXIPIME) 2 g in  mL IVPB) 0 -- 0 -- -- --    Total Intake 208.3 -- 208.3 240 -- 240       Output    Urine  750  -- 750  --  --  --    Number of Times Voided 5 x -- 5 x 1 x -- 1 x    Number of Times Incontinent of Urine 0 x -- 0 x -- -- --    Urine Void (mL) 750 -- 750 -- -- --    Stool  --  -- --  --  -- --    Number of Times Stooled 0 x -- 0 x -- -- --    Total Output 750 -- 750 -- -- --       Net I/O     -541.7 -- -541.7 240 -- 240            Intake/Output Summary (Last 24 hours) at 05/28/19 0954  Last data filed at 05/28/19 0819   Gross per 24 hour   Intake              240 ml   Output              200 ml   Net               40 ml            • acetaminophen  650 mg Q8HRS PRN   • polyethylene glycol/lytes  1 Packet BID   • docusate sodium  100 mg BID   • senna-docusate  1 Tab Nightly   • cloNIDine  0.1 mg Q4HRS PRN   • magnesium hydroxide  30 mL QDAY PRN   • senna-docusate  1 Tab Q24HRS PRN   • dexamethasone  4 mg BID    Followed by   • [START ON 5/30/2019] dexamethasone  2 mg BID    Followed by   • [START ON 6/1/2019] dexamethasone  2 mg DAILY   • metroNIDAZOLE (FLAGYL) IV  500 mg Q8HRS   • Respiratory Care per Protocol   Continuous RT   • oxyCODONE immediate-release  2.5 mg Q3HRS PRN   • cefepime  2 g Q8HRS   • Pharmacy Consult Request  1 Each PHARMACY TO DOSE   • oxyCODONE immediate-release  5 mg Q3HRS PRN   • MD ALERT...DO NOT ADMINISTER NSAIDS or ASPIRIN unless ORDERED By Neurosurgery  1 Each PRN   • ondansetron  4 mg Q4HRS PRN   • scopolamine  1 Patch Q72HRS PRN   • labetalol  10 mg Q HOUR PRN   • hydrALAZINE  10 mg Q HOUR PRN   • bisacodyl  10 mg Q24HRS PRN   • fleet  1 Each Once PRN       Assessment and Plan:  Hospital day #7 left temporal peripherally enhancing cystic lesion  POD #6 left temporal cranitomy, cyst evacuation/biopsy  Prophylactic anticoagulation: no         Start date/time: tbd    Plan:   Stable neuro exam  Cyst:  Pathology pending but frozen benign, cultures no growth, unclear etiology.  Examination continues to improve  Continue ABX unless pathology demontrates malignancy  Chest abdomen pelvis CT negative for  primary tumor  Continue PT/OT therapies

## 2019-05-28 NOTE — CARE PLAN
Problem: Safety  Goal: Will remain free from falls    Intervention: Implement fall precautions  Bed alarm in use. Call light w/in reach. Instructed pt to call for assistance before getting OOB- pt verbalized understanding.        Problem: Knowledge Deficit  Goal: Knowledge of disease process/condition, treatment plan, diagnostic tests, and medications will improve    Intervention: Explain information regarding disease process/condition, treatment plan, diagnostic tests, and medications and document in education  poc discussed- needs reinforcement.

## 2019-05-28 NOTE — PROGRESS NOTES
Telemetry Summary  Rhythm:  Sinus rhythm  HR:  63-64  MA:  0.18  QRS:  0.08  QT:  0.38  Ectopy:  Frequent to occasional PACs, rare PVCs

## 2019-05-28 NOTE — PROGRESS NOTES
Internal Medicine Interval Note  Note Author: Salvatore Jimenez M.D.     Name Robert Dorantes 1965   Age/Sex 53 y.o. male   MRN [id number]   Code Status Full      After 5PM or if no immediate response to page, please call for cross-coverage  Attending/Team: Dr. Christina / NETO GRIGGS  See Patient List for primary contact information  Call [phone] to page    1st Call - Day Intern (R1):   Dr. Jimenez  2nd Call - Day Sr. Resident (R2/R3):   Dr. Wood          Reason for interval visit  (Principal Problem)   Necrotic tumor vs brain abscess      Interval Problem Daily Status Update  (24 hours, problem oriented, brief subjective history, new lab/imaging data pertinent to that problem)     - Patient is alert and oriented to place and person, intermittently oriented to time.  Patient is frustrated and teary due to inability to recall things and clearly express himself.  - Overnight, according to the nurse patient was aggressive and impulsive.  He had several falls in the ICU.  Will start patient on Haldol 1-5 mg as needed for agitation as well as Seroquel 50 mg as needed for agitation.  - Patient denies fever, chills, blurred vision, dizziness, nausea or vomiting.  However, he complains of pain at the surgical site.  - Patient has 5/5 strength in all extremities.  - WBC count at 13.4, likely secondary to patient being on dexamethasone.  - PT evaluated the patient on  and recommended acute inpatient rehab.  Referral to physiatry placed.     Review of Systems   Constitutional: Negative for chills and fever.   HENT: Negative for congestion and sore throat.    Eyes: Negative for blurred vision, photophobia and redness.   Respiratory: Negative for shortness of breath.    Cardiovascular: Negative for chest pain, palpitations and leg swelling.   Gastrointestinal: Negative for abdominal pain, nausea and vomiting.   Genitourinary: Negative for dysuria and hematuria.   Musculoskeletal: Negative for myalgias.    Skin: Negative for itching and rash.   Neurological: Positive for speech change (Expressive aphasia) and headaches. Negative for dizziness, focal weakness, seizures, loss of consciousness and weakness.   Psychiatric/Behavioral: Positive for memory loss (Impaired short-term memory).       Disposition/Barriers to discharge:   IV antibiotics for possible brain abscess  Pathology report from [place]  Confusion/delirium    Consultants/Specialty  Neurosurgery  Pulmonology  Infectious diseases  Speech pathology    PCP: Pcp Pt States None      Quality Measures  Quality-Core Measures   Reviewed items::  Labs reviewed, Medications reviewed and Radiology images reviewed  Richards catheter::  No Richards  DVT prophylaxis pharmacological::  Contraindicated - High bleeding risk (The setting of recent neurosurgery)  DVT prophylaxis - mechanical:  SCDs  Ulcer Prophylaxis::  No  Antibiotics:  Treating active infection/contamination beyond 24 hours perioperative coverage          Physical Exam       Vitals:    05/27/19 1410 05/27/19 2000 05/28/19 0400 05/28/19 0805   BP: 121/80 119/77 122/70 139/78   Pulse: 67 69 (!) 57 64   Resp: 16 16 16 17   Temp: 36.9 °C (98.5 °F) 36.7 °C (98.1 °F)  36.6 °C (97.9 °F)   TempSrc: Temporal Temporal  Temporal   SpO2: 98% 97%  99%   Weight:       Height:         Body mass index is 32.75 kg/m².    Oxygen Therapy:  Pulse Oximetry: 99 %, O2 (LPM): 0, O2 Delivery: None (Room Air)    Physical Exam   Constitutional: No distress.   HENT:   Head:       Mouth/Throat: No oropharyngeal exudate.   Eyes: Pupils are equal, round, and reactive to light. Conjunctivae and EOM are normal. No scleral icterus.   Neck: Normal range of motion. No JVD present.   Cardiovascular: Normal rate, regular rhythm and normal heart sounds.    No murmur heard.  Pulmonary/Chest: Breath sounds normal. No respiratory distress. He has no wheezes.   Abdominal: He exhibits no distension. There is no tenderness.   Musculoskeletal: He exhibits  no edema or tenderness.   Lymphadenopathy:     He has no cervical adenopathy.   Neurological: He is alert.   Oriented to place and person, however intermittently oriented to time   Skin: No rash noted. No erythema.   Psychiatric: His mood appears anxious. His affect is labile. He exhibits abnormal recent memory.             Assessment/Plan     * Brain abscess- (present on admission)   Assessment & Plan    Treated for acute suppurative otitis media 9 days ago.  9 days history of increasing left ear pain, confusion, difficulty finding words.  History of meth abuse.  CT scan of head with contrast showed fluid collection most likely abscess over the left frontotemporal area with mass-effect, midline shift and evidence of developing hydrocephalus.  Neurosurgery consulted, s/p craniotomy 5/22/19  ID on board, patient was initially on cefepime Vanco and Flagyl -vancomycin discontinued on 5/27  Bacterial culture, fungal culture, and AFB with negative preliminary results.  Pathology sent to [place]; results pending.      S/P craniotomy- (present on admission)   Assessment & Plan    No complications.  Follows commands off sedation.     Agitation- (present on admission)   Assessment & Plan    Patient intermittently agitated and aggressive towards staff.  PRN fentanyl, ativan, and haldol ordered.     Acute respiratory failure with hypoxia (HCC)- (present on admission)   Assessment & Plan    RESOLVED.  Intubated for airway protection in ED 5/22 - extubated 5/24.  RT/O2 protocol.     Methamphetamine abuse (HCC)- (present on admission)   Assessment & Plan    History of meth abuse per nephew, smoking.  No history of IV use.  Consider patient education and counseling.     Expressive aphasia   Assessment & Plan    -Patient has difficulty expressing, his frustrated and teary.  - He also has difficulty recollecting recent events  - Likely secondary to lesion in the dominant (left) frontotemporal lobe affecting the Broca's area  -  Speech pathology on board.      Microcytic anemia   Assessment & Plan    Patient has a hemoglobin of 12.2 with MCV of 75.4  Iron at 17, Sats of 4% and TIBC of 386.  Patient denies blood in stools or abdominal pain.  Stool for occult blood negative  No records of colonoscopy per chart review    Plan:  - Check ferritin levels; orders placed  - We will start iron supplementation once brain abscess is ruled out       Smoker- (present on admission)   Assessment & Plan    Patient confused, per mom smokes about 3 cigarettes/day.  Consider education and counseling.

## 2019-05-28 NOTE — DISCHARGE PLANNING
Aware of PMR referral from Dr. Jimenez. Altered mental status. CT head revealed 4 cm mass lesion in left frontotemporal region. POD 6 left temporal craniotomy with evacuation left temporal cystic mass. Pathology pending - sent to [place]. Hx of methamphetamine use. Pending pathology results, pt may require IV abx in monittored setting. No Physiatry consult ordered per protocol at this time. TCC will follow for plan of care. Thank you for the referral.

## 2019-05-28 NOTE — THERAPY
"Speech Language Therapy dysphagia treatment completed.     Functional Status:  Pt was seen at breakfast, following aphasia evaluation, with a dysphagia II/thin liquid meal tray.  Pt very eager for PO intake.  He consumed soft solids, puree and thin liquids without any overt s/sx of aspiration noted.  Vocal quality remained clear throughout meal and swallow initiation was timely.  Pt noted to be impulsive with self feeding, requiring min cuieng to take small sips and bites, and eat/drink slowly.  Recommend to continue a dysphagia II diet with thin liquids.  SLP continues to follow for dysphagia and aphasia tx.      Recommendations: Continue a dysphagia II diet with thin liquids with close monitoring to ensure use of strategies     Plan of Care: Will benefit from Speech Therapy 5 times per week    Post-Acute Therapy: Recommend inpatient transitional care services for continued speech therapy services.      See \"Rehab Therapy-Acute\" Patient Summary Report for complete documentation.     "

## 2019-05-29 LAB
ANION GAP SERPL CALC-SCNC: 8 MMOL/L (ref 0–11.9)
BASOPHILS # BLD AUTO: 0.2 % (ref 0–1.8)
BASOPHILS # BLD: 0.02 K/UL (ref 0–0.12)
BUN SERPL-MCNC: 19 MG/DL (ref 8–22)
CALCIUM SERPL-MCNC: 8.6 MG/DL (ref 8.5–10.5)
CHLORIDE SERPL-SCNC: 105 MMOL/L (ref 96–112)
CO2 SERPL-SCNC: 25 MMOL/L (ref 20–33)
CREAT SERPL-MCNC: 0.63 MG/DL (ref 0.5–1.4)
EOSINOPHIL # BLD AUTO: 0.04 K/UL (ref 0–0.51)
EOSINOPHIL NFR BLD: 0.3 % (ref 0–6.9)
ERYTHROCYTE [DISTWIDTH] IN BLOOD BY AUTOMATED COUNT: 50.5 FL (ref 35.9–50)
FERRITIN SERPL-MCNC: 10.7 NG/ML (ref 22–322)
GLUCOSE SERPL-MCNC: 104 MG/DL (ref 65–99)
HCT VFR BLD AUTO: 43.1 % (ref 42–52)
HGB BLD-MCNC: 13.1 G/DL (ref 14–18)
IMM GRANULOCYTES # BLD AUTO: 0.1 K/UL (ref 0–0.11)
IMM GRANULOCYTES NFR BLD AUTO: 0.8 % (ref 0–0.9)
LYMPHOCYTES # BLD AUTO: 1.61 K/UL (ref 1–4.8)
LYMPHOCYTES NFR BLD: 13.2 % (ref 22–41)
MAGNESIUM SERPL-MCNC: 2.1 MG/DL (ref 1.5–2.5)
MCH RBC QN AUTO: 22.9 PG (ref 27–33)
MCHC RBC AUTO-ENTMCNC: 30.4 G/DL (ref 33.7–35.3)
MCV RBC AUTO: 75.3 FL (ref 81.4–97.8)
MONOCYTES # BLD AUTO: 1.25 K/UL (ref 0–0.85)
MONOCYTES NFR BLD AUTO: 10.2 % (ref 0–13.4)
NEUTROPHILS # BLD AUTO: 9.2 K/UL (ref 1.82–7.42)
NEUTROPHILS NFR BLD: 75.3 % (ref 44–72)
NRBC # BLD AUTO: 0 K/UL
NRBC BLD-RTO: 0 /100 WBC
PLATELET # BLD AUTO: 308 K/UL (ref 164–446)
PMV BLD AUTO: 10.9 FL (ref 9–12.9)
POTASSIUM SERPL-SCNC: 3.8 MMOL/L (ref 3.6–5.5)
RBC # BLD AUTO: 5.72 M/UL (ref 4.7–6.1)
SODIUM SERPL-SCNC: 138 MMOL/L (ref 135–145)
WBC # BLD AUTO: 12.2 K/UL (ref 4.8–10.8)

## 2019-05-29 PROCEDURE — 700101 HCHG RX REV CODE 250: Performed by: STUDENT IN AN ORGANIZED HEALTH CARE EDUCATION/TRAINING PROGRAM

## 2019-05-29 PROCEDURE — 97112 NEUROMUSCULAR REEDUCATION: CPT

## 2019-05-29 PROCEDURE — 97530 THERAPEUTIC ACTIVITIES: CPT

## 2019-05-29 PROCEDURE — 80048 BASIC METABOLIC PNL TOTAL CA: CPT

## 2019-05-29 PROCEDURE — 700111 HCHG RX REV CODE 636 W/ 250 OVERRIDE (IP): Performed by: NURSE PRACTITIONER

## 2019-05-29 PROCEDURE — 97535 SELF CARE MNGMENT TRAINING: CPT

## 2019-05-29 PROCEDURE — 82728 ASSAY OF FERRITIN: CPT

## 2019-05-29 PROCEDURE — 700111 HCHG RX REV CODE 636 W/ 250 OVERRIDE (IP): Performed by: INTERNAL MEDICINE

## 2019-05-29 PROCEDURE — 700102 HCHG RX REV CODE 250 W/ 637 OVERRIDE(OP): Performed by: INTERNAL MEDICINE

## 2019-05-29 PROCEDURE — A9270 NON-COVERED ITEM OR SERVICE: HCPCS | Performed by: INTERNAL MEDICINE

## 2019-05-29 PROCEDURE — 83735 ASSAY OF MAGNESIUM: CPT

## 2019-05-29 PROCEDURE — 85025 COMPLETE CBC W/AUTO DIFF WBC: CPT

## 2019-05-29 PROCEDURE — 99232 SBSQ HOSP IP/OBS MODERATE 35: CPT | Performed by: INTERNAL MEDICINE

## 2019-05-29 PROCEDURE — 700112 HCHG RX REV CODE 229: Performed by: INTERNAL MEDICINE

## 2019-05-29 PROCEDURE — 700102 HCHG RX REV CODE 250 W/ 637 OVERRIDE(OP): Performed by: STUDENT IN AN ORGANIZED HEALTH CARE EDUCATION/TRAINING PROGRAM

## 2019-05-29 PROCEDURE — 99232 SBSQ HOSP IP/OBS MODERATE 35: CPT | Mod: GC | Performed by: INTERNAL MEDICINE

## 2019-05-29 PROCEDURE — 36415 COLL VENOUS BLD VENIPUNCTURE: CPT

## 2019-05-29 PROCEDURE — 700105 HCHG RX REV CODE 258: Performed by: INTERNAL MEDICINE

## 2019-05-29 PROCEDURE — 770001 HCHG ROOM/CARE - MED/SURG/GYN PRIV*

## 2019-05-29 PROCEDURE — A9270 NON-COVERED ITEM OR SERVICE: HCPCS | Performed by: STUDENT IN AN ORGANIZED HEALTH CARE EDUCATION/TRAINING PROGRAM

## 2019-05-29 RX ORDER — OXYCODONE HYDROCHLORIDE 5 MG/1
5 TABLET ORAL EVERY 6 HOURS PRN
Status: DISCONTINUED | OUTPATIENT
Start: 2019-05-29 | End: 2019-06-20

## 2019-05-29 RX ORDER — METRONIDAZOLE 500 MG/1
500 TABLET ORAL EVERY 8 HOURS
Status: DISCONTINUED | OUTPATIENT
Start: 2019-05-29 | End: 2019-06-07

## 2019-05-29 RX ORDER — FERROUS SULFATE 325(65) MG
325 TABLET ORAL
Status: DISCONTINUED | OUTPATIENT
Start: 2019-05-29 | End: 2019-06-20

## 2019-05-29 RX ORDER — QUETIAPINE FUMARATE 25 MG/1
25 TABLET, FILM COATED ORAL EVERY EVENING
Status: DISCONTINUED | OUTPATIENT
Start: 2019-05-29 | End: 2019-06-04

## 2019-05-29 RX ADMIN — METRONIDAZOLE 500 MG: 500 TABLET, FILM COATED ORAL at 21:38

## 2019-05-29 RX ADMIN — DEXAMETHASONE SODIUM PHOSPHATE 4 MG: 4 INJECTION, SOLUTION INTRA-ARTICULAR; INTRALESIONAL; INTRAMUSCULAR; INTRAVENOUS; SOFT TISSUE at 05:16

## 2019-05-29 RX ADMIN — DOCUSATE SODIUM 100 MG: 100 CAPSULE, LIQUID FILLED ORAL at 05:17

## 2019-05-29 RX ADMIN — POLYETHYLENE GLYCOL 3350 1 PACKET: 17 POWDER, FOR SOLUTION ORAL at 18:32

## 2019-05-29 RX ADMIN — METRONIDAZOLE 500 MG: 500 TABLET, FILM COATED ORAL at 15:29

## 2019-05-29 RX ADMIN — CEFEPIME 2 G: 2 INJECTION, POWDER, FOR SOLUTION INTRAVENOUS at 15:35

## 2019-05-29 RX ADMIN — CEFEPIME 2 G: 2 INJECTION, POWDER, FOR SOLUTION INTRAVENOUS at 05:17

## 2019-05-29 RX ADMIN — DOCUSATE SODIUM 100 MG: 100 CAPSULE, LIQUID FILLED ORAL at 18:32

## 2019-05-29 RX ADMIN — METRONIDAZOLE 500 MG: 500 INJECTION, SOLUTION INTRAVENOUS at 04:11

## 2019-05-29 RX ADMIN — DEXAMETHASONE SODIUM PHOSPHATE 4 MG: 4 INJECTION, SOLUTION INTRA-ARTICULAR; INTRALESIONAL; INTRAMUSCULAR; INTRAVENOUS; SOFT TISSUE at 18:32

## 2019-05-29 RX ADMIN — SENNOSIDES,DOCUSATE SODIUM 1 TABLET: 8.6; 5 TABLET, FILM COATED ORAL at 21:37

## 2019-05-29 RX ADMIN — CEFEPIME 2 G: 2 INJECTION, POWDER, FOR SOLUTION INTRAVENOUS at 23:01

## 2019-05-29 RX ADMIN — QUETIAPINE FUMARATE 25 MG: 25 TABLET ORAL at 18:32

## 2019-05-29 ASSESSMENT — COGNITIVE AND FUNCTIONAL STATUS - GENERAL
TOILETING: A LITTLE
DRESSING REGULAR LOWER BODY CLOTHING: A LITTLE
PERSONAL GROOMING: A LITTLE
HELP NEEDED FOR BATHING: A LITTLE
SUGGESTED CMS G CODE MODIFIER DAILY ACTIVITY: CK
DRESSING REGULAR UPPER BODY CLOTHING: A LITTLE
DAILY ACTIVITIY SCORE: 19

## 2019-05-29 ASSESSMENT — ENCOUNTER SYMPTOMS
BLURRED VISION: 0
FOCAL WEAKNESS: 0
DIZZINESS: 0
VOMITING: 0
SPEECH CHANGE: 1
CHILLS: 0
LOSS OF CONSCIOUSNESS: 0
COUGH: 0
MEMORY LOSS: 1
PHOTOPHOBIA: 0
WEAKNESS: 0
SEIZURES: 0
ABDOMINAL PAIN: 0
SHORTNESS OF BREATH: 0
HEADACHES: 1
MYALGIAS: 0
PALPITATIONS: 0
NAUSEA: 0
FEVER: 0
SORE THROAT: 0
EYE REDNESS: 0

## 2019-05-29 NOTE — DISCHARGE PLANNING
Aware of repeat PMR referral from Dr. Wood. Rehab remains monitoring for pathology results/plan of care. No Physiatry consult as yet per protocol. TCC  following.

## 2019-05-29 NOTE — PROGRESS NOTES
Neurosurgery Progress Note    Subjective:  No acute events. Sitting up in chair at bedside. No headache at this time.    Exam:   A&O x3. Disoriented to event/reason for hospitalization. Becomes tearful when brain surgery explained.    Exhibits some confusion and repetitive statements about his work during conversation.   4 PERRL, EOMI. Denies blurry or double vision. States his vision is better than before  Tongue midline without fasciculation. No facial droop.   Hearing intact.   Strength: Bilateral shoulder shrug, bicep, tricep, deltoid,  5/5.                   Bilateral IP, DF, PF 5/5.   Sensation: Intact throughout.   Incision: open to air. c/d/i with staples intact  Eating, drinking. Denies n/v.  Voiding. +BM.  Pain controlled.   Ambulatory. Denies dizziness.    CT head good evacuation left temporal cyst with persistent cerebral edema    BP  Min: 108/76  Max: 113/69  Pulse  Av  Min: 64  Max: 64  Resp  Av  Min: 16  Max: 16  Temp  Av.6 °C (97.8 °F)  Min: 36.2 °C (97.1 °F)  Max: 36.9 °C (98.4 °F)  SpO2  Av.5 %  Min: 95 %  Max: 98 %    No Data Recorded    Recent Labs      19   05   WBC  11.7*  13.4*  12.2*   RBC  5.24  5.29  5.72   HEMOGLOBIN  12.0*  12.2*  13.1*   HEMATOCRIT  39.3*  39.9*  43.1   MCV  75.0*  75.4*  75.3*   MCH  22.9*  23.1*  22.9*   MCHC  30.5*  30.6*  30.4*   RDW  48.1  49.1  50.5*   PLATELETCT  279  283  308   MPV  11.0  11.1  10.9     Recent Labs      19   05   SODIUM  136  135  138   POTASSIUM  3.7  3.9  3.8   CHLORIDE  104  105  105   CO2  26  23  25   GLUCOSE  102*  132*  104*   BUN  19  25*  19   CREATININE  0.57  0.67  0.63   CALCIUM  8.2*  8.4*  8.6               Intake/Output       19 0700 - 19 0619 - 19 0659      [id number] 7520-9328 Total  Total       Intake    P.O.  1240  200 1440  --  -- --    P.O. 6120 789 4404 -- -- --     IV Piggyback  200  -- 200  --  -- --    Volume (mL) (metroNIDAZOLE (FLAGYL) IVPB 500 mg) 100 -- 100 -- -- --    Volume (mL) (cefepime (MAXIPIME) 2 g in  mL IVPB) 100 -- 100 -- -- --    Total Intake 1320 715 8818 -- -- --       Output    Urine  --  -- --  --  -- --    Number of Times Voided 4 x 3 x 7 x -- -- --    Stool  --  -- --  --  -- --    Number of Times Stooled -- -- -- 0 x -- 0 x    Total Output -- -- -- -- -- --       Net I/O     1218 174 [id number] -- -- --            Intake/Output Summary (Last 24 hours) at 05/29/19 1046  Last data filed at 05/29/19 0600   Gross per 24 hour   Intake             1400 ml   Output                0 ml   Net             1400 ml            • oxyCODONE immediate-release  5 mg Q6HRS PRN   • ferrous sulfate  325 mg QDAY with Breakfast   • metroNIDAZOLE  500 mg Q8HRS   • haloperidol  1-5 mg Q4HRS PRN   • QUEtiapine  50 mg Q12HRS PRN   • acetaminophen  650 mg Q8HRS PRN   • polyethylene glycol/lytes  1 Packet BID   • docusate sodium  100 mg BID   • senna-docusate  1 Tab Nightly   • cloNIDine  0.1 mg Q4HRS PRN   • magnesium hydroxide  30 mL QDAY PRN   • senna-docusate  1 Tab Q24HRS PRN   • dexamethasone  4 mg BID    Followed by   • [START ON 5/30/2019] dexamethasone  2 mg BID    Followed by   • [START ON 6/1/2019] dexamethasone  2 mg DAILY   • Respiratory Care per Protocol   Continuous RT   • cefepime  2 g Q8HRS   • Pharmacy Consult Request  1 Each PHARMACY TO DOSE   • MD ALERT...DO NOT ADMINISTER NSAIDS or ASPIRIN unless ORDERED By Neurosurgery  1 Each PRN   • ondansetron  4 mg Q4HRS PRN   • scopolamine  1 Patch Q72HRS PRN   • labetalol  10 mg Q HOUR PRN   • hydrALAZINE  10 mg Q HOUR PRN   • bisacodyl  10 mg Q24HRS PRN   • fleet  1 Each Once PRN       Assessment and Plan:  Hospital day #8 left temporal peripherally enhancing cystic lesion  POD #7 left temporal cranitomy, cyst evacuation/biopsy  Prophylactic anticoagulation: no         Start date/time: tbd    Plan:   Stable neuro  exam  Cyst:  Pathology pending but frozen benign, cultures no growth, unclear etiology.  Continue ABX unless pathology demontrates malignancy  Chest abdomen pelvis CT negative for primary tumor  Continue PT/OT therapies  Continue medical management

## 2019-05-29 NOTE — CARE PLAN
Problem: Safety  Goal: Will remain free from injury  Outcome: PROGRESSING AS EXPECTED  Pt remains impulsive, does not use call light. Bed alarm on.     Problem: Urinary Elimination:  Goal: Ability to reestablish a normal urinary elimination pattern will improve  Outcome: PROGRESSING AS EXPECTED  Pt voiding well in bathroom.

## 2019-05-29 NOTE — PROGRESS NOTES
Bedside report recieved, assumed care.   Pt is A&Ox3, impulsive at times. Expressive aphasia.   Plan of care discussed. All needs met at this time.   Bed alarm on. Bed locked and in bed in low position, call light and belongings within reach, upper rails up. Treaded socks on. Refused SCD's.

## 2019-05-29 NOTE — DISCHARGE PLANNING
Anticipated Discharge Disposition:   IRF following.     Action:   Path report still pending so IRF is monitoring and unable to determine dispo at this time.   Rounding done. Pt was sleeping.     Barriers to Discharge:   Medical clearance-pt is on IV Decadron, IV abx, pending path.    Plan:   Follow up with MD and IRF.

## 2019-05-29 NOTE — DIETARY
Nutrition Services: Update   Day 8 of admit.  Robert Dorantes is a 53 y.o. male with admitting DX of Brain abscess    Pt is currently on Regular Dys 2 diet with thin liquids. Wt 106.5 kg via bed scale. PO intake % all meals documented last 3 days.    Malnutrition Risk: No indicators at this time.    Recommendations/Plan:     1. Encourage intake of meals  2. Document intake of all meals as % taken in ADL's to provide interdisciplinary communication across all shifts.   3. Monitor weight.  4. Nutrition rep will continue to see patient for ongoing meal and snack preferences.  5. Obtain supplement order per RD as needed.    RD following

## 2019-05-29 NOTE — CARE PLAN
Problem: Safety  Goal: Will remain free from falls  Outcome: PROGRESSING SLOWER THAN EXPECTED  Impulsive and needing constant reminders to call for assistance before getting out of bed or out of the chair.  Pt intermittently calls for assistance.     Problem: Respiratory:  Goal: Respiratory status will improve  Outcome: PROGRESSING AS EXPECTED  Pt tolerating room air

## 2019-05-29 NOTE — PROGRESS NOTES
RN MOBILITY NOTE     Surgery patient?: Yes  Date of surgery:  5/22/19  Ambulated 50 ft on day of surgery? (N/A if today is not date of surgery): NA  Number of times ambulated 50 feet or greater today: 3  Patient has been up to chair, edge of bed or HOB 90 degrees for all meals?: Yes  Goal met? (goal is ambulating at least 50 feet 2 times on day shift, one time on night shift): Yes  If patient did not meet mobility goal, why?: NA

## 2019-05-29 NOTE — PROGRESS NOTES
Infectious Disease Progress Note    Author: Coreen Martinez M.D. Date & Time of service: 2019  12:10 PM    Chief Complaint:  Brain mass       Interval History:  53 y.o. male  admitted 2019 for AMS due to above. +meth abuse.  Recently in the ED on May 12 and was diagnosed with acute suppurative otitis media   AF WBC 12.6 extubated this am-pain controlled-decreased strength RUE +delerium   AF improved strength RUE-intermittently combative and cooperative   AF WBC 9.7 drain removed-somnolent this am-per RN strength continues to improve   AF had HA earlier but now improved-no new neurologic deficits. Plan for C/A/P today   AF WBC 13.4 remains alert-strength improved. Denies SE abx. HA controlled   AF WBC 12.2 sitting up to chair-no new complaints  Labs Reviewed, Medications Reviewed, Wound Reviewed.    Review of Systems:  Review of Systems   Constitutional: Negative for chills and fever.   Respiratory: Negative for cough and shortness of breath.    Cardiovascular: Negative for chest pain.   Gastrointestinal: Negative for abdominal pain, nausea and vomiting.   Genitourinary: Negative for dysuria.   Musculoskeletal: Negative for myalgias.   Skin: Negative for rash.   Neurological: Positive for headaches.   All other systems reviewed and are negative.      Hemodynamics:  Temp (24hrs), Av.6 °C (97.8 °F), Min:36.2 °C (97.1 °F), Max:36.9 °C (98.4 °F)  Temperature: 36.9 °C (98.4 °F)  Pulse  Av.3  Min: 40  Max: 98  Blood Pressure: 113/69       Physical Exam:  Physical Exam   Constitutional: He is oriented to person, place, and time. No distress.   HENT:   Head: Normocephalic.   Mouth/Throat: No oropharyngeal exudate.   Left craniotomy site with staples   +edema inferior to site decreased   Eyes: Pupils are equal, round, and reactive to light. EOM are normal. No scleral icterus.   Neck: Neck supple.   Cardiovascular:   bradycardic   Pulmonary/Chest: Effort normal. No respiratory  distress. He has no wheezes. He has no rales.   Abdominal: Soft. He exhibits no distension. There is no tenderness.   Musculoskeletal: He exhibits edema.   Lymphadenopathy:     He has no cervical adenopathy.   Neurological: He is alert and oriented to person, place, and time. No cranial nerve deficit.   Skin: Skin is warm. No rash noted. He is not diaphoretic. No erythema.   Nursing note and vitals reviewed.      Meds:    Current Facility-Administered Medications:   •  oxyCODONE immediate-release  •  ferrous sulfate  •  metroNIDAZOLE  •  haloperidol  •  QUEtiapine  •  acetaminophen  •  polyethylene glycol/lytes  •  docusate sodium  •  senna-docusate  •  cloNIDine  •  magnesium hydroxide  •  senna-docusate  •  [COMPLETED] dexamethasone **FOLLOWED BY** [COMPLETED] dexamethasone **FOLLOWED BY** [COMPLETED] dexamethasone **FOLLOWED BY** dexamethasone **FOLLOWED BY** [START ON 5/30/2019] dexamethasone **FOLLOWED BY** [START ON 6/1/2019] dexamethasone  •  Respiratory Care per Protocol  •  cefepime  •  Pharmacy Consult Request  •  MD ALERT...DO NOT ADMINISTER NSAIDS or ASPIRIN unless ORDERED By Neurosurgery  •  ondansetron  •  scopolamine  •  labetalol  •  hydrALAZINE  •  bisacodyl  •  fleet    Labs:  Recent Labs      05/27/19 0345 05/28/19 0240 05/29/19 0517   WBC  11.7*  13.4*  12.2*   RBC  5.24  5.29  5.72   HEMOGLOBIN  12.0*  12.2*  13.1*   HEMATOCRIT  39.3*  39.9*  43.1   MCV  75.0*  75.4*  75.3*   MCH  22.9*  23.1*  22.9*   RDW  48.1  49.1  50.5*   PLATELETCT  279  283  308   MPV  11.0  11.1  10.9   NEUTSPOLYS   --    --   75.30*   LYMPHOCYTES   --    --   13.20*   MONOCYTES   --    --   10.20   EOSINOPHILS   --    --   0.30   BASOPHILS   --    --   0.20     Recent Labs      05/27/19 0345 05/28/19 0240 05/29/19 0517   SODIUM  136  135  138   POTASSIUM  3.7  3.9  3.8   CHLORIDE  104  105  105   CO2  26  23  25   GLUCOSE  102*  132*  104*   BUN  19  25*  19     Recent Labs      05/27/19 0345   05/28/19   0240  05/29/19   0517   ALBUMIN  3.4   --    --    TBILIRUBIN  0.8   --    --    ALKPHOSPHAT  35   --    --    TOTPROTEIN  5.6*   --    --    ALTSGPT  30   --    --    ASTSGOT  20   --    --    CREATININE  0.57  0.67  0.63       Imaging:  Ct-cta Head With & W/o-post Process  1.  No large vessel occlusion, high-grade stenosis or aneurysm of the [place]. 2.  A 4 cm mass lesion in the left frontotemporal region. Imaging features favor an abscess over a solid lesion. However, MRI is needed for definitive characterization. 3.  Extensive edema associated with the mass, resulting in 10 mm left-to-right midline shift. 4.  Effacement of the left and enlargement of the right lateral ventricles may be due to developing hydrocephalus. Findings were discussed with JOSE ALTAMIRANO on 5/21/2019 8:39 PM.    Ct-cta Neck With & W/o-post Processing  Result Date: 5/21/2019 5/21/2019 8:06 PM HISTORY/REASON FOR EXAM:  SAH suspected, initial exam TECHNIQUE/EXAM DESCRIPTION: CT angiogram of the neck with contrast. Postcontrast images were obtained of the neck from the great vessels through the skull base following the power injection of nonionic contrast at 5.0 mL/sec. Thin-section helical images were obtained with overlapping reconstruction interval. Coronal and oblique multiplanar volume reformats were generated. Cervical internal carotid artery percent stenosis is calculated using the standard method according to the NASCET criteria wherein a segment of uniform caliber mid or distal cervical internal carotid is used as the reference denominator. 3D angiographic images were reviewed on PACS.  Maximum intensity projection (MIP) images were generated and reviewed 100 mL of Omnipaque 350 nonionic contrast was injected intravenously. Low dose optimization technique was utilized for this CT exam including automated exposure control and adjustment of the mA and/or kV according to patient size. COMPARISON:  None. FINDINGS:  Left-sided aortic arch with patent great vessel origins. The right common carotid artery, cervical carotid bifurcation, and cervical internal carotid artery show no significant stenosis. There is no evidence of dissection or aneurysm. The left common carotid artery, cervical carotid bifurcation, and cervical internal carotid artery show no significant stenosis. There is no evidence of dissection or aneurysm. The cervical vertebral arteries are patent bilaterally. Prominent right paratracheal node measuring 1 cm short axis, statistically reactive. Otherwise, the neck soft tissues and lung apices in the field of view are unremarkable. 3D angiographic/MIP images of the vasculature confirm the vascular findings as described above.     Unremarkable CT angiogram of the neck. No high-grade stenosis, large vessel occlusion, aneurysm or dissection.    Dx-chest-2 Views  Result Date: 5/12/2019 5/12/2019 2:28 AM HISTORY/REASON FOR EXAM:  Cough TECHNIQUE/EXAM DESCRIPTION AND NUMBER OF VIEWS: Two views of the chest. COMPARISON:  None. FINDINGS: LUNGS: Linear atelectasis in the left midlung. No focal consolidation. No effusions. PNEUMOTHORAX: None. LINES AND TUBES: None. MEDIASTINUM: No cardiomegaly. Atherosclerosis. MUSCULOSKELETAL STRUCTURES: No acute fracture. Hiatal hernia.   1.  No focal consolidation or pleural effusions. 2.  Hiatal hernia.    Dx-chest-portable (1 View)  Result Date: 5/24/2019 5/24/2019 1:08 AM HISTORY/REASON FOR EXAM:  For indication of respiratory failure. TECHNIQUE/EXAM DESCRIPTION AND NUMBER OF VIEWS: Single portable view of the chest. COMPARISON: Yesterday FINDINGS: Endotracheal tube, NG tube and right IJ central line mean in place. There is bibasilar atelectasis or consolidation. There are probable trace effusions. No pneumothorax. Lung aeration appears slightly improved.   Slight improvement in lung aeration.      Transparentrees-stealth Brain With & W/o  Result Date: 5/22/2019  COMPARISON:  CTA of the head  5/21/2019 FINDINGS: No fiducial markers are appreciated. The calvaria are unremarkable. There are no extra-axial fluid collections. The left temporal lobe, there is a thick-walled ring-enhancing mass which measures about 47 mm x 32 mm x 43 mm. Central hypoenhancement may represent tumor necrosis or cystic change. There is marked surrounding vasogenic edema extending into the basal ganglia, subinsular white matter, internal capsule, corona radiata, and left frontal deep white matter. There is effacement of the left lateral ventricle and moderate dilatation of the right lateral ventricle including the temporal horn due to trapping. There is left-to-right shift of midline structures of about 11 mm (T2 axial image 54, series 3). There are no other enhancing lesions. The brainstem and posterior fossa structures are unremarkable for marked compression of the midbrain with flattening of the left cerebral peduncle and effacement of perimesencephalic cisterns. There is some vasogenic edema extending down into the left side of the midbrain as well as the left thalamus and subthalamic nucleus. The major vessels including the dural venous sinuses appear intact. Paranasal sinuses show moderate mucosal thickening with small air-fluid/debris levels in the maxillary antra. Moderate mucosal thickening in the sphenoid sinus and among the ethmoid air cells. Minimal mucosal thickening in the frontal air cells.     1.  Large peripherally enhancing intra-axial mass in the left temporal lobe with marked surrounding vasogenic edema. Findings are most consistent with high-grade primary brain tumor such as glioblastoma. No other enhancing lesions elsewhere to suggest metastasis, however, a solitary brain metastasis would not be excluded. Brain abscess would not be excluded. There are no diffusion weighted images to determine whether contents of this lesion show restricted diffusion. There are no prior MRI scans for comparison. 2.  Marked  "left-to-right shift of midline structures along with midbrain compression and displacement.      Micro:  Results     Procedure Component Value Units Date/Time    Anaerobic Culture [[id number]] Collected:  05/22/19 1818    Order Status:  Completed Specimen:  Wound Updated:  05/27/19 1159     Significant Indicator NEG     Source WND     Site Brain Abscess     Culture Result No Anaerobes isolated.    Narrative:       CALL  [facility]son  RST2 tel. ,  CALLED  RST2 tel.  05/22/2019, 19:14, RB PERF. RESULTS CALLED TO:[id number].  Surgery Specimen    CULTURE WOUND W/ GRAM STAIN [[id number]] Collected:  05/22/19 1818    Order Status:  Completed Specimen:  Wound Updated:  05/27/19 1159     Significant Indicator NEG     Source WND     Site Brain Abscess     Culture Result No growth at 72 hours.     Gram Stain Result Rare WBCs.  No organisms seen.      Narrative:       CALL  [facility]  RST2 tel. ,  CALLED  RST2 tel.  05/22/2019, 19:14, RB PERF. RESULTS CALLED TO:[id number].  Surgery Specimen    BLOOD CULTURE [[id number]] Collected:  05/21/19 2126    Order Status:  Completed Specimen:  Blood from Peripheral Updated:  05/27/19 0100     Significant Indicator NEG     Source BLD     Site PERIPHERAL     Culture Result No growth after 5 days of incubation.    Narrative:       Per Hospital Policy: Only change Specimen Src: to \"Line\" if  specified by physician order.  Left AC    BLOOD CULTURE [[id number]] Collected:  05/21/19 2120    Order Status:  Completed Specimen:  Blood from Peripheral Updated:  05/27/19 0100     Significant Indicator NEG     Source BLD     Site PERIPHERAL     Culture Result No growth after 5 days of incubation.    Narrative:       Per Hospital Policy: Only change Specimen Src: to \"Line\" if  specified by physician order.  Right Wrist    Acid Fast Stain [[id number]] Collected:  05/22/19 1700    Order Status:  Completed Specimen:  Wound Updated:  05/24/19 2057     Significant Indicator NEG     Source WND     Site BRAIN ABSCESS     AFB " Smear Results No acid fast bacilli seen.    Narrative:       Collected By:[id number] RADHA SAHA  Brain cyst.  Surgery Specimen    AFB Culture [[id number]] Collected:  05/22/19 1700    Order Status:  Completed Specimen:  Wound from Cyst Updated:  05/24/19 2056     Significant Indicator NEG     Source WND     Site BRAIN ABSCESS     Culture Result Culture in progress.     AFB Smear Results No acid fast bacilli seen.    Narrative:       Collected By:[id number] RADHA SAHA  Brain cyst.  Surgery Specimen    Fungal Culture [[id number]] Collected:  05/22/19 1700    Order Status:  Completed Specimen:  Wound from Cyst Updated:  05/24/19 2056     Significant Indicator NEG     Source WND     Site BRAIN ABSCESS     Culture Result Culture in progress.    Narrative:       Collected By:[id number] RADHA SAHA  Brain cyst.  Surgery Specimen    GRAM STAIN [[id number]] Collected:  05/22/19 1818    Order Status:  Completed Specimen:  Wound Updated:  05/22/19 1914     Significant Indicator .     Source WND     Site Brain Abscess     Gram Stain Result Rare WBCs.  No organisms seen.      Narrative:       CALL  [facility]  RST2 tel. ,  CALLED  RST2 tel.  05/22/2019, 19:14, RB PERF. RESULTS CALLED TO:[id number].  Surgery Specimen    FLUID CULTURE W/GRAM STAIN [[id number]]     Order Status:  No result Specimen:  Cyst from Other Body Fluid           Assessment:  Active Hospital Problems    Diagnosis   • *Brain abscess [G06.0]   • Acute respiratory failure with hypoxia (HCC) [J96.01]   • Delirium [R41.0]   • Methamphetamine abuse (HCC) [F15.10]   • S/P craniotomy [Z98.890]       Plan:  Brain mass,   Cystic  Necrotic tumor vs brain abscess  No fever  MRI with 4.7 cm left temporal lobe mass with midline shift  S/p craniotomy 5/22-no op note available for review as yet  Gram stain neg; cxs neg final  CT c/a/p neg  Path still pending-sent to [facility]  Blood cxs neg  HIV neg  Continue cefepime/flagyl pending path results    Leukocytosis, mild  persistent  Multifactorial  On steroids  Monitor    Meth abuse  Will needs monitored setting for IV abx if confirmed abscess    I have performed a physical exam and reviewed and updated ROS as of today.  In review of yesterday's note dated  5/28/2019, there are no changes except as documented above.

## 2019-05-29 NOTE — CARE PLAN
Problem: Nutritional:  Goal: Achieve adequate nutritional intake  Patient will consume 50% of meals   Outcome: MET Date Met: 05/29/19  PO intake % all documented meals last 3 days.

## 2019-05-30 LAB
BASOPHILS # BLD AUTO: 0.2 % (ref 0–1.8)
BASOPHILS # BLD: 0.03 K/UL (ref 0–0.12)
EOSINOPHIL # BLD AUTO: 0.04 K/UL (ref 0–0.51)
EOSINOPHIL NFR BLD: 0.2 % (ref 0–6.9)
ERYTHROCYTE [DISTWIDTH] IN BLOOD BY AUTOMATED COUNT: 51.8 FL (ref 35.9–50)
HCT VFR BLD AUTO: 41.6 % (ref 42–52)
HGB BLD-MCNC: 13 G/DL (ref 14–18)
IMM GRANULOCYTES # BLD AUTO: 0.21 K/UL (ref 0–0.11)
IMM GRANULOCYTES NFR BLD AUTO: 1.2 % (ref 0–0.9)
LYMPHOCYTES # BLD AUTO: 1.56 K/UL (ref 1–4.8)
LYMPHOCYTES NFR BLD: 8.9 % (ref 22–41)
MCH RBC QN AUTO: 23.7 PG (ref 27–33)
MCHC RBC AUTO-ENTMCNC: 31.3 G/DL (ref 33.7–35.3)
MCV RBC AUTO: 75.8 FL (ref 81.4–97.8)
MONOCYTES # BLD AUTO: 1.55 K/UL (ref 0–0.85)
MONOCYTES NFR BLD AUTO: 8.9 % (ref 0–13.4)
NEUTROPHILS # BLD AUTO: 14.06 K/UL (ref 1.82–7.42)
NEUTROPHILS NFR BLD: 80.6 % (ref 44–72)
NRBC # BLD AUTO: 0 K/UL
NRBC BLD-RTO: 0 /100 WBC
PLATELET # BLD AUTO: 312 K/UL (ref 164–446)
PMV BLD AUTO: 10.6 FL (ref 9–12.9)
RBC # BLD AUTO: 5.49 M/UL (ref 4.7–6.1)
WBC # BLD AUTO: 17.5 K/UL (ref 4.8–10.8)

## 2019-05-30 PROCEDURE — 770001 HCHG ROOM/CARE - MED/SURG/GYN PRIV*

## 2019-05-30 PROCEDURE — A9270 NON-COVERED ITEM OR SERVICE: HCPCS | Performed by: INTERNAL MEDICINE

## 2019-05-30 PROCEDURE — 700102 HCHG RX REV CODE 250 W/ 637 OVERRIDE(OP): Performed by: INTERNAL MEDICINE

## 2019-05-30 PROCEDURE — 700112 HCHG RX REV CODE 229: Performed by: INTERNAL MEDICINE

## 2019-05-30 PROCEDURE — 97116 GAIT TRAINING THERAPY: CPT

## 2019-05-30 PROCEDURE — 97530 THERAPEUTIC ACTIVITIES: CPT

## 2019-05-30 PROCEDURE — 97112 NEUROMUSCULAR REEDUCATION: CPT

## 2019-05-30 PROCEDURE — 700111 HCHG RX REV CODE 636 W/ 250 OVERRIDE (IP): Performed by: NURSE PRACTITIONER

## 2019-05-30 PROCEDURE — 99232 SBSQ HOSP IP/OBS MODERATE 35: CPT | Mod: GC | Performed by: INTERNAL MEDICINE

## 2019-05-30 PROCEDURE — 700111 HCHG RX REV CODE 636 W/ 250 OVERRIDE (IP): Performed by: NEUROLOGICAL SURGERY

## 2019-05-30 PROCEDURE — 36415 COLL VENOUS BLD VENIPUNCTURE: CPT

## 2019-05-30 PROCEDURE — 99232 SBSQ HOSP IP/OBS MODERATE 35: CPT | Performed by: INTERNAL MEDICINE

## 2019-05-30 PROCEDURE — 700102 HCHG RX REV CODE 250 W/ 637 OVERRIDE(OP): Performed by: STUDENT IN AN ORGANIZED HEALTH CARE EDUCATION/TRAINING PROGRAM

## 2019-05-30 PROCEDURE — 700105 HCHG RX REV CODE 258: Performed by: INTERNAL MEDICINE

## 2019-05-30 PROCEDURE — 85025 COMPLETE CBC W/AUTO DIFF WBC: CPT

## 2019-05-30 PROCEDURE — A9270 NON-COVERED ITEM OR SERVICE: HCPCS | Performed by: STUDENT IN AN ORGANIZED HEALTH CARE EDUCATION/TRAINING PROGRAM

## 2019-05-30 PROCEDURE — 700111 HCHG RX REV CODE 636 W/ 250 OVERRIDE (IP): Performed by: INTERNAL MEDICINE

## 2019-05-30 RX ADMIN — FERROUS SULFATE TAB 325 MG (65 MG ELEMENTAL FE) 325 MG: 325 (65 FE) TAB at 08:52

## 2019-05-30 RX ADMIN — DEXAMETHASONE SODIUM PHOSPHATE 2 MG: 4 INJECTION, SOLUTION INTRAMUSCULAR; INTRAVENOUS at 18:21

## 2019-05-30 RX ADMIN — DEXAMETHASONE SODIUM PHOSPHATE 2 MG: 4 INJECTION, SOLUTION INTRAMUSCULAR; INTRAVENOUS at 05:44

## 2019-05-30 RX ADMIN — POLYETHYLENE GLYCOL 3350 1 PACKET: 17 POWDER, FOR SOLUTION ORAL at 18:21

## 2019-05-30 RX ADMIN — DOCUSATE SODIUM 100 MG: 100 CAPSULE, LIQUID FILLED ORAL at 18:21

## 2019-05-30 RX ADMIN — CEFEPIME 2 G: 2 INJECTION, POWDER, FOR SOLUTION INTRAVENOUS at 08:48

## 2019-05-30 RX ADMIN — METRONIDAZOLE 500 MG: 500 TABLET, FILM COATED ORAL at 05:44

## 2019-05-30 RX ADMIN — METRONIDAZOLE 500 MG: 500 TABLET, FILM COATED ORAL at 21:47

## 2019-05-30 RX ADMIN — ONDANSETRON 4 MG: 2 INJECTION INTRAMUSCULAR; INTRAVENOUS at 04:12

## 2019-05-30 RX ADMIN — CEFEPIME 2 G: 2 INJECTION, POWDER, FOR SOLUTION INTRAVENOUS at 21:48

## 2019-05-30 RX ADMIN — QUETIAPINE FUMARATE 25 MG: 25 TABLET ORAL at 18:20

## 2019-05-30 RX ADMIN — METRONIDAZOLE 500 MG: 500 TABLET, FILM COATED ORAL at 15:23

## 2019-05-30 RX ADMIN — CEFEPIME 2 G: 2 INJECTION, POWDER, FOR SOLUTION INTRAVENOUS at 15:23

## 2019-05-30 RX ADMIN — DOCUSATE SODIUM 100 MG: 100 CAPSULE, LIQUID FILLED ORAL at 05:44

## 2019-05-30 RX ADMIN — POLYETHYLENE GLYCOL 3350 1 PACKET: 17 POWDER, FOR SOLUTION ORAL at 05:44

## 2019-05-30 ASSESSMENT — ENCOUNTER SYMPTOMS
SORE THROAT: 0
WEAKNESS: 0
SHORTNESS OF BREATH: 0
FEVER: 0
SEIZURES: 0
COUGH: 0
NAUSEA: 0
DIARRHEA: 0
LOSS OF CONSCIOUSNESS: 0
PHOTOPHOBIA: 0
ABDOMINAL PAIN: 0
CHILLS: 0
EYE REDNESS: 0
VOMITING: 0
DIZZINESS: 0
FOCAL WEAKNESS: 0
PALPITATIONS: 0
HEADACHES: 1
BLURRED VISION: 0
SPEECH CHANGE: 1
MYALGIAS: 0
MEMORY LOSS: 1

## 2019-05-30 ASSESSMENT — COGNITIVE AND FUNCTIONAL STATUS - GENERAL
TURNING FROM BACK TO SIDE WHILE IN FLAT BAD: A LITTLE
CLIMB 3 TO 5 STEPS WITH RAILING: A LOT
MOBILITY SCORE: 17
MOVING TO AND FROM BED TO CHAIR: A LITTLE
STANDING UP FROM CHAIR USING ARMS: A LITTLE
SUGGESTED CMS G CODE MODIFIER MOBILITY: CK
MOVING FROM LYING ON BACK TO SITTING ON SIDE OF FLAT BED: A LITTLE
WALKING IN HOSPITAL ROOM: A LITTLE

## 2019-05-30 ASSESSMENT — GAIT ASSESSMENTS
ASSISTIVE DEVICE: FRONT WHEEL WALKER
DEVIATION: ATAXIC;OTHER (COMMENT)
GAIT LEVEL OF ASSIST: MINIMAL ASSIST
DISTANCE (FEET): 100

## 2019-05-30 NOTE — PROGRESS NOTES
Neurosurgery Progress Note    Subjective:  No acute events. In bed watching tv. No complaints at this time.     Exam:   A&O x3. Disoriented to event/reason for hospitalization.   Poor short term memory. Dances around questions he cannot answer. Does not remember what he had for lunch.   4 PERRL, EOMI. Denies blurry or double vision.   Tongue midline without fasciculation. No facial droop.   No drift. Unremarkable finger-nose, SHAUNA testing.   Hearing intact.   Strength: Bilateral shoulder shrug, bicep, tricep, deltoid,  5/5.                   Bilateral IP, DF, PF 5/5.   Sensation: Intact throughout.   Incision: open to air. c/d/i with staples intact.   Slight swelling to left temporal region. Tender to touch.   Eating, drinking. Denies n/v.  Voiding. +BM.  Pain controlled.   Ambulatory. Denies dizziness.    CT head good evacuation left temporal cyst with persistent cerebral edema    BP  Min: 113/71  Max: 122/76  Pulse  Av  Min: 63  Max: 71  Resp  Av  Min: 17  Max: 17  Temp  Av.6 °C (97.9 °F)  Min: 36.5 °C (97.7 °F)  Max: 36.7 °C (98 °F)  SpO2  Av.5 %  Min: 98 %  Max: 99 %    No Data Recorded    Recent Labs      19   02419   0517  19   0540   WBC  13.4*  12.2*  17.5*   RBC  5.29  5.72  5.49   HEMOGLOBIN  12.2*  13.1*  13.0*   HEMATOCRIT  39.9*  43.1  41.6*   MCV  75.4*  75.3*  75.8*   MCH  23.1*  22.9*  23.7*   MCHC  30.6*  30.4*  31.3*   RDW  49.1  50.5*  51.8*   PLATELETCT  283  308  312   MPV  11.1  10.9  10.6     Recent Labs      19   0517   SODIUM  135  138   POTASSIUM  3.9  3.8   CHLORIDE  105  105   CO2  23  25   GLUCOSE  132*  104*   BUN  25*  19   CREATININE  0.67  0.63   CALCIUM  8.4*  8.6               Intake/Output       19 0700 - 19 - 1959      0-0659 Total  Total       Intake    P.O.  --  240 240  360  -- 360    P.O. -- 240 240 360 -- 360    Total Intake -- 240 240  360 -- 360       Output    Urine  --  -- --  --  -- --    Number of Times Voided -- 3 x 3 x 1 x -- 1 x    Stool  --  -- --  --  -- --    Number of Times Stooled 0 x -- 0 x 1 x -- 1 x    Total Output -- -- -- -- -- --       Net I/O     -- 240 240 360 -- 360            Intake/Output Summary (Last 24 hours) at 05/30/19 1636  Last data filed at 05/30/19 0800   Gross per 24 hour   Intake              600 ml   Output                0 ml   Net              600 ml            • oxyCODONE immediate-release  5 mg Q6HRS PRN   • ferrous sulfate  325 mg QDAY with Breakfast   • metroNIDAZOLE  500 mg Q8HRS   • QUEtiapine  25 mg Q EVENING   • haloperidol  1-5 mg Q4HRS PRN   • acetaminophen  650 mg Q8HRS PRN   • polyethylene glycol/lytes  1 Packet BID   • docusate sodium  100 mg BID   • senna-docusate  1 Tab Nightly   • cloNIDine  0.1 mg Q4HRS PRN   • magnesium hydroxide  30 mL QDAY PRN   • senna-docusate  1 Tab Q24HRS PRN   • dexamethasone  2 mg BID    Followed by   • [START ON 6/1/2019] dexamethasone  2 mg DAILY   • Respiratory Care per Protocol   Continuous RT   • cefepime  2 g Q8HRS   • Pharmacy Consult Request  1 Each PHARMACY TO DOSE   • MD ALERT...DO NOT ADMINISTER NSAIDS or ASPIRIN unless ORDERED By Neurosurgery  1 Each PRN   • ondansetron  4 mg Q4HRS PRN   • scopolamine  1 Patch Q72HRS PRN   • labetalol  10 mg Q HOUR PRN   • hydrALAZINE  10 mg Q HOUR PRN   • bisacodyl  10 mg Q24HRS PRN   • fleet  1 Each Once PRN       Assessment and Plan:  Hospital day #9 left temporal peripherally enhancing cystic lesion  POD #8 left temporal cranitomy, cyst evacuation/biopsy  Prophylactic anticoagulation: no         Start date/time: tbd    Plan:   Stable neuro exam  Cyst:  Pathology pending but frozen benign, cultures no growth, unclear etiology.  Plan to call [facility] tomorrow for update on pathology results still pending.   Continue ABX unless pathology demontrates malignancy  Chest abdomen pelvis CT negative for primary tumor  Continue  PT/OT therapies  Continue medical management

## 2019-05-30 NOTE — PROGRESS NOTES
Internal Medicine Interval Note  Note Author: Salvatore Jimenez M.D.     Name Robert Dorantes 1965   Age/Sex 53 y.o. male   MRN [id number]   Code Status Full      After 5PM or if no immediate response to page, please call for cross-coverage  Attending/Team: Dr. Christina / NETO GRIGGS  See Patient List for primary contact information  Call [phone] to page    1st Call - Day Intern (R1):   Dr. Jimenez  2nd Call - Day Sr. Resident (R2/R3):   Dr. Wood          Reason for interval visit  (Principal Problem)   Necrotic tumor vs brain abscess      Interval Problem Daily Status Update  (24 hours, problem oriented, brief subjective history, new lab/imaging data pertinent to that problem)     - No acute events reported overnight.  - Patient seemed more drowsy than usual this morning.  He is alert and oriented to place and person but not time.    - WBC count trending up, 17.5 from 12.3 yesterday.  Patient denies headaches, nausea, vomiting, blurry vision or dizziness.   - No change in neurological examination.  - He is currently on dexamethasone 2 mg twice daily.  - Pathology report from [place] still pending.     Review of Systems   Constitutional: Negative for chills and fever.   HENT: Negative for congestion and sore throat.    Eyes: Negative for blurred vision, photophobia and redness.   Respiratory: Negative for shortness of breath.    Cardiovascular: Negative for chest pain, palpitations and leg swelling.   Gastrointestinal: Negative for abdominal pain, nausea and vomiting.   Genitourinary: Negative for dysuria and hematuria.   Musculoskeletal: Negative for myalgias.   Skin: Negative for itching and rash.   Neurological: Positive for speech change (Expressive aphasia) and headaches. Negative for dizziness, focal weakness, seizures, loss of consciousness and weakness.   Psychiatric/Behavioral: Positive for memory loss (Impaired short-term memory).       Disposition/Barriers to discharge:   IV antibiotics  for possible brain abscess  Pathology report from [place]  Confusion/delirium    Consultants/Specialty  Neurosurgery  Pulmonology  Infectious diseases  Speech pathology    PCP: Pcp Pt States None      Quality Measures  Quality-Core Measures   Reviewed items::  Labs reviewed, Medications reviewed and Radiology images reviewed  Richards catheter::  No Richards  DVT prophylaxis pharmacological::  Contraindicated - High bleeding risk (The setting of recent neurosurgery)  DVT prophylaxis - mechanical:  SCDs  Ulcer Prophylaxis::  No  Antibiotics:  Treating active infection/contamination beyond 24 hours perioperative coverage          Physical Exam       Vitals:    05/29/19 0835 05/29/19 1556 05/29/19 2020 05/30/19 0400   BP: 113/69 116/71 113/71 122/76   Pulse: 64 71 63 71   Resp: 16 18 17 17   Temp: 36.9 °C (98.4 °F) 36.3 °C (97.3 °F) 36.7 °C (98 °F) 36.5 °C (97.7 °F)   TempSrc: Temporal Temporal Temporal Temporal   SpO2: 98% 100% 99% 98%   Weight:       Height:         Body mass index is 32.75 kg/m².    Oxygen Therapy:  Pulse Oximetry: 98 %, O2 (LPM): 0, O2 Delivery: None (Room Air)    Physical Exam   Constitutional: No distress.   HENT:   Head:       Mouth/Throat: No oropharyngeal exudate.   Eyes: Pupils are equal, round, and reactive to light. Conjunctivae and EOM are normal. No scleral icterus.   Neck: Normal range of motion. No JVD present.   Cardiovascular: Normal rate, regular rhythm and normal heart sounds.    No murmur heard.  Pulmonary/Chest: Breath sounds normal. No respiratory distress. He has no wheezes.   Abdominal: He exhibits no distension. There is no tenderness.   Musculoskeletal: He exhibits no edema or tenderness.   Lymphadenopathy:     He has no cervical adenopathy.   Neurological: He is alert.   Oriented to place and person, however intermittently oriented to time   Skin: No rash noted. No erythema.   Psychiatric: His mood appears anxious. His affect is labile. He exhibits abnormal recent memory.              Assessment/Plan     * Brain abscess- (present on admission)   Assessment & Plan    Treated for acute suppurative otitis media 9 days ago.  9 days history of increasing left ear pain, confusion, difficulty finding words.  History of meth abuse.  CT scan of head with contrast showed fluid collection most likely abscess over the left frontotemporal area with mass-effect, midline shift and evidence of developing hydrocephalus.  Neurosurgery consulted, s/p craniotomy 5/22/19  ID on board, patient was initially on cefepime Vanco and Flagyl -vancomycin discontinued on 5/27  Bacterial culture, fungal culture, and AFB with negative preliminary results.  Pathology sent to [place]; results pending.      S/P craniotomy- (present on admission)   Assessment & Plan    No complications.  Follows commands off sedation.     Agitation- (present on admission)   Assessment & Plan    Patient intermittently agitated and aggressive towards staff.  -On scheduled Seroquel 25 mg at bedtime  -PRN haldol ordered.     Acute respiratory failure with hypoxia (HCC)- (present on admission)   Assessment & Plan    RESOLVED.  Intubated for airway protection in ED 5/22 - extubated 5/24.  RT/O2 protocol.     Methamphetamine abuse (HCC)- (present on admission)   Assessment & Plan    History of meth abuse per nephew, smoking.  No history of IV use.  Consider patient education and counseling.     Expressive aphasia   Assessment & Plan    -Patient has difficulty expressing, his frustrated and teary.  - He also has difficulty recollecting recent events  - Likely secondary to lesion in the dominant (left) frontotemporal lobe affecting the Broca's area  - Speech pathology on board.      Microcytic anemia   Assessment & Plan    Patient has a hemoglobin of 12.2 with MCV of 75.4  Iron at 17, Sats of 4% and TIBC of 386.  Patient denies blood in stools or abdominal pain.  Stool for occult blood negative  No records of colonoscopy per chart  review    Plan:  - Started patient on iron supplementation with ferrous sulfate.       Smoker- (present on admission)   Assessment & Plan    Patient confused, per mom smokes about 3 cigarettes/day.  Consider education and counseling.

## 2019-05-30 NOTE — DISCHARGE PLANNING
Anticipated Discharge Disposition:   IRF pending    Action:   Received message from Dr. Wood for updates on IRF.   Message said that final pathology result not received yet from [facility].     Barriers to Discharge:   Need to received final pathology report and then IRF will decide if they can accept pt.     Plan:   Update IRF once path is received.

## 2019-05-30 NOTE — THERAPY
"Physical Therapy Treatment completed.   Bed Mobility:  Supine to Sit: Supervised  Transfers: Sit to Stand: Minimal Assist  Gait: Level Of Assist: Minimal Assist with Front-Wheel Walker       Plan of Care: Will benefit from Physical Therapy 3 times per week  Discharge Recommendations: Equipment: Will Continue to Assess for Equipment Needs. Post-acute therapy Recommend inpatient transitional care services for continued physical therapy services.      See \"Rehab Therapy-Acute\" Patient Summary Report for complete documentation.     Pt continues to present w/ decreased functional mobility. Pt very impulsive and requires close guarding and a lot of cues for safety. Pt stating dizziness once sitting yet stood up and attempted to ambulate needing therapist to correct LOB. During ambulation, pt continually taking hands off the FWW and when he gets close to his destination, he attempts to leave the FWW behind. Pt needing cues from therapist to take breaks when he would state he felt dizzy and had decreased form. Pt is a very high fall risk and will require post acute therapy.  "

## 2019-05-30 NOTE — PROGRESS NOTES
Internal Medicine Interval Note  Note Author: Salvatore Jimenez M.D.     Name Robert Dorantes 1965   Age/Sex 53 y.o. male   MRN [id number]   Code Status Full      After 5PM or if no immediate response to page, please call for cross-coverage  Attending/Team: Dr. Christina / NETO GRIGGS  See Patient List for primary contact information  Call [phone] to page    1st Call - Day Intern (R1):   Dr. Jimenez  2nd Call - Day Sr. Resident (R2/R3):   Dr. Wood          Reason for interval visit  (Principal Problem)   Necrotic tumor vs brain abscess      Interval Problem Daily Status Update  (24 hours, problem oriented, brief subjective history, new lab/imaging data pertinent to that problem)     - No acute events overnight.  Patient continued to be impulsive/aggressive and received 1 dose of 50 mg Seroquel.  - This morning, patient was sleeping, however easily arousable.  He is alert and oriented to place and person but not time.  - Patient reports tolerable pain at the surgical site, denies headaches, blurry vision, nausea, vomiting or dizziness.  - No motor or sensory deficits upon examination  - Started patient on scheduled Seroquel 25 mg at bedtime.  - Started iron supplementation.  - WBC count trending down.  Pathology report still pending, continue IV antibiotics.  - We will place physiatry consult again when appropriate       Review of Systems   Constitutional: Negative for chills and fever.   HENT: Negative for congestion and sore throat.    Eyes: Negative for blurred vision, photophobia and redness.   Respiratory: Negative for shortness of breath.    Cardiovascular: Negative for chest pain, palpitations and leg swelling.   Gastrointestinal: Negative for abdominal pain, nausea and vomiting.   Genitourinary: Negative for dysuria and hematuria.   Musculoskeletal: Negative for myalgias.   Skin: Negative for itching and rash.   Neurological: Positive for speech change (Expressive aphasia) and headaches.  Negative for dizziness, focal weakness, seizures, loss of consciousness and weakness.   Psychiatric/Behavioral: Positive for memory loss (Impaired short-term memory).       Disposition/Barriers to discharge:   IV antibiotics for possible brain abscess  Pathology report from [place]  Confusion/delirium    Consultants/Specialty  Neurosurgery  Pulmonology  Infectious diseases  Speech pathology    PCP: Pcp Pt States None      Quality Measures  Quality-Core Measures   Reviewed items::  Labs reviewed, Medications reviewed and Radiology images reviewed  Richards catheter::  No Richards  DVT prophylaxis pharmacological::  Contraindicated - High bleeding risk (The setting of recent neurosurgery)  DVT prophylaxis - mechanical:  SCDs  Ulcer Prophylaxis::  No  Antibiotics:  Treating active infection/contamination beyond 24 hours perioperative coverage          Physical Exam       Vitals:    05/28/19 0805 05/28/19 2000 05/29/19 0835 05/29/19 1556   BP: 139/78 108/76 113/69 116/71   Pulse: 64 64 64 71   Resp: 17 16 16 18   Temp: 36.6 °C (97.9 °F) 36.2 °C (97.1 °F) 36.9 °C (98.4 °F) 36.3 °C (97.3 °F)   TempSrc: Temporal Temporal Temporal Temporal   SpO2: 99% 95% 98% 100%   Weight:       Height:         Body mass index is 32.75 kg/m².    Oxygen Therapy:  Pulse Oximetry: 100 %, O2 (LPM): 0, O2 Delivery: None (Room Air)    Physical Exam   Constitutional: No distress.   HENT:   Head:       Mouth/Throat: No oropharyngeal exudate.   Eyes: Pupils are equal, round, and reactive to light. Conjunctivae and EOM are normal. No scleral icterus.   Neck: Normal range of motion. No JVD present.   Cardiovascular: Normal rate, regular rhythm and normal heart sounds.    No murmur heard.  Pulmonary/Chest: Breath sounds normal. No respiratory distress. He has no wheezes.   Abdominal: He exhibits no distension. There is no tenderness.   Musculoskeletal: He exhibits no edema or tenderness.   Lymphadenopathy:     He has no cervical adenopathy.   Neurological:  He is alert.   Oriented to place and person, however intermittently oriented to time   Skin: No rash noted. No erythema.   Psychiatric: His mood appears anxious. His affect is labile. He exhibits abnormal recent memory.             Assessment/Plan     * Brain abscess- (present on admission)   Assessment & Plan    Treated for acute suppurative otitis media 9 days ago.  9 days history of increasing left ear pain, confusion, difficulty finding words.  History of meth abuse.  CT scan of head with contrast showed fluid collection most likely abscess over the left frontotemporal area with mass-effect, midline shift and evidence of developing hydrocephalus.  Neurosurgery consulted, s/p craniotomy 5/22/19  ID on board, patient was initially on cefepime Vanco and Flagyl -vancomycin discontinued on 5/27  Bacterial culture, fungal culture, and AFB with negative preliminary results.  Pathology sent to [place]; results pending.      S/P craniotomy- (present on admission)   Assessment & Plan    No complications.  Follows commands off sedation.     Agitation- (present on admission)   Assessment & Plan    Patient intermittently agitated and aggressive towards staff.  -On scheduled Seroquel 25 mg at bedtime  -PRN haldol ordered.     Acute respiratory failure with hypoxia (HCC)- (present on admission)   Assessment & Plan    RESOLVED.  Intubated for airway protection in ED 5/22 - extubated 5/24.  RT/O2 protocol.     Methamphetamine abuse (HCC)- (present on admission)   Assessment & Plan    History of meth abuse per nephew, smoking.  No history of IV use.  Consider patient education and counseling.     Expressive aphasia   Assessment & Plan    -Patient has difficulty expressing, his frustrated and teary.  - He also has difficulty recollecting recent events  - Likely secondary to lesion in the dominant (left) frontotemporal lobe affecting the Broca's area  - Speech pathology on board.      Microcytic anemia   Assessment & Plan     Patient has a hemoglobin of 12.2 with MCV of 75.4  Iron at 17, Sats of 4% and TIBC of 386.  Patient denies blood in stools or abdominal pain.  Stool for occult blood negative  No records of colonoscopy per chart review    Plan:  - Check ferritin levels; orders placed  - We will start iron supplementation once brain abscess is ruled out       Smoker- (present on admission)   Assessment & Plan    Patient confused, per mom smokes about 3 cigarettes/day.  Consider education and counseling.

## 2019-05-30 NOTE — CARE PLAN
Problem: Venous Thromboembolism (VTW)/Deep Vein Thrombosis (DVT) Prevention:  Goal: Patient will participate in Venous Thrombosis (VTE)/Deep Vein Thrombosis (DVT)Prevention Measures  Outcome: PROGRESSING AS EXPECTED  Pt refuses SCD's but ambulates frequently.

## 2019-05-30 NOTE — THERAPY
"Occupational Therapy Treatment completed with focus on ADLs, ADL transfers, patient education and upper extremity function.  Functional Status:  Min A standing grooming, Close SPV LB dressing, Min A functional mobility w/ FWW   Plan of Care: Will benefit from Occupational Therapy 5 times per week  Discharge Recommendations:  Equipment Will Continue to Assess for Equipment Needs. Post-acute therapy Recommend inpatient transitional care services for continued occupational therapy services.     See \"Rehab Therapy-Acute\" Patient Summary Report for complete documentation.     Pt is making good progress towards acute OT goals. Pt is very motivated to return to PLOF. Requires cues to attend environment on R side. Pt participated in activities to increase strength/coordination and redevelop pathways in R UE. Will continue to follow for Acute OT services. Recommend post acute placement prior to DC home.   "

## 2019-05-30 NOTE — PROGRESS NOTES
RN MOBILITY NOTE     Surgery patient?: Yes  Date of surgery:  5/22/19  Ambulated 50 ft on day of surgery? (N/A if today is not date of surgery): NA  Number of times ambulated 50 feet or greater today: 3+  Patient has been up to chair, edge of bed or HOB 90 degrees for all meals?: Yes  Goal met? (goal is ambulating at least 50 feet 2 times on day shift, one time on night shift): Yes  If patient did not meet mobility goal, why?: NA

## 2019-05-30 NOTE — PROGRESS NOTES
Infectious Disease Progress Note    Author: Coreen Martinez M.D. Date & Time of service: 2019  11:49 AM    Chief Complaint:  Brain mass       Interval History:  53 y.o. male  admitted 2019 for AMS due to above. +meth abuse.  Recently in the ED on May 12 and was diagnosed with acute suppurative otitis media   AF WBC 12.6 extubated this am-pain controlled-decreased strength RUE +delerium   AF improved strength RUE-intermittently combative and cooperative   AF WBC 9.7 drain removed-somnolent this am-per RN strength continues to improve   AF had HA earlier but now improved-no new neurologic deficits. Plan for C/A/P today   AF WBC 13.4 remains alert-strength improved. Denies SE abx. HA controlled   AF WBC 12.2 sitting up to chair-no new complaints   AF WBC 17 denies any worsening HA, visual changes, N/V/D, abd pain  Labs Reviewed, Medications Reviewed, Wound Reviewed.    Review of Systems:  Review of Systems   Constitutional: Negative for chills and fever.   Respiratory: Negative for cough and shortness of breath.    Cardiovascular: Negative for chest pain.   Gastrointestinal: Negative for abdominal pain, diarrhea, nausea and vomiting.   Genitourinary: Negative for dysuria.   Musculoskeletal: Negative for myalgias.   Skin: Negative for rash.   Neurological: Positive for headaches.        About the same   All other systems reviewed and are negative.      Hemodynamics:  Temp (24hrs), Av.5 °C (97.7 °F), Min:36.3 °C (97.3 °F), Max:36.7 °C (98 °F)  Temperature: 36.5 °C (97.7 °F)  Pulse  Av.4  Min: 40  Max: 98  Blood Pressure: 122/76       Physical Exam:  Physical Exam   Constitutional: No distress.   HENT:   Head: Normocephalic.   Mouth/Throat: No oropharyngeal exudate.   Left craniotomy site with staples   +edema inferior to site decreased   Eyes: Pupils are equal, round, and reactive to light. Conjunctivae and EOM are normal. No scleral icterus.   Neck: Neck supple.    Cardiovascular:   bradycardic   Pulmonary/Chest: Effort normal. No respiratory distress. He has no wheezes. He has no rales.   Abdominal: Soft. He exhibits no distension. There is no tenderness. There is no rebound and no guarding.   Musculoskeletal: He exhibits edema.   Lymphadenopathy:     He has no cervical adenopathy.   Neurological: He is alert. No cranial nerve deficit. Coordination normal.   Oriented to person and place   Skin: Skin is warm. No rash noted. He is not diaphoretic. No erythema.   Nursing note and vitals reviewed.      Meds:    Current Facility-Administered Medications:   •  oxyCODONE immediate-release  •  ferrous sulfate  •  metroNIDAZOLE  •  QUEtiapine  •  haloperidol  •  acetaminophen  •  polyethylene glycol/lytes  •  docusate sodium  •  senna-docusate  •  cloNIDine  •  magnesium hydroxide  •  senna-docusate  •  [COMPLETED] dexamethasone **FOLLOWED BY** [COMPLETED] dexamethasone **FOLLOWED BY** [COMPLETED] dexamethasone **FOLLOWED BY** [COMPLETED] dexamethasone **FOLLOWED BY** dexamethasone **FOLLOWED BY** [START ON 6/1/2019] dexamethasone  •  Respiratory Care per Protocol  •  cefepime  •  Pharmacy Consult Request  •  MD ALERT...DO NOT ADMINISTER NSAIDS or ASPIRIN unless ORDERED By Neurosurgery  •  ondansetron  •  scopolamine  •  labetalol  •  hydrALAZINE  •  bisacodyl  •  fleet    Labs:  Recent Labs      05/28/19   0240  05/29/19   0517  05/30/19   0540   WBC  13.4*  12.2*  17.5*   RBC  5.29  5.72  5.49   HEMOGLOBIN  12.2*  13.1*  13.0*   HEMATOCRIT  39.9*  43.1  41.6*   MCV  75.4*  75.3*  75.8*   MCH  23.1*  22.9*  23.7*   RDW  49.1  50.5*  51.8*   PLATELETCT  283  308  312   MPV  11.1  10.9  10.6   NEUTSPOLYS   --   75.30*  80.60*   LYMPHOCYTES   --   13.20*  8.90*   MONOCYTES   --   10.20  8.90   EOSINOPHILS   --   0.30  0.20   BASOPHILS   --   0.20  0.20     Recent Labs      05/28/19   0240 05/29/19   0517   SODIUM  135  138   POTASSIUM  3.9  3.8   CHLORIDE  105  105   CO2  23  25    GLUCOSE  132*  104*   BUN  25*  19     Recent Labs      05/28/19   0240  05/29/19   0517   CREATININE  0.67  0.63       Imaging:  Ct-cta Head With & W/o-post Process  1.  No large vessel occlusion, high-grade stenosis or aneurysm of the [place]. 2.  A 4 cm mass lesion in the left frontotemporal region. Imaging features favor an abscess over a solid lesion. However, MRI is needed for definitive characterization. 3.  Extensive edema associated with the mass, resulting in 10 mm left-to-right midline shift. 4.  Effacement of the left and enlargement of the right lateral ventricles may be due to developing hydrocephalus. Findings were discussed with JOSE ALTAMIRANO on 5/21/2019 8:39 PM.    Ct-cta Neck With & W/o-post Processing  Result Date: 5/21/2019 5/21/2019 8:06 PM HISTORY/REASON FOR EXAM:  SAH suspected, initial exam TECHNIQUE/EXAM DESCRIPTION: CT angiogram of the neck with contrast. Postcontrast images were obtained of the neck from the great vessels through the skull base following the power injection of nonionic contrast at 5.0 mL/sec. Thin-section helical images were obtained with overlapping reconstruction interval. Coronal and oblique multiplanar volume reformats were generated. Cervical internal carotid artery percent stenosis is calculated using the standard method according to the NASCET criteria wherein a segment of uniform caliber mid or distal cervical internal carotid is used as the reference denominator. 3D angiographic images were reviewed on PACS.  Maximum intensity projection (MIP) images were generated and reviewed 100 mL of Omnipaque 350 nonionic contrast was injected intravenously. Low dose optimization technique was utilized for this CT exam including automated exposure control and adjustment of the mA and/or kV according to patient size. COMPARISON:  None. FINDINGS: Left-sided aortic arch with patent great vessel origins. The right common carotid artery, cervical carotid  bifurcation, and cervical internal carotid artery show no significant stenosis. There is no evidence of dissection or aneurysm. The left common carotid artery, cervical carotid bifurcation, and cervical internal carotid artery show no significant stenosis. There is no evidence of dissection or aneurysm. The cervical vertebral arteries are patent bilaterally. Prominent right paratracheal node measuring 1 cm short axis, statistically reactive. Otherwise, the neck soft tissues and lung apices in the field of view are unremarkable. 3D angiographic/MIP images of the vasculature confirm the vascular findings as described above.     Unremarkable CT angiogram of the neck. No high-grade stenosis, large vessel occlusion, aneurysm or dissection.    Dx-chest-2 Views  Result Date: 5/12/2019 5/12/2019 2:28 AM HISTORY/REASON FOR EXAM:  Cough TECHNIQUE/EXAM DESCRIPTION AND NUMBER OF VIEWS: Two views of the chest. COMPARISON:  None. FINDINGS: LUNGS: Linear atelectasis in the left midlung. No focal consolidation. No effusions. PNEUMOTHORAX: None. LINES AND TUBES: None. MEDIASTINUM: No cardiomegaly. Atherosclerosis. MUSCULOSKELETAL STRUCTURES: No acute fracture. Hiatal hernia.   1.  No focal consolidation or pleural effusions. 2.  Hiatal hernia.    Dx-chest-portable (1 View)  Result Date: 5/24/2019 5/24/2019 1:08 AM HISTORY/REASON FOR EXAM:  For indication of respiratory failure. TECHNIQUE/EXAM DESCRIPTION AND NUMBER OF VIEWS: Single portable view of the chest. COMPARISON: Yesterday FINDINGS: Endotracheal tube, NG tube and right IJ central line mean in place. There is bibasilar atelectasis or consolidation. There are probable trace effusions. No pneumothorax. Lung aeration appears slightly improved.   Slight improvement in lung aeration.      Mr-stealth Brain With & W/o  Result Date: 5/22/2019  COMPARISON:  CTA of the head 5/21/2019 FINDINGS: No fiducial markers are appreciated. The calvaria are unremarkable. There are no  extra-axial fluid collections. The left temporal lobe, there is a thick-walled ring-enhancing mass which measures about 47 mm x 32 mm x 43 mm. Central hypoenhancement may represent tumor necrosis or cystic change. There is marked surrounding vasogenic edema extending into the basal ganglia, subinsular white matter, internal capsule, corona radiata, and left frontal deep white matter. There is effacement of the left lateral ventricle and moderate dilatation of the right lateral ventricle including the temporal horn due to trapping. There is left-to-right shift of midline structures of about 11 mm (T2 axial image 54, series 3). There are no other enhancing lesions. The brainstem and posterior fossa structures are unremarkable for marked compression of the midbrain with flattening of the left cerebral peduncle and effacement of perimesencephalic cisterns. There is some vasogenic edema extending down into the left side of the midbrain as well as the left thalamus and subthalamic nucleus. The major vessels including the dural venous sinuses appear intact. Paranasal sinuses show moderate mucosal thickening with small air-fluid/debris levels in the maxillary antra. Moderate mucosal thickening in the sphenoid sinus and among the ethmoid air cells. Minimal mucosal thickening in the frontal air cells.     1.  Large peripherally enhancing intra-axial mass in the left temporal lobe with marked surrounding vasogenic edema. Findings are most consistent with high-grade primary brain tumor such as glioblastoma. No other enhancing lesions elsewhere to suggest metastasis, however, a solitary brain metastasis would not be excluded. Brain abscess would not be excluded. There are no diffusion weighted images to determine whether contents of this lesion show restricted diffusion. There are no prior MRI scans for comparison. 2.  Marked left-to-right shift of midline structures along with midbrain compression and  "displacement.      Micro:  Results     Procedure Component Value Units Date/Time    Anaerobic Culture [[id number]] Collected:  05/22/19 1818    Order Status:  Completed Specimen:  Wound Updated:  05/27/19 1159     Significant Indicator NEG     Source WND     Site Brain Abscess     Culture Result No Anaerobes isolated.    Narrative:       CALL  [facility]  RST2 tel. ,  CALLED  RST2 tel.  05/22/2019, 19:14, RB PERF. RESULTS CALLED TO:[id number].  Surgery Specimen    CULTURE WOUND W/ GRAM STAIN [[id number]] Collected:  05/22/19 1818    Order Status:  Completed Specimen:  Wound Updated:  05/27/19 1159     Significant Indicator NEG     Source WND     Site Brain Abscess     Culture Result No growth at 72 hours.     Gram Stain Result Rare WBCs.  No organisms seen.      Narrative:       CALL  [facility]  RST2 tel. ,  CALLED  RST2 tel.  05/22/2019, 19:14, RB PERF. RESULTS CALLED TO:[id number].  Surgery Specimen    BLOOD CULTURE [[id number]] Collected:  05/21/19 2126    Order Status:  Completed Specimen:  Blood from Peripheral Updated:  05/27/19 0100     Significant Indicator NEG     Source BLD     Site PERIPHERAL     Culture Result No growth after 5 days of incubation.    Narrative:       Per Hospital Policy: Only change Specimen Src: to \"Line\" if  specified by physician order.  Left AC    BLOOD CULTURE [[id number]] Collected:  05/21/19 2120    Order Status:  Completed Specimen:  Blood from Peripheral Updated:  05/27/19 0100     Significant Indicator NEG     Source BLD     Site PERIPHERAL     Culture Result No growth after 5 days of incubation.    Narrative:       Per Hospital Policy: Only change Specimen Src: to \"Line\" if  specified by physician order.  Right Wrist    Acid Fast Stain [[id number]] Collected:  05/22/19 1700    Order Status:  Completed Specimen:  Wound Updated:  05/24/19 2057     Significant Indicator NEG     Source WND     Site BRAIN ABSCESS     AFB Smear Results No acid fast bacilli seen.    Narrative:       Collected By:[id number] " RADHA SAHA  Brain cyst.  Surgery Specimen    AFB Culture [[id number]] Collected:  05/22/19 1700    Order Status:  Completed Specimen:  Wound from Cyst Updated:  05/24/19 2056     Significant Indicator NEG     Source WND     Site BRAIN ABSCESS     Culture Result Culture in progress.     AFB Smear Results No acid fast bacilli seen.    Narrative:       Collected By:[id number] RADHA SAHA  Brain cyst.  Surgery Specimen    Fungal Culture [[id number]] Collected:  05/22/19 1700    Order Status:  Completed Specimen:  Wound from Cyst Updated:  05/24/19 2056     Significant Indicator NEG     Source WND     Site BRAIN ABSCESS     Culture Result Culture in progress.    Narrative:       Collected By:[id number] RADHA SAHA  Brain cyst.  Surgery Specimen          Assessment:  Active Hospital Problems    Diagnosis   • *Brain abscess [G06.0]   • Acute respiratory failure with hypoxia (HCC) [J96.01]   • Delirium [R41.0]   • Methamphetamine abuse (HCC) [F15.10]   • S/P craniotomy [Z98.890]       Plan:  Brain mass,   Cystic  Necrotic tumor vs brain abscess  No fever  MRI with 4.7 cm left temporal lobe mass with midline shift  S/p craniotomy 5/22-no op note available for review as yet  Gram stain neg; cxs neg final  CT c/a/p neg  Path still pending-sent to [place]  Blood cxs neg  HIV neg  Continue cefepime/flagyl pending path results    Leukocytosis, increased  Multifactorial  No new complaints  On steroids  Monitor    Meth abuse  Will needs monitored setting for IV abx if confirmed abscess    I have performed a physical exam and reviewed and updated ROS as of today.  In review of yesterday's note dated 5/29/2019, there are no changes except as documented above.

## 2019-05-31 LAB
ANISOCYTOSIS BLD QL SMEAR: ABNORMAL
BASOPHILS # BLD AUTO: 0.2 % (ref 0–1.8)
BASOPHILS # BLD: 0.03 K/UL (ref 0–0.12)
COMMENT 1642: NORMAL
EOSINOPHIL # BLD AUTO: 0.05 K/UL (ref 0–0.51)
EOSINOPHIL NFR BLD: 0.3 % (ref 0–6.9)
ERYTHROCYTE [DISTWIDTH] IN BLOOD BY AUTOMATED COUNT: 53.1 FL (ref 35.9–50)
HCT VFR BLD AUTO: 41.7 % (ref 42–52)
HGB BLD-MCNC: 12.4 G/DL (ref 14–18)
IMM GRANULOCYTES # BLD AUTO: 0.16 K/UL (ref 0–0.11)
IMM GRANULOCYTES NFR BLD AUTO: 1.1 % (ref 0–0.9)
LYMPHOCYTES # BLD AUTO: 1.47 K/UL (ref 1–4.8)
LYMPHOCYTES NFR BLD: 10.1 % (ref 22–41)
MCH RBC QN AUTO: 22.9 PG (ref 27–33)
MCHC RBC AUTO-ENTMCNC: 29.7 G/DL (ref 33.7–35.3)
MCV RBC AUTO: 76.9 FL (ref 81.4–97.8)
MICROCYTES BLD QL SMEAR: ABNORMAL
MONOCYTES # BLD AUTO: 1.16 K/UL (ref 0–0.85)
MONOCYTES NFR BLD AUTO: 8 % (ref 0–13.4)
MORPHOLOGY BLD-IMP: NORMAL
NEUTROPHILS # BLD AUTO: 11.66 K/UL (ref 1.82–7.42)
NEUTROPHILS NFR BLD: 80.3 % (ref 44–72)
NRBC # BLD AUTO: 0 K/UL
NRBC BLD-RTO: 0 /100 WBC
OVALOCYTES BLD QL SMEAR: NORMAL
PLATELET # BLD AUTO: 288 K/UL (ref 164–446)
PLATELET BLD QL SMEAR: NORMAL
PMV BLD AUTO: 10.8 FL (ref 9–12.9)
POIKILOCYTOSIS BLD QL SMEAR: NORMAL
RBC # BLD AUTO: 5.42 M/UL (ref 4.7–6.1)
RBC BLD AUTO: PRESENT
WBC # BLD AUTO: 14.5 K/UL (ref 4.8–10.8)

## 2019-05-31 PROCEDURE — 700102 HCHG RX REV CODE 250 W/ 637 OVERRIDE(OP): Performed by: INTERNAL MEDICINE

## 2019-05-31 PROCEDURE — A9270 NON-COVERED ITEM OR SERVICE: HCPCS | Performed by: INTERNAL MEDICINE

## 2019-05-31 PROCEDURE — 85025 COMPLETE CBC W/AUTO DIFF WBC: CPT

## 2019-05-31 PROCEDURE — 700105 HCHG RX REV CODE 258: Performed by: INTERNAL MEDICINE

## 2019-05-31 PROCEDURE — A9270 NON-COVERED ITEM OR SERVICE: HCPCS | Performed by: STUDENT IN AN ORGANIZED HEALTH CARE EDUCATION/TRAINING PROGRAM

## 2019-05-31 PROCEDURE — 99232 SBSQ HOSP IP/OBS MODERATE 35: CPT | Performed by: INTERNAL MEDICINE

## 2019-05-31 PROCEDURE — 97530 THERAPEUTIC ACTIVITIES: CPT

## 2019-05-31 PROCEDURE — 700111 HCHG RX REV CODE 636 W/ 250 OVERRIDE (IP): Performed by: NURSE PRACTITIONER

## 2019-05-31 PROCEDURE — 700102 HCHG RX REV CODE 250 W/ 637 OVERRIDE(OP): Performed by: STUDENT IN AN ORGANIZED HEALTH CARE EDUCATION/TRAINING PROGRAM

## 2019-05-31 PROCEDURE — A9270 NON-COVERED ITEM OR SERVICE: HCPCS | Performed by: NEUROLOGICAL SURGERY

## 2019-05-31 PROCEDURE — 36415 COLL VENOUS BLD VENIPUNCTURE: CPT

## 2019-05-31 PROCEDURE — 97535 SELF CARE MNGMENT TRAINING: CPT

## 2019-05-31 PROCEDURE — 700111 HCHG RX REV CODE 636 W/ 250 OVERRIDE (IP): Performed by: INTERNAL MEDICINE

## 2019-05-31 PROCEDURE — 99232 SBSQ HOSP IP/OBS MODERATE 35: CPT | Mod: GC | Performed by: INTERNAL MEDICINE

## 2019-05-31 PROCEDURE — 700102 HCHG RX REV CODE 250 W/ 637 OVERRIDE(OP): Performed by: NEUROLOGICAL SURGERY

## 2019-05-31 PROCEDURE — 770001 HCHG ROOM/CARE - MED/SURG/GYN PRIV*

## 2019-05-31 PROCEDURE — 97116 GAIT TRAINING THERAPY: CPT

## 2019-05-31 PROCEDURE — 97112 NEUROMUSCULAR REEDUCATION: CPT

## 2019-05-31 PROCEDURE — 700112 HCHG RX REV CODE 229: Performed by: INTERNAL MEDICINE

## 2019-05-31 RX ADMIN — QUETIAPINE FUMARATE 25 MG: 25 TABLET ORAL at 17:03

## 2019-05-31 RX ADMIN — METRONIDAZOLE 500 MG: 500 TABLET, FILM COATED ORAL at 05:16

## 2019-05-31 RX ADMIN — FERROUS SULFATE TAB 325 MG (65 MG ELEMENTAL FE) 325 MG: 325 (65 FE) TAB at 09:00

## 2019-05-31 RX ADMIN — CEFEPIME 2 G: 2 INJECTION, POWDER, FOR SOLUTION INTRAVENOUS at 05:18

## 2019-05-31 RX ADMIN — DOCUSATE SODIUM 100 MG: 100 CAPSULE, LIQUID FILLED ORAL at 05:20

## 2019-05-31 RX ADMIN — METRONIDAZOLE 500 MG: 500 TABLET, FILM COATED ORAL at 13:16

## 2019-05-31 RX ADMIN — DEXAMETHASONE SODIUM PHOSPHATE 2 MG: 4 INJECTION, SOLUTION INTRAMUSCULAR; INTRAVENOUS at 05:16

## 2019-05-31 RX ADMIN — POLYETHYLENE GLYCOL 3350 1 PACKET: 17 POWDER, FOR SOLUTION ORAL at 05:16

## 2019-05-31 RX ADMIN — DEXAMETHASONE SODIUM PHOSPHATE 2 MG: 4 INJECTION, SOLUTION INTRAMUSCULAR; INTRAVENOUS at 17:03

## 2019-05-31 RX ADMIN — CEFEPIME 2 G: 2 INJECTION, POWDER, FOR SOLUTION INTRAVENOUS at 21:03

## 2019-05-31 RX ADMIN — CEFEPIME 2 G: 2 INJECTION, POWDER, FOR SOLUTION INTRAVENOUS at 14:09

## 2019-05-31 RX ADMIN — METRONIDAZOLE 500 MG: 500 TABLET, FILM COATED ORAL at 21:03

## 2019-05-31 RX ADMIN — ACETAMINOPHEN 650 MG: 325 TABLET, FILM COATED ORAL at 14:13

## 2019-05-31 RX ADMIN — SCOPALAMINE 1 PATCH: 1 PATCH, EXTENDED RELEASE TRANSDERMAL at 19:57

## 2019-05-31 ASSESSMENT — COGNITIVE AND FUNCTIONAL STATUS - GENERAL
CLIMB 3 TO 5 STEPS WITH RAILING: A LITTLE
HELP NEEDED FOR BATHING: A LITTLE
STANDING UP FROM CHAIR USING ARMS: A LITTLE
TURNING FROM BACK TO SIDE WHILE IN FLAT BAD: A LITTLE
SUGGESTED CMS G CODE MODIFIER DAILY ACTIVITY: CK
DAILY ACTIVITIY SCORE: 19
MOVING TO AND FROM BED TO CHAIR: A LITTLE
DRESSING REGULAR LOWER BODY CLOTHING: A LITTLE
SUGGESTED CMS G CODE MODIFIER MOBILITY: CK
MOVING FROM LYING ON BACK TO SITTING ON SIDE OF FLAT BED: A LITTLE
WALKING IN HOSPITAL ROOM: A LITTLE
MOBILITY SCORE: 18
TOILETING: A LITTLE
DRESSING REGULAR UPPER BODY CLOTHING: A LITTLE
PERSONAL GROOMING: A LITTLE

## 2019-05-31 ASSESSMENT — ENCOUNTER SYMPTOMS
SORE THROAT: 0
MYALGIAS: 0
FOCAL WEAKNESS: 0
NAUSEA: 0
COUGH: 0
VOMITING: 0
BLURRED VISION: 0
SPEECH CHANGE: 1
CHILLS: 0
DIARRHEA: 0
EYE REDNESS: 0
SHORTNESS OF BREATH: 0
HEADACHES: 0
SEIZURES: 0
PHOTOPHOBIA: 0
ABDOMINAL PAIN: 0
NERVOUS/ANXIOUS: 1
WEAKNESS: 0
MEMORY LOSS: 1
FEVER: 0
DIZZINESS: 0
PALPITATIONS: 0
LOSS OF CONSCIOUSNESS: 0
HEADACHES: 1

## 2019-05-31 ASSESSMENT — GAIT ASSESSMENTS
DISTANCE (FEET): 350
ASSISTIVE DEVICE: FRONT WHEEL WALKER
GAIT LEVEL OF ASSIST: MINIMAL ASSIST
DEVIATION: BRADYKINETIC;SHUFFLED GAIT

## 2019-05-31 NOTE — PROGRESS NOTES
Called [facility] department [phone]. They currently do not have patients specimen on record and cannot find anything under his last name or .  Was informed they are behind and records are only updated to 2019.  Specimen typically takes 2-3 days from [facility] for [facility] to receive.    Voicemail left to [facility] pathology to verify specimen was sent and to get tracking number as requested by [facility]. Will follow up Monday with [facility] and [facility].     Addendum: Received return call from [facility]. They called [facility] with tracking number and specimen was received 2019.

## 2019-05-31 NOTE — CARE PLAN
Problem: Safety  Goal: Will remain free from falls    Intervention: Assess risk factors for falls  Pt high fall risk. Bed and chair alarm on all times. Bed in lowest and locked position. Patient at times will forget to call before getting out of bed but when the alarm goes off will immediately sit back down on the bed until staff arrives. Pt educated frequently on calling prior to getting out of bed.       Problem: Respiratory:  Goal: Respiratory status will improve    Intervention: Educate and encourage incentive spirometry usage  Pt using IS up to 3000. Pt educated on importance and encouraged to use IS frequently.

## 2019-05-31 NOTE — PROGRESS NOTES
RN MOBILITY NOTE     Surgery patient?: yes   Date of surgery: 5/22/19  Ambulated 50 ft on day of surgery? (N/A if today is not date of surgery): n/a  Number of times ambulated 50 feet or greater today: 4  Patient has been up to chair, edge of bed or HOB 90 degrees for all meals?: yes  Goal met? (goal is ambulating at least 50 feet 2 times on day shift, one time on night shift): yes  If patient did not meet mobility goal, why?: n/a

## 2019-05-31 NOTE — CARE PLAN
Problem: Safety  Goal: Will remain free from falls    Intervention: Assess risk factors for falls  Pt high fall risk. Bed and chair alarm. Bed in lowest and locked position. Pt calls appropriately and didn't try to get out of bed without staff assistance.       Problem: Infection  Goal: Will remain free from infection    Intervention: Implement standard precautions and perform hand washing before and after patient contact  Hand hygiene before and after patient contact. Pt washing hands after using the bathroom.

## 2019-05-31 NOTE — PROGRESS NOTES
Infectious Disease Progress Note    Author: Rae Reyes M.D. Date & Time of service: 2019  11:37 AM    Chief Complaint:  Brain mass       Interval History:  53 y.o. male  admitted 2019 for AMS due to above. +meth abuse.  Recently in the ED on May 12 and was diagnosed with acute suppurative otitis media   AF WBC 12.6 extubated this am-pain controlled-decreased strength RUE +delerium   AF improved strength RUE-intermittently combative and cooperative   AF WBC 9.7 drain removed-somnolent this am-per RN strength continues to improve   AF had HA earlier but now improved-no new neurologic deficits. Plan for C/A/P today   AF WBC 13.4 remains alert-strength improved. Denies SE abx. HA controlled   AF WBC 12.2 sitting up to chair-no new complaints   AF WBC 17 denies any worsening HA, visual changes, N/V/D, abd pain  2019-no fevers.  Complains of some headache.  No new issues overnight.  WBC is 14.5  Labs Reviewed, Medications Reviewed, Wound Reviewed.    Review of Systems:  Review of Systems   Constitutional: Negative for chills and fever.   Respiratory: Negative for cough and shortness of breath.    Cardiovascular: Negative for chest pain.   Gastrointestinal: Negative for abdominal pain, diarrhea, nausea and vomiting.   Genitourinary: Negative for dysuria.   Musculoskeletal: Negative for myalgias.   Skin: Negative for rash.   Neurological: Positive for headaches.        About the same   All other systems reviewed and are negative.      Hemodynamics:  Temp (24hrs), Av.7 °C (98.1 °F), Min:36.3 °C (97.4 °F), Max:37.1 °C (98.8 °F)  Temperature: 36.7 °C (98 °F)  Pulse  Av.5  Min: 40  Max: 98  Blood Pressure: 116/69       Physical Exam:  Physical Exam   Constitutional: No distress.   HENT:   Head: Normocephalic.   Mouth/Throat: No oropharyngeal exudate.   Left craniotomy site with staples   +edema inferior to site decreased   Eyes: Pupils are equal, round, and reactive to  light. Conjunctivae and EOM are normal. No scleral icterus.   Neck: Neck supple.   Cardiovascular:   bradycardic   Pulmonary/Chest: Effort normal. No respiratory distress. He has no wheezes. He has no rales.   Abdominal: Soft. He exhibits no distension. There is no tenderness. There is no rebound and no guarding.   Musculoskeletal: He exhibits edema.   Lymphadenopathy:     He has no cervical adenopathy.   Neurological: He is alert. No cranial nerve deficit. Coordination normal.   Oriented to person and place   Skin: Skin is warm. No rash noted. He is not diaphoretic. No erythema.   Nursing note and vitals reviewed.      Meds:    Current Facility-Administered Medications:   •  oxyCODONE immediate-release  •  ferrous sulfate  •  metroNIDAZOLE  •  QUEtiapine  •  haloperidol  •  acetaminophen  •  polyethylene glycol/lytes  •  docusate sodium  •  senna-docusate  •  cloNIDine  •  magnesium hydroxide  •  senna-docusate  •  [COMPLETED] dexamethasone **FOLLOWED BY** [COMPLETED] dexamethasone **FOLLOWED BY** [COMPLETED] dexamethasone **FOLLOWED BY** [COMPLETED] dexamethasone **FOLLOWED BY** dexamethasone **FOLLOWED BY** [START ON 6/1/2019] dexamethasone  •  Respiratory Care per Protocol  •  cefepime  •  Pharmacy Consult Request  •  MD ALERT...DO NOT ADMINISTER NSAIDS or ASPIRIN unless ORDERED By Neurosurgery  •  ondansetron  •  scopolamine  •  labetalol  •  hydrALAZINE  •  bisacodyl  •  fleet    Labs:  Recent Labs      05/29/19   0517  05/30/19   0540  05/31/19   0203   WBC  12.2*  17.5*  14.5*   RBC  5.72  5.49  5.42   HEMOGLOBIN  13.1*  13.0*  12.4*   HEMATOCRIT  43.1  41.6*  41.7*   MCV  75.3*  75.8*  76.9*   MCH  22.9*  23.7*  22.9*   RDW  50.5*  51.8*  53.1*   PLATELETCT  308  312  288   MPV  10.9  10.6  10.8   NEUTSPOLYS  75.30*  80.60*  80.30*   LYMPHOCYTES  13.20*  8.90*  10.10*   MONOCYTES  10.20  8.90  8.00   EOSINOPHILS  0.30  0.20  0.30   BASOPHILS  0.20  0.20  0.20   RBCMORPHOLO   --    --   Present     Recent  Labs      05/29/19   0517   SODIUM  138   POTASSIUM  3.8   CHLORIDE  105   CO2  25   GLUCOSE  104*   BUN  19     Recent Labs      05/29/19   0517   CREATININE  0.63       Imaging:  Ct-cta Head With & W/o-post Process  1.  No large vessel occlusion, high-grade stenosis or aneurysm of the [place]. 2.  A 4 cm mass lesion in the left frontotemporal region. Imaging features favor an abscess over a solid lesion. However, MRI is needed for definitive characterization. 3.  Extensive edema associated with the mass, resulting in 10 mm left-to-right midline shift. 4.  Effacement of the left and enlargement of the right lateral ventricles may be due to developing hydrocephalus. Findings were discussed with JOSE ALTAMIRANO on 5/21/2019 8:39 PM.    Ct-cta Neck With & W/o-post Processing  Result Date: 5/21/2019 5/21/2019 8:06 PM HISTORY/REASON FOR EXAM:  SAH suspected, initial exam TECHNIQUE/EXAM DESCRIPTION: CT angiogram of the neck with contrast. Postcontrast images were obtained of the neck from the great vessels through the skull base following the power injection of nonionic contrast at 5.0 mL/sec. Thin-section helical images were obtained with overlapping reconstruction interval. Coronal and oblique multiplanar volume reformats were generated. Cervical internal carotid artery percent stenosis is calculated using the standard method according to the NASCET criteria wherein a segment of uniform caliber mid or distal cervical internal carotid is used as the reference denominator. 3D angiographic images were reviewed on PACS.  Maximum intensity projection (MIP) images were generated and reviewed 100 mL of Omnipaque 350 nonionic contrast was injected intravenously. Low dose optimization technique was utilized for this CT exam including automated exposure control and adjustment of the mA and/or kV according to patient size. COMPARISON:  None. FINDINGS: Left-sided aortic arch with patent great vessel origins. The right  common carotid artery, cervical carotid bifurcation, and cervical internal carotid artery show no significant stenosis. There is no evidence of dissection or aneurysm. The left common carotid artery, cervical carotid bifurcation, and cervical internal carotid artery show no significant stenosis. There is no evidence of dissection or aneurysm. The cervical vertebral arteries are patent bilaterally. Prominent right paratracheal node measuring 1 cm short axis, statistically reactive. Otherwise, the neck soft tissues and lung apices in the field of view are unremarkable. 3D angiographic/MIP images of the vasculature confirm the vascular findings as described above.     Unremarkable CT angiogram of the neck. No high-grade stenosis, large vessel occlusion, aneurysm or dissection.    Dx-chest-2 Views  Result Date: 5/12/2019 5/12/2019 2:28 AM HISTORY/REASON FOR EXAM:  Cough TECHNIQUE/EXAM DESCRIPTION AND NUMBER OF VIEWS: Two views of the chest. COMPARISON:  None. FINDINGS: LUNGS: Linear atelectasis in the left midlung. No focal consolidation. No effusions. PNEUMOTHORAX: None. LINES AND TUBES: None. MEDIASTINUM: No cardiomegaly. Atherosclerosis. MUSCULOSKELETAL STRUCTURES: No acute fracture. Hiatal hernia.   1.  No focal consolidation or pleural effusions. 2.  Hiatal hernia.    Dx-chest-portable (1 View)  Result Date: 5/24/2019 5/24/2019 1:08 AM HISTORY/REASON FOR EXAM:  For indication of respiratory failure. TECHNIQUE/EXAM DESCRIPTION AND NUMBER OF VIEWS: Single portable view of the chest. COMPARISON: Yesterday FINDINGS: Endotracheal tube, NG tube and right IJ central line mean in place. There is bibasilar atelectasis or consolidation. There are probable trace effusions. No pneumothorax. Lung aeration appears slightly improved.   Slight improvement in lung aeration.      Mr-stealth Brain With & W/o  Result Date: 5/22/2019  COMPARISON:  CTA of the head 5/21/2019 FINDINGS: No fiducial markers are appreciated. The calvaria  are unremarkable. There are no extra-axial fluid collections. The left temporal lobe, there is a thick-walled ring-enhancing mass which measures about 47 mm x 32 mm x 43 mm. Central hypoenhancement may represent tumor necrosis or cystic change. There is marked surrounding vasogenic edema extending into the basal ganglia, subinsular white matter, internal capsule, corona radiata, and left frontal deep white matter. There is effacement of the left lateral ventricle and moderate dilatation of the right lateral ventricle including the temporal horn due to trapping. There is left-to-right shift of midline structures of about 11 mm (T2 axial image 54, series 3). There are no other enhancing lesions. The brainstem and posterior fossa structures are unremarkable for marked compression of the midbrain with flattening of the left cerebral peduncle and effacement of perimesencephalic cisterns. There is some vasogenic edema extending down into the left side of the midbrain as well as the left thalamus and subthalamic nucleus. The major vessels including the dural venous sinuses appear intact. Paranasal sinuses show moderate mucosal thickening with small air-fluid/debris levels in the maxillary antra. Moderate mucosal thickening in the sphenoid sinus and among the ethmoid air cells. Minimal mucosal thickening in the frontal air cells.     1.  Large peripherally enhancing intra-axial mass in the left temporal lobe with marked surrounding vasogenic edema. Findings are most consistent with high-grade primary brain tumor such as glioblastoma. No other enhancing lesions elsewhere to suggest metastasis, however, a solitary brain metastasis would not be excluded. Brain abscess would not be excluded. There are no diffusion weighted images to determine whether contents of this lesion show restricted diffusion. There are no prior MRI scans for comparison. 2.  Marked left-to-right shift of midline structures along with midbrain compression  "and displacement.      Micro:  Results     Procedure Component Value Units Date/Time    Anaerobic Culture [[id number]] Collected:  05/22/19 1818    Order Status:  Completed Specimen:  Wound Updated:  05/27/19 1159     Significant Indicator NEG     Source WND     Site Brain Abscess     Culture Result No Anaerobes isolated.    Narrative:       CALL  [facility]  RST2 tel. ,  CALLED  RST2 tel.  05/22/2019, 19:14, RB PERF. RESULTS CALLED TO:[id number].  Surgery Specimen    CULTURE WOUND W/ GRAM STAIN [[id number]] Collected:  05/22/19 1818    Order Status:  Completed Specimen:  Wound Updated:  05/27/19 1159     Significant Indicator NEG     Source WND     Site Brain Abscess     Culture Result No growth at 72 hours.     Gram Stain Result Rare WBCs.  No organisms seen.      Narrative:       CALL  [facility]  RST2 tel. ,  CALLED  RST2 tel.  05/22/2019, 19:14, RB PERF. RESULTS CALLED TO:[id number].  Surgery Specimen    BLOOD CULTURE [[id number]] Collected:  05/21/19 2126    Order Status:  Completed Specimen:  Blood from Peripheral Updated:  05/27/19 0100     Significant Indicator NEG     Source BLD     Site PERIPHERAL     Culture Result No growth after 5 days of incubation.    Narrative:       Per Hospital Policy: Only change Specimen Src: to \"Line\" if  specified by physician order.  Left AC    BLOOD CULTURE [[id number]] Collected:  05/21/19 2120    Order Status:  Completed Specimen:  Blood from Peripheral Updated:  05/27/19 0100     Significant Indicator NEG     Source BLD     Site PERIPHERAL     Culture Result No growth after 5 days of incubation.    Narrative:       Per Hospital Policy: Only change Specimen Src: to \"Line\" if  specified by physician order.  Right Wrist    Acid Fast Stain [[id number]] Collected:  05/22/19 1700    Order Status:  Completed Specimen:  Wound Updated:  05/24/19 2057     Significant Indicator NEG     Source WND     Site BRAIN ABSCESS     AFB Smear Results No acid fast bacilli seen.    Narrative:       Collected " By:[id number] RADHA SAHA  Brain cyst.  Surgery Specimen    AFB Culture [[id number]] Collected:  05/22/19 1700    Order Status:  Completed Specimen:  Wound from Cyst Updated:  05/24/19 2056     Significant Indicator NEG     Source WND     Site BRAIN ABSCESS     Culture Result Culture in progress.     AFB Smear Results No acid fast bacilli seen.    Narrative:       Collected By:[id number] RADHA SAHA  Brain cyst.  Surgery Specimen    Fungal Culture [[id number]] Collected:  05/22/19 1700    Order Status:  Completed Specimen:  Wound from Cyst Updated:  05/24/19 2056     Significant Indicator NEG     Source WND     Site BRAIN ABSCESS     Culture Result Culture in progress.    Narrative:       Collected By:[id number] RADHA SAHA  Brain cyst.  Surgery Specimen          Assessment:  Active Hospital Problems    Diagnosis   • *Brain abscess [G06.0]   • Acute respiratory failure with hypoxia (HCC) [J96.01]   • Delirium [R41.0]   • Methamphetamine abuse (HCC) [F15.10]   • S/P craniotomy [Z98.890]       Plan:  Brain mass,   Cystic  Necrotic tumor vs brain abscess  No fever  MRI with 4.7 cm left temporal lobe mass with midline shift  S/p craniotomy 5/22-no op note available for review as yet  Gram stain neg; cxs neg final  CT c/a/p neg  Path still pending-sent to [place]  Blood cxs neg  HIV neg  Continue cefepime/flagyl pending path results    Leukocytosis, increased  Multifactorial  No new complaints  On steroids  Monitor    Meth abuse  Will needs monitored setting for IV abx if confirmed abscess    I have performed a physical exam and reviewed and updated ROS as of today.  In review of yesterday's note dated 5/30/2019, there are no changes except as documented above.

## 2019-05-31 NOTE — THERAPY
"Occupational Therapy Treatment completed with focus on ADLs, ADL transfers and upper extremity function.  Functional Status:  Min A standing grooming w/ objects placed on R, SPV UB dressing w/ cues for sequencing, Min A functional mobility w/ FWW   Plan of Care: Will benefit from Occupational Therapy 5 times per week  Discharge Recommendations:  Equipment Will Continue to Assess for Equipment Needs. Post-acute therapy Discharge to a transitional care facility for continued skilled therapy services.    See \"Rehab Therapy-Acute\" Patient Summary Report for complete documentation.     Pt participated in tasks to increase awareness, strength, coordination and reaction time of R UE. Pt could participate in task for ~2 min prior to requesting a break. Pt continues to require cues to attend to items on the R side and demo's impaired coordination on R UE. Will continue to follow, recommend post acute placement.   "

## 2019-05-31 NOTE — DISCHARGE PLANNING
Anticipated Discharge Disposition:   IRF following    Action:   Chart review  Also, talked to pt's mom who asked CM about disability pay or unemployment pay. Explained that CM does not assist with disability or unemployment pay but advised her to talk to Medicare/Social Security. Also to Unemployment office.     Barriers to Discharge:   Pathology still pending. Complete work up needs to be finalized for IRF to accept pt.       Plan:   Follow up with MD on Monday. Follow up with IRF.

## 2019-05-31 NOTE — PROGRESS NOTES
Internal Medicine Interval Note  Note Author: Ranjana Wood M.D.     Name Robert Dorantes 1965   Age/Sex 53 y.o. male   MRN [id number]   Code Status Full      After 5PM or if no immediate response to page, please call for cross-coverage  Attending/Team: Dr. Christina / NETO GRIGGS  See Patient List for primary contact information  Call [phone] to page    1st Call - Day Intern (R1):   Dr. Jimenez  2nd Call - Day Sr. Resident (R2/R3):   Dr. Wood          Reason for interval visit  (Principal Problem)   Necrotic tumor vs brain abscess      Interval Problem Daily Status Update  (24 hours, problem oriented, brief subjective history, new lab/imaging data pertinent to that problem)     -No acute events overnight, expressive aphasia improved.  -Pending pathology results.           Review of Systems   Constitutional: Negative for chills and fever.   HENT: Negative for congestion and sore throat.    Eyes: Negative for blurred vision, photophobia and redness.   Respiratory: Negative for shortness of breath.    Cardiovascular: Negative for chest pain, palpitations and leg swelling.   Gastrointestinal: Negative for abdominal pain, nausea and vomiting.   Genitourinary: Negative for dysuria and hematuria.   Musculoskeletal: Negative for myalgias.   Skin: Negative for itching and rash.   Neurological: Positive for speech change (Expressive aphasia). Negative for dizziness, focal weakness, seizures, loss of consciousness, weakness and headaches.   Psychiatric/Behavioral: Positive for memory loss (Impaired short-term memory). The patient is nervous/anxious.        Disposition/Barriers to discharge:   IV antibiotics for possible brain abscess  Pathology report from [facility]  Confusion/delirium    Consultants/Specialty  Neurosurgery  Pulmonology  Infectious diseases  Speech pathology    PCP: Pcp Pt States None      Quality Measures  Quality-Core Measures   Reviewed items::  Labs reviewed, Medications reviewed and  Radiology images reviewed  Richards catheter::  No Richards  DVT prophylaxis pharmacological::  Contraindicated - High bleeding risk (The setting of recent neurosurgery)  DVT prophylaxis - mechanical:  SCDs  Ulcer Prophylaxis::  No  Antibiotics:  Treating active infection/contamination beyond 24 hours perioperative coverage          Physical Exam       Vitals:    05/30/19 1617 05/30/19 2030 05/31/19 0410 05/31/19 0725   BP: (!) 94/56 114/64 111/73 116/69   Pulse: 67 60 63 60   Resp: 17 17 16 17   Temp: 36.3 °C (97.4 °F) 37.1 °C (98.8 °F) 36.7 °C (98 °F) 36.7 °C (98 °F)   TempSrc: Temporal Temporal Temporal Temporal   SpO2: 94% 96% 95% 98%   Weight:       Height:         Body mass index is 32.75 kg/m².    Oxygen Therapy:  Pulse Oximetry: 98 %, O2 (LPM): 0, O2 Delivery: None (Room Air)    Physical Exam   Constitutional: No distress.   HENT:   Head:       Mouth/Throat: No oropharyngeal exudate.   Staples around the incision site with no erythema or drainage around the surgical site.    Eyes: Pupils are equal, round, and reactive to light. Conjunctivae and EOM are normal. No scleral icterus.   Neck: Normal range of motion. No JVD present.   Cardiovascular: Normal rate, regular rhythm and normal heart sounds.    No murmur heard.  Pulmonary/Chest: Breath sounds normal. No respiratory distress. He has no wheezes.   Abdominal: Soft. Bowel sounds are normal. He exhibits no distension. There is no tenderness.   Musculoskeletal: He exhibits no edema or tenderness.   Lymphadenopathy:     He has no cervical adenopathy.   Neurological: He is alert.   Still intermittent episodes of confusion but expressive aphasia improved.  No focal neurological deficits.   Skin: No rash noted. No erythema.   Psychiatric: His mood appears anxious. His affect is labile. He exhibits abnormal recent memory.             Assessment/Plan     * Brain abscess- (present on admission)   Assessment & Plan    Treated for acute suppurative otitis media 9 days  ago.  9 days history of increasing left ear pain, confusion, difficulty finding words.  History of meth abuse.  CT scan of head with contrast showed fluid collection most likely abscess over the left frontotemporal area with mass-effect, midline shift and evidence of developing hydrocephalus.  Neurosurgery consulted, s/p craniotomy 5/22/19  ID on board, patient was initially on cefepime Vanco and Flagyl -vancomycin discontinued on 5/27  Bacterial culture, fungal culture, and AFB with negative preliminary results.  Pathology sent to [facility]; results pending.      S/P craniotomy- (present on admission)   Assessment & Plan    No complications.  Follows commands off sedation.     Agitation- (present on admission)   Assessment & Plan    Patient intermittently agitated and aggressive towards staff.  -On scheduled Seroquel 25 mg at bedtime  -PRN haldol ordered.     Acute respiratory failure with hypoxia (HCC)- (present on admission)   Assessment & Plan    RESOLVED.  Intubated for airway protection in ED 5/22 - extubated 5/24.  RT/O2 protocol.     Methamphetamine abuse (HCC)- (present on admission)   Assessment & Plan    History of meth abuse per nephew, smoking.  No history of IV use.  Consider patient education and counseling.     Expressive aphasia   Assessment & Plan    -Patient has difficulty expressing, his frustrated and teary.  - He also has difficulty recollecting recent events  - Likely secondary to lesion in the dominant (left) frontotemporal lobe affecting the Broca's area  - Speech pathology on board.      Microcytic anemia   Assessment & Plan    Patient has a hemoglobin of 12.2 with MCV of 75.4  Iron at 17, Sats of 4% and TIBC of 386.  Patient denies blood in stools or abdominal pain.  Stool for occult blood negative  No records of colonoscopy per chart review    Plan:  - Started patient on iron supplementation with ferrous sulfate.       Smoker- (present on admission)   Assessment & Plan    Patient confused,  per mom smokes about 3 cigarettes/day.  Consider education and counseling.

## 2019-05-31 NOTE — PROGRESS NOTES
Bedside report recieved, assumed care.   Pt is A&Ox3. Expressive aphasia.   Plan of care discussed. All needs met at this time.   Bed alarm on. Bed locked and in bed in low position, call light and belongings within reach, upper rails up. Treaded socks on.

## 2019-05-31 NOTE — CARE PLAN
Problem: Safety  Goal: Will remain free from falls  Outcome: PROGRESSING AS EXPECTED  Fall risk assessed using Magana-Robi Scale. Fall precautions in place including bed alarm. Pt educated to call for assistance but pt remains impulsive.     Problem: Infection  Goal: Will remain free from infection  Outcome: PROGRESSING SLOWER THAN EXPECTED  Pt WBC elevated. Pt is receiving IV abx and PO abx as ordered. Incision site is CDI with no drainage.

## 2019-05-31 NOTE — DISCHARGE PLANNING
Follow up for rehab, Barrier to IRF level of care, duration of antibiotic therapy undetermined, pathology pending,  No provider identified for post acute services,

## 2019-05-31 NOTE — PROGRESS NOTES
Neurosurgery Progress Note    Subjective:  No acute events. In bed watching tv. No complaints at this time.     Exam:   A&O x3. Disoriented to event/reason for hospitalization.   Poor short term memory.   4 PERRL, EOMI. Denies blurry or double vision.   Tongue midline without fasciculation. No facial droop.   No drift. Unremarkable finger-nose, SHAUNA testing.   Hearing intact.   Strength: Bilateral shoulder shrug, bicep, tricep, deltoid,  5/5.                   Bilateral IP, DF, PF 5/5.   Sensation: Intact throughout.   Incision: open to air. c/d/i with staples intact.   Slight swelling to left temporal region. Tender to touch.   Eating, drinking. Denies n/v.  Voiding. +BM.  No pain  Ambulatory.    CT head good evacuation left temporal cyst with persistent cerebral edema    BP  Min: 94/56  Max: 116/69  Pulse  Av.5  Min: 60  Max: 67  Resp  Av.8  Min: 16  Max: 17  Temp  Av.7 °C (98.1 °F)  Min: 36.3 °C (97.4 °F)  Max: 37.1 °C (98.8 °F)  SpO2  Av.8 %  Min: 94 %  Max: 98 %    No Data Recorded    Recent Labs      19   0517  19   0540  19   0203   WBC  12.2*  17.5*  14.5*   RBC  5.72  5.49  5.42   HEMOGLOBIN  13.1*  13.0*  12.4*   HEMATOCRIT  43.1  41.6*  41.7*   MCV  75.3*  75.8*  76.9*   MCH  22.9*  23.7*  22.9*   MCHC  30.4*  31.3*  29.7*   RDW  50.5*  51.8*  53.1*   PLATELETCT  308  312  288   MPV  10.9  10.6  10.8     Recent Labs      19   0517   SODIUM  138   POTASSIUM  3.8   CHLORIDE  105   CO2  25   GLUCOSE  104*   BUN  19   CREATININE  0.63   CALCIUM  8.6               Intake/Output       19 - 19 0659 19 - 19 Total  Total       Intake    P.O.  980  240 1220  360  -- 360    P.O.  360 -- 360    Total Intake  360 -- 360       Output    Urine  --  -- --  --  -- --    Number of Times Voided 3 x 2 x 5 x 1 x -- 1 x    Stool  --  -- --  --  -- --    Number of Times  Stooled 1 x 1 x 2 x -- -- --    Total Output -- -- -- -- -- --       Net I/O      360 -- 360            Intake/Output Summary (Last 24 hours) at 05/31/19 1005  Last data filed at 05/31/19 0800   Gross per 24 hour   Intake             1220 ml   Output                0 ml   Net             1220 ml            • oxyCODONE immediate-release  5 mg Q6HRS PRN   • ferrous sulfate  325 mg QDAY with Breakfast   • metroNIDAZOLE  500 mg Q8HRS   • QUEtiapine  25 mg Q EVENING   • haloperidol  1-5 mg Q4HRS PRN   • acetaminophen  650 mg Q8HRS PRN   • polyethylene glycol/lytes  1 Packet BID   • docusate sodium  100 mg BID   • senna-docusate  1 Tab Nightly   • cloNIDine  0.1 mg Q4HRS PRN   • magnesium hydroxide  30 mL QDAY PRN   • senna-docusate  1 Tab Q24HRS PRN   • dexamethasone  2 mg BID    Followed by   • [START ON 6/1/2019] dexamethasone  2 mg DAILY   • Respiratory Care per Protocol   Continuous RT   • cefepime  2 g Q8HRS   • Pharmacy Consult Request  1 Each PHARMACY TO DOSE   • MD ALERT...DO NOT ADMINISTER NSAIDS or ASPIRIN unless ORDERED By Neurosurgery  1 Each PRN   • ondansetron  4 mg Q4HRS PRN   • scopolamine  1 Patch Q72HRS PRN   • labetalol  10 mg Q HOUR PRN   • hydrALAZINE  10 mg Q HOUR PRN   • bisacodyl  10 mg Q24HRS PRN   • fleet  1 Each Once PRN       Assessment and Plan:  Hospital day #10 left temporal peripherally enhancing cystic lesion  POD #9 left temporal cranitomy, cyst evacuation/biopsy  Prophylactic anticoagulation: no         Start date/time: tbd    Plan:   Stable neuro exam  Cyst:  Pathology pending but frozen benign, cultures no growth, unclear etiology.  Calling [facility] for update on pathology results which are still pending.   Continue ABX unless pathology demontrates malignancy  Chest abdomen pelvis CT negative for primary tumor  Continue PT/OT therapies  Continue medical management

## 2019-05-31 NOTE — THERAPY
"Physical Therapy Treatment completed.   Bed Mobility:  Supine to Sit: Supervised  Transfers: Sit to Stand: Supervised  Gait: Level Of Assist: Minimal Assist with Front-Wheel Walker       Plan of Care: Will benefit from Physical Therapy 3 times per week  Discharge Recommendations: Equipment: Will Continue to Assess for Equipment Needs. Post-acute therapy Recommend inpatient transitional care services for continued physical therapy services.      See \"Rehab Therapy-Acute\" Patient Summary Report for complete documentation.     Pt presenting more alert today. Pt was able to follow cues better however still requires a lot of repetition when it comes to safety. Pt was able to increase his tolerance to standing activities. He ambulated 350 feet w/ the FWW. Began gait w/out an AD w/ pt demonstrating an increased DILLAN and decreased stability. During standing balance exercises, pt presenting w/ delayed righting reactions. As per initial eval, pt will benefit from post acute therapy.  "

## 2019-06-01 LAB
ALBUMIN SERPL BCP-MCNC: 3.4 G/DL (ref 3.2–4.9)
ALBUMIN/GLOB SERPL: 1.4 G/DL
ALP SERPL-CCNC: 51 U/L (ref 30–99)
ALT SERPL-CCNC: 66 U/L (ref 2–50)
ANION GAP SERPL CALC-SCNC: 7 MMOL/L (ref 0–11.9)
AST SERPL-CCNC: 24 U/L (ref 12–45)
BASOPHILS # BLD AUTO: 0.3 % (ref 0–1.8)
BASOPHILS # BLD: 0.04 K/UL (ref 0–0.12)
BILIRUB SERPL-MCNC: 0.4 MG/DL (ref 0.1–1.5)
BUN SERPL-MCNC: 17 MG/DL (ref 8–22)
CALCIUM SERPL-MCNC: 8.5 MG/DL (ref 8.5–10.5)
CHLORIDE SERPL-SCNC: 104 MMOL/L (ref 96–112)
CO2 SERPL-SCNC: 24 MMOL/L (ref 20–33)
CREAT SERPL-MCNC: 0.59 MG/DL (ref 0.5–1.4)
EOSINOPHIL # BLD AUTO: 0.06 K/UL (ref 0–0.51)
EOSINOPHIL NFR BLD: 0.4 % (ref 0–6.9)
ERYTHROCYTE [DISTWIDTH] IN BLOOD BY AUTOMATED COUNT: 51.8 FL (ref 35.9–50)
GLOBULIN SER CALC-MCNC: 2.4 G/DL (ref 1.9–3.5)
GLUCOSE SERPL-MCNC: 106 MG/DL (ref 65–99)
HCT VFR BLD AUTO: 40.5 % (ref 42–52)
HGB BLD-MCNC: 12.4 G/DL (ref 14–18)
IMM GRANULOCYTES # BLD AUTO: 0.33 K/UL (ref 0–0.11)
IMM GRANULOCYTES NFR BLD AUTO: 2.3 % (ref 0–0.9)
LYMPHOCYTES # BLD AUTO: 1.27 K/UL (ref 1–4.8)
LYMPHOCYTES NFR BLD: 9 % (ref 22–41)
MCH RBC QN AUTO: 23.4 PG (ref 27–33)
MCHC RBC AUTO-ENTMCNC: 30.6 G/DL (ref 33.7–35.3)
MCV RBC AUTO: 76.4 FL (ref 81.4–97.8)
MONOCYTES # BLD AUTO: 1.38 K/UL (ref 0–0.85)
MONOCYTES NFR BLD AUTO: 9.8 % (ref 0–13.4)
NEUTROPHILS # BLD AUTO: 11.04 K/UL (ref 1.82–7.42)
NEUTROPHILS NFR BLD: 78.2 % (ref 44–72)
NRBC # BLD AUTO: 0 K/UL
NRBC BLD-RTO: 0 /100 WBC
PLATELET # BLD AUTO: 251 K/UL (ref 164–446)
PMV BLD AUTO: 10.8 FL (ref 9–12.9)
POTASSIUM SERPL-SCNC: 3.9 MMOL/L (ref 3.6–5.5)
PROT SERPL-MCNC: 5.8 G/DL (ref 6–8.2)
RBC # BLD AUTO: 5.3 M/UL (ref 4.7–6.1)
SODIUM SERPL-SCNC: 135 MMOL/L (ref 135–145)
WBC # BLD AUTO: 14.1 K/UL (ref 4.8–10.8)

## 2019-06-01 PROCEDURE — 700105 HCHG RX REV CODE 258: Performed by: INTERNAL MEDICINE

## 2019-06-01 PROCEDURE — 85025 COMPLETE CBC W/AUTO DIFF WBC: CPT

## 2019-06-01 PROCEDURE — A9270 NON-COVERED ITEM OR SERVICE: HCPCS | Performed by: INTERNAL MEDICINE

## 2019-06-01 PROCEDURE — A9270 NON-COVERED ITEM OR SERVICE: HCPCS | Performed by: STUDENT IN AN ORGANIZED HEALTH CARE EDUCATION/TRAINING PROGRAM

## 2019-06-01 PROCEDURE — 700102 HCHG RX REV CODE 250 W/ 637 OVERRIDE(OP): Performed by: INTERNAL MEDICINE

## 2019-06-01 PROCEDURE — 770001 HCHG ROOM/CARE - MED/SURG/GYN PRIV*

## 2019-06-01 PROCEDURE — 99232 SBSQ HOSP IP/OBS MODERATE 35: CPT | Performed by: INTERNAL MEDICINE

## 2019-06-01 PROCEDURE — 700112 HCHG RX REV CODE 229: Performed by: INTERNAL MEDICINE

## 2019-06-01 PROCEDURE — 700111 HCHG RX REV CODE 636 W/ 250 OVERRIDE (IP): Performed by: NURSE PRACTITIONER

## 2019-06-01 PROCEDURE — 700102 HCHG RX REV CODE 250 W/ 637 OVERRIDE(OP): Performed by: STUDENT IN AN ORGANIZED HEALTH CARE EDUCATION/TRAINING PROGRAM

## 2019-06-01 PROCEDURE — 99232 SBSQ HOSP IP/OBS MODERATE 35: CPT | Mod: GC | Performed by: INTERNAL MEDICINE

## 2019-06-01 PROCEDURE — 700111 HCHG RX REV CODE 636 W/ 250 OVERRIDE (IP): Performed by: INTERNAL MEDICINE

## 2019-06-01 PROCEDURE — 36415 COLL VENOUS BLD VENIPUNCTURE: CPT

## 2019-06-01 PROCEDURE — 80053 COMPREHEN METABOLIC PANEL: CPT

## 2019-06-01 RX ADMIN — METRONIDAZOLE 500 MG: 500 TABLET, FILM COATED ORAL at 04:05

## 2019-06-01 RX ADMIN — SENNOSIDES,DOCUSATE SODIUM 1 TABLET: 8.6; 5 TABLET, FILM COATED ORAL at 21:05

## 2019-06-01 RX ADMIN — DOCUSATE SODIUM 100 MG: 100 CAPSULE, LIQUID FILLED ORAL at 16:47

## 2019-06-01 RX ADMIN — CEFEPIME 2 G: 2 INJECTION, POWDER, FOR SOLUTION INTRAVENOUS at 21:06

## 2019-06-01 RX ADMIN — FERROUS SULFATE TAB 325 MG (65 MG ELEMENTAL FE) 325 MG: 325 (65 FE) TAB at 07:55

## 2019-06-01 RX ADMIN — OXYCODONE HYDROCHLORIDE 5 MG: 5 TABLET ORAL at 21:16

## 2019-06-01 RX ADMIN — QUETIAPINE FUMARATE 25 MG: 25 TABLET ORAL at 16:48

## 2019-06-01 RX ADMIN — ACETAMINOPHEN 650 MG: 325 TABLET, FILM COATED ORAL at 04:06

## 2019-06-01 RX ADMIN — METRONIDAZOLE 500 MG: 500 TABLET, FILM COATED ORAL at 13:10

## 2019-06-01 RX ADMIN — CEFEPIME 2 G: 2 INJECTION, POWDER, FOR SOLUTION INTRAVENOUS at 04:07

## 2019-06-01 RX ADMIN — DEXAMETHASONE SODIUM PHOSPHATE 2 MG: 4 INJECTION, SOLUTION INTRAMUSCULAR; INTRAVENOUS at 04:07

## 2019-06-01 RX ADMIN — CEFEPIME 2 G: 2 INJECTION, POWDER, FOR SOLUTION INTRAVENOUS at 13:10

## 2019-06-01 RX ADMIN — METRONIDAZOLE 500 MG: 500 TABLET, FILM COATED ORAL at 21:05

## 2019-06-01 RX ADMIN — OXYCODONE HYDROCHLORIDE 5 MG: 5 TABLET ORAL at 02:33

## 2019-06-01 ASSESSMENT — ENCOUNTER SYMPTOMS
COUGH: 0
SPEECH CHANGE: 1
DIARRHEA: 0
LOSS OF CONSCIOUSNESS: 0
FEVER: 0
MYALGIAS: 0
HEADACHES: 1
SHORTNESS OF BREATH: 0
MEMORY LOSS: 1
NAUSEA: 0
PALPITATIONS: 0
HEADACHES: 0
EYE REDNESS: 0
ABDOMINAL PAIN: 0
BLURRED VISION: 0
PHOTOPHOBIA: 0
CHILLS: 0
NERVOUS/ANXIOUS: 1
DIZZINESS: 0
WEAKNESS: 0
FOCAL WEAKNESS: 0
VOMITING: 0
SEIZURES: 0
SORE THROAT: 0

## 2019-06-01 NOTE — PROGRESS NOTES
Infectious Disease Progress Note    Author: Rae Reyes M.D. Date & Time of service: 2019  2:42 PM    Chief Complaint:  Brain mass       Interval History:  53 y.o. male  admitted 2019 for AMS due to above. +meth abuse.  Recently in the ED on May 12 and was diagnosed with acute suppurative otitis media   AF WBC 12.6 extubated this am-pain controlled-decreased strength RUE +delerium   AF improved strength RUE-intermittently combative and cooperative   AF WBC 9.7 drain removed-somnolent this am-per RN strength continues to improve   AF had HA earlier but now improved-no new neurologic deficits. Plan for C/A/P today   AF WBC 13.4 remains alert-strength improved. Denies SE abx. HA controlled   AF WBC 12.2 sitting up to chair-no new complaints   AF WBC 17 denies any worsening HA, visual changes, N/V/D, abd pain  2019-no fevers.  Complains of some headache.  No new issues overnight.  WBC is 14.5  2019 no fevers.  No new issues.  Pathology is still not back.  Labs Reviewed, Medications Reviewed, Wound Reviewed.    Review of Systems:  Review of Systems   Constitutional: Negative for chills and fever.   Respiratory: Negative for cough and shortness of breath.    Cardiovascular: Negative for chest pain.   Gastrointestinal: Negative for abdominal pain, diarrhea, nausea and vomiting.   Genitourinary: Negative for dysuria.   Musculoskeletal: Negative for myalgias.   Skin: Negative for rash.   Neurological: Positive for headaches.        About the same   All other systems reviewed and are negative.      Hemodynamics:  Temp (24hrs), Av.9 °C (98.4 °F), Min:36.6 °C (97.9 °F), Max:37.2 °C (99 °F)  Temperature: 37.2 °C (99 °F)  Pulse  Av.7  Min: 40  Max: 98  Blood Pressure: 114/65       Physical Exam:  Physical Exam   Constitutional: No distress.   HENT:   Head: Normocephalic.   Mouth/Throat: No oropharyngeal exudate.   Left craniotomy site with staples   +edema inferior to  site decreased   Eyes: Pupils are equal, round, and reactive to light. Conjunctivae and EOM are normal. No scleral icterus.   Neck: Neck supple.   Cardiovascular:   bradycardic   Pulmonary/Chest: Effort normal. No respiratory distress. He has no wheezes. He has no rales.   Abdominal: Soft. He exhibits no distension. There is no tenderness. There is no rebound and no guarding.   Musculoskeletal: He exhibits edema.   Lymphadenopathy:     He has no cervical adenopathy.   Neurological: He is alert. No cranial nerve deficit. Coordination normal.   Oriented to person and place   Skin: Skin is warm. No rash noted. He is not diaphoretic. No erythema.   Nursing note and vitals reviewed.      Meds:    Current Facility-Administered Medications:   •  oxyCODONE immediate-release  •  ferrous sulfate  •  metroNIDAZOLE  •  QUEtiapine  •  haloperidol  •  acetaminophen  •  polyethylene glycol/lytes  •  docusate sodium  •  senna-docusate  •  cloNIDine  •  magnesium hydroxide  •  senna-docusate  •  [COMPLETED] dexamethasone **FOLLOWED BY** [COMPLETED] dexamethasone **FOLLOWED BY** [COMPLETED] dexamethasone **FOLLOWED BY** [COMPLETED] dexamethasone **FOLLOWED BY** [COMPLETED] dexamethasone **FOLLOWED BY** dexamethasone  •  Respiratory Care per Protocol  •  cefepime  •  Pharmacy Consult Request  •  MD ALERT...DO NOT ADMINISTER NSAIDS or ASPIRIN unless ORDERED By Neurosurgery  •  ondansetron  •  scopolamine  •  labetalol  •  hydrALAZINE  •  bisacodyl  •  fleet    Labs:  Recent Labs      05/30/19   0540  05/31/19   0203  06/01/19   0539   WBC  17.5*  14.5*  14.1*   RBC  5.49  5.42  5.30   HEMOGLOBIN  13.0*  12.4*  12.4*   HEMATOCRIT  41.6*  41.7*  40.5*   MCV  75.8*  76.9*  76.4*   MCH  23.7*  22.9*  23.4*   RDW  51.8*  53.1*  51.8*   PLATELETCT  312  288  251   MPV  10.6  10.8  10.8   NEUTSPOLYS  80.60*  80.30*  78.20*   LYMPHOCYTES  8.90*  10.10*  9.00*   MONOCYTES  8.90  8.00  9.80   EOSINOPHILS  0.20  0.30  0.40   BASOPHILS  0.20  0.20   0.30   RBCMORPHOLO   --   Present   --      Recent Labs      06/01/19   0539   SODIUM  135   POTASSIUM  3.9   CHLORIDE  104   CO2  24   GLUCOSE  106*   BUN  17     Recent Labs      06/01/19   0539   ALBUMIN  3.4   TBILIRUBIN  0.4   ALKPHOSPHAT  51   TOTPROTEIN  5.8*   ALTSGPT  66*   ASTSGOT  24   CREATININE  0.59       Imaging:  Ct-cta Head With & W/o-post Process  1.  No large vessel occlusion, high-grade stenosis or aneurysm of the [place]. 2.  A 4 cm mass lesion in the left frontotemporal region. Imaging features favor an abscess over a solid lesion. However, MRI is needed for definitive characterization. 3.  Extensive edema associated with the mass, resulting in 10 mm left-to-right midline shift. 4.  Effacement of the left and enlargement of the right lateral ventricles may be due to developing hydrocephalus. Findings were discussed with JOSE ALTAMIRANO on 5/21/2019 8:39 PM.    Ct-cta Neck With & W/o-post Processing  Result Date: 5/21/2019 5/21/2019 8:06 PM HISTORY/REASON FOR EXAM:  SAH suspected, initial exam TECHNIQUE/EXAM DESCRIPTION: CT angiogram of the neck with contrast. Postcontrast images were obtained of the neck from the great vessels through the skull base following the power injection of nonionic contrast at 5.0 mL/sec. Thin-section helical images were obtained with overlapping reconstruction interval. Coronal and oblique multiplanar volume reformats were generated. Cervical internal carotid artery percent stenosis is calculated using the standard method according to the NASCET criteria wherein a segment of uniform caliber mid or distal cervical internal carotid is used as the reference denominator. 3D angiographic images were reviewed on PACS.  Maximum intensity projection (MIP) images were generated and reviewed 100 mL of Omnipaque 350 nonionic contrast was injected intravenously. Low dose optimization technique was utilized for this CT exam including automated exposure control and  adjustment of the mA and/or kV according to patient size. COMPARISON:  None. FINDINGS: Left-sided aortic arch with patent great vessel origins. The right common carotid artery, cervical carotid bifurcation, and cervical internal carotid artery show no significant stenosis. There is no evidence of dissection or aneurysm. The left common carotid artery, cervical carotid bifurcation, and cervical internal carotid artery show no significant stenosis. There is no evidence of dissection or aneurysm. The cervical vertebral arteries are patent bilaterally. Prominent right paratracheal node measuring 1 cm short axis, statistically reactive. Otherwise, the neck soft tissues and lung apices in the field of view are unremarkable. 3D angiographic/MIP images of the vasculature confirm the vascular findings as described above.     Unremarkable CT angiogram of the neck. No high-grade stenosis, large vessel occlusion, aneurysm or dissection.    Dx-chest-2 Views  Result Date: 5/12/2019 5/12/2019 2:28 AM HISTORY/REASON FOR EXAM:  Cough TECHNIQUE/EXAM DESCRIPTION AND NUMBER OF VIEWS: Two views of the chest. COMPARISON:  None. FINDINGS: LUNGS: Linear atelectasis in the left midlung. No focal consolidation. No effusions. PNEUMOTHORAX: None. LINES AND TUBES: None. MEDIASTINUM: No cardiomegaly. Atherosclerosis. MUSCULOSKELETAL STRUCTURES: No acute fracture. Hiatal hernia.   1.  No focal consolidation or pleural effusions. 2.  Hiatal hernia.    Dx-chest-portable (1 View)  Result Date: 5/24/2019 5/24/2019 1:08 AM HISTORY/REASON FOR EXAM:  For indication of respiratory failure. TECHNIQUE/EXAM DESCRIPTION AND NUMBER OF VIEWS: Single portable view of the chest. COMPARISON: Yesterday FINDINGS: Endotracheal tube, NG tube and right IJ central line mean in place. There is bibasilar atelectasis or consolidation. There are probable trace effusions. No pneumothorax. Lung aeration appears slightly improved.   Slight improvement in lung  aeration.      Mr-stealth Brain With & W/o  Result Date: 5/22/2019  COMPARISON:  CTA of the head 5/21/2019 FINDINGS: No fiducial markers are appreciated. The calvaria are unremarkable. There are no extra-axial fluid collections. The left temporal lobe, there is a thick-walled ring-enhancing mass which measures about 47 mm x 32 mm x 43 mm. Central hypoenhancement may represent tumor necrosis or cystic change. There is marked surrounding vasogenic edema extending into the basal ganglia, subinsular white matter, internal capsule, corona radiata, and left frontal deep white matter. There is effacement of the left lateral ventricle and moderate dilatation of the right lateral ventricle including the temporal horn due to trapping. There is left-to-right shift of midline structures of about 11 mm (T2 axial image 54, series 3). There are no other enhancing lesions. The brainstem and posterior fossa structures are unremarkable for marked compression of the midbrain with flattening of the left cerebral peduncle and effacement of perimesencephalic cisterns. There is some vasogenic edema extending down into the left side of the midbrain as well as the left thalamus and subthalamic nucleus. The major vessels including the dural venous sinuses appear intact. Paranasal sinuses show moderate mucosal thickening with small air-fluid/debris levels in the maxillary antra. Moderate mucosal thickening in the sphenoid sinus and among the ethmoid air cells. Minimal mucosal thickening in the frontal air cells.     1.  Large peripherally enhancing intra-axial mass in the left temporal lobe with marked surrounding vasogenic edema. Findings are most consistent with high-grade primary brain tumor such as glioblastoma. No other enhancing lesions elsewhere to suggest metastasis, however, a solitary brain metastasis would not be excluded. Brain abscess would not be excluded. There are no diffusion weighted images to determine whether contents of  "this lesion show restricted diffusion. There are no prior MRI scans for comparison. 2.  Marked left-to-right shift of midline structures along with midbrain compression and displacement.      Micro:  Results     Procedure Component Value Units Date/Time    Anaerobic Culture [[id number]] Collected:  05/22/19 1818    Order Status:  Completed Specimen:  Wound Updated:  05/27/19 1159     Significant Indicator NEG     Source WND     Site Brain Abscess     Culture Result No Anaerobes isolated.    Narrative:       CALL  [facility]  RST2 tel. ,  CALLED  RST2 tel.  05/22/2019, 19:14, RB PERF. RESULTS CALLED TO:[id number].  Surgery Specimen    CULTURE WOUND W/ GRAM STAIN [[id number]] Collected:  05/22/19 1818    Order Status:  Completed Specimen:  Wound Updated:  05/27/19 1159     Significant Indicator NEG     Source WND     Site Brain Abscess     Culture Result No growth at 72 hours.     Gram Stain Result Rare WBCs.  No organisms seen.      Narrative:       CALL  [facility]  RST2 tel. ,  CALLED  RST2 tel.  05/22/2019, 19:14, RB PERF. RESULTS CALLED TO:[id number].  Surgery Specimen    BLOOD CULTURE [[id number]] Collected:  05/21/19 2126    Order Status:  Completed Specimen:  Blood from Peripheral Updated:  05/27/19 0100     Significant Indicator NEG     Source BLD     Site PERIPHERAL     Culture Result No growth after 5 days of incubation.    Narrative:       Per Hospital Policy: Only change Specimen Src: to \"Line\" if  specified by physician order.  Left AC    BLOOD CULTURE [[id number]] Collected:  05/21/19 2120    Order Status:  Completed Specimen:  Blood from Peripheral Updated:  05/27/19 0100     Significant Indicator NEG     Source BLD     Site PERIPHERAL     Culture Result No growth after 5 days of incubation.    Narrative:       Per Hospital Policy: Only change Specimen Src: to \"Line\" if  specified by physician order.  Right Wrist          Assessment:  Active Hospital Problems    Diagnosis   • *Brain abscess [G06.0]   • Acute respiratory " failure with hypoxia (HCC) [J96.01]   • Delirium [R41.0]   • Methamphetamine abuse (HCC) [F15.10]   • S/P craniotomy [Z98.890]       Plan:  Brain mass,   Cystic  Necrotic tumor vs brain abscess  No fever  MRI with 4.7 cm left temporal lobe mass with midline shift  S/p craniotomy 5/22-no op note available for review as yet  Gram stain neg; cxs neg final  CT c/a/p neg  Path still pending-sent to [facility]  Blood cxs neg  HIV neg  Continue cefepime/flagyl pending path results-still pending    Leukocytosis, increased  Multifactorial  No new complaints  On steroids  Monitor    Meth abuse  Will needs monitored setting for IV abx if confirmed abscess    I have performed a physical exam and reviewed and updated ROS as of today.  In review of yesterday's note dated 5/31/2019, there are no changes except as documented above.

## 2019-06-01 NOTE — PROGRESS NOTES
Internal Medicine Interval Note  Note Author: Salvatore Jimenez M.D.     Name Robert Dorantes 1965   Age/Sex 53 y.o. male   MRN [id number]   Code Status Full      After 5PM or if no immediate response to page, please call for cross-coverage  Attending/Team: Dr. Christina / NETO GRIGGS  See Patient List for primary contact information  Call [phone] to page    1st Call - Day Intern (R1):   Dr. Jimenez  2nd Call - Day Sr. Resident (R2/R3):   Dr. Wood          Reason for interval visit  (Principal Problem)   Necrotic tumor vs brain abscess      Interval Problem Daily Status Update  (24 hours, problem oriented, brief subjective history, new lab/imaging data pertinent to that problem)     -No acute events overnight.  Patient had a bout of severe left-sided headache radiating down to the occipital region as well as to the nape of the neck, which was relieved upon receiving a dose of 5 mg oxycodone and stop Ativan.  - This morning, patient was alert and oriented x3.  Reports significant improvement in pain.  - Pathology report still pending.  Patient continues to be on IV cefepime and oral Flagyl.       Review of Systems   Constitutional: Negative for chills and fever.   HENT: Negative for congestion and sore throat.    Eyes: Negative for blurred vision, photophobia and redness.   Respiratory: Negative for shortness of breath.    Cardiovascular: Negative for chest pain, palpitations and leg swelling.   Gastrointestinal: Negative for abdominal pain, nausea and vomiting.   Genitourinary: Negative for dysuria and hematuria.   Musculoskeletal: Negative for myalgias.   Skin: Negative for itching and rash.   Neurological: Positive for speech change (Expressive aphasia). Negative for dizziness, focal weakness, seizures, loss of consciousness, weakness and headaches.   Psychiatric/Behavioral: Positive for memory loss (Impaired short-term memory). The patient is nervous/anxious.        Disposition/Barriers to  discharge:   IV antibiotics for possible brain abscess  Pathology report from [facility]  Confusion/delirium    Consultants/Specialty  Neurosurgery  Pulmonology  Infectious diseases  Speech pathology    PCP: Pcp Pt States None      Quality Measures  Quality-Core Measures   Reviewed items::  Labs reviewed, Medications reviewed and Radiology images reviewed  Richards catheter::  No Richards  DVT prophylaxis pharmacological::  Contraindicated - High bleeding risk (The setting of recent neurosurgery)  DVT prophylaxis - mechanical:  SCDs  Ulcer Prophylaxis::  No  Antibiotics:  Treating active infection/contamination beyond 24 hours perioperative coverage          Physical Exam       Vitals:    05/31/19 1500 05/31/19 1947 06/01/19 0405 06/01/19 0748   BP: (!) 96/74 103/69 126/81 114/65   Pulse: 73 82 60 61   Resp: 17 17 17 14   Temp: 36.8 °C (98.3 °F) 36.8 °C (98.2 °F) 36.6 °C (97.9 °F) 37.2 °C (99 °F)   TempSrc: Temporal Temporal Temporal Temporal   SpO2: 94% 97% 97% 97%   Weight:       Height:         Body mass index is 32.75 kg/m².    Oxygen Therapy:  Pulse Oximetry: 97 %, O2 (LPM): 0, O2 Delivery: None (Room Air)    Physical Exam   Constitutional: No distress.   HENT:   Head:       Mouth/Throat: No oropharyngeal exudate.   Staples around the incision site with no erythema or drainage around the surgical site.    Eyes: Pupils are equal, round, and reactive to light. Conjunctivae and EOM are normal. No scleral icterus.   Neck: Normal range of motion. No JVD present.   Cardiovascular: Normal rate, regular rhythm and normal heart sounds.    No murmur heard.  Pulmonary/Chest: Breath sounds normal. No respiratory distress. He has no wheezes.   Abdominal: Soft. Bowel sounds are normal. He exhibits no distension. There is no tenderness.   Musculoskeletal: He exhibits no edema or tenderness.   Lymphadenopathy:     He has no cervical adenopathy.   Neurological: He is alert.   Still intermittent episodes of confusion but expressive  aphasia improved.  No focal neurological deficits.   Skin: No rash noted. No erythema.   Psychiatric: His mood appears anxious. His affect is labile. He exhibits abnormal recent memory.             Assessment/Plan     * Brain abscess- (present on admission)   Assessment & Plan    Treated for acute suppurative otitis media 9 days ago.  9 days history of increasing left ear pain, confusion, difficulty finding words.  History of meth abuse.  CT scan of head with contrast showed fluid collection most likely abscess over the left frontotemporal area with mass-effect, midline shift and evidence of developing hydrocephalus.  Neurosurgery consulted, s/p craniotomy 5/22/19  ID on board, patient was initially on cefepime Vanco and Flagyl -vancomycin discontinued on 5/27  Bacterial culture, fungal culture, and AFB with negative preliminary results.  Pathology sent to [facility]; results pending.      S/P craniotomy- (present on admission)   Assessment & Plan    No complications.  Follows commands off sedation.     Agitation- (present on admission)   Assessment & Plan    Patient intermittently agitated and aggressive towards staff.  -On scheduled Seroquel 25 mg at bedtime  -PRN haldol ordered.     Acute respiratory failure with hypoxia (HCC)- (present on admission)   Assessment & Plan    RESOLVED.  Intubated for airway protection in ED 5/22 - extubated 5/24.  RT/O2 protocol.     Methamphetamine abuse (HCC)- (present on admission)   Assessment & Plan    History of meth abuse per nephew, smoking.  No history of IV use.  Consider patient education and counseling.     Expressive aphasia   Assessment & Plan    -Patient has difficulty expressing, his frustrated and teary.  - He also has difficulty recollecting recent events  - Likely secondary to lesion in the dominant (left) frontotemporal lobe affecting the Broca's area  - Speech pathology on board.      Microcytic anemia   Assessment & Plan    Patient has a hemoglobin of 12.2 with  MCV of 75.4  Iron at 17, Sats of 4% and TIBC of 386.  Patient denies blood in stools or abdominal pain.  Stool for occult blood negative  No records of colonoscopy per chart review    Plan:  - Started patient on iron supplementation with ferrous sulfate.       Smoker- (present on admission)   Assessment & Plan    Patient confused, per mom smokes about 3 cigarettes/day.  Consider education and counseling.

## 2019-06-02 LAB
BASOPHILS # BLD AUTO: 0.6 % (ref 0–1.8)
BASOPHILS # BLD: 0.07 K/UL (ref 0–0.12)
EOSINOPHIL # BLD AUTO: 0.1 K/UL (ref 0–0.51)
EOSINOPHIL NFR BLD: 0.8 % (ref 0–6.9)
ERYTHROCYTE [DISTWIDTH] IN BLOOD BY AUTOMATED COUNT: 53 FL (ref 35.9–50)
HCT VFR BLD AUTO: 42.5 % (ref 42–52)
HGB BLD-MCNC: 12.9 G/DL (ref 14–18)
IMM GRANULOCYTES # BLD AUTO: 0.3 K/UL (ref 0–0.11)
IMM GRANULOCYTES NFR BLD AUTO: 2.4 % (ref 0–0.9)
LYMPHOCYTES # BLD AUTO: 2.13 K/UL (ref 1–4.8)
LYMPHOCYTES NFR BLD: 17.1 % (ref 22–41)
MCH RBC QN AUTO: 23.4 PG (ref 27–33)
MCHC RBC AUTO-ENTMCNC: 30.4 G/DL (ref 33.7–35.3)
MCV RBC AUTO: 77.1 FL (ref 81.4–97.8)
MONOCYTES # BLD AUTO: 1.38 K/UL (ref 0–0.85)
MONOCYTES NFR BLD AUTO: 11.1 % (ref 0–13.4)
NEUTROPHILS # BLD AUTO: 8.5 K/UL (ref 1.82–7.42)
NEUTROPHILS NFR BLD: 68 % (ref 44–72)
NRBC # BLD AUTO: 0 K/UL
NRBC BLD-RTO: 0 /100 WBC
PLATELET # BLD AUTO: 267 K/UL (ref 164–446)
PMV BLD AUTO: 10.5 FL (ref 9–12.9)
RBC # BLD AUTO: 5.51 M/UL (ref 4.7–6.1)
WBC # BLD AUTO: 12.5 K/UL (ref 4.8–10.8)

## 2019-06-02 PROCEDURE — A9270 NON-COVERED ITEM OR SERVICE: HCPCS | Performed by: INTERNAL MEDICINE

## 2019-06-02 PROCEDURE — 700102 HCHG RX REV CODE 250 W/ 637 OVERRIDE(OP): Performed by: STUDENT IN AN ORGANIZED HEALTH CARE EDUCATION/TRAINING PROGRAM

## 2019-06-02 PROCEDURE — 700102 HCHG RX REV CODE 250 W/ 637 OVERRIDE(OP): Performed by: INTERNAL MEDICINE

## 2019-06-02 PROCEDURE — A9270 NON-COVERED ITEM OR SERVICE: HCPCS | Performed by: STUDENT IN AN ORGANIZED HEALTH CARE EDUCATION/TRAINING PROGRAM

## 2019-06-02 PROCEDURE — 700105 HCHG RX REV CODE 258: Performed by: INTERNAL MEDICINE

## 2019-06-02 PROCEDURE — 99232 SBSQ HOSP IP/OBS MODERATE 35: CPT | Mod: GC | Performed by: INTERNAL MEDICINE

## 2019-06-02 PROCEDURE — 36415 COLL VENOUS BLD VENIPUNCTURE: CPT

## 2019-06-02 PROCEDURE — 700111 HCHG RX REV CODE 636 W/ 250 OVERRIDE (IP): Performed by: NURSE PRACTITIONER

## 2019-06-02 PROCEDURE — 85025 COMPLETE CBC W/AUTO DIFF WBC: CPT

## 2019-06-02 PROCEDURE — 770001 HCHG ROOM/CARE - MED/SURG/GYN PRIV*

## 2019-06-02 PROCEDURE — 700112 HCHG RX REV CODE 229: Performed by: INTERNAL MEDICINE

## 2019-06-02 PROCEDURE — 700111 HCHG RX REV CODE 636 W/ 250 OVERRIDE (IP): Performed by: INTERNAL MEDICINE

## 2019-06-02 RX ADMIN — DOCUSATE SODIUM 100 MG: 100 CAPSULE, LIQUID FILLED ORAL at 04:04

## 2019-06-02 RX ADMIN — CEFEPIME 2 G: 2 INJECTION, POWDER, FOR SOLUTION INTRAVENOUS at 13:29

## 2019-06-02 RX ADMIN — METRONIDAZOLE 500 MG: 500 TABLET, FILM COATED ORAL at 22:45

## 2019-06-02 RX ADMIN — DEXAMETHASONE SODIUM PHOSPHATE 2 MG: 4 INJECTION, SOLUTION INTRAMUSCULAR; INTRAVENOUS at 04:02

## 2019-06-02 RX ADMIN — OXYCODONE HYDROCHLORIDE 5 MG: 5 TABLET ORAL at 22:45

## 2019-06-02 RX ADMIN — POLYETHYLENE GLYCOL 3350 1 PACKET: 17 POWDER, FOR SOLUTION ORAL at 04:04

## 2019-06-02 RX ADMIN — ACETAMINOPHEN 650 MG: 325 TABLET, FILM COATED ORAL at 16:21

## 2019-06-02 RX ADMIN — SENNOSIDES,DOCUSATE SODIUM 1 TABLET: 8.6; 5 TABLET, FILM COATED ORAL at 22:45

## 2019-06-02 RX ADMIN — FERROUS SULFATE TAB 325 MG (65 MG ELEMENTAL FE) 325 MG: 325 (65 FE) TAB at 07:27

## 2019-06-02 RX ADMIN — OXYCODONE HYDROCHLORIDE 5 MG: 5 TABLET ORAL at 03:23

## 2019-06-02 RX ADMIN — METRONIDAZOLE 500 MG: 500 TABLET, FILM COATED ORAL at 04:04

## 2019-06-02 RX ADMIN — CEFEPIME 2 G: 2 INJECTION, POWDER, FOR SOLUTION INTRAVENOUS at 04:01

## 2019-06-02 RX ADMIN — POLYETHYLENE GLYCOL 3350 1 PACKET: 17 POWDER, FOR SOLUTION ORAL at 16:21

## 2019-06-02 RX ADMIN — METRONIDAZOLE 500 MG: 500 TABLET, FILM COATED ORAL at 13:21

## 2019-06-02 RX ADMIN — OXYCODONE HYDROCHLORIDE 5 MG: 5 TABLET ORAL at 16:21

## 2019-06-02 RX ADMIN — QUETIAPINE FUMARATE 25 MG: 25 TABLET ORAL at 16:18

## 2019-06-02 RX ADMIN — DOCUSATE SODIUM 100 MG: 100 CAPSULE, LIQUID FILLED ORAL at 16:18

## 2019-06-02 RX ADMIN — CEFEPIME 2 G: 2 INJECTION, POWDER, FOR SOLUTION INTRAVENOUS at 22:29

## 2019-06-02 ASSESSMENT — ENCOUNTER SYMPTOMS
VOMITING: 0
SORE THROAT: 0
EYE REDNESS: 0
DIZZINESS: 0
NERVOUS/ANXIOUS: 1
PHOTOPHOBIA: 0
FOCAL WEAKNESS: 0
SPEECH CHANGE: 1
MEMORY LOSS: 1
ABDOMINAL PAIN: 0
WEAKNESS: 0
NAUSEA: 0
SEIZURES: 0
BLURRED VISION: 0
SHORTNESS OF BREATH: 0
PALPITATIONS: 0
LOSS OF CONSCIOUSNESS: 0
HEADACHES: 0
FEVER: 0
CHILLS: 0
MYALGIAS: 0

## 2019-06-02 NOTE — PROGRESS NOTES
Internal Medicine Interval Note  Note Author: Salvatore Jimenez M.D.     Name Robert Dorantes 1965   Age/Sex 53 y.o. male   MRN [id number]   Code Status Full      After 5PM or if no immediate response to page, please call for cross-coverage  Attending/Team: Dr. Christina / NETO GRIGGS  See Patient List for primary contact information  Call [phone] to page    1st Call - Day Intern (R1):   Dr. Jimenez  2nd Call - Day Sr. Resident (R2/R3):   Dr. Wood          Reason for interval visit  (Principal Problem)   Necrotic tumor vs brain abscess      Interval Problem Daily Status Update  (24 hours, problem oriented, brief subjective history, new lab/imaging data pertinent to that problem)     - No acute events overnight.   - Patient continues to complain of moderate headache, in the region of the forehead today.  - WBC count trending down. WBC at 12.5 from 14.1 Received his last dose of Decadron today.   - Pathology results still pending.  Will continue cefepime and Flagyl   - Spoke with patient's mother and updated her.  She would like the patient to be around familiar surroundings given his recent change in memory and expressed interest in taking him home as soon as possible.  Explained to her that he needs to be on IV antibiotics and that it is not advisable to discharge him before the pathology report.  Patient's mother is also working on POA so she can make decisions for her son henceforth.       Review of Systems   Constitutional: Negative for chills and fever.   HENT: Negative for congestion and sore throat.    Eyes: Negative for blurred vision, photophobia and redness.   Respiratory: Negative for shortness of breath.    Cardiovascular: Negative for chest pain, palpitations and leg swelling.   Gastrointestinal: Negative for abdominal pain, nausea and vomiting.   Genitourinary: Negative for dysuria and hematuria.   Musculoskeletal: Negative for myalgias.   Skin: Negative for itching and rash.    Neurological: Positive for speech change (Expressive aphasia). Negative for dizziness, focal weakness, seizures, loss of consciousness, weakness and headaches.   Psychiatric/Behavioral: Positive for memory loss (Impaired short-term memory). The patient is nervous/anxious.        Disposition/Barriers to discharge:   IV antibiotics for possible brain abscess  Pathology report from [place]  Confusion/delirium    Consultants/Specialty  Neurosurgery  Pulmonology  Infectious diseases  Speech pathology    PCP: Pcp Pt States None      Quality Measures  Quality-Core Measures   Reviewed items::  Labs reviewed, Medications reviewed and Radiology images reviewed  Richards catheter::  No Richards  DVT prophylaxis pharmacological::  Contraindicated - High bleeding risk (The setting of recent neurosurgery)  DVT prophylaxis - mechanical:  SCDs  Ulcer Prophylaxis::  No  Antibiotics:  Treating active infection/contamination beyond 24 hours perioperative coverage          Physical Exam       Vitals:    06/01/19 1606 06/01/19 1952 06/02/19 0400 06/02/19 0800   BP: 114/71 130/97 102/65 109/72   Pulse: 67 87 65 63   Resp: 18 17 17 18   Temp: 36.6 °C (97.8 °F) 37.1 °C (98.8 °F) 37.5 °C (99.5 °F) 37.1 °C (98.8 °F)   TempSrc: Temporal Temporal Temporal Temporal   SpO2: 95%      Weight:       Height:         Body mass index is 32.75 kg/m².    Oxygen Therapy:  Pulse Oximetry: 95 %, O2 (LPM): 0, O2 Delivery: None (Room Air)    Physical Exam   Constitutional: No distress.   HENT:   Head:       Mouth/Throat: No oropharyngeal exudate.   Staples around the incision site with no erythema or drainage around the surgical site.    Eyes: Pupils are equal, round, and reactive to light. Conjunctivae and EOM are normal. No scleral icterus.   Neck: Normal range of motion. No JVD present.   Cardiovascular: Normal rate, regular rhythm and normal heart sounds.    No murmur heard.  Pulmonary/Chest: Breath sounds normal. No respiratory distress. He has no wheezes.    Abdominal: Soft. Bowel sounds are normal. He exhibits no distension. There is no tenderness.   Musculoskeletal: He exhibits no edema or tenderness.   Lymphadenopathy:     He has no cervical adenopathy.   Neurological: He is alert.   Still intermittent episodes of confusion but expressive aphasia improved.  No focal neurological deficits.   Skin: No rash noted. No erythema.   Psychiatric: His mood appears anxious. His affect is labile. He exhibits abnormal recent memory.             Assessment/Plan     * Brain abscess- (present on admission)   Assessment & Plan    Treated for acute suppurative otitis media 9 days ago.  9 days history of increasing left ear pain, confusion, difficulty finding words.  History of meth abuse.  CT scan of head with contrast showed fluid collection most likely abscess over the left frontotemporal area with mass-effect, midline shift and evidence of developing hydrocephalus.  Neurosurgery consulted, s/p craniotomy 5/22/19  ID on board, patient was initially on cefepime Vanco and Flagyl -vancomycin discontinued on 5/27  Bacterial culture, fungal culture, and AFB with negative preliminary results.  Pathology sent to [facility]; results pending.      S/P craniotomy- (present on admission)   Assessment & Plan    No complications.  Follows commands off sedation.     Agitation- (present on admission)   Assessment & Plan    Patient intermittently agitated and aggressive towards staff.  -On scheduled Seroquel 25 mg at bedtime  -PRN haldol ordered.     Acute respiratory failure with hypoxia (HCC)- (present on admission)   Assessment & Plan    RESOLVED.  Intubated for airway protection in ED 5/22 - extubated 5/24.  RT/O2 protocol.     Methamphetamine abuse (HCC)- (present on admission)   Assessment & Plan    History of meth abuse per nephew, smoking.  No history of IV use.  Consider patient education and counseling.     Expressive aphasia   Assessment & Plan    -Patient has difficulty expressing, his  frustrated and teary.  - He also has difficulty recollecting recent events  - Likely secondary to lesion in the dominant (left) frontotemporal lobe affecting the Broca's area  - Speech pathology on board.      Microcytic anemia   Assessment & Plan    Patient has a hemoglobin of 12.2 with MCV of 75.4  Iron at 17, Sats of 4% and TIBC of 386.  Patient denies blood in stools or abdominal pain.  Stool for occult blood negative  No records of colonoscopy per chart review    Plan:  - Started patient on iron supplementation with ferrous sulfate.       Smoker- (present on admission)   Assessment & Plan    Patient confused, per mom smokes about 3 cigarettes/day.  Consider education and counseling.

## 2019-06-03 LAB
ALBUMIN SERPL BCP-MCNC: 3.6 G/DL (ref 3.2–4.9)
ALBUMIN/GLOB SERPL: 1.6 G/DL
ALP SERPL-CCNC: 43 U/L (ref 30–99)
ALT SERPL-CCNC: 43 U/L (ref 2–50)
ANION GAP SERPL CALC-SCNC: 10 MMOL/L (ref 0–11.9)
AST SERPL-CCNC: 14 U/L (ref 12–45)
BASOPHILS # BLD AUTO: 0.5 % (ref 0–1.8)
BASOPHILS # BLD: 0.06 K/UL (ref 0–0.12)
BILIRUB SERPL-MCNC: 0.5 MG/DL (ref 0.1–1.5)
BUN SERPL-MCNC: 16 MG/DL (ref 8–22)
CALCIUM SERPL-MCNC: 8.4 MG/DL (ref 8.5–10.5)
CHLORIDE SERPL-SCNC: 104 MMOL/L (ref 96–112)
CO2 SERPL-SCNC: 22 MMOL/L (ref 20–33)
CREAT SERPL-MCNC: 0.65 MG/DL (ref 0.5–1.4)
EOSINOPHIL # BLD AUTO: 0.11 K/UL (ref 0–0.51)
EOSINOPHIL NFR BLD: 0.9 % (ref 0–6.9)
ERYTHROCYTE [DISTWIDTH] IN BLOOD BY AUTOMATED COUNT: 53.8 FL (ref 35.9–50)
GLOBULIN SER CALC-MCNC: 2.3 G/DL (ref 1.9–3.5)
GLUCOSE SERPL-MCNC: 121 MG/DL (ref 65–99)
HCT VFR BLD AUTO: 42.5 % (ref 42–52)
HGB BLD-MCNC: 12.8 G/DL (ref 14–18)
IMM GRANULOCYTES # BLD AUTO: 0.27 K/UL (ref 0–0.11)
IMM GRANULOCYTES NFR BLD AUTO: 2.2 % (ref 0–0.9)
LYMPHOCYTES # BLD AUTO: 1.89 K/UL (ref 1–4.8)
LYMPHOCYTES NFR BLD: 15.3 % (ref 22–41)
MCH RBC QN AUTO: 23.4 PG (ref 27–33)
MCHC RBC AUTO-ENTMCNC: 30.1 G/DL (ref 33.7–35.3)
MCV RBC AUTO: 77.6 FL (ref 81.4–97.8)
MONOCYTES # BLD AUTO: 1.16 K/UL (ref 0–0.85)
MONOCYTES NFR BLD AUTO: 9.4 % (ref 0–13.4)
NEUTROPHILS # BLD AUTO: 8.88 K/UL (ref 1.82–7.42)
NEUTROPHILS NFR BLD: 71.7 % (ref 44–72)
NRBC # BLD AUTO: 0 K/UL
NRBC BLD-RTO: 0 /100 WBC
PLATELET # BLD AUTO: 269 K/UL (ref 164–446)
PMV BLD AUTO: 11.2 FL (ref 9–12.9)
POTASSIUM SERPL-SCNC: 3.3 MMOL/L (ref 3.6–5.5)
PROT SERPL-MCNC: 5.9 G/DL (ref 6–8.2)
RBC # BLD AUTO: 5.48 M/UL (ref 4.7–6.1)
SODIUM SERPL-SCNC: 136 MMOL/L (ref 135–145)
WBC # BLD AUTO: 12.4 K/UL (ref 4.8–10.8)

## 2019-06-03 PROCEDURE — 97112 NEUROMUSCULAR REEDUCATION: CPT

## 2019-06-03 PROCEDURE — A9270 NON-COVERED ITEM OR SERVICE: HCPCS | Performed by: STUDENT IN AN ORGANIZED HEALTH CARE EDUCATION/TRAINING PROGRAM

## 2019-06-03 PROCEDURE — 700111 HCHG RX REV CODE 636 W/ 250 OVERRIDE (IP): Performed by: INTERNAL MEDICINE

## 2019-06-03 PROCEDURE — A9270 NON-COVERED ITEM OR SERVICE: HCPCS | Performed by: INTERNAL MEDICINE

## 2019-06-03 PROCEDURE — 80053 COMPREHEN METABOLIC PANEL: CPT

## 2019-06-03 PROCEDURE — 700102 HCHG RX REV CODE 250 W/ 637 OVERRIDE(OP): Performed by: INTERNAL MEDICINE

## 2019-06-03 PROCEDURE — 770001 HCHG ROOM/CARE - MED/SURG/GYN PRIV*

## 2019-06-03 PROCEDURE — 700112 HCHG RX REV CODE 229: Performed by: INTERNAL MEDICINE

## 2019-06-03 PROCEDURE — 302135 SEQUENTIAL COMPRESSION MACHINE: Performed by: INTERNAL MEDICINE

## 2019-06-03 PROCEDURE — 97116 GAIT TRAINING THERAPY: CPT

## 2019-06-03 PROCEDURE — 99232 SBSQ HOSP IP/OBS MODERATE 35: CPT | Mod: GC | Performed by: INTERNAL MEDICINE

## 2019-06-03 PROCEDURE — 700105 HCHG RX REV CODE 258: Performed by: INTERNAL MEDICINE

## 2019-06-03 PROCEDURE — 85025 COMPLETE CBC W/AUTO DIFF WBC: CPT

## 2019-06-03 PROCEDURE — 700102 HCHG RX REV CODE 250 W/ 637 OVERRIDE(OP): Performed by: STUDENT IN AN ORGANIZED HEALTH CARE EDUCATION/TRAINING PROGRAM

## 2019-06-03 PROCEDURE — 97530 THERAPEUTIC ACTIVITIES: CPT

## 2019-06-03 PROCEDURE — 36415 COLL VENOUS BLD VENIPUNCTURE: CPT

## 2019-06-03 RX ORDER — MAGNESIUM SULFATE 1 G/100ML
1 INJECTION INTRAVENOUS ONCE
Status: DISCONTINUED | OUTPATIENT
Start: 2019-06-03 | End: 2019-06-03

## 2019-06-03 RX ORDER — MAGNESIUM SULFATE HEPTAHYDRATE 40 MG/ML
2 INJECTION, SOLUTION INTRAVENOUS ONCE
Status: COMPLETED | OUTPATIENT
Start: 2019-06-03 | End: 2019-06-03

## 2019-06-03 RX ORDER — POTASSIUM CHLORIDE 20 MEQ/1
40 TABLET, EXTENDED RELEASE ORAL 2 TIMES DAILY
Status: DISCONTINUED | OUTPATIENT
Start: 2019-06-03 | End: 2019-06-06

## 2019-06-03 RX ADMIN — CEFEPIME 2 G: 2 INJECTION, POWDER, FOR SOLUTION INTRAVENOUS at 12:57

## 2019-06-03 RX ADMIN — FERROUS SULFATE TAB 325 MG (65 MG ELEMENTAL FE) 325 MG: 325 (65 FE) TAB at 07:59

## 2019-06-03 RX ADMIN — DOCUSATE SODIUM 100 MG: 100 CAPSULE, LIQUID FILLED ORAL at 16:02

## 2019-06-03 RX ADMIN — POTASSIUM CHLORIDE 40 MEQ: 1500 TABLET, EXTENDED RELEASE ORAL at 06:44

## 2019-06-03 RX ADMIN — METRONIDAZOLE 500 MG: 500 TABLET, FILM COATED ORAL at 12:57

## 2019-06-03 RX ADMIN — OXYCODONE HYDROCHLORIDE 5 MG: 5 TABLET ORAL at 22:26

## 2019-06-03 RX ADMIN — POLYETHYLENE GLYCOL 3350 1 PACKET: 17 POWDER, FOR SOLUTION ORAL at 05:19

## 2019-06-03 RX ADMIN — DOCUSATE SODIUM 100 MG: 100 CAPSULE, LIQUID FILLED ORAL at 05:18

## 2019-06-03 RX ADMIN — METRONIDAZOLE 500 MG: 500 TABLET, FILM COATED ORAL at 05:19

## 2019-06-03 RX ADMIN — QUETIAPINE FUMARATE 25 MG: 25 TABLET ORAL at 16:02

## 2019-06-03 RX ADMIN — METRONIDAZOLE 500 MG: 500 TABLET, FILM COATED ORAL at 20:24

## 2019-06-03 RX ADMIN — POLYETHYLENE GLYCOL 3350 1 PACKET: 17 POWDER, FOR SOLUTION ORAL at 16:02

## 2019-06-03 RX ADMIN — CEFEPIME 2 G: 2 INJECTION, POWDER, FOR SOLUTION INTRAVENOUS at 05:58

## 2019-06-03 RX ADMIN — OXYCODONE HYDROCHLORIDE 5 MG: 5 TABLET ORAL at 05:19

## 2019-06-03 RX ADMIN — POTASSIUM CHLORIDE 40 MEQ: 1500 TABLET, EXTENDED RELEASE ORAL at 16:02

## 2019-06-03 RX ADMIN — CEFEPIME 2 G: 2 INJECTION, POWDER, FOR SOLUTION INTRAVENOUS at 20:23

## 2019-06-03 RX ADMIN — MAGNESIUM SULFATE IN WATER 2 G: 40 INJECTION, SOLUTION INTRAVENOUS at 11:04

## 2019-06-03 RX ADMIN — SENNOSIDES,DOCUSATE SODIUM 1 TABLET: 8.6; 5 TABLET, FILM COATED ORAL at 21:04

## 2019-06-03 ASSESSMENT — GAIT ASSESSMENTS
GAIT LEVEL OF ASSIST: MINIMAL ASSIST
DEVIATION: INCREASED BASE OF SUPPORT;ATAXIC
DISTANCE (FEET): 150

## 2019-06-03 ASSESSMENT — COGNITIVE AND FUNCTIONAL STATUS - GENERAL
MOVING TO AND FROM BED TO CHAIR: A LITTLE
SUGGESTED CMS G CODE MODIFIER MOBILITY: CK
STANDING UP FROM CHAIR USING ARMS: A LITTLE
MOBILITY SCORE: 19
MOVING FROM LYING ON BACK TO SITTING ON SIDE OF FLAT BED: A LITTLE
WALKING IN HOSPITAL ROOM: A LITTLE
CLIMB 3 TO 5 STEPS WITH RAILING: A LITTLE

## 2019-06-03 NOTE — THERAPY
"Physical Therapy Treatment completed.   Bed Mobility:  Supine to Sit: Supervised  Transfers: Sit to Stand: Supervised  Gait: Level Of Assist: Minimal Assist with No Equipment Needed       Plan of Care: Will benefit from Physical Therapy 3 times per week  Discharge Recommendations: Equipment: Will Continue to Assess for Equipment Needs. Post-acute therapy Recommend inpatient transitional care services for continued physical therapy services.      See \"Rehab Therapy-Acute\" Patient Summary Report for complete documentation.     Pt continues to progress w/ therapy. Pt demonstrates improved strength and stability during mobility. He is primarily limited by his cognition. He is very impulsive w/ decreased safety awareness. Pt did demonstrate improved righting reactions when balance tested. He was able to ambulate 150 feet w/out an AD. He does compensate by using a large DILLAN. As per initial eval, pt will benefit from post acute therapy.  "

## 2019-06-03 NOTE — ASSESSMENT & PLAN NOTE
BMI 31.  Outpatient follow-up for weight loss management.  Concern for sleep apnea, recommend outpatient sleep study for further evaluation.

## 2019-06-04 LAB
ALBUMIN SERPL BCP-MCNC: 3.6 G/DL (ref 3.2–4.9)
ALBUMIN/GLOB SERPL: 1.5 G/DL
ALP SERPL-CCNC: 46 U/L (ref 30–99)
ALT SERPL-CCNC: 49 U/L (ref 2–50)
ANION GAP SERPL CALC-SCNC: 7 MMOL/L (ref 0–11.9)
AST SERPL-CCNC: 19 U/L (ref 12–45)
BASOPHILS # BLD AUTO: 0.8 % (ref 0–1.8)
BASOPHILS # BLD: 0.09 K/UL (ref 0–0.12)
BILIRUB SERPL-MCNC: 0.5 MG/DL (ref 0.1–1.5)
BUN SERPL-MCNC: 20 MG/DL (ref 8–22)
CALCIUM SERPL-MCNC: 8.5 MG/DL (ref 8.5–10.5)
CHLORIDE SERPL-SCNC: 106 MMOL/L (ref 96–112)
CO2 SERPL-SCNC: 24 MMOL/L (ref 20–33)
CREAT SERPL-MCNC: 0.68 MG/DL (ref 0.5–1.4)
EOSINOPHIL # BLD AUTO: 0.11 K/UL (ref 0–0.51)
EOSINOPHIL NFR BLD: 1 % (ref 0–6.9)
ERYTHROCYTE [DISTWIDTH] IN BLOOD BY AUTOMATED COUNT: 53.1 FL (ref 35.9–50)
FERRITIN SERPL-MCNC: 16.2 NG/ML (ref 22–322)
GLOBULIN SER CALC-MCNC: 2.4 G/DL (ref 1.9–3.5)
GLUCOSE SERPL-MCNC: 88 MG/DL (ref 65–99)
HCT VFR BLD AUTO: 43.3 % (ref 42–52)
HGB BLD-MCNC: 13 G/DL (ref 14–18)
IMM GRANULOCYTES # BLD AUTO: 0.21 K/UL (ref 0–0.11)
IMM GRANULOCYTES NFR BLD AUTO: 1.9 % (ref 0–0.9)
LYMPHOCYTES # BLD AUTO: 1.53 K/UL (ref 1–4.8)
LYMPHOCYTES NFR BLD: 13.5 % (ref 22–41)
MAGNESIUM SERPL-MCNC: 2 MG/DL (ref 1.5–2.5)
MCH RBC QN AUTO: 23 PG (ref 27–33)
MCHC RBC AUTO-ENTMCNC: 30 G/DL (ref 33.7–35.3)
MCV RBC AUTO: 76.8 FL (ref 81.4–97.8)
MONOCYTES # BLD AUTO: 1.19 K/UL (ref 0–0.85)
MONOCYTES NFR BLD AUTO: 10.5 % (ref 0–13.4)
NEUTROPHILS # BLD AUTO: 8.17 K/UL (ref 1.82–7.42)
NEUTROPHILS NFR BLD: 72.3 % (ref 44–72)
NRBC # BLD AUTO: 0 K/UL
NRBC BLD-RTO: 0 /100 WBC
PLATELET # BLD AUTO: 248 K/UL (ref 164–446)
PMV BLD AUTO: 10.6 FL (ref 9–12.9)
POTASSIUM SERPL-SCNC: 4 MMOL/L (ref 3.6–5.5)
PROT SERPL-MCNC: 6 G/DL (ref 6–8.2)
RBC # BLD AUTO: 5.64 M/UL (ref 4.7–6.1)
SODIUM SERPL-SCNC: 137 MMOL/L (ref 135–145)
VIT B12 SERPL-MCNC: 581 PG/ML (ref 211–911)
WBC # BLD AUTO: 11.3 K/UL (ref 4.8–10.8)

## 2019-06-04 PROCEDURE — 97530 THERAPEUTIC ACTIVITIES: CPT

## 2019-06-04 PROCEDURE — 700111 HCHG RX REV CODE 636 W/ 250 OVERRIDE (IP): Performed by: INTERNAL MEDICINE

## 2019-06-04 PROCEDURE — 82728 ASSAY OF FERRITIN: CPT

## 2019-06-04 PROCEDURE — 700105 HCHG RX REV CODE 258: Performed by: INTERNAL MEDICINE

## 2019-06-04 PROCEDURE — 770001 HCHG ROOM/CARE - MED/SURG/GYN PRIV*

## 2019-06-04 PROCEDURE — A9270 NON-COVERED ITEM OR SERVICE: HCPCS | Performed by: STUDENT IN AN ORGANIZED HEALTH CARE EDUCATION/TRAINING PROGRAM

## 2019-06-04 PROCEDURE — 97116 GAIT TRAINING THERAPY: CPT

## 2019-06-04 PROCEDURE — 700102 HCHG RX REV CODE 250 W/ 637 OVERRIDE(OP): Performed by: INTERNAL MEDICINE

## 2019-06-04 PROCEDURE — A9270 NON-COVERED ITEM OR SERVICE: HCPCS | Performed by: INTERNAL MEDICINE

## 2019-06-04 PROCEDURE — 80053 COMPREHEN METABOLIC PANEL: CPT

## 2019-06-04 PROCEDURE — 97535 SELF CARE MNGMENT TRAINING: CPT

## 2019-06-04 PROCEDURE — 99233 SBSQ HOSP IP/OBS HIGH 50: CPT | Mod: GC | Performed by: INTERNAL MEDICINE

## 2019-06-04 PROCEDURE — 83735 ASSAY OF MAGNESIUM: CPT

## 2019-06-04 PROCEDURE — 82607 VITAMIN B-12: CPT

## 2019-06-04 PROCEDURE — 82784 ASSAY IGA/IGD/IGG/IGM EACH: CPT

## 2019-06-04 PROCEDURE — 700102 HCHG RX REV CODE 250 W/ 637 OVERRIDE(OP): Performed by: STUDENT IN AN ORGANIZED HEALTH CARE EDUCATION/TRAINING PROGRAM

## 2019-06-04 PROCEDURE — 92526 ORAL FUNCTION THERAPY: CPT

## 2019-06-04 PROCEDURE — 83516 IMMUNOASSAY NONANTIBODY: CPT

## 2019-06-04 PROCEDURE — 85025 COMPLETE CBC W/AUTO DIFF WBC: CPT

## 2019-06-04 PROCEDURE — 700112 HCHG RX REV CODE 229: Performed by: INTERNAL MEDICINE

## 2019-06-04 PROCEDURE — 36415 COLL VENOUS BLD VENIPUNCTURE: CPT

## 2019-06-04 RX ORDER — QUETIAPINE FUMARATE 25 MG/1
50 TABLET, FILM COATED ORAL EVERY EVENING
Status: DISCONTINUED | OUTPATIENT
Start: 2019-06-04 | End: 2019-06-04

## 2019-06-04 RX ORDER — QUETIAPINE FUMARATE 25 MG/1
25 TABLET, FILM COATED ORAL 2 TIMES DAILY
Status: DISCONTINUED | OUTPATIENT
Start: 2019-06-04 | End: 2019-06-14

## 2019-06-04 RX ADMIN — CEFEPIME 2 G: 2 INJECTION, POWDER, FOR SOLUTION INTRAVENOUS at 14:00

## 2019-06-04 RX ADMIN — DOCUSATE SODIUM 100 MG: 100 CAPSULE, LIQUID FILLED ORAL at 16:45

## 2019-06-04 RX ADMIN — CEFEPIME 2 G: 2 INJECTION, POWDER, FOR SOLUTION INTRAVENOUS at 20:09

## 2019-06-04 RX ADMIN — SENNOSIDES,DOCUSATE SODIUM 1 TABLET: 8.6; 5 TABLET, FILM COATED ORAL at 20:11

## 2019-06-04 RX ADMIN — POTASSIUM CHLORIDE 40 MEQ: 1500 TABLET, EXTENDED RELEASE ORAL at 16:45

## 2019-06-04 RX ADMIN — FERROUS SULFATE TAB 325 MG (65 MG ELEMENTAL FE) 325 MG: 325 (65 FE) TAB at 08:05

## 2019-06-04 RX ADMIN — POTASSIUM CHLORIDE 40 MEQ: 1500 TABLET, EXTENDED RELEASE ORAL at 05:47

## 2019-06-04 RX ADMIN — METRONIDAZOLE 500 MG: 500 TABLET, FILM COATED ORAL at 14:00

## 2019-06-04 RX ADMIN — QUETIAPINE FUMARATE 25 MG: 25 TABLET ORAL at 16:45

## 2019-06-04 RX ADMIN — METRONIDAZOLE 500 MG: 500 TABLET, FILM COATED ORAL at 20:11

## 2019-06-04 RX ADMIN — METRONIDAZOLE 500 MG: 500 TABLET, FILM COATED ORAL at 05:47

## 2019-06-04 RX ADMIN — ACETAMINOPHEN 650 MG: 325 TABLET, FILM COATED ORAL at 05:47

## 2019-06-04 RX ADMIN — POLYETHYLENE GLYCOL 3350 1 PACKET: 17 POWDER, FOR SOLUTION ORAL at 16:45

## 2019-06-04 RX ADMIN — DOCUSATE SODIUM 100 MG: 100 CAPSULE, LIQUID FILLED ORAL at 05:47

## 2019-06-04 RX ADMIN — CEFEPIME 2 G: 2 INJECTION, POWDER, FOR SOLUTION INTRAVENOUS at 05:43

## 2019-06-04 RX ADMIN — POLYETHYLENE GLYCOL 3350 1 PACKET: 17 POWDER, FOR SOLUTION ORAL at 05:47

## 2019-06-04 ASSESSMENT — COGNITIVE AND FUNCTIONAL STATUS - GENERAL
SUGGESTED CMS G CODE MODIFIER DAILY ACTIVITY: CK
MOVING FROM LYING ON BACK TO SITTING ON SIDE OF FLAT BED: A LITTLE
DAILY ACTIVITIY SCORE: 19
PERSONAL GROOMING: A LITTLE
DRESSING REGULAR UPPER BODY CLOTHING: A LITTLE
MOVING TO AND FROM BED TO CHAIR: A LITTLE
WALKING IN HOSPITAL ROOM: A LITTLE
DRESSING REGULAR LOWER BODY CLOTHING: A LITTLE
STANDING UP FROM CHAIR USING ARMS: A LITTLE
MOBILITY SCORE: 19
CLIMB 3 TO 5 STEPS WITH RAILING: A LITTLE
SUGGESTED CMS G CODE MODIFIER MOBILITY: CK
HELP NEEDED FOR BATHING: A LITTLE
TOILETING: A LITTLE

## 2019-06-04 ASSESSMENT — GAIT ASSESSMENTS
DEVIATION: ATAXIC;INCREASED BASE OF SUPPORT
DISTANCE (FEET): 200
GAIT LEVEL OF ASSIST: MINIMAL ASSIST

## 2019-06-04 NOTE — PROGRESS NOTES
Patient showing increased signs of agitation, especially regarding food. Very upset around 4PM and refused to take some medications. Often will get up to the restroom without calling. Will continue to monitor. Bed alarm in place, reoriented to use call button.

## 2019-06-04 NOTE — THERAPY
"Occupational Therapy Treatment completed with focus on ADLs, ADL transfers, patient education and cognition.  Functional Status:  Pt was seen for Occupational Therapy treatment today, see Therapy Kardex for details.Treatment included education in breath control with activity and at rest, self pacing techs and energy conservation for pain management. Educated pt in safety awareness techs as well. Pt extremely agitated this session . Awaiting pathology report per RN. RN stated pt has been agitated all AM. Pt demo poor STM asking X3 in 1min,  \"What am I suppose to do?' Pt was asked to \"Put your pants on pants and socks\".  Pt continued to look at therapist stating, \"And what am I suppose to do?\" Pt stated he wanted to shave at the sink. Set up done prior to ambulating to sink. Pt looked in the mirror and stated, \"What am I suppose to do?\" Looked into the mirror X2 and stated, \"Ok, I'm finished\". Pt was handed razor and shaving cream, then followed through with task safely. Pt handed tooth brush, pt washed face, did oral hygiene with supervision.Pt noticed his scar in the mirror and started to cry.Needing increased TLC and encouragement. Pt demonstrated Supervised for UB dressing, Supervised for LB dressing seated base, CGA/Min A in standing. Pt also demonstrated supervision  for Ambulating ADL's without AD. Pt did demo 1 LOB and hit the corner of the wall slightly, verbally cussed, but did right himself. RN informed. Pt stood at toilet to urinate with supervision only. Pt was left up in bed , call light in reach, bed alarm on and nursing is aware. RN updated on OT treatment findings and recommendations. Continue Occupational Therapy services as per plan.    Plan of Care: Will benefit from Occupational Therapy 5 times per week  Discharge Recommendations:  Equipment Will Continue to Assess for Equipment Needs. Post-acute therapy Discharge to a transitional care facility for continued skilled therapy services.    See " "\"Rehab Therapy-Acute\" Patient Summary Report for complete documentation.   "

## 2019-06-04 NOTE — CARE PLAN
Problem: Safety  Goal: Will remain free from falls  Patient is high risk for falls has episodes of confusion and need reorientation and doesn't use call light appropriately. Bed alarm is in place. Call light within reach, reoriented of use. Hourly rounding implemented. Frequent toileting offered.     Problem: Mobility  Goal: Risk for activity intolerance will decrease  No mobility issues noted. Steady and ambulates independently.

## 2019-06-04 NOTE — THERAPY
"Physical Therapy Treatment completed.   Bed Mobility:  Supine to Sit: Supervised  Transfers: Sit to Stand: Supervised  Gait: Level Of Assist: Minimal Assist with No Equipment Needed       Plan of Care: Will benefit from Physical Therapy 3 times per week  Discharge Recommendations: Equipment: Will Continue to Assess for Equipment Needs. Post-acute therapy Recommend inpatient transitional care services for continued physical therapy services.       See \"Rehab Therapy-Acute\" Patient Summary Report for complete documentation.     Pt presenting more subdued today. He was more distracted today and continuously focused on food. Pt needing more cues to stay on task today. He demonstrated decreased righting reactions compared to yesterday. He was able to increase tolerance to ambulation however at the end kept sighing. Pt continues to be at a level in which he will benefit from post acute therapy.  "

## 2019-06-04 NOTE — PROGRESS NOTES
Patient has reported sharp pain on his chest point at the middle below the xyphoid process. PRN Oxycodone was given and VS were taken. Waited few minutes to reassess patient if pain medicine worked. After 30 mins patient still verbalized chest pain. Milk was offered, patient drank the milk and few minutes later he is sleeping. Will continue to monitor for reoccurrence.

## 2019-06-04 NOTE — PROGRESS NOTES
Neurosurgery Progress Note    Subjective:  No acute events. Sleeping, rouses easily to voice. No complaints of pain.     Exam:   A&O x3. Fluent speech  Poor short term memory.   4 PERRL, EOMI. Denies blurry or double vision.   Tongue midline without fasciculation. No facial droop.   No drift. Unremarkable finger-nose, SHAUNA testing.   Hearing intact.   Strength: Bilateral shoulder shrug, bicep, tricep, deltoid,  5/5.                   Bilateral IP, DF, PF 5/5.   Sensation: Intact throughout.   Incision: open to air. c/d/i with staples intact.   Eating, drinking. Denies n/v.  Voiding. +BM 6/3/19  No pain  Ambulatory.    CT head good evacuation left temporal cyst with persistent cerebral edema    BP  Min: 95/53  Max: 108/71  Pulse  Av.8  Min: 64  Max: 89  Resp  Av  Min: 16  Max: 16  Temp  Av.9 °C (98.4 °F)  Min: 36.6 °C (97.9 °F)  Max: 37.3 °C (99.2 °F)  SpO2  Av.8 %  Min: 92 %  Max: 99 %    No Data Recorded    Recent Labs      19   0316  19   0304   WBC  12.5*  12.4*   RBC  5.51  5.48   HEMOGLOBIN  12.9*  12.8*   HEMATOCRIT  42.5  42.5   MCV  77.1*  77.6*   MCH  23.4*  23.4*   MCHC  30.4*  30.1*   RDW  53.0*  53.8*   PLATELETCT  267  269   MPV  10.5  11.2     Recent Labs      19   0304   SODIUM  136   POTASSIUM  3.3*   CHLORIDE  104   CO2  22   GLUCOSE  121*   BUN  16   CREATININE  0.65   CALCIUM  8.4*               Intake/Output       19 - 19 0659 19 - 19 Total  Total       Intake    P.O.  1070  -- 1070  --  -- --    P.O. 1070 -- 1070 -- -- --    Total Intake 1070 -- 1070 -- -- --       Output    Urine  --  -- --  --  -- --    Number of Times Voided 4 x -- 4 x -- -- --    Total Output -- -- -- -- -- --       Net I/O     1070 -- 1070 -- -- --            Intake/Output Summary (Last 24 hours) at 19 0874  Last data filed at 19 1810   Gross per 24 hour   Intake              830 ml    Output                0 ml   Net              830 ml            • potassium chloride SA  40 mEq BID   • oxyCODONE immediate-release  5 mg Q6HRS PRN   • ferrous sulfate  325 mg QDAY with Breakfast   • metroNIDAZOLE  500 mg Q8HRS   • QUEtiapine  25 mg Q EVENING   • haloperidol  1-5 mg Q4HRS PRN   • acetaminophen  650 mg Q8HRS PRN   • polyethylene glycol/lytes  1 Packet BID   • docusate sodium  100 mg BID   • senna-docusate  1 Tab Nightly   • cloNIDine  0.1 mg Q4HRS PRN   • magnesium hydroxide  30 mL QDAY PRN   • senna-docusate  1 Tab Q24HRS PRN   • Respiratory Care per Protocol   Continuous RT   • cefepime  2 g Q8HRS   • Pharmacy Consult Request  1 Each PHARMACY TO DOSE   • MD ALERT...DO NOT ADMINISTER NSAIDS or ASPIRIN unless ORDERED By Neurosurgery  1 Each PRN   • ondansetron  4 mg Q4HRS PRN   • scopolamine  1 Patch Q72HRS PRN   • labetalol  10 mg Q HOUR PRN   • hydrALAZINE  10 mg Q HOUR PRN   • bisacodyl  10 mg Q24HRS PRN   • fleet  1 Each Once PRN       Assessment and Plan:  Hospital day #14 left temporal peripherally enhancing cystic lesion  POD #13 left temporal cranitomy, cyst evacuation/biopsy  Prophylactic anticoagulation: no         Start date/time: pt mobile, ambulatory    Plan:   Stable neuro exam  Cyst:  Pathology pending but frozen benign, cultures no growth, unclear etiology.  Continue ABX unless pathology demontrates malignancy  Chest abdomen pelvis CT negative for primary tumor  Continue PT/OT therapies  Continue medical management  Staples to be removed POD #14

## 2019-06-04 NOTE — PROGRESS NOTES
Internal Medicine Interval Note    Note Author: Em Savage M.D.      Name Robert CALDERON 1965   Age 54 y.o. male   MRN [id number]   Code Status Full Code     After 5 PM or if no immediate response to page, please call for cross-coverage.    Attending: Dr. Landa  Team: Bryce See patient list for primary contact information.  Call [phone] to page.    1st Call - Day Intern, R1  Dr. Savage 2nd Call - Day Sr. Resident, R2-R3  Dr. Wood       Principal Problem  Brain mass      Interval History  No acute events overnight.  Called [facility], results still pending.   Still with some expressive aphasia, reports doing well otherwise, denies any complaints.       ROS  Constitutional: Negative for chills and fatigue.  HEN: Negative for sinus pain and congestion.  Throat: Negative for dysphagia and sore throat.  Eyes: Negative for pain and diplopia.   Respiratory: Negative for cough and shortness of breath.  Cardiovascular: Negative for chest pain and palpitations.  Gastrointestinal: Negative for constipation, diarrhea, nausea and vomiting.   Genitourinary: Negative for dysuria and hematuria.    Extremities: Negative for pain and swelling.  Neurological: Positive for expressive aphasia. Negative for headache, dizziness and sensory change.  Hematologic: Negative for abnormal bleeding and bruising.   Psychiatric: Negative for depression and anxiety.      Disposition, Discharge Barriers  Inpatient, pending pathology results.     Consultants  Neurosurgery  Infectious Disease       Quality-Core Measures  Richards Catheter: No  Central Line: No  VTE Prophylaxis: Lovenox  Antibiotics: Flagyl and cefepime  Rehab: PT, OT following      Physical Exam    Temp:  [36.3 °C (97.3 °F)-36.9 °C (98.5 °F)] 36.7 °C (98.1 °F)  Pulse:  [60-65] 64  Resp:  [16-18] 16  BP: (107-114)/(69-75) 108/71  SpO2:  [92 %-99 %] 92 %    General: No acute distress, cooperative.  Head: Normocephalic, incision from L frontotemporal  craniotomy with staples healing well.    Eyes: Normal conjunctivae, sclerae anicteric.  Throat: Oropharynx clear, oral mucosa moist.  Neck: Supple, no lymphadenopathy.  Lungs: Normal work of breathing, clear to auscultation bilaterally.  Heart: Normal rate, regular rhythm, no murmurs.  Abdomen: Soft, bowel sounds present, non-tender, no peritoneal signs.  Extremities: Nontender, no cyanosis, no edema.  Neuro: Alert, oriented to person place and time, expressive aphasia, normal tone and sensation.   Skin: Warm, dry, intact.      Labs and Imaging  Pertinent labs, imaging and diagnostic tests and procedures associated with this visit have been reviewed, specific comments can be found in the assessment and plan section below.     Recent Labs      06/01/19   0539  06/03/19   0304   SODIUM  135  136   POTASSIUM  3.9  3.3*   CHLORIDE  104  104   CO2  24  22   BUN  17  16   CREATININE  0.59  0.65   GLUCOSE  106*  121*   CALCIUM  8.5  8.4*     Recent Labs      06/01/19   0539  06/02/19   0316  06/03/19   0304   WBC  14.1*  12.5*  12.4*   HEMOGLOBIN  12.4*  12.9*  12.8*   HEMATOCRIT  40.5*  42.5  42.5   PLATELETCT  251  267  269     Recent Labs      06/01/19   0539  06/03/19   0304   ALTSGPT  66*  43   ASTSGOT  24  14   ALKPHOSPHAT  51  43   TBILIRUBIN  0.4  0.5   ALBUMIN  3.4  3.6       Assessment and Plan    Status post craniotomy   Assessment & Plan    No complications, brain specimen sent to [facility], results pending.     * Brain abscess   Assessment & Plan    CT scan of head with contrast showed fluid collection most likely abscess over the left frontotemporal area with mass-effect, midline shift and evidence of developing hydrocephalus.  Neurosurgery consulted.  Status post craniotomy on 5/22/19, specimen sent to [facility] for pathology.  ID on board, initially on vanc and flagyl, vanc discontinued 5/27/19, cefepime started instead.   Bacterial, including AFB, and fungal cultures negative to date.      Plan:  Contacted [facility] at [phone], results pending.  Continue cefepime and metronidazole pending path results per ID.     Agitation   Assessment & Plan    Intermittently agitated and aggressive towards the staff.  On Seroquel 25 mg nightly.  Haldol PRN for agitation and aggression not managed with Seroquel.     Acute respiratory failure with hypoxia (HCC)   Assessment & Plan    Resolved.  Intubated on admission for airway protection on 5/22/2019, extubated on 5/24/2019.  RT protocol, supplemental oxygen as needed.     Delirium   Assessment & Plan    Resolved.  On Seroquel 25 mg qhs and Haldol PRN for intermittent aggression and agitation.   CTM.     Methamphetamine abuse (HCC)   Assessment & Plan    History of meth abuse via smoking per the patient's nephew, no history of IV drug use.  Substance abuse counseling prior to discharge.     Expressive aphasia   Assessment & Plan    Improving.  Still with some difficulty identifying objects and recalling current events.  Likely secondary to a lesion in the frontotemporal lobe of the left dominant brain.     Microcytic anemia   Assessment & Plan    Stable.  Fe 17, Fe Sat 4% and TIBC 386.  Stool negative for occult blood.  No records of colonoscopy per chart review.  Started on iron supplement, recommend outpatient PCP follow-up and screening colonoscopy.    Plan:  Continue ferrous sulfate daily     Class 1 obesity due to excess calories without serious comorbidity with body mass index (BMI) of 33.0 to 33.9 in adult   Assessment & Plan    BMI 31.  Patient follow-up for weight loss management.  Concern for sleep apnea, recommend outpatient sleep study for further evaluation.     Smoker   Assessment & Plan    Smokes 3 cigarettes per day per his mother's report.  Smoking cessation counseling prior to discharge.

## 2019-06-04 NOTE — PROGRESS NOTES
Internal Medicine Interval Note    Note Author: Em Savage M.D.      Name Robert CALDERON 1965   Age 54 y.o. male   MRN [id number]   Code Status Full Code     After 5 PM or if no immediate response to page, please call for cross-coverage.    Attending: Dr. Landa  Team: Blue See patient list for primary contact information.  Call [phone] to page.    1st Call - Day Intern, R1  Dr. Savage 2nd Call - Day Sr. Resident, R2-R3  Dr. Wood       Principal Problem  Brain mass; concerning for infection vs. malignancy      Interval History  No acute events overnight.  Called [facility] yesterday, pathology results still pending.   Continues to have some expressive aphasia.  Reports doing well overall, denies any complaints.   Contacted ID about POA if path negative or inconclusive, appreciate recommendations.  POD 13 from craniotomy; Neurosurgery following, suture removal planned for POD 14.  Called back the patient's mother; no response and voicemail box was full, will try her again tomorrow.       ROS  Constitutional: Negative for chills and fatigue.  HEN: Negative for sinus pain and congestion.  Throat: Negative for dysphagia and sore throat.  Eyes: Negative for pain and diplopia.   Respiratory: Negative for cough and shortness of breath.  Cardiovascular: Negative for chest pain and palpitations.  Gastrointestinal: Negative for constipation, diarrhea, nausea and vomiting.   Genitourinary: Negative for dysuria and hematuria.    Extremities: Negative for pain and swelling.  Neurological: Positive for expressive aphasia. Negative for headache, dizziness and sensory change.  Hematologic: Negative for abnormal bleeding and bruising.   Psychiatric: Negative for depression and anxiety.      Disposition, Discharge Barriers  Inpatient, pending pathology results.       Consultants  Neurosurgery  Infectious Disease       Quality-Core Measures  Richards Catheter: No  Central Line: No  VTE Prophylaxis:  Lovenox  Antibiotics: Flagyl and cefepime  Rehab: PT, OT, SLP following       Physical Exam    Temp:  [36.2 °C (97.1 °F)-37.3 °C (99.2 °F)] 36.2 °C (97.1 °F)  Pulse:  [64-89] 88  Resp:  [16] 16  BP: ()/(53-68) 124/68  SpO2:  [94 %-99 %] 94 %    General: No acute distress, cooperative.  Head: Normocephalic, incision from L frontotemporal craniotomy (staples in place)  healing well.    Eyes: Normal conjunctivae, sclerae anicteric.  Throat: Oropharynx clear, oral mucosa moist.  Neck: Supple, no lymphadenopathy.  Lungs: Normal work of breathing, clear to auscultation bilaterally.  Heart: Normal rate, regular rhythm, no murmurs.  Abdomen: Soft, bowel sounds present, non-tender, no peritoneal signs.  Extremities: Nontender, no cyanosis, no edema.  Neuro: Alert, oriented to person place and time, expressive aphasia, normal tone and sensation.   Skin: Warm, dry, intact.      Labs and Imaging  Pertinent labs, imaging and diagnostic tests and procedures associated with this visit have been reviewed, specific comments can be found in the assessment and plan section below.     Recent Labs      06/03/19   0304  06/04/19   0813   SODIUM  136  137   POTASSIUM  3.3*  4.0   CHLORIDE  104  106   CO2  22  24   BUN  16  20   CREATININE  0.65  0.68   GLUCOSE  121*  88   CALCIUM  8.4*  8.5   MAGNESIUM   --   2.0     Recent Labs      06/02/19   0316  06/03/19   0304  06/04/19   0813   WBC  12.5*  12.4*  11.3*   HEMOGLOBIN  12.9*  12.8*  13.0*   HEMATOCRIT  42.5  42.5  43.3   PLATELETCT  267  269  248     Recent Labs      06/03/19   0304  06/04/19   0813   ALTSGPT  43  49   ASTSGOT  14  19   ALKPHOSPHAT  43  46   TBILIRUBIN  0.5  0.5   ALBUMIN  3.6  3.6       Assessment and Plan    Status post craniotomy   Assessment & Plan    No complications, brain specimen sent to [place] pathology, results pending.  Suture removal 6/5/19 per Neurosurgery.      * Brain abscess   Assessment & Plan    CT head with contrast showed fluid collection  most likely abscess over the left frontotemporal area with mass-effect, midline shift and evidence of developing hydrocephalus.  Neurosurgery consulted.  Status post craniotomy on 5/22/19, specimen sent to [place] for pathology.  ID on board, initially on vanc and flagyl, vanc discontinued 5/27/19, cefepime started instead.   Bacterial, including AFB, and fungal cultures negative to date.     Plan:  Pathology specimen(s) received by [facility]  Contacted [facility] at [phone] yesterday, results pending.  Continue cefepime and metronidazole pending path results per ID.     Agitation   Assessment & Plan    Intermittently agitated and aggressive towards the staff.  On Seroquel 25 mg nightly.    Plan:  Modified Seroquel dose to 25 mg BID for better management.   Haldol PRN for agitation and aggression not managed with Seroquel.     Acute respiratory failure with hypoxia (HCC)   Assessment & Plan    Resolved.  Intubated on admission for airway protection on 5/22/2019, extubated on 5/24/2019.  RT protocol, supplemental oxygen as needed.     Delirium   Assessment & Plan    Resolved.  On Seroquel 25 mg qhs and Haldol PRN for intermittent aggression and agitation.   CTM.     Methamphetamine abuse (HCC)   Assessment & Plan    History of meth abuse via smoking per the patient's nephew, no history of IV drug use.  Substance abuse counseling prior to discharge.     Expressive aphasia   Assessment & Plan    Improving.  Still with some difficulty identifying objects and recalling current events.  Likely secondary to a lesion in the frontotemporal lobe of the left dominant brain.     Microcytic anemia   Assessment & Plan    Stable.  Fe 17, Fe Sat 4% and TIBC 386, ferritin 16.2.  Stool negative for occult blood.  No records of EGD or colonoscopy per chart review.  Started on ferrous sulfate supplementation this admission.     Plan:  Continue ferrous sulfate daily.  Stool H. Pylori test pending.   Celiac panel and  reticulocyte panel pending.   Outpatient PCP follow-up, recommend GI cancer screening with EGD and colonoscopy.     Class 1 obesity due to excess calories without serious comorbidity with body mass index (BMI) of 33.0 to 33.9 in adult   Assessment & Plan    BMI 31.  Patient follow-up for weight loss management.  Concern for sleep apnea, recommend outpatient sleep study for further evaluation.     Smoker   Assessment & Plan    Smokes 3 cigarettes per day per his mother's report.  Smoking cessation counseling prior to discharge.

## 2019-06-05 LAB
ANION GAP SERPL CALC-SCNC: 10 MMOL/L (ref 0–11.9)
BASOPHILS # BLD AUTO: 0.8 % (ref 0–1.8)
BASOPHILS # BLD: 0.08 K/UL (ref 0–0.12)
BUN SERPL-MCNC: 20 MG/DL (ref 8–22)
CALCIUM SERPL-MCNC: 8.9 MG/DL (ref 8.5–10.5)
CHLORIDE SERPL-SCNC: 106 MMOL/L (ref 96–112)
CO2 SERPL-SCNC: 23 MMOL/L (ref 20–33)
CREAT SERPL-MCNC: 0.64 MG/DL (ref 0.5–1.4)
EOSINOPHIL # BLD AUTO: 0.15 K/UL (ref 0–0.51)
EOSINOPHIL NFR BLD: 1.5 % (ref 0–6.9)
ERYTHROCYTE [DISTWIDTH] IN BLOOD BY AUTOMATED COUNT: 53.9 FL (ref 35.9–50)
GLUCOSE SERPL-MCNC: 130 MG/DL (ref 65–99)
HCT VFR BLD AUTO: 44.3 % (ref 42–52)
HGB BLD-MCNC: 13.6 G/DL (ref 14–18)
HGB RETIC QN AUTO: 31.5 PG/CELL (ref 29–35)
IGA SERPL-MCNC: 154 MG/DL (ref 68–408)
IMM GRANULOCYTES # BLD AUTO: 0.14 K/UL (ref 0–0.11)
IMM GRANULOCYTES NFR BLD AUTO: 1.4 % (ref 0–0.9)
IMM RETICS NFR: 7 % (ref 9.3–17.4)
LYMPHOCYTES # BLD AUTO: 1.63 K/UL (ref 1–4.8)
LYMPHOCYTES NFR BLD: 16.7 % (ref 22–41)
MAGNESIUM SERPL-MCNC: 1.9 MG/DL (ref 1.5–2.5)
MCH RBC QN AUTO: 23.5 PG (ref 27–33)
MCHC RBC AUTO-ENTMCNC: 30.7 G/DL (ref 33.7–35.3)
MCV RBC AUTO: 76.6 FL (ref 81.4–97.8)
MONOCYTES # BLD AUTO: 0.9 K/UL (ref 0–0.85)
MONOCYTES NFR BLD AUTO: 9.2 % (ref 0–13.4)
NEUTROPHILS # BLD AUTO: 6.88 K/UL (ref 1.82–7.42)
NEUTROPHILS NFR BLD: 70.4 % (ref 44–72)
NRBC # BLD AUTO: 0 K/UL
NRBC BLD-RTO: 0 /100 WBC
PLATELET # BLD AUTO: 244 K/UL (ref 164–446)
PMV BLD AUTO: 10.2 FL (ref 9–12.9)
POTASSIUM SERPL-SCNC: 3.8 MMOL/L (ref 3.6–5.5)
RBC # BLD AUTO: 5.78 M/UL (ref 4.7–6.1)
RETICS # AUTO: 0.06 M/UL (ref 0.04–0.06)
RETICS/RBC NFR: 1 % (ref 0.8–2.1)
SODIUM SERPL-SCNC: 139 MMOL/L (ref 135–145)
WBC # BLD AUTO: 9.8 K/UL (ref 4.8–10.8)

## 2019-06-05 PROCEDURE — A9270 NON-COVERED ITEM OR SERVICE: HCPCS | Performed by: INTERNAL MEDICINE

## 2019-06-05 PROCEDURE — 700112 HCHG RX REV CODE 229: Performed by: INTERNAL MEDICINE

## 2019-06-05 PROCEDURE — 85025 COMPLETE CBC W/AUTO DIFF WBC: CPT

## 2019-06-05 PROCEDURE — 700102 HCHG RX REV CODE 250 W/ 637 OVERRIDE(OP): Performed by: INTERNAL MEDICINE

## 2019-06-05 PROCEDURE — 83735 ASSAY OF MAGNESIUM: CPT

## 2019-06-05 PROCEDURE — 36415 COLL VENOUS BLD VENIPUNCTURE: CPT

## 2019-06-05 PROCEDURE — 85046 RETICYTE/HGB CONCENTRATE: CPT

## 2019-06-05 PROCEDURE — 700102 HCHG RX REV CODE 250 W/ 637 OVERRIDE(OP): Performed by: STUDENT IN AN ORGANIZED HEALTH CARE EDUCATION/TRAINING PROGRAM

## 2019-06-05 PROCEDURE — 99233 SBSQ HOSP IP/OBS HIGH 50: CPT | Mod: GC | Performed by: INTERNAL MEDICINE

## 2019-06-05 PROCEDURE — 700105 HCHG RX REV CODE 258: Performed by: INTERNAL MEDICINE

## 2019-06-05 PROCEDURE — A9270 NON-COVERED ITEM OR SERVICE: HCPCS | Performed by: STUDENT IN AN ORGANIZED HEALTH CARE EDUCATION/TRAINING PROGRAM

## 2019-06-05 PROCEDURE — 99232 SBSQ HOSP IP/OBS MODERATE 35: CPT | Performed by: INTERNAL MEDICINE

## 2019-06-05 PROCEDURE — 770001 HCHG ROOM/CARE - MED/SURG/GYN PRIV*

## 2019-06-05 PROCEDURE — 97535 SELF CARE MNGMENT TRAINING: CPT

## 2019-06-05 PROCEDURE — 80048 BASIC METABOLIC PNL TOTAL CA: CPT

## 2019-06-05 PROCEDURE — 700111 HCHG RX REV CODE 636 W/ 250 OVERRIDE (IP): Performed by: INTERNAL MEDICINE

## 2019-06-05 PROCEDURE — 700111 HCHG RX REV CODE 636 W/ 250 OVERRIDE (IP): Performed by: NEUROLOGICAL SURGERY

## 2019-06-05 RX ADMIN — ONDANSETRON 4 MG: 2 INJECTION INTRAMUSCULAR; INTRAVENOUS at 21:33

## 2019-06-05 RX ADMIN — CEFEPIME 2 G: 2 INJECTION, POWDER, FOR SOLUTION INTRAVENOUS at 05:24

## 2019-06-05 RX ADMIN — POLYETHYLENE GLYCOL 3350 1 PACKET: 17 POWDER, FOR SOLUTION ORAL at 04:29

## 2019-06-05 RX ADMIN — QUETIAPINE FUMARATE 25 MG: 25 TABLET ORAL at 17:24

## 2019-06-05 RX ADMIN — METRONIDAZOLE 500 MG: 500 TABLET, FILM COATED ORAL at 21:33

## 2019-06-05 RX ADMIN — QUETIAPINE FUMARATE 25 MG: 25 TABLET ORAL at 04:29

## 2019-06-05 RX ADMIN — CEFEPIME 2 G: 2 INJECTION, POWDER, FOR SOLUTION INTRAVENOUS at 13:57

## 2019-06-05 RX ADMIN — POTASSIUM CHLORIDE 40 MEQ: 1500 TABLET, EXTENDED RELEASE ORAL at 04:29

## 2019-06-05 RX ADMIN — VANCOMYCIN HYDROCHLORIDE 2500 MG: 100 INJECTION, POWDER, LYOPHILIZED, FOR SOLUTION INTRAVENOUS at 17:54

## 2019-06-05 RX ADMIN — DOCUSATE SODIUM 100 MG: 100 CAPSULE, LIQUID FILLED ORAL at 18:00

## 2019-06-05 RX ADMIN — ACETAMINOPHEN 650 MG: 325 TABLET, FILM COATED ORAL at 04:29

## 2019-06-05 RX ADMIN — POTASSIUM CHLORIDE 40 MEQ: 1500 TABLET, EXTENDED RELEASE ORAL at 17:24

## 2019-06-05 RX ADMIN — DOCUSATE SODIUM 100 MG: 100 CAPSULE, LIQUID FILLED ORAL at 04:29

## 2019-06-05 RX ADMIN — POLYETHYLENE GLYCOL 3350 1 PACKET: 17 POWDER, FOR SOLUTION ORAL at 17:24

## 2019-06-05 RX ADMIN — METRONIDAZOLE 500 MG: 500 TABLET, FILM COATED ORAL at 04:29

## 2019-06-05 RX ADMIN — METRONIDAZOLE 500 MG: 500 TABLET, FILM COATED ORAL at 13:57

## 2019-06-05 RX ADMIN — CEFEPIME 2 G: 2 INJECTION, POWDER, FOR SOLUTION INTRAVENOUS at 22:55

## 2019-06-05 RX ADMIN — OXYCODONE HYDROCHLORIDE 5 MG: 5 TABLET ORAL at 21:33

## 2019-06-05 RX ADMIN — FERROUS SULFATE TAB 325 MG (65 MG ELEMENTAL FE) 325 MG: 325 (65 FE) TAB at 09:31

## 2019-06-05 ASSESSMENT — COGNITIVE AND FUNCTIONAL STATUS - GENERAL
SUGGESTED CMS G CODE MODIFIER DAILY ACTIVITY: CK
PERSONAL GROOMING: A LITTLE
TOILETING: A LITTLE
HELP NEEDED FOR BATHING: A LITTLE
DRESSING REGULAR LOWER BODY CLOTHING: A LITTLE
DAILY ACTIVITIY SCORE: 19
DRESSING REGULAR UPPER BODY CLOTHING: A LITTLE

## 2019-06-05 ASSESSMENT — ENCOUNTER SYMPTOMS
DIARRHEA: 0
FEVER: 0
NAUSEA: 0
ABDOMINAL PAIN: 0
COUGH: 0
MYALGIAS: 0
HEADACHES: 1
CHILLS: 0
SHORTNESS OF BREATH: 0
VOMITING: 0

## 2019-06-05 NOTE — THERAPY
"Speech Language Therapy dysphagia treatment completed.   Functional Status:  Patient seen for dysphagia follow up. Communication strategies were used throughout the session due to patient's expressive AND receptive aphasia. He was found sitting александр-cross in his bed finishing his dinner tray. No overt signs noted with the last few bites. Thin liquids via cup were consumed without difficulty. He was given trials of chopped mixed consistency and dry solids as well as a straw for his water. He consumed mixed textures and dry solids without signs of aspiration. However, while consuming a cracker he took a sip of water from the straw at the same time and started coughing. Further trials from the straw were completed. No coughing noted but he had throat clearing ~25% of the time. This was eliminated when taking sips from the cup.  Recommendations:  Recommend upgrade to Dysphagia 3, thin liquids. Crackers okay. No straws.   Plan of Care: Will benefit from Speech Therapy 5 times per week  Post-Acute Therapy: Recommend inpatient transitional care services for continued speech therapy services to address APHASIA        See \"Rehab Therapy-Acute\" Patient Summary Report for complete documentation.     "

## 2019-06-05 NOTE — DISCHARGE PLANNING
Aware of repeat PMR referral from . ID recommending 6 weeks IV abx based on pathology results. Case reviewed with Physiatry Dr. Deny Farr. Duration of IV abx is barrier to IRF. Anticipate skilled nursing for post acute care. Pending progress at [facility], may be potential transition to IRF pt as IV abx therapy nears completion. VM update to Neuro d/c planner [phone].

## 2019-06-05 NOTE — CARE PLAN
Problem: Safety  Goal: Will remain free from falls  Patient is confused and high risk for fall. Bed alarm is in place. Call light within reach, reoriented of use. Hourly rounding implemented. Frequent toileting offered.     Problem: Pain Management  Goal: Pain level will decrease to patient's comfort goal  Pain is controlled with current pain management. Will continue to monitor for any side effects.

## 2019-06-05 NOTE — PROGRESS NOTES
"Pharmacy Kinetics 54 y.o. male on vancomycin day # 1 (restart; previously on 7days ending ) 2019    Currently on Vancomycin 2500 mg iv x1 loading dose  (1700)    Indication for Treatment: brain abscess     Pertinent history per medical record: Admitted on 2019 for confusion for the past week and a half.  He recently completed treatment with cefdinir for otitis media.  PMH of methamphetamine abuse.  ID and Neurosurgery are consulting. S/p craniotomy.  Pathology specimen collected in OR and sent to [place]. Per ID - will treat it like a brain abscess in view of findings in the OR and pathology being nonspecific gliosis; re-start vancomycin; aim for ~6 weeks of abx.     Other antibiotics: cefepime 2g IV q8h, metronidazole 500mg PO q8h    Allergies: Pcn [penicillins] and Sulfa drugs     List concerns for renal function: obesity (BMI 31)    Pertinent cultures to date:   19 brain abscess: no organisms seen  19 blood-peripheral x 2:  negative    MRSA nares swab if pneumonia is a concern (ordered/positive/negative/n-a): N/A    Recent Labs      19   0304  19   0813  19   0301   WBC  12.4*  11.3*  9.8   NEUTSPOLYS  71.70  72.30*  70.40     Recent Labs      19   0304  19   0813  19   0301   BUN  16  20  20   CREATININE  0.65  0.68  0.64   ALBUMIN  3.6  3.6   --      No results for input(s): VANCOTROUGH, VANCOPEAK, VANCORANDOM in the last 72 hours.  Intake/Output Summary (Last 24 hours) at 19 1607  Last data filed at 19 1800   Gross per 24 hour   Intake              320 ml   Output                0 ml   Net              320 ml      /60   Pulse 71   Temp 37.2 °C (99 °F) (Temporal)   Resp 19   Ht 1.803 m (5' 11\")   Wt 101 kg (222 lb 10.6 oz)   SpO2 100%  Temp (24hrs), Av.1 °C (98.7 °F), Min:36.8 °C (98.2 °F), Max:37.2 °C (99 °F)      A/P   1. Vancomycin dose change: Vancomycin 1500 mg iv q8hr ()  2. Next vancomycin level: tomorrow, " 6/7/19, at 0830  (ordered)  3. Goal trough: 18-22 mcg/mL  4. Comments: Vancomycin restarted per ID for non-specific pathology report. Anticipate IV abx on for 6 weeks. No leukocytosis and afebrile. Renal function appears stable and little concern for accumulation. Previously tolerated ~15mg/kg and was able to reach goal trough so will restart that dose. Trough level ordered for prior to 6th dose to assess accumulation and dose appropriateness. Pharmacy to continue to follow ID recommendations.      Lea Conner, PharmD., BCPS

## 2019-06-05 NOTE — PROGRESS NOTES
Neurosurgery Progress Note    Subjective:  No acute events. Sitting up watching tv. No complaints.     Exam:   A&O x3. Fluent speech  Poor short term memory.   4 PERRL, EOMI. Denies blurry or double vision.   Tongue midline without fasciculation. No facial droop.   No drift. Unremarkable finger-nose, SHAUNA testing.   Hearing intact.   Strength: Bilateral shoulder shrug, bicep, tricep, deltoid,  5/5.                   Bilateral IP, DF, PF 5/5.   Sensation: Intact throughout.   Incision: open to air. c/d/i with staples intact.   Eating, drinking. Denies n/v.  Voiding. +BM 6/3/19  No pain  Ambulatory.    CT head good evacuation left temporal cyst with persistent cerebral edema    BP  Min: 92/68  Max: 124/68  Pulse  Av.3  Min: 71  Max: 88  Resp  Av.3  Min: 16  Max: 19  Temp  Av.8 °C (98.3 °F)  Min: 36.2 °C (97.1 °F)  Max: 37.2 °C (99 °F)  SpO2  Av %  Min: 94 %  Max: 100 %    No Data Recorded    Recent Labs      19   0304  19   0813  19   0301   WBC  12.4*  11.3*  9.8   RBC  5.48  5.64  5.78   HEMOGLOBIN  12.8*  13.0*  13.6*   HEMATOCRIT  42.5  43.3  44.3   MCV  77.6*  76.8*  76.6*   MCH  23.4*  23.0*  23.5*   MCHC  30.1*  30.0*  30.7*   RDW  53.8*  53.1*  53.9*   PLATELETCT  269  248  244   MPV  11.2  10.6  10.2     Recent Labs      19   0304  19   0813  19   0301   SODIUM  136  137  139   POTASSIUM  3.3*  4.0  3.8   CHLORIDE  104  106  106   CO2  22  24  23   GLUCOSE  121*  88  130*   BUN  16  20  20   CREATININE  0.65  0.68  0.64   CALCIUM  8.4*  8.5  8.9               Intake/Output       19 - 1959 19 07 - 19 Total  Total       Intake    P.O.  570  -- 570  --  -- --    P.O. 570 -- 570 -- -- --    I.V.  50  -- 50  --  -- --    Magnesium Sulfate Volume 50 -- 50 -- -- --    IV Piggyback  400  -- 400  --  -- --    Volume (mL) (cefepime (MAXIPIME) 2 g in  mL IVPB) 400 -- 400  -- -- --    Total Intake 1020 -- 1020 -- -- --       Output    Urine  --  -- --  --  -- --    Number of Times Voided 4 x -- 4 x -- -- --    Total Output -- -- -- -- -- --       Net I/O     1020 -- 1020 -- -- --            Intake/Output Summary (Last 24 hours) at 06/05/19 0859  Last data filed at 06/04/19 1800   Gross per 24 hour   Intake             1020 ml   Output                0 ml   Net             1020 ml            • QUEtiapine  25 mg BID   • potassium chloride SA  40 mEq BID   • oxyCODONE immediate-release  5 mg Q6HRS PRN   • ferrous sulfate  325 mg QDAY with Breakfast   • metroNIDAZOLE  500 mg Q8HRS   • haloperidol  1-5 mg Q4HRS PRN   • acetaminophen  650 mg Q8HRS PRN   • polyethylene glycol/lytes  1 Packet BID   • docusate sodium  100 mg BID   • senna-docusate  1 Tab Nightly   • cloNIDine  0.1 mg Q4HRS PRN   • magnesium hydroxide  30 mL QDAY PRN   • senna-docusate  1 Tab Q24HRS PRN   • Respiratory Care per Protocol   Continuous RT   • cefepime  2 g Q8HRS   • Pharmacy Consult Request  1 Each PHARMACY TO DOSE   • MD ALERT...DO NOT ADMINISTER NSAIDS or ASPIRIN unless ORDERED By Neurosurgery  1 Each PRN   • ondansetron  4 mg Q4HRS PRN   • scopolamine  1 Patch Q72HRS PRN   • labetalol  10 mg Q HOUR PRN   • hydrALAZINE  10 mg Q HOUR PRN   • bisacodyl  10 mg Q24HRS PRN   • fleet  1 Each Once PRN       Assessment and Plan:  Hospital day #15 left temporal peripherally enhancing cystic lesion  POD #14 left temporal cranitomy, cyst evacuation/biopsy  Prophylactic anticoagulation: no         Start date/time: pt mobile, ambulatory      Plan:   Stable neuro exam  Remove staples today  Final pathology from [place] mild reactive gliosis  Pt clear from NSG standpoint to dc/transfer  Repeat MRI brain w/wo contrast in next 2-3 days. Obtain prior to dc if pt is dc'd/transfered.

## 2019-06-05 NOTE — PROGRESS NOTES
0745Report received, plan of care reviewed and discussed, assessment complete, oriented to room, bed alarm on, nonskid socks applied, advised to call for assistance.   0945 Discussed plan of care, encouraged and answered all questions, will continue to monitor.  1145 Up in bed, all needs met at this time.  1345 Asked and received several meals throughout the day.  1545 Resting, mother at bedside, plan of care discussed.  1845 Report given to next shift.

## 2019-06-05 NOTE — PROGRESS NOTES
Infectious Disease Progress Note    Author: Rae Reyes M.D. Date & Time of service: 2019  3:20 PM    Chief Complaint:  Brain mass       Interval History:  53 y.o. male  admitted 2019 for AMS due to above. +meth abuse.  Recently in the ED on May 12 and was diagnosed with acute suppurative otitis media   AF WBC 12.6 extubated this am-pain controlled-decreased strength RUE +delerium   AF improved strength RUE-intermittently combative and cooperative   AF WBC 9.7 drain removed-somnolent this am-per RN strength continues to improve   AF had HA earlier but now improved-no new neurologic deficits. Plan for C/A/P today   AF WBC 13.4 remains alert-strength improved. Denies SE abx. HA controlled   AF WBC 12.2 sitting up to chair-no new complaints   AF WBC 17 denies any worsening HA, visual changes, N/V/D, abd pain  2019-no fevers.  Complains of some headache.  No new issues overnight.  WBC is 14.5  2019 no fevers.  No new issues.  Pathology is still not back.  - no fevers. The path has come back positive for gliosis.  Denies any new complaints.  Labs Reviewed, Medications Reviewed, Wound Reviewed.    Review of Systems:  Review of Systems   Constitutional: Negative for chills and fever.   Respiratory: Negative for cough and shortness of breath.    Cardiovascular: Negative for chest pain.   Gastrointestinal: Negative for abdominal pain, diarrhea, nausea and vomiting.   Genitourinary: Negative for dysuria.   Musculoskeletal: Negative for myalgias.   Skin: Negative for rash.   Neurological: Positive for headaches.        About the same   All other systems reviewed and are negative.      Hemodynamics:  Temp (24hrs), Av.1 °C (98.7 °F), Min:36.8 °C (98.2 °F), Max:37.2 °C (99 °F)  Temperature: 37.2 °C (99 °F)  Pulse  Av.6  Min: 40  Max: 98  Blood Pressure: 103/60       Physical Exam:  Physical Exam   Constitutional: No distress.   HENT:   Head: Normocephalic.    Mouth/Throat: No oropharyngeal exudate.   Left craniotomy site with staples   +edema inferior to site decreased   Eyes: Pupils are equal, round, and reactive to light. Conjunctivae and EOM are normal. No scleral icterus.   Neck: Neck supple.   Cardiovascular:   bradycardic   Pulmonary/Chest: Effort normal. No respiratory distress. He has no wheezes. He has no rales.   Abdominal: Soft. He exhibits no distension. There is no tenderness. There is no rebound and no guarding.   Musculoskeletal: He exhibits edema.   Lymphadenopathy:     He has no cervical adenopathy.   Neurological: He is alert. No cranial nerve deficit. Coordination normal.   Oriented to person and place   Skin: Skin is warm. No rash noted. He is not diaphoretic. No erythema.   Nursing note and vitals reviewed.      Meds:    Current Facility-Administered Medications:   •  MD Alert...Vancomycin per Pharmacy  •  QUEtiapine  •  potassium chloride SA  •  oxyCODONE immediate-release  •  ferrous sulfate  •  metroNIDAZOLE  •  haloperidol  •  acetaminophen  •  polyethylene glycol/lytes  •  docusate sodium  •  senna-docusate  •  cloNIDine  •  magnesium hydroxide  •  senna-docusate  •  Respiratory Care per Protocol  •  cefepime  •  Pharmacy Consult Request  •  MD ALERT...DO NOT ADMINISTER NSAIDS or ASPIRIN unless ORDERED By Neurosurgery  •  ondansetron  •  scopolamine  •  labetalol  •  hydrALAZINE  •  bisacodyl  •  fleet    Labs:  Recent Labs      06/03/19 0304 06/04/19   0813  06/05/19   0301   WBC  12.4*  11.3*  9.8   RBC  5.48  5.64  5.78   HEMOGLOBIN  12.8*  13.0*  13.6*   HEMATOCRIT  42.5  43.3  44.3   MCV  77.6*  76.8*  76.6*   MCH  23.4*  23.0*  23.5*   RDW  53.8*  53.1*  53.9*   PLATELETCT  269  248  244   MPV  11.2  10.6  10.2   NEUTSPOLYS  71.70  72.30*  70.40   LYMPHOCYTES  15.30*  13.50*  16.70*   MONOCYTES  9.40  10.50  9.20   EOSINOPHILS  0.90  1.00  1.50   BASOPHILS  0.50  0.80  0.80     Recent Labs      06/03/19   0304 06/04/19   0813   06/05/19   0301   SODIUM  136  137  139   POTASSIUM  3.3*  4.0  3.8   CHLORIDE  104  106  106   CO2  22  24  23   GLUCOSE  121*  88  130*   BUN  16  20  20     Recent Labs      06/03/19   0304  06/04/19   0813  06/05/19   0301   ALBUMIN  3.6  3.6   --    TBILIRUBIN  0.5  0.5   --    ALKPHOSPHAT  43  46   --    TOTPROTEIN  5.9*  6.0   --    ALTSGPT  43  49   --    ASTSGOT  14  19   --    CREATININE  0.65  0.68  0.64       Imaging:  Ct-cta Head With & W/o-post Process  1.  No large vessel occlusion, high-grade stenosis or aneurysm of the [place]. 2.  A 4 cm mass lesion in the left frontotemporal region. Imaging features favor an abscess over a solid lesion. However, MRI is needed for definitive characterization. 3.  Extensive edema associated with the mass, resulting in 10 mm left-to-right midline shift. 4.  Effacement of the left and enlargement of the right lateral ventricles may be due to developing hydrocephalus. Findings were discussed with JOSE ALTAMIRANO on 5/21/2019 8:39 PM.    Ct-cta Neck With & W/o-post Processing  Result Date: 5/21/2019 5/21/2019 8:06 PM HISTORY/REASON FOR EXAM:  SAH suspected, initial exam TECHNIQUE/EXAM DESCRIPTION: CT angiogram of the neck with contrast. Postcontrast images were obtained of the neck from the great vessels through the skull base following the power injection of nonionic contrast at 5.0 mL/sec. Thin-section helical images were obtained with overlapping reconstruction interval. Coronal and oblique multiplanar volume reformats were generated. Cervical internal carotid artery percent stenosis is calculated using the standard method according to the NASCET criteria wherein a segment of uniform caliber mid or distal cervical internal carotid is used as the reference denominator. 3D angiographic images were reviewed on PACS.  Maximum intensity projection (MIP) images were generated and reviewed 100 mL of Omnipaque 350 nonionic contrast was injected intravenously. Low  dose optimization technique was utilized for this CT exam including automated exposure control and adjustment of the mA and/or kV according to patient size. COMPARISON:  None. FINDINGS: Left-sided aortic arch with patent great vessel origins. The right common carotid artery, cervical carotid bifurcation, and cervical internal carotid artery show no significant stenosis. There is no evidence of dissection or aneurysm. The left common carotid artery, cervical carotid bifurcation, and cervical internal carotid artery show no significant stenosis. There is no evidence of dissection or aneurysm. The cervical vertebral arteries are patent bilaterally. Prominent right paratracheal node measuring 1 cm short axis, statistically reactive. Otherwise, the neck soft tissues and lung apices in the field of view are unremarkable. 3D angiographic/MIP images of the vasculature confirm the vascular findings as described above.     Unremarkable CT angiogram of the neck. No high-grade stenosis, large vessel occlusion, aneurysm or dissection.    Dx-chest-2 Views  Result Date: 5/12/2019 5/12/2019 2:28 AM HISTORY/REASON FOR EXAM:  Cough TECHNIQUE/EXAM DESCRIPTION AND NUMBER OF VIEWS: Two views of the chest. COMPARISON:  None. FINDINGS: LUNGS: Linear atelectasis in the left midlung. No focal consolidation. No effusions. PNEUMOTHORAX: None. LINES AND TUBES: None. MEDIASTINUM: No cardiomegaly. Atherosclerosis. MUSCULOSKELETAL STRUCTURES: No acute fracture. Hiatal hernia.   1.  No focal consolidation or pleural effusions. 2.  Hiatal hernia.    Dx-chest-portable (1 View)  Result Date: 5/24/2019 5/24/2019 1:08 AM HISTORY/REASON FOR EXAM:  For indication of respiratory failure. TECHNIQUE/EXAM DESCRIPTION AND NUMBER OF VIEWS: Single portable view of the chest. COMPARISON: Yesterday FINDINGS: Endotracheal tube, NG tube and right IJ central line mean in place. There is bibasilar atelectasis or consolidation. There are probable trace effusions. No  pneumothorax. Lung aeration appears slightly improved.   Slight improvement in lung aeration.      Mr-stealth Brain With & W/o  Result Date: 5/22/2019  COMPARISON:  CTA of the head 5/21/2019 FINDINGS: No fiducial markers are appreciated. The calvaria are unremarkable. There are no extra-axial fluid collections. The left temporal lobe, there is a thick-walled ring-enhancing mass which measures about 47 mm x 32 mm x 43 mm. Central hypoenhancement may represent tumor necrosis or cystic change. There is marked surrounding vasogenic edema extending into the basal ganglia, subinsular white matter, internal capsule, corona radiata, and left frontal deep white matter. There is effacement of the left lateral ventricle and moderate dilatation of the right lateral ventricle including the temporal horn due to trapping. There is left-to-right shift of midline structures of about 11 mm (T2 axial image 54, series 3). There are no other enhancing lesions. The brainstem and posterior fossa structures are unremarkable for marked compression of the midbrain with flattening of the left cerebral peduncle and effacement of perimesencephalic cisterns. There is some vasogenic edema extending down into the left side of the midbrain as well as the left thalamus and subthalamic nucleus. The major vessels including the dural venous sinuses appear intact. Paranasal sinuses show moderate mucosal thickening with small air-fluid/debris levels in the maxillary antra. Moderate mucosal thickening in the sphenoid sinus and among the ethmoid air cells. Minimal mucosal thickening in the frontal air cells.     1.  Large peripherally enhancing intra-axial mass in the left temporal lobe with marked surrounding vasogenic edema. Findings are most consistent with high-grade primary brain tumor such as glioblastoma. No other enhancing lesions elsewhere to suggest metastasis, however, a solitary brain metastasis would not be excluded. Brain abscess would not be  excluded. There are no diffusion weighted images to determine whether contents of this lesion show restricted diffusion. There are no prior MRI scans for comparison. 2.  Marked left-to-right shift of midline structures along with midbrain compression and displacement.      Micro:  Results     ** No results found for the last 168 hours. **          Assessment:  Active Hospital Problems    Diagnosis   • *Brain abscess [G06.0]   • Acute respiratory failure with hypoxia (HCC) [J96.01]   • Delirium [R41.0]   • Methamphetamine abuse (HCC) [F15.10]   • S/P craniotomy [Z98.890]       Plan:  Brain mass,   Cystic  Necrotic tumor vs brain abscess  No fever  MRI with 4.7 cm left temporal lobe mass with midline shift  S/p craniotomy 5/22-no op note available for review as yet  Gram stain neg; cxs neg final  CT c/a/p neg  Blood cxs neg  HIV neg  The pathology results is there is mild gliosis-  We will treat it like a brain abscess in view of findings in the OR and pathology being nonspecific gliosis  Re-add vancomycin  Aim for 6 weeks of antibiotic    Leukocytosis, resolved    Meth abuse  Will needs monitored setting for IV abx if confirmed abscess    Discussed with internal medicine residents

## 2019-06-05 NOTE — DISCHARGE PLANNING
Anticipated Discharge Disposition: TBD    Action: Discussed pt's case with NETO RUFFIN, final pathology results obtained from [facility] indicate mild reactive gliosis. NETO RUFFIN relayed that she has spoken to ID MD and pt will likely need 6 weeks IV abx treatment. NETO RUFFIN is going to place rehab consult as pt will benefit from acute rehab for SLP needs, PT/OT.     Barriers to Discharge: Pending Medicaid.     Plan: As above, will await ID recommendations for IV abx's. NETO RUFFIN placing rehab consult.

## 2019-06-05 NOTE — THERAPY
"Occupational Therapy Treatment completed with focus on ADLs, patient education and cognition.  Functional Status:  Max A for 3 step meal prep activity, Max A for home safety, Max A for sequencing and identifying what bills need to be paid.   Plan of Care: Will benefit from Occupational Therapy 5 times per week  Discharge Recommendations:  Equipment No Equipment Needed. Post-acute therapy Recommend inpatient transitional care services for continued occupational therapy services.     See \"Rehab Therapy-Acute\" Patient Summary Report for complete documentation.     Pt seen for Acute OT session w/ emphasis on higher level IADLs. Pt able to identify that he was hungry, when asked what pt would like to eat reported \"a sand which\" pt unable to identify what was needed to make a sandwich. When a PB&J was selected for pt he required increased time to identify that jelly was needed and could not identify the bread. Pt unable to answer basic questions such as \"who would you call if your house was on fire?\" pt responded \"the house fire people\". At this time pt requires max assist for IADLs and safety and would be a safety risk if he were to return home. Discussed POC with pt and his mother. Both agreeable to post acute placement at this time.   "

## 2019-06-06 LAB
ANION GAP SERPL CALC-SCNC: 10 MMOL/L (ref 0–11.9)
ANISOCYTOSIS BLD QL SMEAR: ABNORMAL
BASOPHILS # BLD AUTO: 1.3 % (ref 0–1.8)
BASOPHILS # BLD: 0.12 K/UL (ref 0–0.12)
BUN SERPL-MCNC: 21 MG/DL (ref 8–22)
CALCIUM SERPL-MCNC: 8.3 MG/DL (ref 8.5–10.5)
CHLORIDE SERPL-SCNC: 108 MMOL/L (ref 96–112)
CO2 SERPL-SCNC: 22 MMOL/L (ref 20–33)
COMMENT 1642: NORMAL
CREAT SERPL-MCNC: 0.73 MG/DL (ref 0.5–1.4)
EOSINOPHIL # BLD AUTO: 0.15 K/UL (ref 0–0.51)
EOSINOPHIL NFR BLD: 1.6 % (ref 0–6.9)
ERYTHROCYTE [DISTWIDTH] IN BLOOD BY AUTOMATED COUNT: 56.6 FL (ref 35.9–50)
GLUCOSE SERPL-MCNC: 84 MG/DL (ref 65–99)
HCT VFR BLD AUTO: 43.5 % (ref 42–52)
HGB BLD-MCNC: 12.8 G/DL (ref 14–18)
IMM GRANULOCYTES # BLD AUTO: 0.12 K/UL (ref 0–0.11)
IMM GRANULOCYTES NFR BLD AUTO: 1.3 % (ref 0–0.9)
LYMPHOCYTES # BLD AUTO: 1.62 K/UL (ref 1–4.8)
LYMPHOCYTES NFR BLD: 17.2 % (ref 22–41)
MAGNESIUM SERPL-MCNC: 1.9 MG/DL (ref 1.5–2.5)
MCH RBC QN AUTO: 23.2 PG (ref 27–33)
MCHC RBC AUTO-ENTMCNC: 29.4 G/DL (ref 33.7–35.3)
MCV RBC AUTO: 78.9 FL (ref 81.4–97.8)
MICROCYTES BLD QL SMEAR: ABNORMAL
MONOCYTES # BLD AUTO: 1.09 K/UL (ref 0–0.85)
MONOCYTES NFR BLD AUTO: 11.6 % (ref 0–13.4)
MORPHOLOGY BLD-IMP: NORMAL
NEUTROPHILS # BLD AUTO: 6.31 K/UL (ref 1.82–7.42)
NEUTROPHILS NFR BLD: 67 % (ref 44–72)
NRBC # BLD AUTO: 0 K/UL
NRBC BLD-RTO: 0 /100 WBC
OVALOCYTES BLD QL SMEAR: NORMAL
PLATELET # BLD AUTO: 225 K/UL (ref 164–446)
PLATELET BLD QL SMEAR: NORMAL
PMV BLD AUTO: 10.5 FL (ref 9–12.9)
POIKILOCYTOSIS BLD QL SMEAR: NORMAL
POTASSIUM SERPL-SCNC: 4.3 MMOL/L (ref 3.6–5.5)
RBC # BLD AUTO: 5.51 M/UL (ref 4.7–6.1)
RBC BLD AUTO: PRESENT
SODIUM SERPL-SCNC: 140 MMOL/L (ref 135–145)
TTG IGA SER IA-ACNC: 1 U/ML (ref 0–3)
WBC # BLD AUTO: 9.4 K/UL (ref 4.8–10.8)

## 2019-06-06 PROCEDURE — 700102 HCHG RX REV CODE 250 W/ 637 OVERRIDE(OP): Performed by: INTERNAL MEDICINE

## 2019-06-06 PROCEDURE — 700102 HCHG RX REV CODE 250 W/ 637 OVERRIDE(OP): Performed by: STUDENT IN AN ORGANIZED HEALTH CARE EDUCATION/TRAINING PROGRAM

## 2019-06-06 PROCEDURE — 700105 HCHG RX REV CODE 258: Performed by: INTERNAL MEDICINE

## 2019-06-06 PROCEDURE — 99232 SBSQ HOSP IP/OBS MODERATE 35: CPT | Performed by: INTERNAL MEDICINE

## 2019-06-06 PROCEDURE — 97112 NEUROMUSCULAR REEDUCATION: CPT

## 2019-06-06 PROCEDURE — 97116 GAIT TRAINING THERAPY: CPT

## 2019-06-06 PROCEDURE — 83735 ASSAY OF MAGNESIUM: CPT

## 2019-06-06 PROCEDURE — 99232 SBSQ HOSP IP/OBS MODERATE 35: CPT | Mod: GC | Performed by: INTERNAL MEDICINE

## 2019-06-06 PROCEDURE — 770001 HCHG ROOM/CARE - MED/SURG/GYN PRIV*

## 2019-06-06 PROCEDURE — A9270 NON-COVERED ITEM OR SERVICE: HCPCS | Performed by: STUDENT IN AN ORGANIZED HEALTH CARE EDUCATION/TRAINING PROGRAM

## 2019-06-06 PROCEDURE — A9270 NON-COVERED ITEM OR SERVICE: HCPCS | Performed by: INTERNAL MEDICINE

## 2019-06-06 PROCEDURE — 700111 HCHG RX REV CODE 636 W/ 250 OVERRIDE (IP): Performed by: INTERNAL MEDICINE

## 2019-06-06 PROCEDURE — 97530 THERAPEUTIC ACTIVITIES: CPT

## 2019-06-06 PROCEDURE — 700111 HCHG RX REV CODE 636 W/ 250 OVERRIDE (IP): Performed by: STUDENT IN AN ORGANIZED HEALTH CARE EDUCATION/TRAINING PROGRAM

## 2019-06-06 PROCEDURE — 80048 BASIC METABOLIC PNL TOTAL CA: CPT

## 2019-06-06 PROCEDURE — 36415 COLL VENOUS BLD VENIPUNCTURE: CPT

## 2019-06-06 PROCEDURE — 85025 COMPLETE CBC W/AUTO DIFF WBC: CPT

## 2019-06-06 RX ADMIN — CEFEPIME 2 G: 2 INJECTION, POWDER, FOR SOLUTION INTRAVENOUS at 14:09

## 2019-06-06 RX ADMIN — QUETIAPINE FUMARATE 25 MG: 25 TABLET ORAL at 06:30

## 2019-06-06 RX ADMIN — SENNOSIDES,DOCUSATE SODIUM 1 TABLET: 8.6; 5 TABLET, FILM COATED ORAL at 21:56

## 2019-06-06 RX ADMIN — METRONIDAZOLE 500 MG: 500 TABLET, FILM COATED ORAL at 06:30

## 2019-06-06 RX ADMIN — FERROUS SULFATE TAB 325 MG (65 MG ELEMENTAL FE) 325 MG: 325 (65 FE) TAB at 06:30

## 2019-06-06 RX ADMIN — METRONIDAZOLE 500 MG: 500 TABLET, FILM COATED ORAL at 14:09

## 2019-06-06 RX ADMIN — OXYCODONE HYDROCHLORIDE 5 MG: 5 TABLET ORAL at 06:30

## 2019-06-06 RX ADMIN — VANCOMYCIN HYDROCHLORIDE 1500 MG: 100 INJECTION, POWDER, LYOPHILIZED, FOR SOLUTION INTRAVENOUS at 10:21

## 2019-06-06 RX ADMIN — POLYETHYLENE GLYCOL 3350 1 PACKET: 17 POWDER, FOR SOLUTION ORAL at 06:31

## 2019-06-06 RX ADMIN — METRONIDAZOLE 500 MG: 500 TABLET, FILM COATED ORAL at 21:56

## 2019-06-06 RX ADMIN — QUETIAPINE FUMARATE 25 MG: 25 TABLET ORAL at 17:23

## 2019-06-06 RX ADMIN — ACETAMINOPHEN 650 MG: 325 TABLET, FILM COATED ORAL at 02:18

## 2019-06-06 RX ADMIN — CEFEPIME 2 G: 2 INJECTION, POWDER, FOR SOLUTION INTRAVENOUS at 21:57

## 2019-06-06 RX ADMIN — VANCOMYCIN HYDROCHLORIDE 1500 MG: 100 INJECTION, POWDER, LYOPHILIZED, FOR SOLUTION INTRAVENOUS at 01:59

## 2019-06-06 RX ADMIN — CEFEPIME 2 G: 2 INJECTION, POWDER, FOR SOLUTION INTRAVENOUS at 06:30

## 2019-06-06 RX ADMIN — ENOXAPARIN SODIUM 40 MG: 100 INJECTION SUBCUTANEOUS at 14:00

## 2019-06-06 RX ADMIN — VANCOMYCIN HYDROCHLORIDE 1500 MG: 100 INJECTION, POWDER, LYOPHILIZED, FOR SOLUTION INTRAVENOUS at 17:22

## 2019-06-06 ASSESSMENT — ENCOUNTER SYMPTOMS
NAUSEA: 0
ABDOMINAL PAIN: 0
COUGH: 0
MYALGIAS: 0
FEVER: 0
SHORTNESS OF BREATH: 0
HEADACHES: 1
CHILLS: 0
VOMITING: 0
DIARRHEA: 0

## 2019-06-06 ASSESSMENT — COGNITIVE AND FUNCTIONAL STATUS - GENERAL
SUGGESTED CMS G CODE MODIFIER MOBILITY: CJ
STANDING UP FROM CHAIR USING ARMS: A LITTLE
CLIMB 3 TO 5 STEPS WITH RAILING: A LITTLE
MOBILITY SCORE: 20
WALKING IN HOSPITAL ROOM: A LITTLE
MOVING FROM LYING ON BACK TO SITTING ON SIDE OF FLAT BED: A LITTLE

## 2019-06-06 ASSESSMENT — GAIT ASSESSMENTS
DISTANCE (FEET): 500
GAIT LEVEL OF ASSIST: SUPERVISED
DEVIATION: INCREASED BASE OF SUPPORT

## 2019-06-06 NOTE — PROGRESS NOTES
Infectious Disease Progress Note    Author: Rae Reyes M.D. Date & Time of service: 2019  3:13 PM    Chief Complaint:  Brain mass       Interval History:  53 y.o. male  admitted 2019 for AMS due to above. +meth abuse.  Recently in the ED on May 12 and was diagnosed with acute suppurative otitis media   AF WBC 12.6 extubated this am-pain controlled-decreased strength RUE +delerium   AF improved strength RUE-intermittently combative and cooperative   AF WBC 9.7 drain removed-somnolent this am-per RN strength continues to improve   AF had HA earlier but now improved-no new neurologic deficits. Plan for C/A/P today   AF WBC 13.4 remains alert-strength improved. Denies SE abx. HA controlled   AF WBC 12.2 sitting up to chair-no new complaints   AF WBC 17 denies any worsening HA, visual changes, N/V/D, abd pain  2019-no fevers.  Complains of some headache.  No new issues overnight.  WBC is 14.5  2019 no fevers.  No new issues.  Pathology is still not back.  - no fevers. The path has come back positive for gliosis.  Denies any new complaints.  2019 no fevers.  No new issues overnight.  Tolerating the antibiotics  Labs Reviewed, Medications Reviewed, Wound Reviewed.    Review of Systems:  Review of Systems   Constitutional: Negative for chills and fever.   Respiratory: Negative for cough and shortness of breath.    Cardiovascular: Negative for chest pain.   Gastrointestinal: Negative for abdominal pain, diarrhea, nausea and vomiting.   Genitourinary: Negative for dysuria.   Musculoskeletal: Negative for myalgias.   Skin: Negative for rash.   Neurological: Positive for headaches.        About the same   All other systems reviewed and are negative.      Hemodynamics:  Temp (24hrs), Av °C (98.6 °F), Min:36.9 °C (98.4 °F), Max:37.2 °C (98.9 °F)  Temperature: 36.9 °C (98.5 °F)  Pulse  Av.9  Min: 40  Max: 98  Blood Pressure: (!) 93/57       Physical  Exam:  Physical Exam   Constitutional: No distress.   HENT:   Head: Normocephalic.   Mouth/Throat: No oropharyngeal exudate.   Left craniotomy site with staples   +edema inferior to site decreased   Eyes: Pupils are equal, round, and reactive to light. Conjunctivae and EOM are normal. No scleral icterus.   Neck: Neck supple.   Cardiovascular:   bradycardic   Pulmonary/Chest: Effort normal. No respiratory distress. He has no wheezes. He has no rales.   Abdominal: Soft. He exhibits no distension. There is no tenderness. There is no rebound and no guarding.   Musculoskeletal: He exhibits edema.   Lymphadenopathy:     He has no cervical adenopathy.   Neurological: He is alert. No cranial nerve deficit. Coordination normal.   Oriented to person and place   Skin: Skin is warm. No rash noted. He is not diaphoretic. No erythema.   Nursing note and vitals reviewed.      Meds:    Current Facility-Administered Medications:   •  enoxaparin  •  MD Alert...Vancomycin per Pharmacy  •  vancomycin  •  QUEtiapine  •  oxyCODONE immediate-release  •  ferrous sulfate  •  metroNIDAZOLE  •  haloperidol  •  acetaminophen  •  polyethylene glycol/lytes  •  docusate sodium  •  senna-docusate  •  cloNIDine  •  magnesium hydroxide  •  senna-docusate  •  Respiratory Care per Protocol  •  cefepime  •  Pharmacy Consult Request  •  MD ALERT...DO NOT ADMINISTER NSAIDS or ASPIRIN unless ORDERED By Neurosurgery  •  ondansetron  •  scopolamine  •  labetalol  •  hydrALAZINE  •  bisacodyl  •  fleet    Labs:  Recent Labs      06/04/19   0813  06/05/19   0301  06/06/19   0240   WBC  11.3*  9.8  9.4   RBC  5.64  5.78  5.51   HEMOGLOBIN  13.0*  13.6*  12.8*   HEMATOCRIT  43.3  44.3  43.5   MCV  76.8*  76.6*  78.9*   MCH  23.0*  23.5*  23.2*   RDW  53.1*  53.9*  56.6*   PLATELETCT  248  244  225   MPV  10.6  10.2  10.5   NEUTSPOLYS  72.30*  70.40  67.00   LYMPHOCYTES  13.50*  16.70*  17.20*   MONOCYTES  10.50  9.20  11.60   EOSINOPHILS  1.00  1.50  1.60    BASOPHILS  0.80  0.80  1.30   RBCMORPHOLO   --    --   Present     Recent Labs      06/04/19   0813  06/05/19   0301  06/06/19   0240   SODIUM  137  139  140   POTASSIUM  4.0  3.8  4.3   CHLORIDE  106  106  108   CO2  24  23  22   GLUCOSE  88  130*  84   BUN  20  20  21     Recent Labs      06/04/19   0813  06/05/19   0301  06/06/19   0240   ALBUMIN  3.6   --    --    TBILIRUBIN  0.5   --    --    ALKPHOSPHAT  46   --    --    TOTPROTEIN  6.0   --    --    ALTSGPT  49   --    --    ASTSGOT  19   --    --    CREATININE  0.68  0.64  0.73       Imaging:  Ct-cta Head With & W/o-post Process  1.  No large vessel occlusion, high-grade stenosis or aneurysm of the [place]. 2.  A 4 cm mass lesion in the left frontotemporal region. Imaging features favor an abscess over a solid lesion. However, MRI is needed for definitive characterization. 3.  Extensive edema associated with the mass, resulting in 10 mm left-to-right midline shift. 4.  Effacement of the left and enlargement of the right lateral ventricles may be due to developing hydrocephalus. Findings were discussed with JOSE ALTAMIRANO on 5/21/2019 8:39 PM.    Ct-cta Neck With & W/o-post Processing  Result Date: 5/21/2019 5/21/2019 8:06 PM HISTORY/REASON FOR EXAM:  SAH suspected, initial exam TECHNIQUE/EXAM DESCRIPTION: CT angiogram of the neck with contrast. Postcontrast images were obtained of the neck from the great vessels through the skull base following the power injection of nonionic contrast at 5.0 mL/sec. Thin-section helical images were obtained with overlapping reconstruction interval. Coronal and oblique multiplanar volume reformats were generated. Cervical internal carotid artery percent stenosis is calculated using the standard method according to the NASCET criteria wherein a segment of uniform caliber mid or distal cervical internal carotid is used as the reference denominator. 3D angiographic images were reviewed on PACS.  Maximum intensity  projection (MIP) images were generated and reviewed 100 mL of Omnipaque 350 nonionic contrast was injected intravenously. Low dose optimization technique was utilized for this CT exam including automated exposure control and adjustment of the mA and/or kV according to patient size. COMPARISON:  None. FINDINGS: Left-sided aortic arch with patent great vessel origins. The right common carotid artery, cervical carotid bifurcation, and cervical internal carotid artery show no significant stenosis. There is no evidence of dissection or aneurysm. The left common carotid artery, cervical carotid bifurcation, and cervical internal carotid artery show no significant stenosis. There is no evidence of dissection or aneurysm. The cervical vertebral arteries are patent bilaterally. Prominent right paratracheal node measuring 1 cm short axis, statistically reactive. Otherwise, the neck soft tissues and lung apices in the field of view are unremarkable. 3D angiographic/MIP images of the vasculature confirm the vascular findings as described above.     Unremarkable CT angiogram of the neck. No high-grade stenosis, large vessel occlusion, aneurysm or dissection.    Dx-chest-2 Views  Result Date: 5/12/2019 5/12/2019 2:28 AM HISTORY/REASON FOR EXAM:  Cough TECHNIQUE/EXAM DESCRIPTION AND NUMBER OF VIEWS: Two views of the chest. COMPARISON:  None. FINDINGS: LUNGS: Linear atelectasis in the left midlung. No focal consolidation. No effusions. PNEUMOTHORAX: None. LINES AND TUBES: None. MEDIASTINUM: No cardiomegaly. Atherosclerosis. MUSCULOSKELETAL STRUCTURES: No acute fracture. Hiatal hernia.   1.  No focal consolidation or pleural effusions. 2.  Hiatal hernia.    Dx-chest-portable (1 View)  Result Date: 5/24/2019 5/24/2019 1:08 AM HISTORY/REASON FOR EXAM:  For indication of respiratory failure. TECHNIQUE/EXAM DESCRIPTION AND NUMBER OF VIEWS: Single portable view of the chest. COMPARISON: Yesterday FINDINGS: Endotracheal tube, NG tube  and right IJ central line mean in place. There is bibasilar atelectasis or consolidation. There are probable trace effusions. No pneumothorax. Lung aeration appears slightly improved.   Slight improvement in lung aeration.      Mr-stealth Brain With & W/o  Result Date: 5/22/2019  COMPARISON:  CTA of the head 5/21/2019 FINDINGS: No fiducial markers are appreciated. The calvaria are unremarkable. There are no extra-axial fluid collections. The left temporal lobe, there is a thick-walled ring-enhancing mass which measures about 47 mm x 32 mm x 43 mm. Central hypoenhancement may represent tumor necrosis or cystic change. There is marked surrounding vasogenic edema extending into the basal ganglia, subinsular white matter, internal capsule, corona radiata, and left frontal deep white matter. There is effacement of the left lateral ventricle and moderate dilatation of the right lateral ventricle including the temporal horn due to trapping. There is left-to-right shift of midline structures of about 11 mm (T2 axial image 54, series 3). There are no other enhancing lesions. The brainstem and posterior fossa structures are unremarkable for marked compression of the midbrain with flattening of the left cerebral peduncle and effacement of perimesencephalic cisterns. There is some vasogenic edema extending down into the left side of the midbrain as well as the left thalamus and subthalamic nucleus. The major vessels including the dural venous sinuses appear intact. Paranasal sinuses show moderate mucosal thickening with small air-fluid/debris levels in the maxillary antra. Moderate mucosal thickening in the sphenoid sinus and among the ethmoid air cells. Minimal mucosal thickening in the frontal air cells.     1.  Large peripherally enhancing intra-axial mass in the left temporal lobe with marked surrounding vasogenic edema. Findings are most consistent with high-grade primary brain tumor such as glioblastoma. No other  enhancing lesions elsewhere to suggest metastasis, however, a solitary brain metastasis would not be excluded. Brain abscess would not be excluded. There are no diffusion weighted images to determine whether contents of this lesion show restricted diffusion. There are no prior MRI scans for comparison. 2.  Marked left-to-right shift of midline structures along with midbrain compression and displacement.      Micro:  Results     ** No results found for the last 168 hours. **          Assessment:  Active Hospital Problems    Diagnosis   • *Brain abscess [G06.0]   • Acute respiratory failure with hypoxia (HCC) [J96.01]   • Delirium [R41.0]   • Methamphetamine abuse (HCC) [F15.10]   • S/P craniotomy [Z98.890]       Plan:  Brain mass,   Cystic -unclear etiology  No fever  MRI with 4.7 cm left temporal lobe mass with midline shift  S/p craniotomy 5/22-  Gram stain neg; cxs neg final  CT c/a/p neg  Blood cxs neg  HIV neg  The pathology results is there is mild gliosis-  We will treat it like a brain abscess in view of findings in the OR and pathology being nonspecific gliosis  Re-add vancomycin  Aim for 6 weeks of antibiotics through 7/6/2019  Check sed rate and CRP in a.m.  PICC line    Leukocytosis, resolved    Meth abuse  Will needs monitored setting for IV abx if confirmed abscess    Discussed with internal medicine residents

## 2019-06-06 NOTE — DISCHARGE PLANNING
Anticipated Discharge Disposition:   SNF    Action:    Received vm from DAMIAN Avila with [facility].  Pt declined at this time because of duration of IV antibiotics.    Tiger text to Dr. Savage to request SNF order please. Reply received.    Left vm for patient's mother, [place] [phone] with request to return call.    Barriers to Discharge:    IV antibiotics  Meth abuse hx  Medicaid FFS    Plan:    Obtain SNF choices.

## 2019-06-06 NOTE — PROGRESS NOTES
Neurosurgery Progress Note    Subjective:  No acute events. Sleeping, rouses easily to voice. No c/o pain.     Exam:   A&O x3. Fluent speech  Poor short term memory.   4 PERRL, EOMI. Denies blurry or double vision.   Tongue midline without fasciculation. No facial droop.   No drift. Unremarkable finger-nose, SHAUNA testing.   Hearing intact.   Strength: Bilateral shoulder shrug, bicep, tricep, deltoid,  5/5.                   Bilateral IP, DF, PF 5/5.   Sensation: Intact throughout.   Incision: open to air. c/d/i with staples intact.   Eating, drinking. Denies n/v.  Voiding. +BM 6/3/19  No pain  Ambulatory.    CT head good evacuation left temporal cyst with persistent cerebral edema    BP  Min: 93/57  Max: 117/68  Pulse  Av.3  Min: 66  Max: 89  Resp  Av.3  Min: 17  Max: 18  Temp  Av °C (98.6 °F)  Min: 36.9 °C (98.4 °F)  Max: 37.2 °C (98.9 °F)  SpO2  Av.5 %  Min: 94 %  Max: 95 %    No Data Recorded    Recent Labs      19   0813  19   0301  19   0240   WBC  11.3*  9.8  9.4   RBC  5.64  5.78  5.51   HEMOGLOBIN  13.0*  13.6*  12.8*   HEMATOCRIT  43.3  44.3  43.5   MCV  76.8*  76.6*  78.9*   MCH  23.0*  23.5*  23.2*   MCHC  30.0*  30.7*  29.4*   RDW  53.1*  53.9*  56.6*   PLATELETCT  248  244  225   MPV  10.6  10.2  10.5     Recent Labs      19   0813  19   0301  19   0240   SODIUM  137  139  140   POTASSIUM  4.0  3.8  4.3   CHLORIDE  106  106  108   CO2  24  23  22   GLUCOSE  88  130*  84   BUN  20  20  21   CREATININE  0.68  0.64  0.73   CALCIUM  8.5  8.9  8.3*               Intake/Output       19 07 - 19 0659 19 1959       Total  Total       Intake    Total Intake -- -- -- -- -- --       Output    Urine  --  -- --  --  -- --    Number of Times Voided -- 1 x 1 x -- -- --    Stool  --  -- --  --  -- --    Number of Times Stooled -- 1 x 1 x -- -- --    Total Output -- -- -- -- -- --        Net I/O     -- -- -- -- -- --          No intake or output data in the 24 hours ending 06/06/19 0849         • MD Alert...Vancomycin per Pharmacy   PHARMACY TO DOSE   • vancomycin  15 mg/kg Q8HR   • QUEtiapine  25 mg BID   • oxyCODONE immediate-release  5 mg Q6HRS PRN   • ferrous sulfate  325 mg QDAY with Breakfast   • metroNIDAZOLE  500 mg Q8HRS   • haloperidol  1-5 mg Q4HRS PRN   • acetaminophen  650 mg Q8HRS PRN   • polyethylene glycol/lytes  1 Packet BID   • docusate sodium  100 mg BID   • senna-docusate  1 Tab Nightly   • cloNIDine  0.1 mg Q4HRS PRN   • magnesium hydroxide  30 mL QDAY PRN   • senna-docusate  1 Tab Q24HRS PRN   • Respiratory Care per Protocol   Continuous RT   • cefepime  2 g Q8HRS   • Pharmacy Consult Request  1 Each PHARMACY TO DOSE   • MD ALERT...DO NOT ADMINISTER NSAIDS or ASPIRIN unless ORDERED By Neurosurgery  1 Each PRN   • ondansetron  4 mg Q4HRS PRN   • scopolamine  1 Patch Q72HRS PRN   • labetalol  10 mg Q HOUR PRN   • hydrALAZINE  10 mg Q HOUR PRN   • bisacodyl  10 mg Q24HRS PRN   • fleet  1 Each Once PRN       Assessment and Plan:  Hospital day #16 left temporal peripherally enhancing cystic lesion  POD #15 left temporal cranitomy, cyst evacuation/biopsy  Prophylactic anticoagulation: no         Start date/time: pt mobile, ambulatory      Plan:   Stable neuro exam  Remove staples today  Final pathology from [facility] mild reactive gliosis  Pt clear from NSG standpoint to dc/transfer  MRI brain w/wo ordered for tomorrow. Ordered for post op f/u for baseline imaging moving forward.

## 2019-06-06 NOTE — PROGRESS NOTES
Internal Medicine Interval Note    Note Author: Em Savage M.D.      Name Robert CALDERON 1965   Age 54 y.o. male   MRN [id number]   Code Status Full Code     After 5 PM or if no immediate response to page, please call for cross-coverage.    Attending: Dr. Landa  Team: Blue See patient list for primary contact information.  Call [phone] to page.    1st Call - Day Intern, R1  Dr. Savage 2nd Call - Day Sr. Resident, R2-R3  Dr. Wood       Principal Problem  Brain mass; concerning for infection vs. malignancy      Interval History  No acute events overnight.  Doing well, denies any complaints.   Mild reactive gliosis per [facility]'s finalized pathology report.  Staple removal today, patient cleared from NSG standpoint.   Vanc restarted and 6-weeks of antibiotics recommended by ID per path results.  Patient is in the process of obtaining health insurance per , physiatry referral placed.  Case reviewed by Physiatrist Dr. Barclay, IV antibiotics are a barrier to IRF, therefore anticipate discharge to SNF for now.       ROS  Constitutional: Negative for chills and fatigue.  HEN: Negative for sinus pain and congestion.  Throat: Negative for dysphagia and sore throat.  Eyes: Negative for pain and diplopia.   Respiratory: Negative for cough and shortness of breath.  Cardiovascular: Negative for chest pain and palpitations.  Gastrointestinal: Negative for constipation, diarrhea, nausea and vomiting.   Genitourinary: Negative for dysuria and hematuria.    Extremities: Negative for pain and swelling.  Neurological: Positive for expressive aphasia. Negative for headache, dizziness and sensory change.  Hematologic: Negative for abnormal bleeding and bruising.   Psychiatric: Negative for depression and anxiety.      Disposition, Discharge Barriers  Inpatient, pending pathology results.       Consultants  Neurosurgery  Infectious Disease       Quality-Core Measures  Richards Catheter: No  Central Line: No  VTE  Hospitalist Admission Note    NAME: Alfonzo Shi   :  3/17/1925   MRN:  [id number]     Date/Time:  2017 7:13 AM    Patient PCP: Lucia Ackerman MD  ________________________________________________________________________    Given the patient's current clinical presentation, I have a high level of concern for decompensation if discharged from the ED. Complex decision making was performed which includes reviewing the patient's available past medical records, laboratory results, and Xray films. I have also directly communicated my plan and discussed this case with the involved ED physician. My assessment of this patient's clinical condition and my plan of care is as follows:    Assessment / Plan:  Generalized malaise and fatigue suspect due to viral URI  -supportive care for now  -unable to perform ADL's at this time  -PT/CM support  -no hypoxia/confusion at this time    Congestive heart failure, systolic, chronic  -continue on home medicines tomorrow once she is tolerating PO and we can check orthostatics to ensure she can tolerate her antihypertensives  -cardiology to see  -remain on tele for now    Chronic thrombocytopenia  -no evidence of bleeding, stable at this time. Lymphoma  -continue on OP PO medicine as she was on    Hypertension, Benign essential   -in ED had incorrect cuff size therefore BP appeared low but were not  -remain on OP PO medicine once tolerating PO and deemed not orthostatic    Code Status: full  DVT Prophylaxis: hep sq  GI Prophylaxis: not indicated    Baseline: independent - still walks on treadmill        Subjective:   CHIEF COMPLAINT: \"I just feel terribly\"    HISTORY OF PRESENT ILLNESS:     Mer January is a 80 y.o.  female with known history as listed below presents to ED with complaint noted above. Available records were reviewed at the time of H&P. Patient with known CHF and lymphoma presents to ED following not feeling well for 2 days.  She notes unable to perform Prophylaxis: Lovenox  Antibiotics: Flagyl, cefepime and vancomycin  Rehab: PT, OT, SLP following       Physical Exam    Temp:  [36.8 °C (98.2 °F)-37.2 °C (99 °F)] 37.2 °C (99 °F)  Pulse:  [71-84] 81  Resp:  [16-19] 18  BP: ()/(60-68) 117/68  SpO2:  [94 %-100 %] 100 %    General: No acute distress, cooperative.  Head: Normocephalic, incision from L frontotemporal craniotomy (staples in place)  healing well.    Eyes: Normal conjunctivae, sclerae anicteric.  Throat: Oropharynx clear, oral mucosa moist.  Neck: Supple, no lymphadenopathy.  Lungs: Normal work of breathing, clear to auscultation bilaterally.  Heart: Normal rate, regular rhythm, no murmurs.  Abdomen: Soft, bowel sounds present, non-tender, no peritoneal signs.  Extremities: Nontender, no cyanosis, no edema.  Neuro: Alert, oriented to person place and time, expressive aphasia, normal tone and sensation.   Skin: Warm, dry, intact.      Labs and Imaging  Pertinent labs, imaging and diagnostic tests and procedures associated with this visit have been reviewed, specific comments can be found in the assessment and plan section below.     Recent Labs      06/03/19   0304  06/04/19   0813  06/05/19   0301   SODIUM  136  137  139   POTASSIUM  3.3*  4.0  3.8   CHLORIDE  104  106  106   CO2  22  24  23   BUN  16  20  20   CREATININE  0.65  0.68  0.64   GLUCOSE  121*  88  130*   CALCIUM  8.4*  8.5  8.9   MAGNESIUM   --   2.0  1.9     Recent Labs      06/03/19   0304  06/04/19   0813  06/05/19   0301   WBC  12.4*  11.3*  9.8   HEMOGLOBIN  12.8*  13.0*  13.6*   HEMATOCRIT  42.5  43.3  44.3   PLATELETCT  269  248  244     Recent Labs      06/03/19   0304  06/04/19   0813   ALTSGPT  43  49   ASTSGOT  14  19   ALKPHOSPHAT  43  46   TBILIRUBIN  0.5  0.5   ALBUMIN  3.6  3.6       Assessment and Plan    Status post craniotomy   Assessment & Plan    No complications, suture removal 6/5/19 per Neurosurgery.  Follow up on anticoagulation with Dr. Young tomorrow.     *  her usual activities to include walking on treadmill due to excessive fatigue/malaise and mild sob. No myalgias. No f/nv/d. No change to her home medicines. No chest pain/pressure. No sweating. +chills. No cough. +rhinorrhea over these 2 days. No dysphagia/odynophagia. No sore throat. No sick contacts. She felt poorly enough not to take her home medicine on day of arrival to ED> in ED her CXR without evidence for infiltrate or pulmonary edema. Labs wnl. We were asked to observe for work up and evaluation of the above problems. Past Medical History   Diagnosis Date    Asthma     Cancer ([facility] [address]      lymphoma    Congestive heart failure, unspecified     Heart failure (HCC)     Hypertension     Other ill-defined conditions(799.89)      heart murmur    Presence of biventricular automatic cardioverter/defibrillator (AICD) 7/2/2015     CellControl biventricular AICD implant    Stomach ulcer       Past Surgical History   Procedure Laterality Date    Hx hysterectomy      Hx other surgical       lymph node surgery    Hx tonsillectomy      Pr sinus surgery proc unlisted       Nasal polyps removed    Hx heent       cataracts removed    Pr egd transoral biopsy single/multiple  5/23/2012          Hx colonoscopy       Social History   Substance Use Topics    Smoking status: Never Smoker    Smokeless tobacco: Never Used    Alcohol use No      Family History   Problem Relation Age of Onset    Heart Disease Mother     Stroke Father      Allergies   Allergen Reactions    Codeine Other (comments)     \"made me disoriented\" per pt      Prior to Admission medications    Medication Sig Start Date End Date Taking?  Authorizing Provider   furosemide (LASIX) 40 mg tablet Take 1 and a half tablet bid 1/19/17  Yes Diamante Gallo MD   allopurinol (ZYLOPRIM) 300 mg tablet TAKE ONE (1) TABLET(S) DAILY 1/10/17  Yes Mena Alfredo MD   atorvastatin (LIPITOR) 10 mg tablet TAKE ONE (1) TABLET(S) DAILY 1/10/17  Yes Brain abscess   Assessment & Plan    CT head with contrast showed fluid collection most likely abscess over the left frontotemporal area with mass-effect, midline shift and evidence of developing hydrocephalus.  Neurosurgery consulted.  Status post craniotomy on 5/22/19, specimen sent to [facility] for pathology.  ID on board, initially on vanc and flagyl, vanc discontinued 5/27/19, cefepime started instead, vanc restarted per ID on 6/5/19.   Bacterial, including AFB, and fungal cultures negative to date.   Mild reactive gliosis per final path report from [facility], see Media tab for more details.     Plan:  Continue cefepime and metronidazole, vancomycin restarted today per ID  6-week course of antibiotics recommended by ID      Agitation   Assessment & Plan    Intermittently agitated and aggressive towards the staff.  On Seroquel 25 mg nightly.    Plan:  Modified Seroquel dose to 25 mg BID for better management.   Haldol PRN for agitation and aggression not managed with Seroquel.     Acute respiratory failure with hypoxia (HCC)   Assessment & Plan    Resolved.  Intubated on admission for airway protection on 5/22/2019, extubated on 5/24/2019.  RT protocol, supplemental oxygen as needed.     Delirium   Assessment & Plan    Resolved.  On Seroquel 25 mg qhs and Haldol PRN for intermittent aggression and agitation.   CTM.     Methamphetamine abuse (HCC)   Assessment & Plan    History of meth abuse via smoking per the patient's nephew, no history of IV drug use.  Substance abuse counseling prior to discharge.     Expressive aphasia   Assessment & Plan    Improving.  Still with some difficulty identifying objects and recalling current events.  Likely secondary to a lesion in the frontotemporal lobe of the left dominant brain.     Microcytic anemia   Assessment & Plan    Stable.  Fe 17, Fe Sat 4% and TIBC 386, ferritin 16.2.  Stool negative for occult blood.  No records of EGD or colonoscopy per chart review.  Started on  George Oliver MD   carvedilol (COREG) 6.25 mg tablet TAKE ONE (1) TABLET(S) TWIC E DAILY WITH FOOD 1/10/17  Yes George Oliver MD   fluticasone-salmeterol (ADVAIR) 250-50 mcg/dose diskus inhaler Take 1 Puff by inhalation two (2) times a day. 1/10/17  Yes George Oliver MD   valsartan (DIOVAN) 40 mg tablet TAKE ONE (1) TABLET(S) ONCE DAILY 1/10/17  Yes George Oliver MD   loratadine (CLARITIN) 10 mg tablet Take 10 mg by mouth. Yes Historical Provider   co-enzyme Q-10 (CO Q-10) 100 mg capsule Take 100 mg by mouth daily. Yes Historical Provider   OXYGEN-AIR DELIVERY SYSTEMS by Does Not Apply route. Yes Historical Provider   albuterol (PROVENTIL VENTOLIN) 2.5 mg /3 mL (0.083 %) nebulizer solution 3 mL by Nebulization route every four (4) hours as needed for Wheezing. 12/18/15  Yes Robert Mathias MD   NEBULIZER ACCESSORIES ([id number] [facility]) by Does Not Apply route. Yes Historical Provider   ibrutinib (IMBRUVICA) 140 mg cap Take 3 Caps by mouth daily. Yes Historical Provider   acetaminophen (TYLENOL EXTRA STRENGTH) 500 mg tablet Take 500 mg by mouth every six (6) hours as needed for Pain. Yes Historical Provider   multivitamin (ONE A DAY) tablet Take 1 Tab by mouth daily.    Yes Historical Provider     REVIEW OF SYSTEMS:  See HPI for details  General: negative for fever, + chills, NO sweats, ++ weakness, NO weight loss  Eyes: negative for blurred vision, eye pain, loss of vision, diplopia  Ear Nose and Throat: +rhinorrhea, NO pharyngitis, otalgia, tinnitus, speech or swallowing difficulties  Respiratory:  negative for pleuritic pain, cough, sputum production, wheezing, + intermittent SOB, mild JENKINS  Cardiology:  negative for chest pain, palpitations, orthopnea, PND, edema, syncope   Gastrointestinal: negative for abdominal pain, N/V, dysphagia, change in bowel habits, bleeding  Genitourinary: negative for frequency, urgency, dysuria, hematuria, incontinence  Muskuloskeletal [place]  negative for arthralgia, myalgia  Hematology: negative for easy bruising, bleeding, lymphadenopathy  Dermatological: negative for rash, ulceration, mole change, new lesion  Endocrine: negative for hot flashes or polydipsia  Neurological: negative for headache, dizziness, confusion, focal weakness, paresthesia, memory loss, gait disturbance  Psychological: negative for anxiety, depression, agitation    Objective:   VITALS:    Visit Vitals    /75    Pulse 77    Temp 98.8 °F (37.1 °C)    Resp 19    Ht 5' 2\" (1.575 m)    Wt 61.2 kg (135 lb)    SpO2 97%    Breastfeeding No    BMI 24.69 kg/m2     PHYSICAL EXAM:     GENERAL:    WD y   WN y   Cachectic    Thin y   Obese    Disheveled y   Ill Appearing Critically y   Ill Appearing Chronically    Acute Distress y   Other      HEENT:    NC/AT/EOMI y   PERRLA y   Conjunctivae Pink    Conjunctivae Pale y   Moist Mucosa    Dry Mucosa y   Hearing intact to voice y   Other      NECK:    Supple y   Masses n   Thyroid Tender n   Other                   RESPIRATORY:    CTA bilaterally WITHOUT wheezing/rhonchi/rales or crackles y   Wheezing    Rhonchi    Crackles    Use of accessory muscles n   Other      CARDIAC:    regular rate and rhythm No murmurs/rubs/gallops y   Murmur    Rubs    Gallops    Rate Regular/Irregular reg   Carotid Bruit Left/Right n   Lower Extremity Edema n   JVP  n   Other Normal capillary refill     ABDOMEN:    Soft non distended non tender +bowel sounds no HSM y   Rigid    Tenderness    Hepatomegaly    Splenomegaly    Distended    Increased girth due to habitus    Normal/Hyper/Hypo Active Bowel Sounds nml   Other      SKIN / MUSCULOSKELETAL:    Rashes n   Ecchymosis n   Ulcers    Tight to palpitation    Turgor Good/Poor g   Cyanosis/Clubbing n   Amputation(s) n   Other      NEUROLOGY:    cranial nerves II-XII grossly intact y   Cranial Nerve Deficit    Facial Droop    Slurred Speech n   Aphasia    Strength Normal    Weakness y global ferrous sulfate supplementation this admission.     Plan:  Continue ferrous sulfate daily.  Stool H. Pylori test pending.   Celiac panel and reticulocyte panel pending.   Outpatient PCP follow-up, recommend GI cancer screening with EGD and colonoscopy.     Class 1 obesity due to excess calories without serious comorbidity with body mass index (BMI) of 33.0 to 33.9 in adult   Assessment & Plan    BMI 31.  Patient follow-up for weight loss management.  Concern for sleep apnea, recommend outpatient sleep study for further evaluation.     Smoker   Assessment & Plan    Smokes 3 cigarettes per day per his mother's report.  Smoking cessation counseling prior to discharge.          Meningismus/Kernig's Sign/ Brudzinsky n   Follows Commands y   Other      PSYCHIATRIC:    AAOx3 in no acute distress y   Insight Poor    Insight Good y   Alert and Oriented to Person     Alert and Oriented to Place    Alert and Oriented toTime    Depressed n   Anxious    Agitated n   Lethargic n   Stuporous n   Sedated    Other    _______________________________________________________________________  Care Plan discussed with:    Comments   Patient x Discussed with patient in room. POC outlined and Questions answered (21   Family      RN x  5   Care Manager                    Consultant:  doris MUNOZ MD 5   _______________________________________________________________________  Recommended Disposition:   Home with Family y   HH/PT/OT/RN    SNF/LTC    RACHELLE    ________________________________________________________________________  TOTAL TIME:  39 Minutes    Critical Care Provided     Minutes non procedure based      Comments   >50% of visit spent in counseling and coordination of care x Chart review  Discussion with patient and/or family and questions answered     ________________________________________________________________________  Signed: Inder Umaña MD    Procedures: see electronic medical records for all procedures/Xrays and details which were not copied into this note but were reviewed prior to creation of Plan. LAB DATA REVIEWED:    Recent Results (from the past 24 hour(s))   EKG, 12 LEAD, INITIAL    Collection Time: 02/01/17  7:26 PM   Result Value Ref Range    Ventricular Rate 80 BPM    Atrial Rate 80 BPM    P-R Interval 148 ms    QRS Duration 168 ms    Q-T Interval 440 ms    QTC Calculation (Bezet) 507 ms    Calculated P Axis -14 degrees    Calculated R Axis -138 degrees    Calculated T Axis 35 degrees    Diagnosis       Atrial-sensed ventricular-paced rhythm  When compared with ECG of 14-NOV-2015 08:37,  Vent.  rate has increased BY   5 BPM     CBC WITH AUTOMATED DIFF    Collection Time: 02/01/17 7:35 PM   Result Value Ref Range    WBC 3.9 3.6 - 11.0 K/uL    RBC 3.51 (L) 3.80 - 5.20 M/uL    HGB 10.9 (L) 11.5 - 16.0 g/dL    HCT 32.4 (L) 35.0 - 47.0 %    MCV 92.3 80.0 - 99.0 FL    MCH 31.1 26.0 - 34.0 PG    MCHC 33.6 30.0 - 36.5 g/dL    RDW 15.4 (H) 11.5 - 14.5 %    PLATELET 963 (L) 411 - 400 K/uL    NEUTROPHILS 28 %    BAND NEUTROPHILS 12 %    LYMPHOCYTES 51 %    MONOCYTES 7 %    EOSINOPHILS 1 %    BASOPHILS 0 %    MYELOCYTES 1 %    ABS. NEUTROPHILS 1.6 K/UL    ABS. LYMPHOCYTES 2.0 K/UL    ABS. MONOCYTES 0.3 K/UL    ABS. EOSINOPHILS 0.0 K/UL    ABS. BASOPHILS 0.0 K/UL    DF MANUAL      RBC COMMENTS NORMOCYTIC, NORMOCHROMIC      WBC COMMENTS PELGER/HUET/LIKE CELLS     METABOLIC PANEL, COMPREHENSIVE    Collection Time: 02/01/17  7:35 PM   Result Value Ref Range    Sodium 137 136 - 145 mmol/L    Potassium 4.3 3.5 - 5.1 mmol/L    Chloride 100 97 - 108 mmol/L    CO2 28 21 - 32 mmol/L    Anion gap 9 5 - 15 mmol/L    Glucose 104 (H) 65 - 100 mg/dL    BUN 30 (H) 6 - 20 MG/DL    Creatinine 1.36 (H) 0.55 - 1.02 MG/DL    BUN/Creatinine ratio 22 (H) 12 - 20      GFR est AA 44 (L) >60 ml/min/1.73m2    GFR est non-AA 36 (L) >60 ml/min/1.73m2    Calcium 8.1 (L) 8.5 - 10.1 MG/DL    Bilirubin, total 0.6 0.2 - 1.0 MG/DL    ALT (SGPT) 9 (L) 12 - 78 U/L    AST (SGOT) 20 15 - 37 U/L    Alk.  phosphatase 68 45 - 117 U/L    Protein, total 6.1 (L) 6.4 - 8.2 g/dL    Albumin 2.6 (L) 3.5 - 5.0 g/dL    Globulin 3.5 2.0 - 4.0 g/dL    A-G Ratio 0.7 (L) 1.1 - 2.2     INFLUENZA A & B AG (RAPID TEST)    Collection Time: 02/01/17  7:35 PM   Result Value Ref Range    Influenza A Antigen NEGATIVE  NEG      Influenza B Antigen NEGATIVE  NEG     PRO-BNP    Collection Time: 02/01/17  7:35 PM   Result Value Ref Range    NT pro-BNP 4851 (H) 0 - 450 PG/ML   MAGNESIUM    Collection Time: 02/01/17  7:35 PM   Result Value Ref Range    Magnesium 2.6 (H) 1.6 - 2.4 mg/dL   TROPONIN I    Collection Time: 02/01/17  7:35 PM   Result Value Ref Range Troponin-I, Qt. <0.04 <0.05 ng/mL   CK W/ CKMB & INDEX    Collection Time: 02/01/17  7:35 PM   Result Value Ref Range    CK 60 26 - 192 U/L    CK - MB <1.0 <3.6 NG/ML    CK-MB Index CANNOT BE CALCULATED 0 - 2.5     D DIMER    Collection Time: 02/01/17  7:35 PM   Result Value Ref Range    D-dimer 3.08 (H) 0.00 - 0.65 mg/L FEU   URINALYSIS W/ REFLEX CULTURE    Collection Time: 02/01/17  8:18 PM   Result Value Ref Range    Color YELLOW/STRAW      Appearance CLEAR CLEAR      Specific gravity 1.011 1.003 - 1.030      pH (UA) 7.5 5.0 - 8.0      Protein NEGATIVE  NEG mg/dL    Glucose NEGATIVE  NEG mg/dL    Ketone NEGATIVE  NEG mg/dL    Bilirubin NEGATIVE  NEG      Blood MODERATE (A) NEG      Urobilinogen 1.0 0.2 - 1.0 EU/dL    Nitrites NEGATIVE  NEG      Leukocyte Esterase NEGATIVE  NEG      WBC 0-4 0 - 4 /hpf    RBC 10-20 0 - 5 /hpf    Epithelial cells FEW FEW /lpf    Bacteria NEGATIVE  NEG /hpf    UA:UC IF INDICATED CULTURE NOT INDICATED BY UA RESULT CNI      Hyaline cast 0-2 0 - 5 /lpf

## 2019-06-06 NOTE — PROGRESS NOTES
Internal Medicine Interval Note    Note Author: Em Savage M.D.      Name Robert CALDERON 1965   Age 54 y.o. male   MRN [id number]   Code Status Full Code     After 5 PM or if no immediate response to page, please call for cross-coverage.    Attending: Dr. Landa  Team: Bryce See patient list for primary contact information.  Call [phone] to page.    1st Call - Day Intern, R1  Dr. Savage 2nd Call - Day Sr. Resident, R2-R3  Dr. Wood       Principal Problem  Brain mass; concerning for infection vs. malignancy      Interval History  No acute events overnight.  Doing well, denies any complaints.  Finalized pathology report from [facility] mild reactive review of systems diagnosis.  Staples removed today.  Repeat MRI ordered for tomorrow by neurosurgery.  Okay to resume DVT prophylaxis per neurosurgery.  Skilled nursing facility referral placed, social work working on discharge planning.  Continue treatment with vancomycin, cefepime and metronidazole per ID, total 6-week course of antibiotics.      ROS  Constitutional: Negative for fever and chills.  HEN:Negative for sinus pain and congestion.  Throat: Negative for soreness and dysphasia.  Eyes: Negative for diplopia and pain.  Respiratory: Negative for cough and shortness of breath.  Cardiovascular: Negative for chest pain palpitations.  Gastrointestinal: Negative for constipation, diarrhea, nausea and vomiting.  Genitourinary: Negative for dysuria and hematuria.  Neurological: Positive for expressive aphasia. Negative for headache, dizziness and sensory change.  Psychiatric: Negative for depression and anxiety.      Disposition, Discharge Barriers  Inpatient, pending insurance and placement to a skilled nursing facility.      Consultants  Neurosurgery  Infectious Disease       Quality-Core Measures  Richards Catheter: No  Central Line: No  VTE Prophylaxis: Lovenox  Antibiotics: Flagyl, cefepime and vancomycin  Rehab: PT, OT, SLP following        Physical Exam    Temp:  [36.9 °C (98.4 °F)-37.2 °C (98.9 °F)] 36.9 °C (98.4 °F)  Pulse:  [66-89] 87  Resp:  [14-17] 14  BP: ()/(57-67) 109/66  SpO2:  [94 %-95 %] 95 %    General: No acute distress, cooperative.  Head: Normocephalic, incision from L frontotemporal craniotomy (staples in place)  healing well.    Eyes: Normal conjunctiva, sclerae anicteric.  Throat: Oropharynx clear, oral mucosa moist.  Neck: Supple, no lymphadenopathy.  Lungs: Normal work of breathing, clear to auscultation bilaterally.  Heart: Normal rate, regular rhythm, no murmurs.  Abdomen: Soft, bowel sounds present, non-tender, no peritoneal signs.  Extremities: Nontender, no cyanosis, no edema.  Neuro: Alert, oriented to person place and time, expressive aphasia, normal tone and sensation.   Skin: Warm, dry, intact.      Labs and Imaging  Pertinent labs, imaging and diagnostic tests and procedures associated with this visit have been reviewed, specific comments can be found in the assessment and plan section below.     Recent Labs      06/04/19   0813  06/05/19   0301  06/06/19   0240   SODIUM  137  139  140   POTASSIUM  4.0  3.8  4.3   CHLORIDE  106  106  108   CO2  24  23  22   BUN  20  20  21   CREATININE  0.68  0.64  0.73   GLUCOSE  88  130*  84   CALCIUM  8.5  8.9  8.3*   MAGNESIUM  2.0  1.9  1.9     Recent Labs      06/04/19   0813  06/05/19   0301  06/06/19   0240   WBC  11.3*  9.8  9.4   HEMOGLOBIN  13.0*  13.6*  12.8*   HEMATOCRIT  43.3  44.3  43.5   PLATELETCT  248  244  225     Recent Labs      06/04/19   0813   ALTSGPT  49   ASTSGOT  19   ALKPHOSPHAT  46   TBILIRUBIN  0.5   ALBUMIN  3.6       Assessment and Plan    Status post craniotomy   Assessment & Plan    No complications, suture removal 6/5/19 per Neurosurgery.  Follow up on anticoagulation with Dr. Young tomorrow.     * Brain abscess   Assessment & Plan    CT head with contrast showed fluid collection most likely abscess over the left frontotemporal area with  mass-effect, midline shift and evidence of developing hydrocephalus.  Neurosurgery consulted.  Status post craniotomy on 5/22/19, specimen sent to [facility] for pathology.  ID on board, initially on vanc and flagyl, vanc discontinued 5/27/19, cefepime started instead, vanc restarted per ID on 6/5/19.   Bacterial, including AFB, and fungal cultures negative to date.   Mild reactive gliosis per final path report from [facility], see Media tab for more details.     Plan:  Continue cefepime and metronidazole, vancomycin restarted today per ID  6-week course of antibiotics recommended by ID      Agitation   Assessment & Plan    Intermittently agitated and aggressive towards the staff.  On Seroquel 25 mg nightly.    Plan:  Modified Seroquel dose to 25 mg BID for better management.   Haldol PRN for agitation and aggression not managed with Seroquel.     Acute respiratory failure with hypoxia (HCC)   Assessment & Plan    Resolved.  Intubated on admission for airway protection on 5/22/2019, extubated on 5/24/2019.  RT protocol, supplemental oxygen as needed.     Delirium   Assessment & Plan    Resolved.  On Seroquel 25 mg qhs and Haldol PRN for intermittent aggression and agitation.   CTM.     Methamphetamine abuse (HCC)   Assessment & Plan    History of meth abuse via smoking per the patient's nephew, no history of IV drug use.  Substance abuse counseling prior to discharge.     Expressive aphasia   Assessment & Plan    Improving.  Still with some difficulty identifying objects and recalling current events.  Likely secondary to a lesion in the frontotemporal lobe of the left dominant brain.     Microcytic anemia   Assessment & Plan    Stable.  Fe 17, Fe Sat 4% and TIBC 386, ferritin 16.2.  Stool negative for occult blood.  No records of EGD or colonoscopy per chart review.  Started on ferrous sulfate supplementation this admission.     Plan:  Continue ferrous sulfate daily.  Stool H. Pylori test pending.   Celiac panel and  reticulocyte panel pending.   Outpatient PCP follow-up, recommend GI cancer screening with EGD and colonoscopy.     Class 1 obesity due to excess calories without serious comorbidity with body mass index (BMI) of 33.0 to 33.9 in adult   Assessment & Plan    BMI 31.  Patient follow-up for weight loss management.  Concern for sleep apnea, recommend outpatient sleep study for further evaluation.     Smoker   Assessment & Plan    Smokes 3 cigarettes per day per his mother's report.  Smoking cessation counseling prior to discharge.

## 2019-06-06 NOTE — PROGRESS NOTES
0815Report received, plan of care reviewed and discussed, assessment complete, oriented to room, bed alarm on, nonskid socks applied, advised to call for assistance.   1015 Discussed plan of care, encouraged and answered all questions, will continue to monitor.  1215 Resting, all needs met at this time.  1415 Ambulating with one assist, will continue to monitor.  1615 Up to bathroom, appears agitated, will continue to monitor.  1845 Report given to next shift.

## 2019-06-07 ENCOUNTER — APPOINTMENT (OUTPATIENT)
Dept: RADIOLOGY | Facility: MEDICAL CENTER | Age: 54
DRG: 025 | End: 2019-06-07
Attending: INTERNAL MEDICINE
Payer: MEDICAID

## 2019-06-07 LAB
ANION GAP SERPL CALC-SCNC: 7 MMOL/L (ref 0–11.9)
BASOPHILS # BLD AUTO: 0.9 % (ref 0–1.8)
BASOPHILS # BLD: 0.07 K/UL (ref 0–0.12)
BUN SERPL-MCNC: 15 MG/DL (ref 8–22)
CALCIUM SERPL-MCNC: 8.8 MG/DL (ref 8.5–10.5)
CHLORIDE SERPL-SCNC: 108 MMOL/L (ref 96–112)
CO2 SERPL-SCNC: 24 MMOL/L (ref 20–33)
CREAT SERPL-MCNC: 0.64 MG/DL (ref 0.5–1.4)
CRP SERPL HS-MCNC: 0.31 MG/DL (ref 0–0.75)
EOSINOPHIL # BLD AUTO: 0.16 K/UL (ref 0–0.51)
EOSINOPHIL NFR BLD: 2 % (ref 0–6.9)
ERYTHROCYTE [DISTWIDTH] IN BLOOD BY AUTOMATED COUNT: 55.9 FL (ref 35.9–50)
ERYTHROCYTE [SEDIMENTATION RATE] IN BLOOD BY WESTERGREN METHOD: 8 MM/HOUR (ref 0–20)
GLUCOSE SERPL-MCNC: 110 MG/DL (ref 65–99)
HCT VFR BLD AUTO: 41.8 % (ref 42–52)
HGB BLD-MCNC: 12.7 G/DL (ref 14–18)
IMM GRANULOCYTES # BLD AUTO: 0.07 K/UL (ref 0–0.11)
IMM GRANULOCYTES NFR BLD AUTO: 0.9 % (ref 0–0.9)
LYMPHOCYTES # BLD AUTO: 1.33 K/UL (ref 1–4.8)
LYMPHOCYTES NFR BLD: 17 % (ref 22–41)
MCH RBC QN AUTO: 23.8 PG (ref 27–33)
MCHC RBC AUTO-ENTMCNC: 30.4 G/DL (ref 33.7–35.3)
MCV RBC AUTO: 78.4 FL (ref 81.4–97.8)
MONOCYTES # BLD AUTO: 0.94 K/UL (ref 0–0.85)
MONOCYTES NFR BLD AUTO: 12 % (ref 0–13.4)
NEUTROPHILS # BLD AUTO: 5.27 K/UL (ref 1.82–7.42)
NEUTROPHILS NFR BLD: 67.2 % (ref 44–72)
NRBC # BLD AUTO: 0 K/UL
NRBC BLD-RTO: 0 /100 WBC
PLATELET # BLD AUTO: 212 K/UL (ref 164–446)
PMV BLD AUTO: 10.5 FL (ref 9–12.9)
POTASSIUM SERPL-SCNC: 3.9 MMOL/L (ref 3.6–5.5)
RBC # BLD AUTO: 5.33 M/UL (ref 4.7–6.1)
SODIUM SERPL-SCNC: 139 MMOL/L (ref 135–145)
VANCOMYCIN TROUGH SERPL-MCNC: 18.6 UG/ML (ref 10–20)
WBC # BLD AUTO: 7.8 K/UL (ref 4.8–10.8)

## 2019-06-07 PROCEDURE — A9270 NON-COVERED ITEM OR SERVICE: HCPCS | Performed by: INTERNAL MEDICINE

## 2019-06-07 PROCEDURE — A9270 NON-COVERED ITEM OR SERVICE: HCPCS | Performed by: STUDENT IN AN ORGANIZED HEALTH CARE EDUCATION/TRAINING PROGRAM

## 2019-06-07 PROCEDURE — 700105 HCHG RX REV CODE 258: Performed by: INTERNAL MEDICINE

## 2019-06-07 PROCEDURE — 700111 HCHG RX REV CODE 636 W/ 250 OVERRIDE (IP): Performed by: STUDENT IN AN ORGANIZED HEALTH CARE EDUCATION/TRAINING PROGRAM

## 2019-06-07 PROCEDURE — 700102 HCHG RX REV CODE 250 W/ 637 OVERRIDE(OP): Performed by: INTERNAL MEDICINE

## 2019-06-07 PROCEDURE — 80202 ASSAY OF VANCOMYCIN: CPT

## 2019-06-07 PROCEDURE — 770001 HCHG ROOM/CARE - MED/SURG/GYN PRIV*

## 2019-06-07 PROCEDURE — 85025 COMPLETE CBC W/AUTO DIFF WBC: CPT

## 2019-06-07 PROCEDURE — 700112 HCHG RX REV CODE 229: Performed by: INTERNAL MEDICINE

## 2019-06-07 PROCEDURE — 05HY33Z INSERTION OF INFUSION DEVICE INTO UPPER VEIN, PERCUTANEOUS APPROACH: ICD-10-PCS | Performed by: INTERNAL MEDICINE

## 2019-06-07 PROCEDURE — 700111 HCHG RX REV CODE 636 W/ 250 OVERRIDE (IP): Performed by: INTERNAL MEDICINE

## 2019-06-07 PROCEDURE — 36573 INSJ PICC RS&I 5 YR+: CPT

## 2019-06-07 PROCEDURE — 99232 SBSQ HOSP IP/OBS MODERATE 35: CPT | Mod: GC | Performed by: INTERNAL MEDICINE

## 2019-06-07 PROCEDURE — 36415 COLL VENOUS BLD VENIPUNCTURE: CPT

## 2019-06-07 PROCEDURE — 86140 C-REACTIVE PROTEIN: CPT

## 2019-06-07 PROCEDURE — 85652 RBC SED RATE AUTOMATED: CPT

## 2019-06-07 PROCEDURE — 700102 HCHG RX REV CODE 250 W/ 637 OVERRIDE(OP): Performed by: STUDENT IN AN ORGANIZED HEALTH CARE EDUCATION/TRAINING PROGRAM

## 2019-06-07 PROCEDURE — 80048 BASIC METABOLIC PNL TOTAL CA: CPT

## 2019-06-07 PROCEDURE — 99232 SBSQ HOSP IP/OBS MODERATE 35: CPT | Performed by: INTERNAL MEDICINE

## 2019-06-07 RX ORDER — METRONIDAZOLE 500 MG/1
500 TABLET ORAL EVERY 8 HOURS
Status: DISCONTINUED | OUTPATIENT
Start: 2019-06-07 | End: 2019-06-13

## 2019-06-07 RX ADMIN — METRONIDAZOLE 500 MG: 500 TABLET, FILM COATED ORAL at 05:57

## 2019-06-07 RX ADMIN — CEFEPIME 2 G: 2 INJECTION, POWDER, FOR SOLUTION INTRAVENOUS at 22:59

## 2019-06-07 RX ADMIN — FERROUS SULFATE TAB 325 MG (65 MG ELEMENTAL FE) 325 MG: 325 (65 FE) TAB at 11:29

## 2019-06-07 RX ADMIN — VANCOMYCIN HYDROCHLORIDE 1500 MG: 100 INJECTION, POWDER, LYOPHILIZED, FOR SOLUTION INTRAVENOUS at 01:34

## 2019-06-07 RX ADMIN — VANCOMYCIN HYDROCHLORIDE 1500 MG: 100 INJECTION, POWDER, LYOPHILIZED, FOR SOLUTION INTRAVENOUS at 20:36

## 2019-06-07 RX ADMIN — DOCUSATE SODIUM 100 MG: 100 CAPSULE, LIQUID FILLED ORAL at 18:00

## 2019-06-07 RX ADMIN — MAGNESIUM HYDROXIDE 30 ML: 400 SUSPENSION ORAL at 20:05

## 2019-06-07 RX ADMIN — QUETIAPINE FUMARATE 25 MG: 25 TABLET ORAL at 18:24

## 2019-06-07 RX ADMIN — SENNOSIDES,DOCUSATE SODIUM 1 TABLET: 8.6; 5 TABLET, FILM COATED ORAL at 20:05

## 2019-06-07 RX ADMIN — QUETIAPINE FUMARATE 25 MG: 25 TABLET ORAL at 05:57

## 2019-06-07 RX ADMIN — CEFEPIME 2 G: 2 INJECTION, POWDER, FOR SOLUTION INTRAVENOUS at 14:01

## 2019-06-07 RX ADMIN — ENOXAPARIN SODIUM 40 MG: 100 INJECTION SUBCUTANEOUS at 06:00

## 2019-06-07 RX ADMIN — METRONIDAZOLE 500 MG: 500 TABLET, FILM COATED ORAL at 14:01

## 2019-06-07 RX ADMIN — VANCOMYCIN HYDROCHLORIDE 1500 MG: 100 INJECTION, POWDER, LYOPHILIZED, FOR SOLUTION INTRAVENOUS at 11:29

## 2019-06-07 RX ADMIN — DOCUSATE SODIUM 100 MG: 100 CAPSULE, LIQUID FILLED ORAL at 05:57

## 2019-06-07 RX ADMIN — CEFEPIME 2 G: 2 INJECTION, POWDER, FOR SOLUTION INTRAVENOUS at 05:58

## 2019-06-07 RX ADMIN — POLYETHYLENE GLYCOL 3350 1 PACKET: 17 POWDER, FOR SOLUTION ORAL at 18:24

## 2019-06-07 ASSESSMENT — ENCOUNTER SYMPTOMS
NAUSEA: 0
FEVER: 0
HEADACHES: 1
COUGH: 0
CHILLS: 0
DIARRHEA: 0
MYALGIAS: 0
VOMITING: 0
SHORTNESS OF BREATH: 0
ABDOMINAL PAIN: 0

## 2019-06-07 NOTE — PROGRESS NOTES
Neurosurgery Progress Note    Subjective:  No acute events. Watching tv, no complaints of pain.     Exam:   A&O x3. Fluent speech  Poor short term memory.   4 PERRL, EOMI. Denies blurry or double vision.   Tongue midline without fasciculation. No facial droop.   No drift. Unremarkable finger-nose, SHAUNA testing.   Hearing intact.   Strength: Bilateral shoulder shrug, bicep, tricep, deltoid,  5/5.                   Bilateral IP, DF, PF 5/5.   Sensation: Intact throughout.   Incision: healing well. Open to air. Staples removed  Eating, drinking. Denies n/v.  Voiding.   No pain  Ambulatory.    CT head good evacuation left temporal cyst with persistent cerebral edema    BP  Min: 96/59  Max: 110/66  Pulse  Av.5  Min: 65  Max: 87  Resp  Av  Min: 14  Max: 18  Temp  Av.9 °C (98.5 °F)  Min: 36.8 °C (98.3 °F)  Max: 37.1 °C (98.7 °F)  SpO2  Av.5 %  Min: 93 %  Max: 95 %    No Data Recorded    Recent Labs      19   0301  19   0240  19   0250   WBC  9.8  9.4  7.8   RBC  5.78  5.51  5.33   HEMOGLOBIN  13.6*  12.8*  12.7*   HEMATOCRIT  44.3  43.5  41.8*   MCV  76.6*  78.9*  78.4*   MCH  23.5*  23.2*  23.8*   MCHC  30.7*  29.4*  30.4*   RDW  53.9*  56.6*  55.9*   PLATELETCT  244  225  212   MPV  10.2  10.5  10.5     Recent Labs      19   0301  19   0240  19   0250   SODIUM  139  140  139   POTASSIUM  3.8  4.3  3.9   CHLORIDE  106  108  108   CO2  23  22  24   GLUCOSE  130*  84  110*   BUN  20  21  15   CREATININE  0.64  0.73  0.64   CALCIUM  8.9  8.3*  8.8               Intake/Output       19 07 - 19 0659 19 -  Total 3322-4362  Total       Intake    P.O.  190  -- 190  --  -- --    P.O. 190 -- 190 -- -- --    Total Intake 190 -- 190 -- -- --       Output    Urine  --  -- --  --  -- --    Number of Times Voided -- 2 x 2 x -- -- --    Stool  --  -- --  --  -- --    Number of Times Stooled -- -- -- 1 x --  1 x    Total Output -- -- -- -- -- --       Net I/O     190 -- 190 -- -- --            Intake/Output Summary (Last 24 hours) at 06/07/19 0903  Last data filed at 06/06/19 1432   Gross per 24 hour   Intake              190 ml   Output                0 ml   Net              190 ml            • enoxaparin  40 mg DAILY   • MD Alert...Vancomycin per Pharmacy   PHARMACY TO DOSE   • vancomycin  15 mg/kg Q8HR   • QUEtiapine  25 mg BID   • oxyCODONE immediate-release  5 mg Q6HRS PRN   • ferrous sulfate  325 mg QDAY with Breakfast   • metroNIDAZOLE  500 mg Q8HRS   • haloperidol  1-5 mg Q4HRS PRN   • acetaminophen  650 mg Q8HRS PRN   • polyethylene glycol/lytes  1 Packet BID   • docusate sodium  100 mg BID   • senna-docusate  1 Tab Nightly   • cloNIDine  0.1 mg Q4HRS PRN   • magnesium hydroxide  30 mL QDAY PRN   • senna-docusate  1 Tab Q24HRS PRN   • Respiratory Care per Protocol   Continuous RT   • cefepime  2 g Q8HRS   • Pharmacy Consult Request  1 Each PHARMACY TO DOSE   • MD ALERT...DO NOT ADMINISTER NSAIDS or ASPIRIN unless ORDERED By Neurosurgery  1 Each PRN   • ondansetron  4 mg Q4HRS PRN   • scopolamine  1 Patch Q72HRS PRN   • labetalol  10 mg Q HOUR PRN   • hydrALAZINE  10 mg Q HOUR PRN   • bisacodyl  10 mg Q24HRS PRN   • fleet  1 Each Once PRN       Assessment and Plan:  Hospital day #17 left temporal peripherally enhancing cystic lesion  POD #16 left temporal cranitomy, cyst evacuation/biopsy  Prophylactic anticoagulation: no         Start date/time: pt mobile, ambulatory      Plan:   Stable neuro exam  MRI brain pending.   Final pathology from [facility] mild reactive gliosis  ID plan for PICC line and IV antibiotics x 6 weeks, through 7/6/2019  Pt clear from NSG standpoint to dc/transfer  NSG will continue to follow intermittently. Please call with any questions.

## 2019-06-07 NOTE — THERAPY
"Physical Therapy Treatment completed.   Bed Mobility:  Supine to Sit: Supervised  Transfers: Sit to Stand: Supervised  Gait: Level Of Assist: Supervised with No Equipment Needed       Plan of Care: Will benefit from Physical Therapy 3 times per week  Discharge Recommendations: Equipment: No Equipment Needed.     See \"Rehab Therapy-Acute\" Patient Summary Report for complete documentation.     Pt presenting more alert today. He continues to be easily distracted during all mobility. He was able increase his tolerance to gait and demonstrated improved balance. Pt is primarily limited by his cognition. He is at a level in which he will require 24/7 SPV.  "

## 2019-06-07 NOTE — CARE PLAN
Problem: Communication  Goal: The ability to communicate needs accurately and effectively will improve  Outcome: PROGRESSING AS EXPECTED  Patient able to communicate needs to staff. Call light within reach, patient educated to call for assistance. Patient calls appropriately. Will continue to assess.    Problem: Safety  Goal: Will remain free from falls  Outcome: PROGRESSING AS EXPECTED  Patient educated to call for assistance. Precautions in place; Call light within reach. Bed alarm in use.  Bed locked and in lowest position. Treaded socks in place. Hourly rounding. Will continue to monitor.

## 2019-06-07 NOTE — PROGRESS NOTES
Infectious Disease Progress Note    Author: Roxana Dorado M.D. Date & Time of service: 2019  9:25 AM    Chief Complaint:  Brain mass       Interval History:  53 y.o. male  admitted 2019 for AMS due to above. +meth abuse.  Recently in the ED on May 12 and was diagnosed with acute suppurative otitis media   AF WBC 12.6 extubated this am-pain controlled-decreased strength RUE +delerium   AF improved strength RUE-intermittently combative and cooperative   AF WBC 9.7 drain removed-somnolent this am-per RN strength continues to improve   AF had HA earlier but now improved-no new neurologic deficits. Plan for C/A/P today   AF WBC 13.4 remains alert-strength improved. Denies SE abx. HA controlled   AF WBC 12.2 sitting up to chair-no new complaints   AF WBC 17 denies any worsening HA, visual changes, N/V/D, abd pain  2019-no fevers.  Complains of some headache.  No new issues overnight.  WBC is 14.5  2019 no fevers.  No new issues.  Pathology is still not back.  - no fevers. The path has come back positive for gliosis.  Denies any new complaints.  2019 no fevers.  No new issues overnight.  Tolerating the antibiotics   afebrile WBC 7.8.  Patient denies any headaches or neck pain.  He is tolerating IV antibiotics without any issues.  No diarrhea.  Family requesting home IV antibiotics if feasible.    Labs Reviewed    Review of Systems:  Review of Systems   Constitutional: Negative for chills and fever.   Respiratory: Negative for cough and shortness of breath.    Cardiovascular: Negative for chest pain.   Gastrointestinal: Negative for abdominal pain, diarrhea, nausea and vomiting.   Genitourinary: Negative for dysuria.   Musculoskeletal: Negative for myalgias.   Skin: Negative for rash.   Neurological: Positive for headaches.        Improving   All other systems reviewed and are negative.      Hemodynamics:  Temp (24hrs), Av.9 °C (98.5 °F), Min:36.8 °C (98.3  °F), Max:37.1 °C (98.7 °F)  Temperature: 36.9 °C (98.4 °F)  Pulse  Av.2  Min: 40  Max: 98  Blood Pressure: (!) 96/59 (reported to RN)       Physical Exam:  Physical Exam   Constitutional: He is oriented to person, place, and time. He appears well-developed. No distress.   HENT:   Head: Normocephalic.   Mouth/Throat: Oropharynx is clear and moist. No oropharyngeal exudate.   Left craniotomy surgical site well approximated and healing well.  There is no drainage or surrounding erythema     Eyes: Pupils are equal, round, and reactive to light. Conjunctivae and EOM are normal. No scleral icterus.   Neck: Neck supple.   Cardiovascular: Normal rate and regular rhythm.    No murmur heard.  Pulmonary/Chest: Effort normal. No respiratory distress. He has no wheezes. He has no rales.   Abdominal: Soft. He exhibits no distension. There is no tenderness. There is no rebound and no guarding.   Musculoskeletal: He exhibits edema.   Lymphadenopathy:     He has no cervical adenopathy.   Neurological: He is alert and oriented to person, place, and time. No cranial nerve deficit. Coordination normal.   Skin: Skin is warm. No rash noted. He is not diaphoretic. No erythema.   Nursing note and vitals reviewed.      Meds:    Current Facility-Administered Medications:   •  enoxaparin  •  MD Alert...Vancomycin per Pharmacy  •  vancomycin  •  QUEtiapine  •  oxyCODONE immediate-release  •  ferrous sulfate  •  metroNIDAZOLE  •  haloperidol  •  acetaminophen  •  polyethylene glycol/lytes  •  docusate sodium  •  senna-docusate  •  cloNIDine  •  magnesium hydroxide  •  senna-docusate  •  Respiratory Care per Protocol  •  cefepime  •  Pharmacy Consult Request  •  MD ALERT...DO NOT ADMINISTER NSAIDS or ASPIRIN unless ORDERED By Neurosurgery  •  ondansetron  •  scopolamine  •  labetalol  •  hydrALAZINE  •  bisacodyl  •  fleet    Labs:  Recent Labs      19   0301  19   0240  19   0250   WBC  9.8  9.4  7.8   RBC  5.78  5.51   5.33   HEMOGLOBIN  13.6*  12.8*  12.7*   HEMATOCRIT  44.3  43.5  41.8*   MCV  76.6*  78.9*  78.4*   MCH  23.5*  23.2*  23.8*   RDW  53.9*  56.6*  55.9*   PLATELETCT  244  225  212   MPV  10.2  10.5  10.5   NEUTSPOLYS  70.40  67.00  67.20   LYMPHOCYTES  16.70*  17.20*  17.00*   MONOCYTES  9.20  11.60  12.00   EOSINOPHILS  1.50  1.60  2.00   BASOPHILS  0.80  1.30  0.90   RBCMORPHOLO   --   Present   --      Recent Labs      06/05/19   0301  06/06/19   0240  06/07/19   0250   SODIUM  139  140  139   POTASSIUM  3.8  4.3  3.9   CHLORIDE  106  108  108   CO2  23  22  24   GLUCOSE  130*  84  110*   BUN  20  21  15     Recent Labs      06/05/19   0301  06/06/19   0240  06/07/19   0250   CREATININE  0.64  0.73  0.64       Imaging:  Ct-cta Head With & W/o-post Process  1.  No large vessel occlusion, high-grade stenosis or aneurysm of the [place]. 2.  A 4 cm mass lesion in the left frontotemporal region. Imaging features favor an abscess over a solid lesion. However, MRI is needed for definitive characterization. 3.  Extensive edema associated with the mass, resulting in 10 mm left-to-right midline shift. 4.  Effacement of the left and enlargement of the right lateral ventricles may be due to developing hydrocephalus. Findings were discussed with JOSE ALTAMIRANO on 5/21/2019 8:39 PM.    Ct-cta Neck With & W/o-post Processing  Result Date: 5/21/2019 5/21/2019 8:06 PM HISTORY/REASON FOR EXAM:  SAH suspected, initial exam TECHNIQUE/EXAM DESCRIPTION: CT angiogram of the neck with contrast. Postcontrast images were obtained of the neck from the great vessels through the skull base following the power injection of nonionic contrast at 5.0 mL/sec. Thin-section helical images were obtained with overlapping reconstruction interval. Coronal and oblique multiplanar volume reformats were generated. Cervical internal carotid artery percent stenosis is calculated using the standard method according to the NASCET criteria wherein a  segment of uniform caliber mid or distal cervical internal carotid is used as the reference denominator. 3D angiographic images were reviewed on PACS.  Maximum intensity projection (MIP) images were generated and reviewed 100 mL of Omnipaque 350 nonionic contrast was injected intravenously. Low dose optimization technique was utilized for this CT exam including automated exposure control and adjustment of the mA and/or kV according to patient size. COMPARISON:  None. FINDINGS: Left-sided aortic arch with patent great vessel origins. The right common carotid artery, cervical carotid bifurcation, and cervical internal carotid artery show no significant stenosis. There is no evidence of dissection or aneurysm. The left common carotid artery, cervical carotid bifurcation, and cervical internal carotid artery show no significant stenosis. There is no evidence of dissection or aneurysm. The cervical vertebral arteries are patent bilaterally. Prominent right paratracheal node measuring 1 cm short axis, statistically reactive. Otherwise, the neck soft tissues and lung apices in the field of view are unremarkable. 3D angiographic/MIP images of the vasculature confirm the vascular findings as described above.     Unremarkable CT angiogram of the neck. No high-grade stenosis, large vessel occlusion, aneurysm or dissection.    Mr-stealth Brain With & W/o  Result Date: 5/22/2019  COMPARISON:  CTA of the head 5/21/2019 FINDINGS: No fiducial markers are appreciated. The calvaria are unremarkable. There are no extra-axial fluid collections. The left temporal lobe, there is a thick-walled ring-enhancing mass which measures about 47 mm x 32 mm x 43 mm. Central hypoenhancement may represent tumor necrosis or cystic change. There is marked surrounding vasogenic edema extending into the basal ganglia, subinsular white matter, internal capsule, corona radiata, and left frontal deep white matter. There is effacement of the left lateral  ventricle and moderate dilatation of the right lateral ventricle including the temporal horn due to trapping. There is left-to-right shift of midline structures of about 11 mm (T2 axial image 54, series 3). There are no other enhancing lesions. The brainstem and posterior fossa structures are unremarkable for marked compression of the midbrain with flattening of the left cerebral peduncle and effacement of perimesencephalic cisterns. There is some vasogenic edema extending down into the left side of the midbrain as well as the left thalamus and subthalamic nucleus. The major vessels including the dural venous sinuses appear intact. Paranasal sinuses show moderate mucosal thickening with small air-fluid/debris levels in the maxillary antra. Moderate mucosal thickening in the sphenoid sinus and among the ethmoid air cells. Minimal mucosal thickening in the frontal air cells.     1.  Large peripherally enhancing intra-axial mass in the left temporal lobe with marked surrounding vasogenic edema. Findings are most consistent with high-grade primary brain tumor such as glioblastoma. No other enhancing lesions elsewhere to suggest metastasis, however, a solitary brain metastasis would not be excluded. Brain abscess would not be excluded. There are no diffusion weighted images to determine whether contents of this lesion show restricted diffusion. There are no prior MRI scans for comparison. 2.  Marked left-to-right shift of midline structures along with midbrain compression and displacement.      Micro:  Results     ** No results found for the last 168 hours. **          Assessment:  Active Hospital Problems    Diagnosis   • *Brain abscess [G06.0]   • Delirium [R41.0]   • Methamphetamine abuse (HCC) [F15.10]   • S/P craniotomy [Z98.890]       Plan:  Brain cyst mass  Etiology unclear  No fever  Leukocytosis resolved  MRI with 4.7 cm left temporal lobe mass with midline shift  S/p craniotomy 5/22/19.  Gray amorphous  gelatinous material noted intraoperatively most consistent with abscess per operative report  Gram stain neg; cxs neg final  CT c/a/p neg  Blood cxs neg  HIV neg  The pathology results is there is mild gliosis-  Will treat like a brain abscess in view of findings in the OR and pathology being nonspecific gliosis  Continue vancomycin, cefepime and Flagyl  Monitor renal function and Vanco trough  Vanco trough 18.6 today  Renal function is normal  Plan for 6 weeks of antibiotics through 7/6/2019  ESR 8 and CRP 0.31  PICC line  Repeat MRI of the brain    Methamphetamine abuse  Will needs monitored setting for IV abx   Not a candidate for PICC line and outpatient or home IV antibiotics given substance abuse    Disposition: Will need a monitored setting for IV antibiotic course

## 2019-06-07 NOTE — DISCHARGE PLANNING
Received Choice form at 1000.  Agency[facility]Facility Name: [facility] skilled referral sent.  Referral sent per Choice form at 1005.

## 2019-06-07 NOTE — DISCHARGE PLANNING
Agency/Facility Name: [facility]   Spoke To: Marcie  Outcome: Patient declined; no medicaid beds at this time.     Agency/Facility Name: [facility]  Spoke To: Celia  Outcome: Patient declined; currently not accepting medicaid.

## 2019-06-07 NOTE — DISCHARGE PLANNING
Anticipated Discharge Disposition:   TBD    Action:    Spoke with patient's mother, Virginia and she would like patient to go home with IV antibiotic infusion and home health preferably as patient has been asking why he has not gone home.  He thinks it would be better for patient to be at home in familiar surroundings.  She stated that she would be able to give the infusion (s) with teaching.  She, her grandson and his wife are available to provide 24/7 supervision.  She stated if home infusion and home health not possible then pursue SNF choices.  Ok to send blanket referrals to [facility] and [facility].    Virginia inquired about guardianship to take care of patient's bills like car payments.  She stated that she lives with patient and her grandson and his wife in rented home in [facility].  RN CM provided information on guardianship and where to get assistance.    Tiger text to Dr. Savage, Dr. Hines and Dr. Dorado.    Barriers to Discharge:    IV antibiotics  Meth abuse hx  Medicaid FFS    Plan:    F/U with medical team.

## 2019-06-07 NOTE — DISCHARGE PLANNING
Agency/Facility Name: [facility]   Spoke To: Cecelia   Outcome: Patient declined; care exceeds capacity.     Agency/Facility Name: [facility]  Spoke To: Chanel (liaison)   Outcome: Patient declined; due to behaviors.

## 2019-06-07 NOTE — PROGRESS NOTES
2140  Assumed care of pt. Pt A&Ox2, disoriented to time and situation. Pt denies numbness, tingling, and pain. Bed alarm on. Pt educated to call for assistance. Call light within reach. Bed locked and in lowest position. SCDs refused at this time, education provided.

## 2019-06-07 NOTE — PROGRESS NOTES
"Pharmacy Kinetics 54 y.o. male on vancomycin day # 3  (restart: had been on x 7 days ending )  2019    Currently on Vancomycin 1500 mg iv q8hr    Indication for Treatment: brain abscess      Pertinent history per medical record: Admitted on 2019 for confusion for the past week and a half.  He recently completed treatment with cefdinir for otitis media.  PMH of methamphetamine abuse.  ID and Neurosurgery are consulting. S/p craniotomy.  Pathology specimen collected in OR and sent to [place]. Per ID - will treat it like a brain abscess in view of findings in the OR and pathology being nonspecific gliosis; re-start vancomycin; aim for ~6 weeks of abx (end ).      Other antibiotics: cefepime 2g IV q8h, metronidazole 500mg PO q8h     Allergies: Pcn [penicillins] and Sulfa drugs      List concerns for renal function: BUN/SCr ratio > 20:1, BMI 31, long term vanco dosing     Pertinent cultures to date:   19 brain abscess: no organisms seen  19 blood-peripheral x 2:  negative     MRSA nares swab if pneumonia is a concern (ordered/positive/negative/n-a): n/a       Recent Labs      19   0301  19   0240  19   0250   WBC  9.8  9.4  7.8   NEUTSPOLYS  70.40  67.00  67.20     Recent Labs      19   0301  19   0240  19   0250   BUN  20  21  15   CREATININE  0.64  0.73  0.64     Recent Labs      19   0825   [facility]  18.6     Intake/Output Summary (Last 24 hours) at 19 1052  Last data filed at 19 1432   Gross per 24 hour   Intake              190 ml   Output                0 ml   Net              190 ml      BP (!) 96/59   Pulse 74   Temp 36.9 °C (98.4 °F) (Temporal)   Resp 18   Ht 1.803 m (5' 11\")   Wt 101 kg (222 lb 10.6 oz)   SpO2 95%  Temp (24hrs), Av.9 °C (98.5 °F), Min:36.8 °C (98.3 °F), Max:37.1 °C (98.7 °F)      A/P   1. Vancomycin dose change: No  2. Next vancomycin level: was this morning = 18.6 mcg/ml  3. Goal trough: 18-22 " mcg/ml  4. Comments: Vanco trough level is in the goal range. Renal function appears stable. Will continue current dosing for now and check another vanco trough in 3-4 days. Per ID note, plan for 6 weeks abx, ending 7/6. PICC line to be placed.     Broderick Orozco, PharmD

## 2019-06-07 NOTE — PROGRESS NOTES
Internal Medicine Interval Note    Note Author: Em Savage M.D.      Name Robert CALDERON 1965   Age 54 y.o. male   MRN [id number]   Code Status Full Code     After 5 PM or if no immediate response to page, please call for cross-coverage.    Attending: Dr. Landa  Team: [facility] See patient list for primary contact information.  Call [phone] to page.    1st Call - Day Intern, R1  Dr. Savage 2nd Call - Day Sr. Resident, R2-R3  Dr. Wood       Principal Problem  Brain mass; concerning for infection vs. malignancy      Interval History  No acute events overnight.  Continues to deny any complaints  His sutures were removed yesterday.  Home with home health for IV antibiotics not possible given his history of substance abuse and the need for PICC line for IV antibiotics.  For PEG placement and repeat brain MRI today.      ROS  Constitutional: Negative for fever and chills.  HEN:Negative for sinus pain and congestion.  Throat: Negative for soreness and dysphasia.  Eyes: Negative for diplopia and pain.  Respiratory: Negative for cough and shortness of breath.  Cardiovascular: Negative for chest pain palpitations.  Gastrointestinal: Negative for constipation, diarrhea, nausea and vomiting.  Genitourinary: Negative for dysuria and hematuria.  Neurological: Positive for expressive aphasia. Negative for headache, dizziness and sensory change.  Psychiatric: Negative for depression and anxiety.      Disposition, Discharge Barriers  Inpatient, pending insurance and placement to a skilled nursing facility.      Consultants  Neurosurgery  Infectious Disease       Quality-Core Measures  Richards Catheter: No  Central Line: No  VTE Prophylaxis: Lovenox  Antibiotics: Flagyl, cefepime and vancomycin  Rehab: PT, OT, SLP following       Physical Exam    Temp:  [36.8 °C (98.3 °F)-37.1 °C (98.7 °F)] 36.9 °C (98.4 °F)  Pulse:  [65-76] 74  Resp:  [16-18] 18  BP: ()/(59-72) 96/59  SpO2:  [93 %-95 %] 95 %    General: No  acute distress, cooperative.  Head: Normocephalic, incision from L frontotemporal craniotomy (staples in place)  healing well.    Eyes: Normal conjunctiva, sclerae anicteric.  Throat: Oropharynx clear, oral mucosa moist.  Neck: Supple, no lymphadenopathy.  Lungs: Normal work of breathing, clear to auscultation bilaterally.  Heart: Normal rate, regular rhythm, no murmurs.  Abdomen: Soft, bowel sounds present, non-tender, no peritoneal signs.  Extremities: Nontender, no cyanosis, no edema.  Neuro: Alert, oriented to person place and time, expressive aphasia, normal tone and sensation.   Skin: Warm, dry, intact.      Labs and Imaging  Pertinent labs, imaging and diagnostic tests and procedures associated with this visit have been reviewed, specific comments can be found in the assessment and plan section below.     Recent Labs      06/05/19   0301  06/06/19   0240  06/07/19   0250   SODIUM  139  140  139   POTASSIUM  3.8  4.3  3.9   CHLORIDE  106  108  108   CO2  23  22  24   BUN  20  21  15   CREATININE  0.64  0.73  0.64   GLUCOSE  130*  84  110*   CALCIUM  8.9  8.3*  8.8   MAGNESIUM  1.9  1.9   --      Recent Labs      06/05/19   0301  06/06/19   0240  06/07/19   0250   WBC  9.8  9.4  7.8   HEMOGLOBIN  13.6*  12.8*  12.7*   HEMATOCRIT  44.3  43.5  41.8*   PLATELETCT  244  225  212     No results for input(s): ALTSGPT, ASTSGOT, ALKPHOSPHAT, TBILIRUBIN, DBILIRUBIN, GAMMAGT, AMYLASE, LIPASE, ALBUMIN in the last 72 hours.    Assessment and Plan    Status post craniotomy   Assessment & Plan    No complications, sutures removed 6/6/2019.  Okay to resume anticoagulation per Dr. Young.     * Brain abscess   Assessment & Plan    CT head with contrast showed fluid collection most likely abscess over the left frontotemporal area with mass-effect, midline shift and evidence of developing hydrocephalus.  Neurosurgery consulted.  Status post craniotomy on 5/22/19, specimen sent to [facility] for pathology.  ID on board, initially  on vanc and flagyl, vanc discontinued 5/27/19, cefepime started instead, vanc restarted per ID on 6/5/19.   Bacterial, including AFB, and fungal cultures negative to date.   Mild reactive gliosis per final path report from [place].   Vanc trough 18.6 today.  ESR 8, CRP 0.31.  Will need a monitored setting for IV antibiotics, not a candidate for outpatient or home IV antibiotics via PICC given his history of substance abuse.     Plan:  Cefepime, metronidazole and vancomycin x 6 weeks - end date 7/6/19  PICC line to be placed today  Repeat MRI with and without contrast today     Agitation   Assessment & Plan    Intermittently agitated and aggressive towards the staff.  On Seroquel 25 mg nightly.    Plan:  Modified Seroquel dose to 25 mg BID for better management.   Haldol PRN for agitation and aggression not managed with Seroquel.     Acute respiratory failure with hypoxia (HCC)   Assessment & Plan    Resolved.  Intubated on admission for airway protection on 5/22/2019, extubated on 5/24/2019.  RT protocol, supplemental oxygen as needed.     Delirium   Assessment & Plan    Resolved.  On Seroquel 25 mg qhs and Haldol PRN for intermittent aggression and agitation.   CTM.     Methamphetamine abuse (HCC)   Assessment & Plan    History of meth abuse via smoking per the patient's nephew, no history of IV drug use.  Substance abuse counseling prior to discharge.     Expressive aphasia   Assessment & Plan    Improving.  Still with some difficulty identifying objects and recalling current events.  Likely secondary to a lesion in the frontotemporal lobe of the left dominant brain.     Microcytic anemia   Assessment & Plan    Stable.  Fe 17, Fe Sat 4% and TIBC 386, ferritin 16.2.  Stool negative for occult blood.  No records of EGD or colonoscopy per chart review.  Started on ferrous sulfate supplementation this admission.     Plan:  Continue ferrous sulfate daily.  Stool H. Pylori test pending.   Celiac panel and reticulocyte  panel pending.   Outpatient PCP follow-up, recommend GI cancer screening with EGD and colonoscopy.     Class 1 obesity due to excess calories without serious comorbidity with body mass index (BMI) of 33.0 to 33.9 in adult   Assessment & Plan    BMI 31.  Patient follow-up for weight loss management.  Concern for sleep apnea, recommend outpatient sleep study for further evaluation.     Smoker   Assessment & Plan    Smokes 3 cigarettes per day per his mother's report.  Smoking cessation counseling prior to discharge.

## 2019-06-07 NOTE — DISCHARGE PLANNING
Anticipated Discharge Disposition:   SNF    Action:    SNF choice forms faxed to [facility].    Barriers to Discharge:    IV antibiotics  Meth abuse hx  Medicaid FFS    Plan:    Wait for SNF determinations.

## 2019-06-07 NOTE — DISCHARGE PLANNING
Agency/Facility Name: [facility]  Spoke To: Loren  Outcome: Patient declined; due to behaviors.

## 2019-06-07 NOTE — PROGRESS NOTES
06/06/19    Surgery patient?: yes  Date of surgery: 05/22/19  Ambulated 50 ft on day of surgery? (N/A if today is not date of surgery): n/a  Number of times ambulated 50 feet or greater today: 5  Patient has been up to chair, edge of bed or HOB 90 degrees for all meals?: 0/3  Goal met? (goal is ambulating at least 50 feet 2 times on day shift, one time on night shift): yes  If patient did not meet mobility goal, why?:

## 2019-06-07 NOTE — PROCEDURES
Vascular Access Team     Date of Insertion: 6/7/19  Arm Circumference: 36.5  Internal length: 42  External Length: 0  Vein Occupancy %: 16  Reason for PICC: antibiotic therapy  Labs: WBC 7.8, , BUN 15, Cr 0.64, GFR >60, INR na    Consents confirmed, vessel patency confirmed with ultrasound. Risks and benefits of procedure explained to patient and education regarding central line associated bloodstream infections provided. Questions answered.     PICC placed in RUE per MD order with ultrasound guidance, initial arm circumference 36.5cm. 4 Fr, single lumen PICC placed in basilic vein after 1 attempt(s). 2 cc's of 1% lidocaine injected intradermally, 21 gauge microintroducer needle and modified Seldinger technique used. 42 cm catheter inserted with good blood return. Secured at 0 cm marker. Each lumen flushed without resistance with 10 mL 0.9% normal saline. PICC line secured with Biopatch and Tegaderm.     PICC placement is confirmed by nurse using 3CG technology. PICC line is appropriate for use at this time. Patient tolerated procedure well, without complications.  Patient condition relayed to unit RN or ordering physician via this post procedure note in the EMR.     Ultrasound images uploaded to PACS and viewable in the EMR - yes  Ultrasound imaged printed and placed in paper chart - no     BARD Power PICC ref # [id number], Lot # [id number]

## 2019-06-08 ENCOUNTER — APPOINTMENT (OUTPATIENT)
Dept: RADIOLOGY | Facility: MEDICAL CENTER | Age: 54
DRG: 025 | End: 2019-06-08
Attending: NURSE PRACTITIONER
Payer: MEDICAID

## 2019-06-08 LAB
ANION GAP SERPL CALC-SCNC: 6 MMOL/L (ref 0–11.9)
BASOPHILS # BLD AUTO: 1.2 % (ref 0–1.8)
BASOPHILS # BLD: 0.09 K/UL (ref 0–0.12)
BUN SERPL-MCNC: 15 MG/DL (ref 8–22)
CALCIUM SERPL-MCNC: 8.8 MG/DL (ref 8.5–10.5)
CHLORIDE SERPL-SCNC: 107 MMOL/L (ref 96–112)
CO2 SERPL-SCNC: 25 MMOL/L (ref 20–33)
CREAT SERPL-MCNC: 0.71 MG/DL (ref 0.5–1.4)
EOSINOPHIL # BLD AUTO: 0.16 K/UL (ref 0–0.51)
EOSINOPHIL NFR BLD: 2.1 % (ref 0–6.9)
ERYTHROCYTE [DISTWIDTH] IN BLOOD BY AUTOMATED COUNT: 55.9 FL (ref 35.9–50)
GLUCOSE SERPL-MCNC: 115 MG/DL (ref 65–99)
HCT VFR BLD AUTO: 39.6 % (ref 42–52)
HGB BLD-MCNC: 12.1 G/DL (ref 14–18)
IMM GRANULOCYTES # BLD AUTO: 0.07 K/UL (ref 0–0.11)
IMM GRANULOCYTES NFR BLD AUTO: 0.9 % (ref 0–0.9)
LYMPHOCYTES # BLD AUTO: 1.35 K/UL (ref 1–4.8)
LYMPHOCYTES NFR BLD: 17.9 % (ref 22–41)
MCH RBC QN AUTO: 23.8 PG (ref 27–33)
MCHC RBC AUTO-ENTMCNC: 30.6 G/DL (ref 33.7–35.3)
MCV RBC AUTO: 78 FL (ref 81.4–97.8)
MONOCYTES # BLD AUTO: 0.78 K/UL (ref 0–0.85)
MONOCYTES NFR BLD AUTO: 10.4 % (ref 0–13.4)
NEUTROPHILS # BLD AUTO: 5.08 K/UL (ref 1.82–7.42)
NEUTROPHILS NFR BLD: 67.5 % (ref 44–72)
NRBC # BLD AUTO: 0 K/UL
NRBC BLD-RTO: 0 /100 WBC
PLATELET # BLD AUTO: 212 K/UL (ref 164–446)
PMV BLD AUTO: 10 FL (ref 9–12.9)
POTASSIUM SERPL-SCNC: 3.7 MMOL/L (ref 3.6–5.5)
RBC # BLD AUTO: 5.08 M/UL (ref 4.7–6.1)
SODIUM SERPL-SCNC: 138 MMOL/L (ref 135–145)
WBC # BLD AUTO: 7.5 K/UL (ref 4.8–10.8)

## 2019-06-08 PROCEDURE — A9270 NON-COVERED ITEM OR SERVICE: HCPCS | Performed by: STUDENT IN AN ORGANIZED HEALTH CARE EDUCATION/TRAINING PROGRAM

## 2019-06-08 PROCEDURE — 700105 HCHG RX REV CODE 258: Performed by: INTERNAL MEDICINE

## 2019-06-08 PROCEDURE — A9270 NON-COVERED ITEM OR SERVICE: HCPCS | Performed by: INTERNAL MEDICINE

## 2019-06-08 PROCEDURE — 700102 HCHG RX REV CODE 250 W/ 637 OVERRIDE(OP): Performed by: INTERNAL MEDICINE

## 2019-06-08 PROCEDURE — 700117 HCHG RX CONTRAST REV CODE 255: Performed by: NURSE PRACTITIONER

## 2019-06-08 PROCEDURE — 700111 HCHG RX REV CODE 636 W/ 250 OVERRIDE (IP): Performed by: INTERNAL MEDICINE

## 2019-06-08 PROCEDURE — 99232 SBSQ HOSP IP/OBS MODERATE 35: CPT | Performed by: INTERNAL MEDICINE

## 2019-06-08 PROCEDURE — 99232 SBSQ HOSP IP/OBS MODERATE 35: CPT | Mod: GC | Performed by: INTERNAL MEDICINE

## 2019-06-08 PROCEDURE — 700102 HCHG RX REV CODE 250 W/ 637 OVERRIDE(OP): Performed by: STUDENT IN AN ORGANIZED HEALTH CARE EDUCATION/TRAINING PROGRAM

## 2019-06-08 PROCEDURE — 80048 BASIC METABOLIC PNL TOTAL CA: CPT

## 2019-06-08 PROCEDURE — 70553 MRI BRAIN STEM W/O & W/DYE: CPT

## 2019-06-08 PROCEDURE — A9585 GADOBUTROL INJECTION: HCPCS | Performed by: NURSE PRACTITIONER

## 2019-06-08 PROCEDURE — 85025 COMPLETE CBC W/AUTO DIFF WBC: CPT

## 2019-06-08 PROCEDURE — 700112 HCHG RX REV CODE 229: Performed by: INTERNAL MEDICINE

## 2019-06-08 PROCEDURE — 770001 HCHG ROOM/CARE - MED/SURG/GYN PRIV*

## 2019-06-08 RX ORDER — POTASSIUM CHLORIDE 20 MEQ/1
40 TABLET, EXTENDED RELEASE ORAL DAILY
Status: DISCONTINUED | OUTPATIENT
Start: 2019-06-08 | End: 2019-06-09

## 2019-06-08 RX ORDER — GADOBUTROL 604.72 MG/ML
10 INJECTION INTRAVENOUS ONCE
Status: COMPLETED | OUTPATIENT
Start: 2019-06-08 | End: 2019-06-08

## 2019-06-08 RX ORDER — LORAZEPAM 1 MG/1
1 TABLET ORAL ONCE
Status: COMPLETED | OUTPATIENT
Start: 2019-06-08 | End: 2019-06-08

## 2019-06-08 RX ADMIN — POTASSIUM CHLORIDE 40 MEQ: 1500 TABLET, EXTENDED RELEASE ORAL at 06:34

## 2019-06-08 RX ADMIN — POLYETHYLENE GLYCOL 3350 1 PACKET: 17 POWDER, FOR SOLUTION ORAL at 06:03

## 2019-06-08 RX ADMIN — DOCUSATE SODIUM 100 MG: 100 CAPSULE, LIQUID FILLED ORAL at 18:00

## 2019-06-08 RX ADMIN — GADOBUTROL 10 ML: 604.72 INJECTION INTRAVENOUS at 15:33

## 2019-06-08 RX ADMIN — METRONIDAZOLE 500 MG: 500 TABLET, FILM COATED ORAL at 06:05

## 2019-06-08 RX ADMIN — FERROUS SULFATE TAB 325 MG (65 MG ELEMENTAL FE) 325 MG: 325 (65 FE) TAB at 09:33

## 2019-06-08 RX ADMIN — QUETIAPINE FUMARATE 25 MG: 25 TABLET ORAL at 16:04

## 2019-06-08 RX ADMIN — SENNOSIDES,DOCUSATE SODIUM 1 TABLET: 8.6; 5 TABLET, FILM COATED ORAL at 20:00

## 2019-06-08 RX ADMIN — CEFEPIME 2 G: 2 INJECTION, POWDER, FOR SOLUTION INTRAVENOUS at 06:07

## 2019-06-08 RX ADMIN — QUETIAPINE FUMARATE 25 MG: 25 TABLET ORAL at 06:05

## 2019-06-08 RX ADMIN — CEFEPIME 2 G: 2 INJECTION, POWDER, FOR SOLUTION INTRAVENOUS at 23:27

## 2019-06-08 RX ADMIN — POLYETHYLENE GLYCOL 3350 1 PACKET: 17 POWDER, FOR SOLUTION ORAL at 16:04

## 2019-06-08 RX ADMIN — VANCOMYCIN HYDROCHLORIDE 1500 MG: 100 INJECTION, POWDER, LYOPHILIZED, FOR SOLUTION INTRAVENOUS at 12:43

## 2019-06-08 RX ADMIN — LORAZEPAM 1 MG: 1 TABLET ORAL at 12:43

## 2019-06-08 RX ADMIN — VANCOMYCIN HYDROCHLORIDE 1500 MG: 100 INJECTION, POWDER, LYOPHILIZED, FOR SOLUTION INTRAVENOUS at 03:38

## 2019-06-08 RX ADMIN — VANCOMYCIN HYDROCHLORIDE 1500 MG: 100 INJECTION, POWDER, LYOPHILIZED, FOR SOLUTION INTRAVENOUS at 20:00

## 2019-06-08 RX ADMIN — METRONIDAZOLE 500 MG: 500 TABLET, FILM COATED ORAL at 23:27

## 2019-06-08 RX ADMIN — METRONIDAZOLE 500 MG: 500 TABLET, FILM COATED ORAL at 14:43

## 2019-06-08 ASSESSMENT — ENCOUNTER SYMPTOMS
MYALGIAS: 0
COUGH: 0
VOMITING: 0
NAUSEA: 0
HEADACHES: 0
ABDOMINAL PAIN: 0
DIARRHEA: 0
SHORTNESS OF BREATH: 0
CHILLS: 0
FEVER: 0

## 2019-06-08 NOTE — CARE PLAN
Problem: Safety  Goal: Will remain free from injury  Outcome: PROGRESSING AS EXPECTED  Patient educated to call for assistance. Precautions in place; Call light within reach. Bed alarm in use.  Bed locked and in lowest position. Treaded socks in place. Hourly rounding. Will continue to monitor.    Problem: Knowledge Deficit  Goal: Knowledge of the prescribed therapeutic regimen will improve  Outcome: PROGRESSING AS EXPECTED  Will continue to reorient patient and educate about plan of care

## 2019-06-08 NOTE — PROGRESS NOTES
Infectious Disease Progress Note    Author: Roxana Dorado M.D. Date & Time of service: 6/8/2019  10:55 AM    Chief Complaint:  Brain mass       Interval History:  53 y.o. male  admitted 5/21/2019 for AMS due to above. +meth abuse.  Recently in the ED on May 12 and was diagnosed with acute suppurative otitis media  5/24 AF WBC 12.6 extubated this am-pain controlled-decreased strength RUE +delerium  5/25 AF improved strength RUE-intermittently combative and cooperative  5/26 AF WBC 9.7 drain removed-somnolent this am-per RN strength continues to improve  5/27 AF had HA earlier but now improved-no new neurologic deficits. Plan for C/A/P today  5/28 AF WBC 13.4 remains alert-strength improved. Denies SE abx. HA controlled  5/29 AF WBC 12.2 sitting up to chair-no new complaints  5/30 AF WBC 17 denies any worsening HA, visual changes, N/V/D, abd pain  5/31/2019-no fevers.  Complains of some headache.  No new issues overnight.  WBC is 14.5  6/1/2019 no fevers.  No new issues.  Pathology is still not back.  6/5- no fevers. The path has come back positive for gliosis.  Denies any new complaints.  6/6/2019 no fevers.  No new issues overnight.  Tolerating the antibiotics  6/7 afebrile WBC 7.8.  Patient denies any headaches or neck pain.  He is tolerating IV antibiotics without any issues.  No diarrhea.  Family requesting home IV antibiotics if feasible.  6/8 afebrile WBC 7.5 patient denies any pain.  He had a PICC line placed yesterday without any complications    Labs Reviewed    Review of Systems:  Review of Systems   Constitutional: Negative for chills and fever.   Respiratory: Negative for cough and shortness of breath.    Cardiovascular: Negative for chest pain.   Gastrointestinal: Negative for abdominal pain, diarrhea, nausea and vomiting.   Genitourinary: Negative for dysuria.   Musculoskeletal: Negative for myalgias.   Skin: Negative for rash.   Neurological: Negative for headaches.   All other systems reviewed  and are negative.      Hemodynamics:  Temp (24hrs), Av.8 °C (98.3 °F), Min:36.3 °C (97.3 °F), Max:37.4 °C (99.3 °F)  Temperature: 37.4 °C (99.3 °F)  Pulse  Av.3  Min: 40  Max: 98  Blood Pressure: 110/63       Physical Exam:  Physical Exam   Constitutional: He appears well-developed. No distress.   HENT:   Head: Normocephalic.   Mouth/Throat: Oropharynx is clear and moist. No oropharyngeal exudate.   Left craniotomy surgical site well approximated and healing well.  There is no drainage or surrounding erythema     Eyes: Pupils are equal, round, and reactive to light. Conjunctivae and EOM are normal. No scleral icterus.   Neck: Neck supple.   Cardiovascular: Normal rate and regular rhythm.    No murmur heard.  Pulmonary/Chest: Effort normal. No respiratory distress. He has no wheezes. He has no rales.   Abdominal: Soft. He exhibits no distension. There is no tenderness. There is no rebound and no guarding.   Musculoskeletal: He exhibits no edema.   Right upper extremity PICC line-nontender, no surrounding erythema   Lymphadenopathy:     He has no cervical adenopathy.   Neurological: He is alert. No cranial nerve deficit. Coordination normal.   Skin: Skin is warm. No rash noted. He is not diaphoretic. No erythema.   Psychiatric:   Odd affect   Nursing note and vitals reviewed.      Meds:    Current Facility-Administered Medications:   •  potassium chloride SA  •  cefepime  •  metroNIDAZOLE  •  MD Alert...Vancomycin per Pharmacy  •  enoxaparin  •  vancomycin  •  QUEtiapine  •  oxyCODONE immediate-release  •  ferrous sulfate  •  haloperidol  •  acetaminophen  •  polyethylene glycol/lytes  •  docusate sodium  •  senna-docusate  •  cloNIDine  •  magnesium hydroxide  •  senna-docusate  •  Respiratory Care per Protocol  •  Pharmacy Consult Request  •  MD ALERT...DO NOT ADMINISTER NSAIDS or ASPIRIN unless ORDERED By Neurosurgery  •  ondansetron  •  scopolamine  •  labetalol  •  hydrALAZINE  •  bisacodyl  •   fleet    Labs:  Recent Labs      06/06/19   0240  06/07/19   0250  06/08/19   0335   WBC  9.4  7.8  7.5   RBC  5.51  5.33  5.08   HEMOGLOBIN  12.8*  12.7*  12.1*   HEMATOCRIT  43.5  41.8*  39.6*   MCV  78.9*  78.4*  78.0*   MCH  23.2*  23.8*  23.8*   RDW  56.6*  55.9*  55.9*   PLATELETCT  225  212  212   MPV  10.5  10.5  10.0   NEUTSPOLYS  67.00  67.20  67.50   LYMPHOCYTES  17.20*  17.00*  17.90*   MONOCYTES  11.60  12.00  10.40   EOSINOPHILS  1.60  2.00  2.10   BASOPHILS  1.30  0.90  1.20   RBCMORPHOLO  Present   --    --      Recent Labs      06/06/19   0240  06/07/19   0250  06/08/19   0335   SODIUM  140  139  138   POTASSIUM  4.3  3.9  3.7   CHLORIDE  108  108  107   CO2  22  24  25   GLUCOSE  84  110*  115*   BUN  21  15  15     Recent Labs      06/06/19   0240  06/07/19   0250  06/08/19   0335   CREATININE  0.73  0.64  0.71       Imaging:  Ct-cta Head With & W/o-post Process  1.  No large vessel occlusion, high-grade stenosis or aneurysm of the [place]. 2.  A 4 cm mass lesion in the left frontotemporal region. Imaging features favor an abscess over a solid lesion. However, MRI is needed for definitive characterization. 3.  Extensive edema associated with the mass, resulting in 10 mm left-to-right midline shift. 4.  Effacement of the left and enlargement of the right lateral ventricles may be due to developing hydrocephalus. Findings were discussed with JOSE ALTAMIRANO on 5/21/2019 8:39 PM.    Ct-cta Neck With & W/o-post Processing  Result Date: 5/21/2019 5/21/2019 8:06 PM HISTORY/REASON FOR EXAM:  SAH suspected, initial exam TECHNIQUE/EXAM DESCRIPTION: CT angiogram of the neck with contrast. Postcontrast images were obtained of the neck from the great vessels through the skull base following the power injection of nonionic contrast at 5.0 mL/sec. Thin-section helical images were obtained with overlapping reconstruction interval. Coronal and oblique multiplanar volume reformats were generated.  Cervical internal carotid artery percent stenosis is calculated using the standard method according to the NASCET criteria wherein a segment of uniform caliber mid or distal cervical internal carotid is used as the reference denominator. 3D angiographic images were reviewed on PACS.  Maximum intensity projection (MIP) images were generated and reviewed 100 mL of Omnipaque 350 nonionic contrast was injected intravenously. Low dose optimization technique was utilized for this CT exam including automated exposure control and adjustment of the mA and/or kV according to patient size. COMPARISON:  None. FINDINGS: Left-sided aortic arch with patent great vessel origins. The right common carotid artery, cervical carotid bifurcation, and cervical internal carotid artery show no significant stenosis. There is no evidence of dissection or aneurysm. The left common carotid artery, cervical carotid bifurcation, and cervical internal carotid artery show no significant stenosis. There is no evidence of dissection or aneurysm. The cervical vertebral arteries are patent bilaterally. Prominent right paratracheal node measuring 1 cm short axis, statistically reactive. Otherwise, the neck soft tissues and lung apices in the field of view are unremarkable. 3D angiographic/MIP images of the vasculature confirm the vascular findings as described above.     Unremarkable CT angiogram of the neck. No high-grade stenosis, large vessel occlusion, aneurysm or dissection.    [place]-[facility] Brain With & W/o  Result Date: 5/22/2019  COMPARISON:  CTA of the head 5/21/2019 FINDINGS: No fiducial markers are appreciated. The calvaria are unremarkable. There are no extra-axial fluid collections. The left temporal lobe, there is a thick-walled ring-enhancing mass which measures about 47 mm x 32 mm x 43 mm. Central hypoenhancement may represent tumor necrosis or cystic change. There is marked surrounding vasogenic edema extending into the basal ganglia,  subinsular white matter, internal capsule, corona radiata, and left frontal deep white matter. There is effacement of the left lateral ventricle and moderate dilatation of the right lateral ventricle including the temporal horn due to trapping. There is left-to-right shift of midline structures of about 11 mm (T2 axial image 54, series 3). There are no other enhancing lesions. The brainstem and posterior fossa structures are unremarkable for marked compression of the midbrain with flattening of the left cerebral peduncle and effacement of perimesencephalic cisterns. There is some vasogenic edema extending down into the left side of the midbrain as well as the left thalamus and subthalamic nucleus. The major vessels including the dural venous sinuses appear intact. Paranasal sinuses show moderate mucosal thickening with small air-fluid/debris levels in the maxillary antra. Moderate mucosal thickening in the sphenoid sinus and among the ethmoid air cells. Minimal mucosal thickening in the frontal air cells.     1.  Large peripherally enhancing intra-axial mass in the left temporal lobe with marked surrounding vasogenic edema. Findings are most consistent with high-grade primary brain tumor such as glioblastoma. No other enhancing lesions elsewhere to suggest metastasis, however, a solitary brain metastasis would not be excluded. Brain abscess would not be excluded. There are no diffusion weighted images to determine whether contents of this lesion show restricted diffusion. There are no prior MRI scans for comparison. 2.  Marked left-to-right shift of midline structures along with midbrain compression and displacement.      Micro:  Results     ** No results found for the last 168 hours. **          Assessment:  Active Hospital Problems    Diagnosis   • *Brain abscess [G06.0]   • Delirium [R41.0]   • Methamphetamine abuse (HCC) [F15.10]   • S/P craniotomy [Z98.890]       Plan:  Brain cystic mass  Etiology unclear  No  fever  Leukocytosis resolved  MRI with 4.7 cm left temporal lobe mass with midline shift  S/p craniotomy 5/22/19.  Gray amorphous gelatinous material noted intraoperatively most consistent with abscess per operative report  Gram stain neg; cxs neg final  CT c/a/p neg  Blood cxs neg  HIV neg  The pathology results is there is mild gliosis  Will treat like a brain abscess in view of findings in the OR and pathology being nonspecific gliosis  Continue vancomycin, cefepime and Flagyl  Monitor renal function and Vanco trough  Vanco trough 18.6 on 6/7  Renal function is normal  Plan for 6 weeks of antibiotics through 7/6/2019  ESR 8 and CRP 0.31  Repeat MRI of the brain-pending    Methamphetamine abuse  Will needs monitored setting for IV abx   Not a candidate for PICC line and outpatient or home IV antibiotics given substance abuse    Disposition: Will need a monitored setting for IV antibiotic course given his history of methamphetamine abuse    I have performed a physical exam and reviewed and updated ROS and plan today 6/8/2019.  In review of yesterday's note 6/7/2019, there are no changes except as documented above.

## 2019-06-08 NOTE — PROGRESS NOTES
1950 Assumed care of pt. Pt A&Ox2, disoriented to time and situation. Pt denies numbness, tingling, and pain. Pt walks independently in room, pt educated to call for assistance when needed. Bed alarm on, call light within reach. Bed locked and in lowest position. Treaded socks in place. SCDs refused, education provided.

## 2019-06-08 NOTE — PROGRESS NOTES
"Pharmacy Kinetics 54 y.o. male on vancomycin day # 4 (restart: had been on x 7 days ending ) 2019    Currently on Vancomycin 1500 mg iv q8hr    Indication for Treatment: brain abscess      Pertinent history per medical record: Admitted on 2019 for confusion for the past week and a half.  He recently completed treatment with cefdinir for otitis media.  PMH of methamphetamine abuse.  ID and Neurosurgery are consulting. S/p craniotomy.  Pathology specimen collected in OR and sent to [facility]. Per ID - will treat it like a brain abscess in view of findings in the OR and pathology being nonspecific gliosis; re-start vancomycin; aim for ~6 weeks of abx (end ).      Other antibiotics: cefepime 2g IV q8h, metronidazole 500mg PO q8h     Allergies: Pcn [penicillins] and Sulfa drugs      List concerns for renal function: BUN/SCr ratio > 20:1, BMI 31, long term vanco dosing     Pertinent cultures to date:   19 brain abscess: no organisms seen  19 blood-peripheral x 2:  negative     MRSA nares swab if pneumonia is a concern (ordered/positive/negative/n-a): n/a       Recent Labs      19   0240  19   0250  19   0335   WBC  9.4  7.8  7.5   NEUTSPOLYS  67.00  67.20  67.50     Recent Labs      19   0240  19   0250  19   0335   BUN  21  15  15   CREATININE  0.73  0.64  0.71     Recent Labs      19   0825   [facility]  18.6     Intake/Output Summary (Last 24 hours) at 19 1253  Last data filed at 19 0900   Gross per 24 hour   Intake              240 ml   Output                0 ml   Net              240 ml      /63   Pulse 64   Temp 37.4 °C (99.3 °F) (Temporal)   Resp 18   Ht 1.803 m (5' 11\")   Wt 101 kg (222 lb 10.6 oz)   SpO2 97%  Temp (24hrs), Av.8 °C (98.3 °F), Min:36.3 °C (97.3 °F), Max:37.4 °C (99.3 °F)      A/P   1. Vancomycin dose change: No  2. Next vancomycin level: 2-3 days  3. Goal trough: 18-22 mcg/ml  4. Comments: Vanco trough " level yesterday was in the goal range. Renal function appears stable. Pt is at risk for vanco accumulation with BMI of 31 and long term vanco dosing so will check a vanco level more frequently than normal. Will continue current vanco dosing. Per ID, abx through 7/6    Broderick Orozco, PharmD

## 2019-06-08 NOTE — PROGRESS NOTES
Attempted to call patients mother, Alexa Moreno, to complete MRI checklist. Voicemail box full. Will attempt at later time.

## 2019-06-08 NOTE — CARE PLAN
Problem: Communication  Goal: The ability to communicate needs accurately and effectively will improve  Outcome: PROGRESSING AS EXPECTED      Problem: Safety  Goal: Will remain free from injury  Outcome: PROGRESSING AS EXPECTED      Problem: Psychosocial Needs:  Goal: Level of anxiety will decrease  Outcome: PROGRESSING AS EXPECTED

## 2019-06-08 NOTE — PROGRESS NOTES
06/07/19    Surgery patient?: yes   Date of surgery: 05/22/19  Ambulated 50 ft on day of surgery? (N/A if today is not date of surgery): n/a  Number of times ambulated 50 feet or greater today: 6  Patient has been up to chair, edge of bed or HOB 90 degrees for all meals?: 3/3  Goal met? (goal is ambulating at least 50 feet 2 times on day shift, one time on night shift): yes  If patient did not meet mobility goal, why?:

## 2019-06-08 NOTE — PROGRESS NOTES
Internal Medicine Interval Note    Note Author: Ranjana Wood M.D.      Name Robert CALDERON 1965   Age 54 y.o. male   MRN [id number]   Code Status Full Code     After 5 PM or if no immediate response to page, please call for cross-coverage.    Attending: Dr. Landa  Team: Bryce See patient list for primary contact information.  Call [phone] to page.    1st Call - Day Intern, R1  Dr. Savage 2nd Call - Day Sr. Resident, R2-R3  Dr. Wood       Principal Problem  Brain mass; concerning for infection vs. malignancy      Interval History  -Sleeping this morning. Per nursing staff no concerns or acute events overnight.   -Need consent for the MRI brain. Nursing informed will be calling to get consent.       ROS  Constitutional: Negative for fever and chills.  HEN:Negative for sinus pain and congestion.  Throat: Negative for soreness and dysphasia.  Eyes: Negative for diplopia and pain.  Respiratory: Negative for cough and shortness of breath.  Cardiovascular: Negative for chest pain palpitations.  Gastrointestinal: Negative for constipation, diarrhea, nausea and vomiting.  Genitourinary: Negative for dysuria and hematuria.  Neurological: Positive for expressive aphasia. Negative for headache, dizziness and sensory change.  Psychiatric: Negative for depression and anxiety.      Disposition, Discharge Barriers  Inpatient, pending insurance and placement to a skilled nursing facility.      Consultants  Neurosurgery  Infectious Disease       Quality-Core Measures  Richards Catheter: No  Central Line: No  VTE Prophylaxis: Lovenox  Antibiotics: Flagyl, cefepime and vancomycin  Rehab: PT, OT, SLP following       Physical Exam    Temp:  [36.3 °C (97.3 °F)-37.4 °C (99.3 °F)] 37.4 °C (99.3 °F)  Pulse:  [59-83] 64  Resp:  [16-18] 18  BP: (101-122)/(63-81) 110/63  SpO2:  [93 %-97 %] 97 %    General: No acute distress, cooperative.  Head: Normocephalic, incision from L frontotemporal craniotomy (staples in place)   healing well.    Eyes: Normal conjunctiva, sclerae anicteric.  Throat: Oropharynx clear, oral mucosa moist.  Neck: Supple, no lymphadenopathy.  Lungs: Normal work of breathing, clear to auscultation bilaterally.  Heart: Normal rate, regular rhythm, no murmurs.  Abdomen: Soft, bowel sounds present, non-tender, no peritoneal signs.  Extremities: Nontender, no cyanosis, no edema.  Neuro: Alert, oriented to person place and time, expressive aphasia, normal tone and sensation.   Skin: Warm, dry, intact.      Labs and Imaging  Pertinent labs, imaging and diagnostic tests and procedures associated with this visit have been reviewed, specific comments can be found in the assessment and plan section below.     Recent Labs      06/06/19   0240  06/07/19   0250  06/08/19   0335   SODIUM  140  139  138   POTASSIUM  4.3  3.9  3.7   CHLORIDE  108  108  107   CO2  22  24  25   BUN  21  15  15   CREATININE  0.73  0.64  0.71   GLUCOSE  84  110*  115*   CALCIUM  8.3*  8.8  8.8   MAGNESIUM  1.9   --    --      Recent Labs      06/06/19   0240  06/07/19   0250  06/08/19   0335   WBC  9.4  7.8  7.5   HEMOGLOBIN  12.8*  12.7*  12.1*   HEMATOCRIT  43.5  41.8*  39.6*   PLATELETCT  225  212  212     No results for input(s): ALTSGPT, ASTSGOT, ALKPHOSPHAT, TBILIRUBIN, DBILIRUBIN, GAMMAGT, AMYLASE, LIPASE, ALBUMIN in the last 72 hours.    Assessment and Plan    Status post craniotomy   Assessment & Plan    No complications, sutures removed 6/6/2019.  Okay to resume anticoagulation per Dr. Young.     * Brain abscess   Assessment & Plan    CT head with contrast showed fluid collection most likely abscess over the left frontotemporal area with mass-effect, midline shift and evidence of developing hydrocephalus.  Neurosurgery consulted.  Status post craniotomy on 5/22/19, specimen sent to [place] for pathology.  ID on board, initially on vanc and flagyl, vanc discontinued 5/27/19, cefepime started instead, vanc restarted per ID on 6/5/19.    Bacterial, including AFB, and fungal cultures negative to date.   Mild reactive gliosis per final path report from [place].   Vanc trough 18.6 today.  ESR 8, CRP 0.31.  Will need a monitored setting for IV antibiotics, not a candidate for outpatient or home IV antibiotics via PICC given his history of substance abuse.     Plan:  Cefepime, metronidazole and vancomycin x 6 weeks - end date 7/6/19  MRI of the brain with and without contrast.       Agitation   Assessment & Plan    Intermittently agitated and aggressive towards the staff.  On Seroquel 25 mg nightly.    Plan:  Modified Seroquel dose to 25 mg BID for better management.   Haldol PRN for agitation and aggression not managed with Seroquel.     Acute respiratory failure with hypoxia (HCC)   Assessment & Plan    Resolved.  Intubated on admission for airway protection on 5/22/2019, extubated on 5/24/2019.  RT protocol, supplemental oxygen as needed.     Delirium   Assessment & Plan    Resolved.  On Seroquel 25 mg qhs and Haldol PRN for intermittent aggression and agitation.   CTM.     Methamphetamine abuse (HCC)   Assessment & Plan    History of meth abuse via smoking per the patient's nephew, no history of IV drug use.  Substance abuse counseling prior to discharge.     Expressive aphasia   Assessment & Plan    Improving.  Still with some difficulty identifying objects and recalling current events.  Likely secondary to a lesion in the frontotemporal lobe of the left dominant brain.     Microcytic anemia   Assessment & Plan    Stable.  Fe 17, Fe Sat 4% and TIBC 386, ferritin 16.2.  Stool negative for occult blood.  No records of EGD or colonoscopy per chart review.  Started on ferrous sulfate supplementation this admission.     Plan:  Continue ferrous sulfate daily.  Stool H. Pylori test pending.   Celiac panel negative.   Outpatient PCP follow-up, recommend GI cancer screening with EGD and colonoscopy.     Class 1 obesity due to excess calories without  serious comorbidity with body mass index (BMI) of 33.0 to 33.9 in adult   Assessment & Plan    BMI 31.  Patient follow-up for weight loss management.  Concern for sleep apnea, recommend outpatient sleep study for further evaluation.     Smoker   Assessment & Plan    Smokes 3 cigarettes per day per his mother's report.  Smoking cessation counseling prior to discharge.

## 2019-06-09 LAB — H PYLORI AG STL QL IA: NOT DETECTED

## 2019-06-09 PROCEDURE — A9270 NON-COVERED ITEM OR SERVICE: HCPCS | Performed by: INTERNAL MEDICINE

## 2019-06-09 PROCEDURE — 700102 HCHG RX REV CODE 250 W/ 637 OVERRIDE(OP): Performed by: INTERNAL MEDICINE

## 2019-06-09 PROCEDURE — 700102 HCHG RX REV CODE 250 W/ 637 OVERRIDE(OP): Performed by: STUDENT IN AN ORGANIZED HEALTH CARE EDUCATION/TRAINING PROGRAM

## 2019-06-09 PROCEDURE — 700105 HCHG RX REV CODE 258: Performed by: INTERNAL MEDICINE

## 2019-06-09 PROCEDURE — 700105 HCHG RX REV CODE 258

## 2019-06-09 PROCEDURE — 99232 SBSQ HOSP IP/OBS MODERATE 35: CPT | Performed by: INTERNAL MEDICINE

## 2019-06-09 PROCEDURE — 770001 HCHG ROOM/CARE - MED/SURG/GYN PRIV*

## 2019-06-09 PROCEDURE — 700111 HCHG RX REV CODE 636 W/ 250 OVERRIDE (IP): Performed by: INTERNAL MEDICINE

## 2019-06-09 PROCEDURE — 87338 HPYLORI STOOL AG IA: CPT

## 2019-06-09 PROCEDURE — A9270 NON-COVERED ITEM OR SERVICE: HCPCS | Performed by: STUDENT IN AN ORGANIZED HEALTH CARE EDUCATION/TRAINING PROGRAM

## 2019-06-09 PROCEDURE — 99232 SBSQ HOSP IP/OBS MODERATE 35: CPT | Mod: GC | Performed by: INTERNAL MEDICINE

## 2019-06-09 PROCEDURE — 700112 HCHG RX REV CODE 229: Performed by: INTERNAL MEDICINE

## 2019-06-09 PROCEDURE — 700111 HCHG RX REV CODE 636 W/ 250 OVERRIDE (IP): Performed by: STUDENT IN AN ORGANIZED HEALTH CARE EDUCATION/TRAINING PROGRAM

## 2019-06-09 RX ORDER — SODIUM CHLORIDE 9 MG/ML
INJECTION, SOLUTION INTRAVENOUS
Status: COMPLETED
Start: 2019-06-09 | End: 2019-06-09

## 2019-06-09 RX ORDER — POTASSIUM CHLORIDE 20 MEQ/1
40 TABLET, EXTENDED RELEASE ORAL 2 TIMES DAILY
Status: DISCONTINUED | OUTPATIENT
Start: 2019-06-09 | End: 2019-06-15

## 2019-06-09 RX ADMIN — POLYETHYLENE GLYCOL 3350 1 PACKET: 17 POWDER, FOR SOLUTION ORAL at 16:22

## 2019-06-09 RX ADMIN — POTASSIUM CHLORIDE 40 MEQ: 1500 TABLET, EXTENDED RELEASE ORAL at 06:23

## 2019-06-09 RX ADMIN — POLYETHYLENE GLYCOL 3350 1 PACKET: 17 POWDER, FOR SOLUTION ORAL at 06:22

## 2019-06-09 RX ADMIN — VANCOMYCIN HYDROCHLORIDE 1500 MG: 100 INJECTION, POWDER, LYOPHILIZED, FOR SOLUTION INTRAVENOUS at 03:15

## 2019-06-09 RX ADMIN — SENNOSIDES,DOCUSATE SODIUM 1 TABLET: 8.6; 5 TABLET, FILM COATED ORAL at 16:22

## 2019-06-09 RX ADMIN — METRONIDAZOLE 500 MG: 500 TABLET, FILM COATED ORAL at 16:20

## 2019-06-09 RX ADMIN — SODIUM CHLORIDE: 9 INJECTION, SOLUTION INTRAVENOUS at 21:00

## 2019-06-09 RX ADMIN — VANCOMYCIN HYDROCHLORIDE 1500 MG: 100 INJECTION, POWDER, LYOPHILIZED, FOR SOLUTION INTRAVENOUS at 10:42

## 2019-06-09 RX ADMIN — METRONIDAZOLE 500 MG: 500 TABLET, FILM COATED ORAL at 21:03

## 2019-06-09 RX ADMIN — ENOXAPARIN SODIUM 40 MG: 100 INJECTION SUBCUTANEOUS at 06:22

## 2019-06-09 RX ADMIN — DOCUSATE SODIUM 100 MG: 100 CAPSULE, LIQUID FILLED ORAL at 16:20

## 2019-06-09 RX ADMIN — POTASSIUM CHLORIDE 40 MEQ: 1500 TABLET, EXTENDED RELEASE ORAL at 16:20

## 2019-06-09 RX ADMIN — FERROUS SULFATE TAB 325 MG (65 MG ELEMENTAL FE) 325 MG: 325 (65 FE) TAB at 08:59

## 2019-06-09 RX ADMIN — DOCUSATE SODIUM 100 MG: 100 CAPSULE, LIQUID FILLED ORAL at 06:23

## 2019-06-09 RX ADMIN — CEFEPIME 2 G: 2 INJECTION, POWDER, FOR SOLUTION INTRAVENOUS at 15:30

## 2019-06-09 RX ADMIN — SENNOSIDES,DOCUSATE SODIUM 1 TABLET: 8.6; 5 TABLET, FILM COATED ORAL at 21:03

## 2019-06-09 RX ADMIN — SODIUM CHLORIDE: 9 INJECTION, SOLUTION INTRAVENOUS at 16:30

## 2019-06-09 RX ADMIN — QUETIAPINE FUMARATE 25 MG: 25 TABLET ORAL at 06:23

## 2019-06-09 RX ADMIN — METRONIDAZOLE 500 MG: 500 TABLET, FILM COATED ORAL at 06:23

## 2019-06-09 RX ADMIN — CEFEPIME 2 G: 2 INJECTION, POWDER, FOR SOLUTION INTRAVENOUS at 23:30

## 2019-06-09 RX ADMIN — OXYCODONE HYDROCHLORIDE 5 MG: 5 TABLET ORAL at 06:22

## 2019-06-09 RX ADMIN — CEFEPIME 2 G: 2 INJECTION, POWDER, FOR SOLUTION INTRAVENOUS at 06:22

## 2019-06-09 RX ADMIN — VANCOMYCIN HYDROCHLORIDE 1500 MG: 100 INJECTION, POWDER, LYOPHILIZED, FOR SOLUTION INTRAVENOUS at 21:02

## 2019-06-09 RX ADMIN — QUETIAPINE FUMARATE 25 MG: 25 TABLET ORAL at 16:20

## 2019-06-09 ASSESSMENT — ENCOUNTER SYMPTOMS
VOMITING: 0
DIARRHEA: 0
SHORTNESS OF BREATH: 0
FEVER: 0
MYALGIAS: 0
ABDOMINAL PAIN: 0
HEADACHES: 0
COUGH: 0
CHILLS: 0
NAUSEA: 0

## 2019-06-09 NOTE — PROGRESS NOTES
Internal Medicine Interval Note    Note Author: Ranjana Wood M.D.      Name Robert CALDERON 1965   Age 54 y.o. male   MRN [id number]   Code Status Full Code     After 5 PM or if no immediate response to page, please call for cross-coverage.    Attending: Dr. Landa  Team: Blue See patient list for primary contact information.  Call [phone] to page.    1st Call - Day Intern, R1  Dr. Savage 2nd Call - Day Sr. Resident, R2-R3  Dr. Wood       Principal Problem  Brain mass; concerning for infection vs. malignancy      Interval History  -Got MRI done yesterday around 2PM, results pending. Per nursing staff was restless this morning, continues to suffer from expressive aphasia.No focal neurological deficits.   -Mother has agreed on patient going to SNF.      ROS  Constitutional: Negative for fever and chills.  HEN:Negative for sinus pain and congestion.  Throat: Negative for soreness and dysphasia.  Eyes: Negative for diplopia and pain.  Respiratory: Negative for cough and shortness of breath.  Cardiovascular: Negative for chest pain palpitations.  Gastrointestinal: Negative for constipation, diarrhea, nausea and vomiting.  Genitourinary: Negative for dysuria and hematuria.  Neurological: Positive for expressive aphasia. Negative for headache, dizziness and sensory change.  Psychiatric: Negative for depression and anxiety.      Disposition, Discharge Barriers  Inpatient, pending insurance and placement to a skilled nursing facility.      Consultants  Neurosurgery  Infectious Disease       Quality-Core Measures  Richards Catheter: No  Central Line: No  VTE Prophylaxis: Lovenox  Antibiotics: Flagyl, cefepime and vancomycin  Rehab: PT, OT, SLP following       Physical Exam    Temp:  [36.3 °C (97.3 °F)-37.1 °C (98.7 °F)] 36.3 °C (97.3 °F)  Pulse:  [63-82] 63  Resp:  [17-18] 18  BP: (111-150)/(70-82) 150/82  SpO2:  [95 %-96 %] 96 %    General: No acute distress, cooperative.  Head: Normocephalic, incision  from L frontotemporal craniotomy (staples in place)  healing well.    Eyes: Normal conjunctiva, sclerae anicteric.  Throat: Oropharynx clear, oral mucosa moist.  Neck: Supple, no lymphadenopathy.  Lungs: Normal work of breathing, clear to auscultation bilaterally.  Heart: Normal rate, regular rhythm, no murmurs.  Abdomen: Soft, bowel sounds present, non-tender, no peritoneal signs.  Extremities: Nontender, no cyanosis, no edema.  Neuro: Alert, oriented to person place and time, expressive aphasia, normal tone and sensation.   Skin: Warm, dry, intact.      Labs and Imaging  Pertinent labs, imaging and diagnostic tests and procedures associated with this visit have been reviewed, specific comments can be found in the assessment and plan section below.     Recent Labs      06/07/19   0250  06/08/19   0335   SODIUM  139  138   POTASSIUM  3.9  3.7   CHLORIDE  108  107   CO2  24  25   BUN  15  15   CREATININE  0.64  0.71   GLUCOSE  110*  115*   CALCIUM  8.8  8.8     Recent Labs      06/07/19   0250  06/08/19   0335   WBC  7.8  7.5   HEMOGLOBIN  12.7*  12.1*   HEMATOCRIT  41.8*  39.6*   PLATELETCT  212  212     No results for input(s): ALTSGPT, ASTSGOT, ALKPHOSPHAT, TBILIRUBIN, DBILIRUBIN, GAMMAGT, AMYLASE, LIPASE, ALBUMIN in the last 72 hours.    Assessment and Plan    Status post craniotomy   Assessment & Plan    No complications, sutures removed 6/6/2019.  Okay to resume anticoagulation per Dr. Young.     * Brain abscess   Assessment & Plan    CT head with contrast showed fluid collection most likely abscess over the left frontotemporal area with mass-effect, midline shift and evidence of developing hydrocephalus.  Neurosurgery consulted.  Status post craniotomy on 5/22/19, specimen sent to [place] for pathology.  ID on board, initially on vanc and flagyl, vanc discontinued 5/27/19, cefepime started instead, vanc restarted per ID on 6/5/19.   Bacterial, including AFB, and fungal cultures negative to date.   Mild  reactive gliosis per final path report from [place].   Vanc trough 18.6 today.  ESR 8, CRP 0.31.  Will need a monitored setting for IV antibiotics, not a candidate for outpatient or home IV antibiotics via PICC given his history of substance abuse.     Plan:  Cefepime, metronidazole and vancomycin x 6 weeks - end date 7/6/19  MRI of the brain with and without contrast.       Agitation   Assessment & Plan    Intermittently agitated and aggressive towards the staff.  On Seroquel 25 mg nightly.    Plan:  Modified Seroquel dose to 25 mg BID for better management.   Haldol PRN for agitation and aggression not managed with Seroquel.     Acute respiratory failure with hypoxia (HCC)   Assessment & Plan    Resolved.  Intubated on admission for airway protection on 5/22/2019, extubated on 5/24/2019.  RT protocol, supplemental oxygen as needed.     Delirium   Assessment & Plan    Resolved.  On Seroquel 25 mg qhs and Haldol PRN for intermittent aggression and agitation.   CTM.     Methamphetamine abuse (HCC)   Assessment & Plan    History of meth abuse via smoking per the patient's nephew, no history of IV drug use.  Substance abuse counseling prior to discharge.     Expressive aphasia   Assessment & Plan    Improving.  Still with some difficulty identifying objects and recalling current events.  Likely secondary to a lesion in the frontotemporal lobe of the left dominant brain.     Microcytic anemia   Assessment & Plan    Stable.  Fe 17, Fe Sat 4% and TIBC 386, ferritin 16.2.  Stool negative for occult blood.  No records of EGD or colonoscopy per chart review.  Started on ferrous sulfate supplementation this admission.     Plan:  Continue ferrous sulfate daily.  Stool H. Pylori test pending.   Celiac panel negative.   Outpatient PCP follow-up, recommend GI cancer screening with EGD and colonoscopy.     Class 1 obesity due to excess calories without serious comorbidity with body mass index (BMI) of 33.0 to 33.9 in adult    Assessment & Plan    BMI 31.  Patient follow-up for weight loss management.  Concern for sleep apnea, recommend outpatient sleep study for further evaluation.     Smoker   Assessment & Plan    Smokes 3 cigarettes per day per his mother's report.  Smoking cessation counseling prior to discharge.

## 2019-06-09 NOTE — PROGRESS NOTES
Infectious Disease Progress Note    Author: Roxana Dorado M.D. Date & Time of service: 6/9/2019  10:21 AM    Chief Complaint:  Brain mass       Interval History:  53 y.o. male  admitted 5/21/2019 for AMS due to above. +meth abuse.  Recently in the ED on May 12 and was diagnosed with acute suppurative otitis media  5/24 AF WBC 12.6 extubated this am-pain controlled-decreased strength RUE +delerium  5/25 AF improved strength RUE-intermittently combative and cooperative  5/26 AF WBC 9.7 drain removed-somnolent this am-per RN strength continues to improve  5/27 AF had HA earlier but now improved-no new neurologic deficits. Plan for C/A/P today  5/28 AF WBC 13.4 remains alert-strength improved. Denies SE abx. HA controlled  5/29 AF WBC 12.2 sitting up to chair-no new complaints  5/30 AF WBC 17 denies any worsening HA, visual changes, N/V/D, abd pain  5/31/2019-no fevers.  Complains of some headache.  No new issues overnight.  WBC is 14.5  6/1/2019 no fevers.  No new issues.  Pathology is still not back.  6/5- no fevers. The path has come back positive for gliosis.  Denies any new complaints.  6/6/2019 no fevers.  No new issues overnight.  Tolerating the antibiotics  6/7 afebrile WBC 7.8.  Patient denies any headaches or neck pain.  He is tolerating IV antibiotics without any issues.  No diarrhea.  Family requesting home IV antibiotics if feasible.  6/8 afebrile WBC 7.5 patient denies any pain.  He had a PICC line placed yesterday without any complications  6/9 afebrile patient continues to be a poor historian and is forgetful.  He denies any pain.  Father and stepmother at bedside and agree with SNF placement.  Prior to this infection, father notes that he was highly functioning.  Point of care discussed with family    Labs Reviewed    Review of Systems:  Review of Systems   Constitutional: Negative for chills and fever.   Respiratory: Negative for cough and shortness of breath.    Cardiovascular: Negative for chest  pain.   Gastrointestinal: Negative for abdominal pain, diarrhea, nausea and vomiting.   Genitourinary: Negative for dysuria.   Musculoskeletal: Negative for myalgias.   Skin: Negative for rash.   Neurological: Negative for headaches.   All other systems reviewed and are negative.      Hemodynamics:  Temp (24hrs), Av.7 °C (98 °F), Min:36.3 °C (97.3 °F), Max:37.1 °C (98.7 °F)  Temperature: 36.3 °C (97.3 °F)  Pulse  Av.5  Min: 40  Max: 98  Blood Pressure: 150/82       Physical Exam:  Physical Exam   Constitutional: He appears well-developed. No distress.   HENT:   Head: Normocephalic.   Mouth/Throat: Oropharynx is clear and moist. No oropharyngeal exudate.   Left craniotomy surgical site well approximated and healing well.  There is no drainage or surrounding erythema     Eyes: Pupils are equal, round, and reactive to light. Conjunctivae and EOM are normal. No scleral icterus.   Neck: Neck supple.   Cardiovascular: Normal rate and regular rhythm.    No murmur heard.  Pulmonary/Chest: Effort normal. No respiratory distress. He has no wheezes. He has no rales.   Abdominal: Soft. He exhibits no distension. There is no tenderness. There is no rebound and no guarding.   Musculoskeletal: He exhibits no edema.   Right upper extremity PICC line-nontender, no surrounding erythema   Lymphadenopathy:     He has no cervical adenopathy.   Neurological: He is alert. No cranial nerve deficit. Coordination normal.   Skin: Skin is warm. No rash noted. He is not diaphoretic. No erythema.   Psychiatric:   Odd and flat affect   Nursing note and vitals reviewed.      Meds:    Current Facility-Administered Medications:   •  potassium chloride SA  •  cefepime  •  metroNIDAZOLE  •  MD Alert...Vancomycin per Pharmacy  •  enoxaparin  •  vancomycin  •  QUEtiapine  •  oxyCODONE immediate-release  •  ferrous sulfate  •  haloperidol  •  acetaminophen  •  polyethylene glycol/lytes  •  docusate sodium  •  senna-docusate  •  cloNIDine  •   magnesium hydroxide  •  senna-docusate  •  Respiratory Care per Protocol  •  Pharmacy Consult Request  •  MD ALERT...DO NOT ADMINISTER NSAIDS or ASPIRIN unless ORDERED By Neurosurgery  •  ondansetron  •  scopolamine  •  labetalol  •  hydrALAZINE  •  bisacodyl  •  fleet    Labs:  Recent Labs      06/07/19 0250 06/08/19   0335   WBC  7.8  7.5   RBC  5.33  5.08   HEMOGLOBIN  12.7*  12.1*   HEMATOCRIT  41.8*  39.6*   MCV  78.4*  78.0*   MCH  23.8*  23.8*   RDW  55.9*  55.9*   PLATELETCT  212  212   MPV  10.5  10.0   NEUTSPOLYS  67.20  67.50   LYMPHOCYTES  17.00*  17.90*   MONOCYTES  12.00  10.40   EOSINOPHILS  2.00  2.10   BASOPHILS  0.90  1.20     Recent Labs      06/07/19 0250 06/08/19   0335   SODIUM  139  138   POTASSIUM  3.9  3.7   CHLORIDE  108  107   CO2  24  25   GLUCOSE  110*  115*   BUN  15  15     Recent Labs      06/07/19 0250 06/08/19   0335   CREATININE  0.64  0.71       Imaging:  Ct-cta Head With & W/o-post Process  1.  No large vessel occlusion, high-grade stenosis or aneurysm of the [place]. 2.  A 4 cm mass lesion in the left frontotemporal region. Imaging features favor an abscess over a solid lesion. However, MRI is needed for definitive characterization. 3.  Extensive edema associated with the mass, resulting in 10 mm left-to-right midline shift. 4.  Effacement of the left and enlargement of the right lateral ventricles may be due to developing hydrocephalus. Findings were discussed with JOSE ALTAMIRANO on 5/21/2019 8:39 PM.    Ct-cta Neck With & W/o-post Processing  Result Date: 5/21/2019 5/21/2019 8:06 PM HISTORY/REASON FOR EXAM:  SAH suspected, initial exam TECHNIQUE/EXAM DESCRIPTION: CT angiogram of the neck with contrast. Postcontrast images were obtained of the neck from the great vessels through the skull base following the power injection of nonionic contrast at 5.0 mL/sec. Thin-section helical images were obtained with overlapping reconstruction interval. Coronal and  oblique multiplanar volume reformats were generated. Cervical internal carotid artery percent stenosis is calculated using the standard method according to the NASCET criteria wherein a segment of uniform caliber mid or distal cervical internal carotid is used as the reference denominator. 3D angiographic images were reviewed on PACS.  Maximum intensity projection (MIP) images were generated and reviewed 100 mL of Omnipaque 350 nonionic contrast was injected intravenously. Low dose optimization technique was utilized for this CT exam including automated exposure control and adjustment of the mA and/or kV according to patient size. COMPARISON:  None. FINDINGS: Left-sided aortic arch with patent great vessel origins. The right common carotid artery, cervical carotid bifurcation, and cervical internal carotid artery show no significant stenosis. There is no evidence of dissection or aneurysm. The left common carotid artery, cervical carotid bifurcation, and cervical internal carotid artery show no significant stenosis. There is no evidence of dissection or aneurysm. The cervical vertebral arteries are patent bilaterally. Prominent right paratracheal node measuring 1 cm short axis, statistically reactive. Otherwise, the neck soft tissues and lung apices in the field of view are unremarkable. 3D angiographic/MIP images of the vasculature confirm the vascular findings as described above.     Unremarkable CT angiogram of the neck. No high-grade stenosis, large vessel occlusion, aneurysm or dissection.    [employer]-stealth Brain With & W/o  Result Date: 5/22/2019  COMPARISON:  CTA of the head 5/21/2019 FINDINGS: No fiducial markers are appreciated. The calvaria are unremarkable. There are no extra-axial fluid collections. The left temporal lobe, there is a thick-walled ring-enhancing mass which measures about 47 mm x 32 mm x 43 mm. Central hypoenhancement may represent tumor necrosis or cystic change. There is marked surrounding  vasogenic edema extending into the basal ganglia, subinsular white matter, internal capsule, corona radiata, and left frontal deep white matter. There is effacement of the left lateral ventricle and moderate dilatation of the right lateral ventricle including the temporal horn due to trapping. There is left-to-right shift of midline structures of about 11 mm (T2 axial image 54, series 3). There are no other enhancing lesions. The brainstem and posterior fossa structures are unremarkable for marked compression of the midbrain with flattening of the left cerebral peduncle and effacement of perimesencephalic cisterns. There is some vasogenic edema extending down into the left side of the midbrain as well as the left thalamus and subthalamic nucleus. The major vessels including the dural venous sinuses appear intact. Paranasal sinuses show moderate mucosal thickening with small air-fluid/debris levels in the maxillary antra. Moderate mucosal thickening in the sphenoid sinus and among the ethmoid air cells. Minimal mucosal thickening in the frontal air cells.     1.  Large peripherally enhancing intra-axial mass in the left temporal lobe with marked surrounding vasogenic edema. Findings are most consistent with high-grade primary brain tumor such as glioblastoma. No other enhancing lesions elsewhere to suggest metastasis, however, a solitary brain metastasis would not be excluded. Brain abscess would not be excluded. There are no diffusion weighted images to determine whether contents of this lesion show restricted diffusion. There are no prior MRI scans for comparison. 2.  Marked left-to-right shift of midline structures along with midbrain compression and displacement.      Micro:  Results     ** No results found for the last 168 hours. **          Assessment:  Active Hospital Problems    Diagnosis   • *Brain abscess [G06.0]   • Delirium [R41.0]   • Methamphetamine abuse (HCC) [F15.10]   • S/P craniotomy [Z98.890]        Plan:  Brain cystic mass  Etiology unclear  No fever  Leukocytosis resolved  MRI with 4.7 cm left temporal lobe mass with midline shift  S/p craniotomy 5/22/19.  Gray amorphous gelatinous material noted intraoperatively most consistent with abscess per operative report  Gram stain neg; cxs neg final  CT c/a/p neg  Blood cxs neg  HIV neg  The pathology results is there is mild gliosis  Will treat like a brain abscess in view of findings in the OR and pathology being nonspecific gliosis  Continue vancomycin, cefepime and Flagyl  Monitor renal function and Vanco trough  Vanco trough 18.6 on 6/7  Renal function is normal  Plan for 6 weeks of antibiotics through 7/6/2019  ESR 8 and CRP 0.31  Repeat MRI of the brain-pending    Methamphetamine abuse  Will needs monitored setting for IV abx   Not a candidate for PICC line and outpatient or home IV antibiotics given substance abuse    Disposition: Will need a monitored setting for IV antibiotic course given his history of methamphetamine abuse.  Mother now agreeable to skilled nursing facility    I have performed a physical exam and reviewed and updated ROS and plan today 6/9/2019.  In review of yesterday's note 6/8/2019, there are no changes except as documented above.

## 2019-06-09 NOTE — CARE PLAN
Problem: Safety  Goal: Will remain free from injury  Outcome: PROGRESSING AS EXPECTED      Problem: Psychosocial Needs:  Goal: Level of anxiety will decrease  Outcome: PROGRESSING AS EXPECTED      Problem: Mobility  Goal: Risk for activity intolerance will decrease  Outcome: PROGRESSING AS EXPECTED

## 2019-06-09 NOTE — PROGRESS NOTES
"Pharmacy Kinetics 54 y.o. male on vancomycin day # 5 (restart: had been on x 7 days ending ) 2019    Currently on Vancomycin 1500 mg iv q8hr   (0330, 1130, 1930)     Indication for Treatment: brain abscess      Pertinent history per medical record: Admitted on 2019 for confusion for the past week and a half.  He recently completed treatment with cefdinir for otitis media.  PMH of methamphetamine abuse.  ID and Neurosurgery are consulting. S/p craniotomy.  Pathology specimen collected in OR and sent to [place]. Per ID - will treat it like a brain abscess in view of findings in the OR and pathology being nonspecific gliosis; re-start vancomycin; aim for ~6 weeks of abx (end ).      Other antibiotics: cefepime 2g IV q8h, metronidazole 500mg PO q8h     Allergies: Pcn [penicillins] and Sulfa drugs      List concerns for renal function: BUN/SCr ratio > 20:1, BMI 31, long term vanco dosing     Pertinent cultures to date:   19 brain abscess: no organisms seen  19 blood-peripheral x 2:  negative     MRSA nares swab if pneumonia is a concern (ordered/positive/negative/n-a): n/a    Recent Labs      19   0250  19   0335   WBC  7.8  7.5   NEUTSPOLYS  67.20  67.50     Recent Labs      19   0250  19   0335   BUN  15  15   CREATININE  0.64  0.71     Recent Labs      19   0825   VANCOTROUGH  18.6   No intake or output data in the 24 hours ending 19 1008   /82   Pulse 63   Temp 36.3 °C (97.3 °F) (Temporal)   Resp 18   Ht 1.803 m (5' 11\")   Wt 101 kg (222 lb 10.6 oz)   SpO2 96%  Temp (24hrs), Av.7 °C (98 °F), Min:36.3 °C (97.3 °F), Max:37.1 °C (98.7 °F)      A/P   1. Vancomycin dose change: none  2. Next vancomycin level: ~1-2 days  (not ordered)  3. Goal trough: 18-22 mcg/mL  4. Comments: No leukocytosis and afebrile. NNL to assess renal function. Continue current dose. Recommend repeat trough level in a few days to assess accumulation and dose " appropriateness. Pharmacy to continue to follow ID recommendations. Anticipate abx until 7/6.      Lea Conner, PharmD., BCPS

## 2019-06-09 NOTE — PROGRESS NOTES
Surgery patient?: yes   Date of surgery: 05/22/19  Ambulated 50 ft on day of surgery? (N/A if today is not date of surgery): n/a  Number of times ambulated 50 feet or greater today: 4  Patient has been up to chair, edge of bed or HOB 90 degrees for all meals?: Yes  Goal met? (goal is ambulating at least 50 feet 2 times on day shift, one time on night shift): yes  If patient did not meet mobility goal, why?: N/A

## 2019-06-10 LAB
ALBUMIN SERPL BCP-MCNC: 3.7 G/DL (ref 3.2–4.9)
ALBUMIN/GLOB SERPL: 1.5 G/DL
ALP SERPL-CCNC: 46 U/L (ref 30–99)
ALT SERPL-CCNC: 46 U/L (ref 2–50)
ANION GAP SERPL CALC-SCNC: 6 MMOL/L (ref 0–11.9)
AST SERPL-CCNC: 23 U/L (ref 12–45)
BASOPHILS # BLD AUTO: 0.9 % (ref 0–1.8)
BASOPHILS # BLD: 0.08 K/UL (ref 0–0.12)
BILIRUB SERPL-MCNC: 0.5 MG/DL (ref 0.1–1.5)
BUN SERPL-MCNC: 12 MG/DL (ref 8–22)
CALCIUM SERPL-MCNC: 8.9 MG/DL (ref 8.5–10.5)
CHLORIDE SERPL-SCNC: 108 MMOL/L (ref 96–112)
CO2 SERPL-SCNC: 25 MMOL/L (ref 20–33)
CREAT SERPL-MCNC: 0.65 MG/DL (ref 0.5–1.4)
EOSINOPHIL # BLD AUTO: 0.13 K/UL (ref 0–0.51)
EOSINOPHIL NFR BLD: 1.5 % (ref 0–6.9)
ERYTHROCYTE [DISTWIDTH] IN BLOOD BY AUTOMATED COUNT: 57.4 FL (ref 35.9–50)
GLOBULIN SER CALC-MCNC: 2.5 G/DL (ref 1.9–3.5)
GLUCOSE SERPL-MCNC: 113 MG/DL (ref 65–99)
HCT VFR BLD AUTO: 42 % (ref 42–52)
HGB BLD-MCNC: 12.8 G/DL (ref 14–18)
IMM GRANULOCYTES # BLD AUTO: 0.06 K/UL (ref 0–0.11)
IMM GRANULOCYTES NFR BLD AUTO: 0.7 % (ref 0–0.9)
LYMPHOCYTES # BLD AUTO: 1.39 K/UL (ref 1–4.8)
LYMPHOCYTES NFR BLD: 16 % (ref 22–41)
MAGNESIUM SERPL-MCNC: 1.8 MG/DL (ref 1.5–2.5)
MCH RBC QN AUTO: 23.9 PG (ref 27–33)
MCHC RBC AUTO-ENTMCNC: 30.5 G/DL (ref 33.7–35.3)
MCV RBC AUTO: 78.4 FL (ref 81.4–97.8)
MONOCYTES # BLD AUTO: 0.72 K/UL (ref 0–0.85)
MONOCYTES NFR BLD AUTO: 8.3 % (ref 0–13.4)
NEUTROPHILS # BLD AUTO: 6.29 K/UL (ref 1.82–7.42)
NEUTROPHILS NFR BLD: 72.6 % (ref 44–72)
NRBC # BLD AUTO: 0 K/UL
NRBC BLD-RTO: 0 /100 WBC
PLATELET # BLD AUTO: 231 K/UL (ref 164–446)
PMV BLD AUTO: 10.2 FL (ref 9–12.9)
POTASSIUM SERPL-SCNC: 4 MMOL/L (ref 3.6–5.5)
PROT SERPL-MCNC: 6.2 G/DL (ref 6–8.2)
RBC # BLD AUTO: 5.36 M/UL (ref 4.7–6.1)
SODIUM SERPL-SCNC: 139 MMOL/L (ref 135–145)
WBC # BLD AUTO: 8.7 K/UL (ref 4.8–10.8)

## 2019-06-10 PROCEDURE — 700102 HCHG RX REV CODE 250 W/ 637 OVERRIDE(OP): Performed by: INTERNAL MEDICINE

## 2019-06-10 PROCEDURE — 700112 HCHG RX REV CODE 229: Performed by: INTERNAL MEDICINE

## 2019-06-10 PROCEDURE — 700105 HCHG RX REV CODE 258: Performed by: INTERNAL MEDICINE

## 2019-06-10 PROCEDURE — A9270 NON-COVERED ITEM OR SERVICE: HCPCS | Performed by: INTERNAL MEDICINE

## 2019-06-10 PROCEDURE — 770001 HCHG ROOM/CARE - MED/SURG/GYN PRIV*

## 2019-06-10 PROCEDURE — 700111 HCHG RX REV CODE 636 W/ 250 OVERRIDE (IP): Performed by: STUDENT IN AN ORGANIZED HEALTH CARE EDUCATION/TRAINING PROGRAM

## 2019-06-10 PROCEDURE — 83735 ASSAY OF MAGNESIUM: CPT

## 2019-06-10 PROCEDURE — 97535 SELF CARE MNGMENT TRAINING: CPT

## 2019-06-10 PROCEDURE — A9270 NON-COVERED ITEM OR SERVICE: HCPCS | Performed by: STUDENT IN AN ORGANIZED HEALTH CARE EDUCATION/TRAINING PROGRAM

## 2019-06-10 PROCEDURE — 700102 HCHG RX REV CODE 250 W/ 637 OVERRIDE(OP): Performed by: STUDENT IN AN ORGANIZED HEALTH CARE EDUCATION/TRAINING PROGRAM

## 2019-06-10 PROCEDURE — 99232 SBSQ HOSP IP/OBS MODERATE 35: CPT | Mod: GC | Performed by: HOSPITALIST

## 2019-06-10 PROCEDURE — 85025 COMPLETE CBC W/AUTO DIFF WBC: CPT

## 2019-06-10 PROCEDURE — 700111 HCHG RX REV CODE 636 W/ 250 OVERRIDE (IP): Performed by: INTERNAL MEDICINE

## 2019-06-10 PROCEDURE — 99232 SBSQ HOSP IP/OBS MODERATE 35: CPT | Performed by: INTERNAL MEDICINE

## 2019-06-10 PROCEDURE — 80053 COMPREHEN METABOLIC PANEL: CPT

## 2019-06-10 PROCEDURE — 92507 TX SP LANG VOICE COMM INDIV: CPT

## 2019-06-10 RX ADMIN — POLYETHYLENE GLYCOL 3350 1 PACKET: 17 POWDER, FOR SOLUTION ORAL at 17:22

## 2019-06-10 RX ADMIN — POTASSIUM CHLORIDE 40 MEQ: 1500 TABLET, EXTENDED RELEASE ORAL at 05:56

## 2019-06-10 RX ADMIN — VANCOMYCIN HYDROCHLORIDE 1500 MG: 100 INJECTION, POWDER, LYOPHILIZED, FOR SOLUTION INTRAVENOUS at 12:46

## 2019-06-10 RX ADMIN — QUETIAPINE FUMARATE 25 MG: 25 TABLET ORAL at 17:22

## 2019-06-10 RX ADMIN — POTASSIUM CHLORIDE 40 MEQ: 1500 TABLET, EXTENDED RELEASE ORAL at 17:22

## 2019-06-10 RX ADMIN — Medication 400 MG: at 17:22

## 2019-06-10 RX ADMIN — METRONIDAZOLE 500 MG: 500 TABLET, FILM COATED ORAL at 20:39

## 2019-06-10 RX ADMIN — SENNOSIDES,DOCUSATE SODIUM 1 TABLET: 8.6; 5 TABLET, FILM COATED ORAL at 20:39

## 2019-06-10 RX ADMIN — VANCOMYCIN HYDROCHLORIDE 1500 MG: 100 INJECTION, POWDER, LYOPHILIZED, FOR SOLUTION INTRAVENOUS at 20:41

## 2019-06-10 RX ADMIN — METRONIDAZOLE 500 MG: 500 TABLET, FILM COATED ORAL at 05:56

## 2019-06-10 RX ADMIN — CEFEPIME 2 G: 2 INJECTION, POWDER, FOR SOLUTION INTRAVENOUS at 15:25

## 2019-06-10 RX ADMIN — CEFEPIME 2 G: 2 INJECTION, POWDER, FOR SOLUTION INTRAVENOUS at 22:31

## 2019-06-10 RX ADMIN — DOCUSATE SODIUM 100 MG: 100 CAPSULE, LIQUID FILLED ORAL at 05:56

## 2019-06-10 RX ADMIN — QUETIAPINE FUMARATE 25 MG: 25 TABLET ORAL at 05:56

## 2019-06-10 RX ADMIN — FERROUS SULFATE TAB 325 MG (65 MG ELEMENTAL FE) 325 MG: 325 (65 FE) TAB at 07:58

## 2019-06-10 RX ADMIN — DOCUSATE SODIUM 100 MG: 100 CAPSULE, LIQUID FILLED ORAL at 17:22

## 2019-06-10 RX ADMIN — METRONIDAZOLE 500 MG: 500 TABLET, FILM COATED ORAL at 14:04

## 2019-06-10 RX ADMIN — ENOXAPARIN SODIUM 40 MG: 100 INJECTION SUBCUTANEOUS at 05:56

## 2019-06-10 RX ADMIN — Medication 400 MG: at 11:30

## 2019-06-10 RX ADMIN — POLYETHYLENE GLYCOL 3350 1 PACKET: 17 POWDER, FOR SOLUTION ORAL at 05:57

## 2019-06-10 RX ADMIN — CEFEPIME 2 G: 2 INJECTION, POWDER, FOR SOLUTION INTRAVENOUS at 05:56

## 2019-06-10 RX ADMIN — VANCOMYCIN HYDROCHLORIDE 1500 MG: 100 INJECTION, POWDER, LYOPHILIZED, FOR SOLUTION INTRAVENOUS at 03:32

## 2019-06-10 ASSESSMENT — ENCOUNTER SYMPTOMS
FEVER: 0
VOMITING: 0
ABDOMINAL PAIN: 0
SENSORY CHANGE: 1
DIARRHEA: 0
CHILLS: 1
HEADACHES: 0
NAUSEA: 0

## 2019-06-10 ASSESSMENT — COGNITIVE AND FUNCTIONAL STATUS - GENERAL
PERSONAL GROOMING: A LITTLE
EATING MEALS: A LITTLE
DAILY ACTIVITIY SCORE: 18
HELP NEEDED FOR BATHING: A LITTLE
SUGGESTED CMS G CODE MODIFIER DAILY ACTIVITY: CK
DRESSING REGULAR LOWER BODY CLOTHING: A LITTLE
DRESSING REGULAR UPPER BODY CLOTHING: A LITTLE
TOILETING: A LITTLE

## 2019-06-10 NOTE — DISCHARGE PLANNING
Agency/Facility Name: [facility]   Spoke To: Valery   Outcome: Requested new referral be faxed. [facility] faxed new referral at 1500.

## 2019-06-10 NOTE — PROGRESS NOTES
Pt AAOx1-2, ambulatory with SBA.  Pt has expressive aphagia and poor memory/forgetfulness.   No acute changes, VSS.  PICC line in place, no complications.   Denies pain at this time.   Call bell within reach, bed in lowest locked position.   Will continue to monitor.

## 2019-06-10 NOTE — PROGRESS NOTES
Infectious Disease Progress Note    Author: Roxana Dorado M.D. Date & Time of service: 6/10/2019  10:02 AM    Chief Complaint:  Follow-up for brain mass     Interval History:  53 y.o. male  admitted 5/21/2019 for AMS due to above. +meth abuse.  Recently in the ED on May 12 and was diagnosed with acute suppurative otitis media  5/24 AF WBC 12.6 extubated this am-pain controlled-decreased strength RUE +delerium  5/25 AF improved strength RUE-intermittently combative and cooperative  5/26 AF WBC 9.7 drain removed-somnolent this am-per RN strength continues to improve  5/27 AF had HA earlier but now improved-no new neurologic deficits. Plan for C/A/P today  5/28 AF WBC 13.4 remains alert-strength improved. Denies SE abx. HA controlled  5/29 AF WBC 12.2 sitting up to chair-no new complaints  5/30 AF WBC 17 denies any worsening HA, visual changes, N/V/D, abd pain  5/31/2019-no fevers.  Complains of some headache.  No new issues overnight.  WBC is 14.5  6/1/2019 no fevers.  No new issues.  Pathology is still not back.  6/5- no fevers. The path has come back positive for gliosis.  Denies any new complaints.  6/6/2019 no fevers.  No new issues overnight.  Tolerating the antibiotics  6/7 afebrile WBC 7.8.  Patient denies any headaches or neck pain.  He is tolerating IV antibiotics without any issues.  No diarrhea.  Family requesting home IV antibiotics if feasible.  6/8 afebrile WBC 7.5 patient denies any pain.  He had a PICC line placed yesterday without any complications  6/9 afebrile patient continues to be a poor historian and is forgetful.  He denies any pain.  Father and stepmother at bedside and agree with SNF placement.  Prior to this infection, father notes that he was highly functioning.  Point of care discussed with family  6/10 afebrile WBC 8.7 patient complaining of chills overnight.  He continues to have poor memory and is quite forgetful.    Labs Reviewed    Review of Systems:  Review of Systems    Constitutional: Positive for chills. Negative for fever.   Cardiovascular: Negative for chest pain.   Gastrointestinal: Negative for abdominal pain, diarrhea, nausea and vomiting.   Genitourinary: Negative for dysuria.   Skin: Negative for rash.   Neurological: Positive for sensory change. Negative for headaches.        Hands   Limited review of systems secondary to forgetfulness and poor memory    Hemodynamics:  Temp (24hrs), Av.9 °C (98.5 °F), Min:36.9 °C (98.4 °F), Max:37 °C (98.6 °F)  Temperature: 36.9 °C (98.5 °F)  Pulse  Av.8  Min: 40  Max: 98  Blood Pressure: 125/76       Physical Exam:  Physical Exam   Constitutional: He appears well-developed. No distress.   HENT:   Head: Normocephalic.   Mouth/Throat: Oropharynx is clear and moist. No oropharyngeal exudate.   Left craniotomy surgical site well approximated and healing well.  There is no drainage or surrounding erythema     Eyes: Pupils are equal, round, and reactive to light. Conjunctivae and EOM are normal. No scleral icterus.   Neck: Neck supple.   Cardiovascular: Normal rate and regular rhythm.    No murmur heard.  Pulmonary/Chest: Effort normal. No respiratory distress. He has no wheezes. He has no rales.   Abdominal: Soft. He exhibits no distension. There is no tenderness. There is no rebound and no guarding.   Musculoskeletal: He exhibits no edema.   Right upper extremity PICC line-nontender, no surrounding erythema   Lymphadenopathy:     He has no cervical adenopathy.   Neurological: He is alert. No cranial nerve deficit. Coordination normal.   Poor memory and forgetful   Skin: Skin is warm. No rash noted. He is not diaphoretic. No erythema.   Psychiatric:   Odd and flat affect   Nursing note and vitals reviewed.      Meds:    Current Facility-Administered Medications:   •  magnesium oxide  •  potassium chloride SA  •  cefepime  •  metroNIDAZOLE  •  MD Alert...Vancomycin per Pharmacy  •  enoxaparin  •  vancomycin  •  QUEtiapine  •   oxyCODONE immediate-release  •  ferrous sulfate  •  haloperidol  •  acetaminophen  •  polyethylene glycol/lytes  •  docusate sodium  •  senna-docusate  •  cloNIDine  •  magnesium hydroxide  •  senna-docusate  •  Respiratory Care per Protocol  •  Pharmacy Consult Request  •  MD ALERT...DO NOT ADMINISTER NSAIDS or ASPIRIN unless ORDERED By Neurosurgery  •  ondansetron  •  scopolamine  •  labetalol  •  hydrALAZINE  •  bisacodyl  •  fleet    Labs:  Recent Labs      06/08/19   0335  06/10/19   0620   WBC  7.5  8.7   RBC  5.08  5.36   HEMOGLOBIN  12.1*  12.8*   HEMATOCRIT  39.6*  42.0   MCV  78.0*  78.4*   MCH  23.8*  23.9*   RDW  55.9*  57.4*   PLATELETCT  212  231   MPV  10.0  10.2   NEUTSPOLYS  67.50  72.60*   LYMPHOCYTES  17.90*  16.00*   MONOCYTES  10.40  8.30   EOSINOPHILS  2.10  1.50   BASOPHILS  1.20  0.90     Recent Labs      06/08/19   0335  06/10/19   0620   SODIUM  138  139   POTASSIUM  3.7  4.0   CHLORIDE  107  108   CO2  25  25   GLUCOSE  115*  113*   BUN  15  12     Recent Labs      06/08/19   0335  06/10/19   0620   ALBUMIN   --   3.7   TBILIRUBIN   --   0.5   ALKPHOSPHAT   --   46   TOTPROTEIN   --   6.2   ALTSGPT   --   46   ASTSGOT   --   23   CREATININE  0.71  0.65       Imaging:  Ct-cta Head With & W/o-post Process  1.  No large vessel occlusion, high-grade stenosis or aneurysm of the [place]. 2.  A 4 cm mass lesion in the left frontotemporal region. Imaging features favor an abscess over a solid lesion. However, MRI is needed for definitive characterization. 3.  Extensive edema associated with the mass, resulting in 10 mm left-to-right midline shift. 4.  Effacement of the left and enlargement of the right lateral ventricles may be due to developing hydrocephalus. Findings were discussed with JOSE ALTAMIRANO on 5/21/2019 8:39 PM.    Ct-cta Neck With & W/o-post Processing  Result Date: 5/21/2019 5/21/2019 8:06 PM HISTORY/REASON FOR EXAM:  SAH suspected, initial exam TECHNIQUE/EXAM  DESCRIPTION: CT angiogram of the neck with contrast. Postcontrast images were obtained of the neck from the great vessels through the skull base following the power injection of nonionic contrast at 5.0 mL/sec. Thin-section helical images were obtained with overlapping reconstruction interval. Coronal and oblique multiplanar volume reformats were generated. Cervical internal carotid artery percent stenosis is calculated using the standard method according to the NASCET criteria wherein a segment of uniform caliber mid or distal cervical internal carotid is used as the reference denominator. 3D angiographic images were reviewed on PACS.  Maximum intensity projection (MIP) images were generated and reviewed 100 mL of Omnipaque 350 nonionic contrast was injected intravenously. Low dose optimization technique was utilized for this CT exam including automated exposure control and adjustment of the mA and/or kV according to patient size. COMPARISON:  None. FINDINGS: Left-sided aortic arch with patent great vessel origins. The right common carotid artery, cervical carotid bifurcation, and cervical internal carotid artery show no significant stenosis. There is no evidence of dissection or aneurysm. The left common carotid artery, cervical carotid bifurcation, and cervical internal carotid artery show no significant stenosis. There is no evidence of dissection or aneurysm. The cervical vertebral arteries are patent bilaterally. Prominent right paratracheal node measuring 1 cm short axis, statistically reactive. Otherwise, the neck soft tissues and lung apices in the field of view are unremarkable. 3D angiographic/MIP images of the vasculature confirm the vascular findings as described above.     Unremarkable CT angiogram of the neck. No high-grade stenosis, large vessel occlusion, aneurysm or dissection.    Noveporter-stealth Brain With & W/o  Result Date: 5/22/2019  COMPARISON:  CTA of the head 5/21/2019 FINDINGS: No fiducial  markers are appreciated. The calvaria are unremarkable. There are no extra-axial fluid collections. The left temporal lobe, there is a thick-walled ring-enhancing mass which measures about 47 mm x 32 mm x 43 mm. Central hypoenhancement may represent tumor necrosis or cystic change. There is marked surrounding vasogenic edema extending into the basal ganglia, subinsular white matter, internal capsule, corona radiata, and left frontal deep white matter. There is effacement of the left lateral ventricle and moderate dilatation of the right lateral ventricle including the temporal horn due to trapping. There is left-to-right shift of midline structures of about 11 mm (T2 axial image 54, series 3). There are no other enhancing lesions. The brainstem and posterior fossa structures are unremarkable for marked compression of the midbrain with flattening of the left cerebral peduncle and effacement of perimesencephalic cisterns. There is some vasogenic edema extending down into the left side of the midbrain as well as the left thalamus and subthalamic nucleus. The major vessels including the dural venous sinuses appear intact. Paranasal sinuses show moderate mucosal thickening with small air-fluid/debris levels in the maxillary antra. Moderate mucosal thickening in the sphenoid sinus and among the ethmoid air cells. Minimal mucosal thickening in the frontal air cells.     1.  Large peripherally enhancing intra-axial mass in the left temporal lobe with marked surrounding vasogenic edema. Findings are most consistent with high-grade primary brain tumor such as glioblastoma. No other enhancing lesions elsewhere to suggest metastasis, however, a solitary brain metastasis would not be excluded. Brain abscess would not be excluded. There are no diffusion weighted images to determine whether contents of this lesion show restricted diffusion. There are no prior MRI scans for comparison. 2.  Marked left-to-right shift of midline  structures along with midbrain compression and displacement.      Micro:  Results     ** No results found for the last 168 hours. **          Assessment:  Active Hospital Problems    Diagnosis   • *Brain abscess [G06.0]   • Delirium [R41.0]   • Methamphetamine abuse (HCC) [F15.10]   • S/P craniotomy [Z98.890]       Plan:  Brain cystic mass  Etiology unclear  No fevers  Leukocytosis resolved  MRI with 4.7 cm left temporal lobe mass with midline shift  S/p craniotomy 5/22/19.  Gray amorphous gelatinous material noted intraoperatively most consistent with abscess per operative report  Gram stain neg; cxs neg final  CT c/a/p neg  Blood cxs neg  HIV neg  The pathology results is there is mild gliosis  Will treat like a brain abscess in view of findings in the OR and pathology being nonspecific gliosis  Continue vancomycin, cefepime and Flagyl  Monitor renal function and Vanco trough  Vanco trough 18.6 on 6/7  Renal function is normal  Plan for 6 weeks of antibiotics through 7/6/2019  ESR 8 and CRP 0.31  Repeat MRI of the brain- no changes    Methamphetamine abuse  Will need monitored setting for IV abx   Not a candidate for PICC line and outpatient or home IV antibiotics given substance abuse    Disposition: Will need a monitored setting for IV antibiotic course given his history of methamphetamine abuse.  Plan for skilled nursing facility.  Patient will need 3 times weekly Vanco troughs and weekly BMP and CBC.  Okay to DC patient from ID standpoint    I have performed a physical exam and reviewed and updated ROS and plan today 6/10/2019.  In review of yesterday's note 6/9/2019, there are no changes except as documented above.    Discussed with family at bedside

## 2019-06-10 NOTE — DISCHARGE PLANNING
Agency/Facility Name: [facility]   Spoke To: Valery   Outcome: Patient declined; no open medicaid beds.

## 2019-06-10 NOTE — CARE PLAN
Problem: Safety  Goal: Will remain free from falls  Outcome: PROGRESSING AS EXPECTED  Bed in lowest, locked position. Call bell and bedside table within reach. Family at bedside. Will continue hourly rounding.     Problem: Knowledge Deficit  Goal: Knowledge of disease process/condition, treatment plan, diagnostic tests, and medications will improve  Outcome: PROGRESSING SLOWER THAN EXPECTED  Pt only oriented to person and place. Will continue to reinforce education and POC to pt and family.

## 2019-06-10 NOTE — PROGRESS NOTES
Internal Medicine Interval Note    Note Author: Em Savage M.D.      Name Robert CALDERON 1965   Age 54 y.o. male   MRN [id number]   Code Status Full Code     After 5 PM or if no immediate response to page, please call for cross-coverage.    Attending: Dr. Aguilera  Team: Blue See patient list for primary contact information.  Call [phone] to page.    1st Call - Day Intern, R1  Dr. Savage 2nd Call - Day Sr. Resident, R2-R3  Dr. 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  Sitting up in bed eating breakfast; father, stepmother and sister at bedside.  Denies any complaints, still with expressive aphasia and short-term memory loss.  Family updated on the patient's hospital course and discharge recommendations, they remain agreeable to SNF placement.  Repeat MRI brain with and without contrast on 2019 significant for considerable edema, fluid and enhancement around L temporal mass, as well as an 11 mm L to R midline shift unchanged from prior MRI on 2019.  Neurosurgeon Dr. Young contacted regarding repeat MRB.  Treating mass as a brain abscess per ID; vancomycin, cefepime and Flagyl for 6 weeks through 2019.      ROS  Constitutional: Negative for fever and chills.  HEN:Negative for sinus pain and congestion.  Throat: Negative for soreness and dysphasia.  Eyes: Negative for diplopia and pain.  Respiratory: Negative for cough and shortness of breath.  Cardiovascular: Negative for chest pain palpitations.  Gastrointestinal: Negative for constipation, diarrhea, nausea and vomiting.  Genitourinary: Negative for dysuria and hematuria.  Neurological: Positive for expressive aphasia and short term memory loss. Negative for headache, dizziness and sensory change.  Psychiatric: Negative for depression and anxiety.      Disposition, Discharge Barriers  Inpatient, pending insurance and placement to a  skilled nursing facility.      Consultants  Neurosurgery  Infectious Disease      Quality-Core Measures  Richards Catheter: No  Central Line: No  VTE Prophylaxis: Lovenox  Antibiotics: Flagyl, cefepime and vancomycin  Rehab: PT, OT, SLP following       Vitals  Vitals:    06/09/19 1600 06/09/19 1929 06/09/19 2100 06/10/19 0400   BP: 118/75 117/75  125/72   Pulse: 66 92  80   Resp: 18 17  17   Temp: 36.9 °C (98.4 °F) 36.9 °C (98.5 °F)  37 °C (98.6 °F)   TempSrc: Temporal Temporal  Temporal   SpO2: 92% 96%  96%   Weight:   101.3 kg (223 lb 5.2 oz)    Height:           Physical Exam  General: No acute distress, cooperative.  Head: Normocephalic, left frontotemporal craniotomy well opposed and without drainage or surrounding erythema.    Eyes: Normal conjunctiva, sclerae anicteric.  Throat: Oropharynx clear, oral mucosa moist.  Neck: Supple, no lymphadenopathy.  Lungs: Normal work of breathing, clear to auscultation bilaterally.  Heart: Normal rate, regular rhythm, no murmurs.  Abdomen: Soft, bowel sounds present, non-tender, no peritoneal signs.  Extremities: Nontender, no cyanosis, no edema. RUE PICC line.  Neuro: Alert, oriented to person place and time, expressive aphasia, no other focal deficits.   Skin: Warm, dry, intact, no suspicious rashes or lesions on visualized skin.        Labs and Imaging  Pertinent labs, imaging and diagnostic tests and procedures associated with this visit have been reviewed, specific comments can be found in the assessment and plan section below.     Recent Labs      06/08/19   0335  06/10/19   0620   SODIUM  138  139   POTASSIUM  3.7  4.0   CHLORIDE  107  108   CO2  25  25   BUN  15  12   CREATININE  0.71  0.65   GLUCOSE  115*  113*   CALCIUM  8.8  8.9   MAGNESIUM   --   1.8     Recent Labs      06/08/19   0335  06/10/19   0620   WBC  7.5  8.7   HEMOGLOBIN  12.1*  12.8*   HEMATOCRIT  39.6*  42.0   PLATELETCT  212  231     Recent Labs      06/10/19   0620   ALTSGPT  46   ASTSGOT  23    ALKPHOSPHAT  46   TBILIRUBIN  0.5   ALBUMIN  3.7       Assessment and Plan    Status post craniotomy   Assessment & Plan    No complications, sutures removed 6/6/2019.  Okay to resume anticoagulation per Dr. Young.     * Brain abscess   Assessment & Plan    CT head with contrast showed a fluid collection over the L frontotemporal area with mass-effect, midline shift and evidence of developing hydrocephalus.  Concerning for brain abscess vs. GBM.  Neurosurgery consulted, s.p. craniotomy on 5/22/19, specimen sent to [facility] for pathology.  ID on board, initially on vanc and flagyl, vanc discontinued 5/27/19 and cefepime started instead, vanc restarted per ID on 6/5/19.   Gram stain negative, bacterial and fungal cx negative, HIV negative, blood cx negative.     Mild reactive gliosis per [facility]'s final report.   Vanc trough 18.6, i.e. therapeutic, on 6/7/19.  ESR 8, CRP 0.31 on 6/7/19, no prior results for comparison.   Will need a monitored setting for IV antibiotics, not a candidate for outpatient or home IV antibiotics via PICC given history of substance abuse.   Repeat MRB with and without contrast on 6/8/19 showed significant edema, fluid and enhancement around the patient's L temporal mass, as well as an 11 mm L to R midline shift unchaged from prior exam.    Plan:  Cefepime, metronidazole and vancomycin x 6 weeks - end date 7/6/19  Cleared by ID for discharge to SNF for rehab and IV antibiotics via PICC line; the patient will need Vanc troughs 3-times-weekly and a BMP and CBC weekly per ID.  NSG to review 6/8/19 MRI and follow-up on recommendations.  Awaiting Medicaid for SNF placement.     Agitation   Assessment & Plan    Intermittently agitated and aggressive towards the staff.    Plan:  Modified Seroquel dose to 25 mg BID for better management.   Haldol PRN for agitation and aggression not managed with Seroquel.     Acute respiratory failure with hypoxia (HCC)   Assessment & Plan     Resolved.  Intubated on admission for airway protection on 5/22/2019, extubated on 5/24/2019.  RT protocol, supplemental oxygen as needed.     Delirium   Assessment & Plan    Resolved.  On Seroquel 25 mg BID and Haldol PRN for intermittent aggression and agitation.   CTM.     Methamphetamine abuse (HCC)   Assessment & Plan    History of meth abuse via smoking per the patient's nephew, no history of IV drug use.  Substance abuse counseling prior to discharge.     Expressive aphasia   Assessment & Plan    Improving.  Still with some difficulty identifying objects and recalling current events.  Likely secondary to a lesion in the frontotemporal lobe of a left dominant brain.     Microcytic anemia   Assessment & Plan    Stable.  Fe 17, Fe Sat 4% and TIBC 386, ferritin 16.2.  Stool negative for occult blood.  No records of EGD or colonoscopy per chart review.  Started on ferrous sulfate supplementation this admission.   Stool H. pylori test negative.     Plan:  Continue ferrous sulfate daily.  Celiac panel negative.   Outpatient PCP follow-up, recommend GI cancer screening with EGD and colonoscopy.     Class 1 obesity due to excess calories without serious comorbidity with body mass index (BMI) of 33.0 to 33.9 in adult   Assessment & Plan    BMI 31.  Outpatient follow-up for weight loss management.  Concern for sleep apnea, recommend outpatient sleep study for further evaluation.     Smoker   Assessment & Plan    Smokes 3 cigarettes per day per his mother's report.  Smoking cessation counseling prior to discharge.

## 2019-06-10 NOTE — PROGRESS NOTES
Mobility Note    Surgery patient?: yes   Date of surgery: 05/22/19  Ambulated 50 ft on day of surgery? (N/A if today is not date of surgery): n/a  Number of times ambulated 50 feet or greater today: 4  Patient has been up to chair, edge of bed or HOB 90 degrees for all meals?: Yes  Goal met? (goal is ambulating at least 50 feet 2 times on day shift, one time on night shift): yes  If patient did not meet mobility goal, why?: N/A

## 2019-06-10 NOTE — DISCHARGE PLANNING
Agency/Facility Name: [facility]   Spoke To: Jahaira   Outcome: Patient declined; non-contracted insurance provider.    Agency/Facility Name: [facility]   Spoke To: Valery   Outcome: Referral being reviewed.     Agency/Facility Name: [facility]   Spoke To: Tarah  Outcome: Patient declined; no open beds.

## 2019-06-10 NOTE — THERAPY
"Occupational Therapy Treatment completed with focus on ADLs and patient education.  Functional Status:  Pt seen for OT tx. Supv supine > sit, supv sit > stand, completed toilet transfer and light grooming while standing at the sink w/ proper sequencing w/ supv. Pt required max VC for sequencing of meal prepping task. Pt unable to verbalize or problem solve where to find milk to pour into cereal. Pt given different answers to choose from to look for milk and where it would be in the kitchen. Pt unable to complete task appropriately w/o max cues from therapist. Pt also w/ poor sequencing of tasks on how to make bowl of cereal. Required max cues to use R hand to complete self-feeding tasks. Uses L hand mostly for self-feeding, able to demonstrate ability to complete self-feeding w/ R hand but forgets to use it. Pt continues to be limited by aphasia, impaired sequencing and poor problem solving. Pt would benefit from OT services while in-house to address deficits in ADLs and IADLs.   Plan of Care: Will benefit from Occupational Therapy 3 times per week  Discharge Recommendations:  Equipment Will Continue to Assess for Equipment Needs.     See \"Rehab Therapy-Acute\" Patient Summary Report for complete documentation.   "

## 2019-06-10 NOTE — THERAPY
"Speech Language Therapy speech & language treatment completed.     Functional Status: Pt was seen at bedside for f/u aphasia tx. Per chart review, Pt con't to demonstrate paraphasias, anomia and possible memory deficits. Upon arrival to Pt's room, Pt's family members were present. Pt was alert and fully agreeable to all therapy objectives. Pt agreeable to having family members remain in room for today's session. Pt was oriented to self only during orientation questions; however, was able to select correct responses from F03 independently. Pt demonstrated reduced eye-contact, frequent sighing and stated, \"I don't know\" frequently at initiation of session; however, as session progressed and Pt became increasingly successful utilizing compensatory strategies, Pt's affect appeared to improve as evidenced by increased eye contact, smiling and endorsing appreciation for today's visit. Pt was unable to recall the names of his family members independently and/or with verbal repetition; however, demonstrated intact reading skills and benefit from using written cues to recall names. Pt was able to write his own name,  and father's name independently and then recite them aloud with 100% accuracy and mod-I.     Pt participated with Response Elaboration Training (RET) and initially demonstrated difficulties comprehending task objective; however, with verbal + written cues, Pt was able to repeat each sentence 5x across six different actions. Pt's initial response contained neologisms/jargon; however, he was able to fluently produce a semantic sentence with mod-I. Pt used pause, and re-start techniques appropriately, which also appeared to facilitate successful productions. Pt then demonstrated difficulties with generative naming task, and required max-A (e.g. \"Who flies an airplane?\" etc.) Lastly, Pt correctly identified object:object x6, object:action x6 and word:word x6 with 100% accuracy. Given the aforementioned " "information, Pt will benefit from ongoing acute SLP services for aphasia tx, as well as post-acute SLP services for aphasia tx. Recommend combining written + verbal cues during conversations, directives and encourage Pt to read/write in journal to improve recall. Pt/family verbalized understanding. RN aware. SLP following for aphasia and dysphagia tx.    Recommendations:   1.) Given the aforementioned information, Pt will benefit from ongoing acute SLP services for aphasia tx, as well as post-acute SLP services for aphasia tx.   2.) Recommend combining written + verbal cues during conversations, directives and encourage Pt to read/write in journal to improve recall.     Plan of Care: Will benefit from Speech Therapy 5 times per week  Post-Acute Therapy: Recommend inpatient transitional care services for continued speech therapy services.      See \"Rehab Therapy-Acute\" Patient Summary Report for complete documentation.     "

## 2019-06-10 NOTE — PROGRESS NOTES
Pt pres w/ alt LOC, found to have brain abscess, L craniotomy on 5/22 and brain mass resection,  Hx smoking and Meth, AOx1-2, denies numbness and tingling, some expressive aphasia (e.g. poor word choice), RA, BM yesterday, reg dysphag III w/ thins, SBA to BR,  R arm PICC---IV abx through 7/6, head staples and and incision AMRIK--well approx w/ no s/s infection, denies pain, VSS, plan is likely SNF.

## 2019-06-10 NOTE — PROGRESS NOTES
"Pharmacy Kinetics 54 y.o. male on vancomycin day # 6   (restart: had been on x 7 days ending ) 6/10/2019    Currently on Vancomycin 1500 mg iv q8hr   (0330, 1130, 1930)     Indication for Treatment: brain abscess      Pertinent history per medical record: Admitted on 2019 for confusion for the past week and a half.  He recently completed treatment with cefdinir for otitis media.  PMH of methamphetamine abuse.  ID and Neurosurgery are consulting. S/p craniotomy.  Pathology specimen collected in OR and sent to [place]. Per ID - will treat it like a brain abscess in view of findings in the OR and pathology being nonspecific gliosis; re-start vancomycin; aim for ~6 weeks of abx (end ).      Other antibiotics: cefepime 2g IV q8h, metronidazole 500mg PO q8h     Allergies: Pcn [penicillins] and Sulfa drugs      List concerns for renal function: BUN/SCr ratio > 20:1, BMI 31, long term vanco dosing     Pertinent cultures to date:   19 brain abscess: no organisms seen  19 blood-peripheral x 2:  negative     MRSA nares swab if pneumonia is a concern (ordered/positive/negative/n-a): n/a    Recent Labs      19   0335  06/10/19   0620   WBC  7.5  8.7   NEUTSPOLYS  67.50  72.60*     Recent Labs      19   0335  06/10/19   0620   BUN  15  12   CREATININE  0.71  0.65   ALBUMIN   --   3.7     No results for input(s): VANCOTROUGH, VANCOPEAK, VANCORANDOM in the last 72 hours.No intake or output data in the 24 hours ending 06/10/19 0920   /76   Pulse 72   Temp 36.9 °C (98.5 °F) (Temporal)   Resp 17   Ht 1.803 m (5' 11\")   Wt 101.3 kg (223 lb 5.2 oz)   SpO2 97%  Temp (24hrs), Av.9 °C (98.5 °F), Min:36.9 °C (98.4 °F), Max:37 °C (98.6 °F)      A/P   1. Vancomycin dose change: none  2. Next vancomycin level: tomorrow, 19, at 0300   (ordered)  3. Goal trough: 18-22 mcg/mL  4. Comments: No leukocytosis and afebrile. Renal function appears stable per renal indices. Continue current dose. " Trough level ordered for tomorrow to assess accumulation and dose appropriateness. Pharmacy to continue to follow ID recommendations. Anticipate abx until 7/6.      Lea Conner, PharmD., BCPS

## 2019-06-11 LAB
ANION GAP SERPL CALC-SCNC: 9 MMOL/L (ref 0–11.9)
BASOPHILS # BLD AUTO: 1.2 % (ref 0–1.8)
BASOPHILS # BLD: 0.09 K/UL (ref 0–0.12)
BUN SERPL-MCNC: 16 MG/DL (ref 8–22)
CALCIUM SERPL-MCNC: 9 MG/DL (ref 8.5–10.5)
CHLORIDE SERPL-SCNC: 108 MMOL/L (ref 96–112)
CO2 SERPL-SCNC: 23 MMOL/L (ref 20–33)
CREAT SERPL-MCNC: 0.64 MG/DL (ref 0.5–1.4)
EOSINOPHIL # BLD AUTO: 0.17 K/UL (ref 0–0.51)
EOSINOPHIL NFR BLD: 2.2 % (ref 0–6.9)
ERYTHROCYTE [DISTWIDTH] IN BLOOD BY AUTOMATED COUNT: 58.1 FL (ref 35.9–50)
GLUCOSE SERPL-MCNC: 115 MG/DL (ref 65–99)
HCT VFR BLD AUTO: 41.3 % (ref 42–52)
HGB BLD-MCNC: 12.5 G/DL (ref 14–18)
IMM GRANULOCYTES # BLD AUTO: 0.06 K/UL (ref 0–0.11)
IMM GRANULOCYTES NFR BLD AUTO: 0.8 % (ref 0–0.9)
LYMPHOCYTES # BLD AUTO: 1.27 K/UL (ref 1–4.8)
LYMPHOCYTES NFR BLD: 16.6 % (ref 22–41)
MCH RBC QN AUTO: 23.7 PG (ref 27–33)
MCHC RBC AUTO-ENTMCNC: 30.3 G/DL (ref 33.7–35.3)
MCV RBC AUTO: 78.4 FL (ref 81.4–97.8)
MONOCYTES # BLD AUTO: 0.65 K/UL (ref 0–0.85)
MONOCYTES NFR BLD AUTO: 8.5 % (ref 0–13.4)
NEUTROPHILS # BLD AUTO: 5.43 K/UL (ref 1.82–7.42)
NEUTROPHILS NFR BLD: 70.7 % (ref 44–72)
NRBC # BLD AUTO: 0 K/UL
NRBC BLD-RTO: 0 /100 WBC
PLATELET # BLD AUTO: 220 K/UL (ref 164–446)
PMV BLD AUTO: 10.6 FL (ref 9–12.9)
POTASSIUM SERPL-SCNC: 4.1 MMOL/L (ref 3.6–5.5)
RBC # BLD AUTO: 5.27 M/UL (ref 4.7–6.1)
SODIUM SERPL-SCNC: 140 MMOL/L (ref 135–145)
VANCOMYCIN TROUGH SERPL-MCNC: 21 UG/ML (ref 10–20)
WBC # BLD AUTO: 7.7 K/UL (ref 4.8–10.8)

## 2019-06-11 PROCEDURE — 80202 ASSAY OF VANCOMYCIN: CPT

## 2019-06-11 PROCEDURE — 700112 HCHG RX REV CODE 229: Performed by: INTERNAL MEDICINE

## 2019-06-11 PROCEDURE — 700102 HCHG RX REV CODE 250 W/ 637 OVERRIDE(OP): Performed by: INTERNAL MEDICINE

## 2019-06-11 PROCEDURE — 700102 HCHG RX REV CODE 250 W/ 637 OVERRIDE(OP): Performed by: STUDENT IN AN ORGANIZED HEALTH CARE EDUCATION/TRAINING PROGRAM

## 2019-06-11 PROCEDURE — A9270 NON-COVERED ITEM OR SERVICE: HCPCS | Performed by: INTERNAL MEDICINE

## 2019-06-11 PROCEDURE — 80048 BASIC METABOLIC PNL TOTAL CA: CPT

## 2019-06-11 PROCEDURE — 99232 SBSQ HOSP IP/OBS MODERATE 35: CPT | Mod: GC | Performed by: HOSPITALIST

## 2019-06-11 PROCEDURE — A9270 NON-COVERED ITEM OR SERVICE: HCPCS | Performed by: STUDENT IN AN ORGANIZED HEALTH CARE EDUCATION/TRAINING PROGRAM

## 2019-06-11 PROCEDURE — 97112 NEUROMUSCULAR REEDUCATION: CPT

## 2019-06-11 PROCEDURE — 97530 THERAPEUTIC ACTIVITIES: CPT

## 2019-06-11 PROCEDURE — 700105 HCHG RX REV CODE 258: Performed by: INTERNAL MEDICINE

## 2019-06-11 PROCEDURE — 97116 GAIT TRAINING THERAPY: CPT

## 2019-06-11 PROCEDURE — 700111 HCHG RX REV CODE 636 W/ 250 OVERRIDE (IP): Performed by: STUDENT IN AN ORGANIZED HEALTH CARE EDUCATION/TRAINING PROGRAM

## 2019-06-11 PROCEDURE — 700111 HCHG RX REV CODE 636 W/ 250 OVERRIDE (IP): Performed by: INTERNAL MEDICINE

## 2019-06-11 PROCEDURE — 770001 HCHG ROOM/CARE - MED/SURG/GYN PRIV*

## 2019-06-11 PROCEDURE — 85025 COMPLETE CBC W/AUTO DIFF WBC: CPT

## 2019-06-11 PROCEDURE — 700105 HCHG RX REV CODE 258

## 2019-06-11 PROCEDURE — 99232 SBSQ HOSP IP/OBS MODERATE 35: CPT | Performed by: INTERNAL MEDICINE

## 2019-06-11 RX ORDER — SODIUM CHLORIDE 9 MG/ML
INJECTION, SOLUTION INTRAVENOUS
Status: COMPLETED
Start: 2019-06-11 | End: 2019-06-11

## 2019-06-11 RX ADMIN — DOCUSATE SODIUM 100 MG: 100 CAPSULE, LIQUID FILLED ORAL at 05:45

## 2019-06-11 RX ADMIN — QUETIAPINE FUMARATE 25 MG: 25 TABLET ORAL at 16:44

## 2019-06-11 RX ADMIN — CEFEPIME 2 G: 2 INJECTION, POWDER, FOR SOLUTION INTRAVENOUS at 05:52

## 2019-06-11 RX ADMIN — VANCOMYCIN HYDROCHLORIDE 1500 MG: 100 INJECTION, POWDER, LYOPHILIZED, FOR SOLUTION INTRAVENOUS at 04:44

## 2019-06-11 RX ADMIN — VANCOMYCIN HYDROCHLORIDE 1500 MG: 100 INJECTION, POWDER, LYOPHILIZED, FOR SOLUTION INTRAVENOUS at 20:09

## 2019-06-11 RX ADMIN — POLYETHYLENE GLYCOL 3350 1 PACKET: 17 POWDER, FOR SOLUTION ORAL at 16:44

## 2019-06-11 RX ADMIN — POTASSIUM CHLORIDE 40 MEQ: 1500 TABLET, EXTENDED RELEASE ORAL at 16:44

## 2019-06-11 RX ADMIN — DOCUSATE SODIUM 100 MG: 100 CAPSULE, LIQUID FILLED ORAL at 18:00

## 2019-06-11 RX ADMIN — SODIUM CHLORIDE: 9 INJECTION, SOLUTION INTRAVENOUS at 12:45

## 2019-06-11 RX ADMIN — QUETIAPINE FUMARATE 25 MG: 25 TABLET ORAL at 05:45

## 2019-06-11 RX ADMIN — ACETAMINOPHEN 650 MG: 325 TABLET, FILM COATED ORAL at 14:26

## 2019-06-11 RX ADMIN — ENOXAPARIN SODIUM 40 MG: 100 INJECTION SUBCUTANEOUS at 05:46

## 2019-06-11 RX ADMIN — METRONIDAZOLE 500 MG: 500 TABLET, FILM COATED ORAL at 14:26

## 2019-06-11 RX ADMIN — FERROUS SULFATE TAB 325 MG (65 MG ELEMENTAL FE) 325 MG: 325 (65 FE) TAB at 09:16

## 2019-06-11 RX ADMIN — VANCOMYCIN HYDROCHLORIDE 1500 MG: 100 INJECTION, POWDER, LYOPHILIZED, FOR SOLUTION INTRAVENOUS at 12:31

## 2019-06-11 RX ADMIN — POLYETHYLENE GLYCOL 3350 1 PACKET: 17 POWDER, FOR SOLUTION ORAL at 05:46

## 2019-06-11 RX ADMIN — METRONIDAZOLE 500 MG: 500 TABLET, FILM COATED ORAL at 21:21

## 2019-06-11 RX ADMIN — CEFEPIME 2 G: 2 INJECTION, POWDER, FOR SOLUTION INTRAVENOUS at 21:23

## 2019-06-11 RX ADMIN — CEFEPIME 2 G: 2 INJECTION, POWDER, FOR SOLUTION INTRAVENOUS at 15:13

## 2019-06-11 RX ADMIN — SENNOSIDES,DOCUSATE SODIUM 1 TABLET: 8.6; 5 TABLET, FILM COATED ORAL at 21:22

## 2019-06-11 RX ADMIN — METRONIDAZOLE 500 MG: 500 TABLET, FILM COATED ORAL at 05:45

## 2019-06-11 RX ADMIN — POTASSIUM CHLORIDE 40 MEQ: 1500 TABLET, EXTENDED RELEASE ORAL at 05:46

## 2019-06-11 ASSESSMENT — COGNITIVE AND FUNCTIONAL STATUS - GENERAL
STANDING UP FROM CHAIR USING ARMS: A LITTLE
SUGGESTED CMS G CODE MODIFIER MOBILITY: CJ
MOBILITY SCORE: 20
WALKING IN HOSPITAL ROOM: A LITTLE
CLIMB 3 TO 5 STEPS WITH RAILING: A LITTLE
MOVING FROM LYING ON BACK TO SITTING ON SIDE OF FLAT BED: A LITTLE

## 2019-06-11 ASSESSMENT — ENCOUNTER SYMPTOMS
NAUSEA: 0
ABDOMINAL PAIN: 0
VOMITING: 0
FEVER: 0
DIARRHEA: 0
SENSORY CHANGE: 1
HEADACHES: 0
CHILLS: 0

## 2019-06-11 ASSESSMENT — GAIT ASSESSMENTS
GAIT LEVEL OF ASSIST: SUPERVISED
DISTANCE (FEET): 500

## 2019-06-11 NOTE — THERAPY
"Physical Therapy Treatment completed.   Bed Mobility:  Supine to Sit: Supervised  Transfers: Sit to Stand: Supervised  Gait: Level Of Assist: Supervised with No Equipment Needed       Discharge Recommendations: Equipment: No Equipment Needed.     See \"Rehab Therapy-Acute\" Patient Summary Report for complete documentation.     Pt appears to be at his baseline mobility wise. He is able to perform all mobility at a SPV level. He was able to ambulate 500 feet w/out an AD and perform stairs. Pt demonstrating righting reactions w/ tests in balance. Pt is most limited by his cognition as he had absolutely no wayfinding ability even w/ max cues to get back to his room. As pt is at his baseline physically, will put him on a monitor status for DC needs. Pt will require 24/7 SPV at DC d/t his cognition. Recommend he continue to get up and ambulate w/ nursing staff.  "

## 2019-06-11 NOTE — PROGRESS NOTES
"Pharmacy Kinetics 54 y.o. male on vancomycin day # 7   (restart: had been on x 7 days ending )   2019    Currently on Vancomycin 1500 mg iv q8hr   (0330, 1130, 1930)     Indication for Treatment: brain abscess      Pertinent history per medical record: Admitted on 2019 for confusion for the past week and a half.  He recently completed treatment with cefdinir for otitis media.  PMH of methamphetamine abuse.  ID and Neurosurgery are consulting. S/p craniotomy.  Pathology specimen collected in OR and sent to [place]. Per ID - will treat it like a brain abscess in view of findings in the OR and pathology being nonspecific gliosis; re-start vancomycin; aim for ~6 weeks of abx (end ).      Other antibiotics: cefepime 2g IV q8h, metronidazole 500mg PO q8h     Allergies: Pcn [penicillins] and Sulfa drugs      List concerns for renal function: BUN/SCr ratio > 20:1, BMI 31, long term vanco dosing     Pertinent cultures to date:   19 brain abscess: no organisms seen  19 blood-peripheral x 2:  negative     MRSA nares swab if pneumonia is a concern (ordered/positive/negative/n-a): n/a    Recent Labs      06/10/19   0620  19   0330   WBC  8.7  7.7   NEUTSPOLYS  72.60*  70.70     Recent Labs      06/10/19   0620  19   0330   BUN  12  16   CREATININE  0.65  0.64   ALBUMIN  3.7   --      Recent Labs      19   0330   VANCOTROUGH  21.0*   No intake or output data in the 24 hours ending 19 0808   /72   Pulse 61   Temp 36.6 °C (97.8 °F) (Temporal)   Resp 16   Ht 1.803 m (5' 11\")   Wt 101.3 kg (223 lb 5.2 oz)   SpO2 97%  Temp (24hrs), Av.8 °C (98.2 °F), Min:36.6 °C (97.8 °F), Max:37.1 °C (98.7 °F)      A/P   1. Vancomycin dose change: none  2. Next vancomycin level: ~5-6 days  (not ordered)  3. Goal trough: 18-22 mcg/mL  4. Comments: No leukocytosis and afebrile. Renal function appears stable per renal indices. Trough level is at goal. Continue current dose. Recommend a " repeat trough in a few days to assess accumulation and dose appropriateness. Pharmacy to continue to follow ID recommendations.      Lea Conner, PharmD., BCPS

## 2019-06-11 NOTE — PROGRESS NOTES
Neurosurgery Progress Note    Subjective:  No acute events. Sleeping, rouses easily to voice. Denies ha, feels tired.     Exam:   A&O x3. Fluent speech  4 PERRL, EOMI. Denies blurry or double vision.   Tongue midline without fasciculation. No facial droop.   No drift. Unremarkable finger-nose, SHAUNA testing.   Hearing intact.   Strength: Bilateral shoulder shrug, bicep, tricep, deltoid,  5/5.                   Bilateral IP, DF, PF 5/5.   Sensation: Intact throughout.   Incision: healing well. Open to air. Staples removed  Eating, drinking. Denies n/v.  Voiding.   No pain  Ambulatory.    CT head good evacuation left temporal cyst with persistent cerebral edema    BP  Min: 106/72  Max: 125/73  Pulse  Av.3  Min: 61  Max: 86  Resp  Av.5  Min: 16  Max: 17  Temp  Av.7 °C (98.1 °F)  Min: 36.6 °C (97.8 °F)  Max: 37.1 °C (98.7 °F)  SpO2  Av.3 %  Min: 91 %  Max: 97 %    No Data Recorded    Recent Labs      06/10/19   0620  19   0330   WBC  8.7  7.7   RBC  5.36  5.27   HEMOGLOBIN  12.8*  12.5*   HEMATOCRIT  42.0  41.3*   MCV  78.4*  78.4*   MCH  23.9*  23.7*   MCHC  30.5*  30.3*   RDW  57.4*  58.1*   PLATELETCT  231  220   MPV  10.2  10.6     Recent Labs      06/10/19   0620  19   0330   SODIUM  139  140   POTASSIUM  4.0  4.1   CHLORIDE  108  108   CO2  25  23   GLUCOSE  113*  115*   BUN  12  16   CREATININE  0.65  0.64   CALCIUM  8.9  9.0               Intake/Output       06/10/19 0700 - 19 - 19 Total  Total       Intake    Total Intake -- -- -- -- -- --       Output    Urine  --  -- --  --  -- --    Number of Times Voided 2 x -- 2 x -- -- --    Total Output -- -- -- -- -- --       Net I/O     -- -- -- -- -- --          No intake or output data in the 24 hours ending 19 0938         • potassium chloride SA  40 mEq BID   • cefepime  2 g Q8HRS   • metroNIDAZOLE  500 mg Q8HRS   • MD Alert...Vancomycin per  Pharmacy   PHARMACY TO DOSE   • enoxaparin  40 mg DAILY   • vancomycin  15 mg/kg Q8HR   • QUEtiapine  25 mg BID   • oxyCODONE immediate-release  5 mg Q6HRS PRN   • ferrous sulfate  325 mg QDAY with Breakfast   • haloperidol  1-5 mg Q4HRS PRN   • acetaminophen  650 mg Q8HRS PRN   • polyethylene glycol/lytes  1 Packet BID   • docusate sodium  100 mg BID   • senna-docusate  1 Tab Nightly   • cloNIDine  0.1 mg Q4HRS PRN   • magnesium hydroxide  30 mL QDAY PRN   • senna-docusate  1 Tab Q24HRS PRN   • Respiratory Care per Protocol   Continuous RT   • Pharmacy Consult Request  1 Each PHARMACY TO DOSE   • MD ALERT...DO NOT ADMINISTER NSAIDS or ASPIRIN unless ORDERED By Neurosurgery  1 Each PRN   • ondansetron  4 mg Q4HRS PRN   • scopolamine  1 Patch Q72HRS PRN   • labetalol  10 mg Q HOUR PRN   • hydrALAZINE  10 mg Q HOUR PRN   • bisacodyl  10 mg Q24HRS PRN   • fleet  1 Each Once PRN       Assessment and Plan:  Hospital day #21 left temporal peripherally enhancing cystic lesion  POD #20 left temporal cranitomy, cyst evacuation/biopsy  Prophylactic anticoagulation: no         Start date/time: pt mobile, ambulatory      Plan:   Stable neuro exam  Final pathology from [place] mild reactive gliosis  ID plan for PICC line and IV antibiotics x 6 weeks, through 7/6/2019  Pt clear from NSG standpoint to dc/transfer  NSG will sign off at this time. Please call with any questions.

## 2019-06-11 NOTE — PROGRESS NOTES
Infectious Disease Progress Note    Author: Roxana Dorado M.D. Date & Time of service: 6/11/2019  11:21 AM    Chief Complaint:  Follow-up for brain mass     Interval History:  53 y.o. male  admitted 5/21/2019 for AMS due to above. +meth abuse.  Recently in the ED on May 12 and was diagnosed with acute suppurative otitis media  5/24 AF WBC 12.6 extubated this am-pain controlled-decreased strength RUE +delerium  5/25 AF improved strength RUE-intermittently combative and cooperative  5/26 AF WBC 9.7 drain removed-somnolent this am-per RN strength continues to improve  5/27 AF had HA earlier but now improved-no new neurologic deficits. Plan for C/A/P today  5/28 AF WBC 13.4 remains alert-strength improved. Denies SE abx. HA controlled  5/29 AF WBC 12.2 sitting up to chair-no new complaints  5/30 AF WBC 17 denies any worsening HA, visual changes, N/V/D, abd pain  5/31/2019-no fevers.  Complains of some headache.  No new issues overnight.  WBC is 14.5  6/1/2019 no fevers.  No new issues.  Pathology is still not back.  6/5- no fevers. The path has come back positive for gliosis.  Denies any new complaints.  6/6/2019 no fevers.  No new issues overnight.  Tolerating the antibiotics  6/7 afebrile WBC 7.8.  Patient denies any headaches or neck pain.  He is tolerating IV antibiotics without any issues.  No diarrhea.  Family requesting home IV antibiotics if feasible.  6/8 afebrile WBC 7.5 patient denies any pain.  He had a PICC line placed yesterday without any complications  6/9 afebrile patient continues to be a poor historian and is forgetful.  He denies any pain.  Father and stepmother at bedside and agree with SNF placement.  Prior to this infection, father notes that he was highly functioning.  Point of care discussed with family  6/10 afebrile WBC 8.7 patient complaining of chills overnight.  He continues to have poor memory and is quite forgetful.  6/11 AF WBC 7.7 patient sleeping but arousable. Pt continues to be  denied by SNFs. No clinical changes from yesterday    Labs Reviewed    Review of Systems:  Review of Systems   Constitutional: Negative for chills and fever.   Cardiovascular: Negative for chest pain.   Gastrointestinal: Negative for abdominal pain, diarrhea, nausea and vomiting.   Genitourinary: Negative for dysuria.   Skin: Negative for rash.   Neurological: Positive for sensory change. Negative for headaches.        Hands   Limited review of systems secondary to forgetfulness and poor memory    Hemodynamics:  Temp (24hrs), Av.7 °C (98.1 °F), Min:36.6 °C (97.8 °F), Max:37.1 °C (98.7 °F)  Temperature: 36.7 °C (98 °F)  Pulse  Av.9  Min: 40  Max: 98  Blood Pressure: 125/73       Physical Exam:  Physical Exam   Constitutional: He appears well-developed. No distress.   HENT:   Head: Normocephalic.   Mouth/Throat: Oropharynx is clear and moist. No oropharyngeal exudate.   Left craniotomy surgical site well approximated and healing well.  There is no drainage or surrounding erythema     Eyes: Pupils are equal, round, and reactive to light. Conjunctivae and EOM are normal. No scleral icterus.   Neck: Neck supple.   Cardiovascular: Normal rate and regular rhythm.    No murmur heard.  Pulmonary/Chest: Effort normal. No respiratory distress. He has no wheezes. He has no rales.   Abdominal: Soft. He exhibits no distension. There is no tenderness. There is no rebound and no guarding.   Musculoskeletal: He exhibits no edema.   Right upper extremity PICC line-nontender, no surrounding erythema   Lymphadenopathy:     He has no cervical adenopathy.   Neurological: He is alert. No cranial nerve deficit. Coordination normal.   Poor memory and forgetful  Aphasia   Skin: Skin is warm. No rash noted. He is not diaphoretic. No erythema.   Psychiatric:   Flat affect   Nursing note and vitals reviewed.      Meds:    Current Facility-Administered Medications:   •  potassium chloride SA  •  cefepime  •  metroNIDAZOLE  •  MD  Alert...Vancomycin per Pharmacy  •  enoxaparin  •  vancomycin  •  QUEtiapine  •  oxyCODONE immediate-release  •  ferrous sulfate  •  haloperidol  •  acetaminophen  •  polyethylene glycol/lytes  •  docusate sodium  •  senna-docusate  •  cloNIDine  •  magnesium hydroxide  •  senna-docusate  •  Respiratory Care per Protocol  •  Pharmacy Consult Request  •  MD ALERT...DO NOT ADMINISTER NSAIDS or ASPIRIN unless ORDERED By Neurosurgery  •  ondansetron  •  scopolamine  •  labetalol  •  hydrALAZINE  •  bisacodyl  •  fleet    Labs:  Recent Labs      06/10/19   0620  06/11/19   0330   WBC  8.7  7.7   RBC  5.36  5.27   HEMOGLOBIN  12.8*  12.5*   HEMATOCRIT  42.0  41.3*   MCV  78.4*  78.4*   MCH  23.9*  23.7*   RDW  57.4*  58.1*   PLATELETCT  231  220   MPV  10.2  10.6   NEUTSPOLYS  72.60*  70.70   LYMPHOCYTES  16.00*  16.60*   MONOCYTES  8.30  8.50   EOSINOPHILS  1.50  2.20   BASOPHILS  0.90  1.20     Recent Labs      06/10/19   0620  06/11/19   0330   SODIUM  139  140   POTASSIUM  4.0  4.1   CHLORIDE  108  108   CO2  25  23   GLUCOSE  113*  115*   BUN  12  16     Recent Labs      06/10/19   0620  06/11/19   0330   ALBUMIN  3.7   --    TBILIRUBIN  0.5   --    ALKPHOSPHAT  46   --    TOTPROTEIN  6.2   --    ALTSGPT  46   --    ASTSGOT  23   --    CREATININE  0.65  0.64       Imaging:  Ct-cta Head With & W/o-post Process  1.  No large vessel occlusion, high-grade stenosis or aneurysm of the [place]. 2.  A 4 cm mass lesion in the left frontotemporal region. Imaging features favor an abscess over a solid lesion. However, MRI is needed for definitive characterization. 3.  Extensive edema associated with the mass, resulting in 10 mm left-to-right midline shift. 4.  Effacement of the left and enlargement of the right lateral ventricles may be due to developing hydrocephalus. Findings were discussed with JOSE ALTAMIRANO on 5/21/2019 8:39 PM.    Ct-cta Neck With & W/o-post Processing  Result Date: 5/21/2019 5/21/2019 8:06 PM  HISTORY/REASON FOR EXAM:  SAH suspected, initial exam TECHNIQUE/EXAM DESCRIPTION: CT angiogram of the neck with contrast. Postcontrast images were obtained of the neck from the great vessels through the skull base following the power injection of nonionic contrast at 5.0 mL/sec. Thin-section helical images were obtained with overlapping reconstruction interval. Coronal and oblique multiplanar volume reformats were generated. Cervical internal carotid artery percent stenosis is calculated using the standard method according to the NASCET criteria wherein a segment of uniform caliber mid or distal cervical internal carotid is used as the reference denominator. 3D angiographic images were reviewed on PACS.  Maximum intensity projection (MIP) images were generated and reviewed 100 mL of Omnipaque 350 nonionic contrast was injected intravenously. Low dose optimization technique was utilized for this CT exam including automated exposure control and adjustment of the mA and/or kV according to patient size. COMPARISON:  None. FINDINGS: Left-sided aortic arch with patent great vessel origins. The right common carotid artery, cervical carotid bifurcation, and cervical internal carotid artery show no significant stenosis. There is no evidence of dissection or aneurysm. The left common carotid artery, cervical carotid bifurcation, and cervical internal carotid artery show no significant stenosis. There is no evidence of dissection or aneurysm. The cervical vertebral arteries are patent bilaterally. Prominent right paratracheal node measuring 1 cm short axis, statistically reactive. Otherwise, the neck soft tissues and lung apices in the field of view are unremarkable. 3D angiographic/MIP images of the vasculature confirm the vascular findings as described above.     Unremarkable CT angiogram of the neck. No high-grade stenosis, large vessel occlusion, aneurysm or dissection.    [facility]-stealth Brain With & W/o  Result Date:  5/22/2019  COMPARISON:  CTA of the head 5/21/2019 FINDINGS: No fiducial markers are appreciated. The calvaria are unremarkable. There are no extra-axial fluid collections. The left temporal lobe, there is a thick-walled ring-enhancing mass which measures about 47 mm x 32 mm x 43 mm. Central hypoenhancement may represent tumor necrosis or cystic change. There is marked surrounding vasogenic edema extending into the basal ganglia, subinsular white matter, internal capsule, corona radiata, and left frontal deep white matter. There is effacement of the left lateral ventricle and moderate dilatation of the right lateral ventricle including the temporal horn due to trapping. There is left-to-right shift of midline structures of about 11 mm (T2 axial image 54, series 3). There are no other enhancing lesions. The brainstem and posterior fossa structures are unremarkable for marked compression of the midbrain with flattening of the left cerebral peduncle and effacement of perimesencephalic cisterns. There is some vasogenic edema extending down into the left side of the midbrain as well as the left thalamus and subthalamic nucleus. The major vessels including the dural venous sinuses appear intact. Paranasal sinuses show moderate mucosal thickening with small air-fluid/debris levels in the maxillary antra. Moderate mucosal thickening in the sphenoid sinus and among the ethmoid air cells. Minimal mucosal thickening in the frontal air cells.     1.  Large peripherally enhancing intra-axial mass in the left temporal lobe with marked surrounding vasogenic edema. Findings are most consistent with high-grade primary brain tumor such as glioblastoma. No other enhancing lesions elsewhere to suggest metastasis, however, a solitary brain metastasis would not be excluded. Brain abscess would not be excluded. There are no diffusion weighted images to determine whether contents of this lesion show restricted diffusion. There are no prior  MRI scans for comparison. 2.  Marked left-to-right shift of midline structures along with midbrain compression and displacement.      Micro:  Results     ** No results found for the last 168 hours. **          Assessment:  Active Hospital Problems    Diagnosis   • *Brain abscess [G06.0]   • Delirium [R41.0]   • Methamphetamine abuse (HCC) [F15.10]   • S/P craniotomy [Z98.890]       Plan:  Cystic brain mass  Etiology unclear  No fevers  Leukocytosis resolved  MRI with 4.7 cm left temporal lobe mass with midline shift  S/p craniotomy 5/22/19.  Gray amorphous gelatinous material noted intraoperatively most consistent with abscess per operative report  Gram stain neg; cxs neg final  CT c/a/p neg  Blood cxs neg  HIV neg  Final report from [facility] reportedly mild reactive gliosis  Will treat like a brain abscess in view of findings in the OR and pathology being nonspecific gliosis  Continue vancomycin, cefepime and Flagyl  Monitor renal function and Vanco trough  Vanco trough 21 today  Renal function is normal and stable  Plan for 6 weeks of antibiotics through 7/6/2019  ESR 8 and CRP 0.31  Repeat MRI of the brain - no changes    Methamphetamine abuse  Will need monitored setting for IV abx   Not a candidate for PICC line and outpatient or home IV antibiotics given substance abuse    Disposition: Will need a monitored setting for IV antibiotic course given his history of methamphetamine abuse.  Plan for skilled nursing facility but currently being denied.  Patient will need 3 times weekly Vanco troughs and weekly BMP and CBC.      Okay to DC patient from ID standpoint    I have performed a physical exam and reviewed and updated ROS and plan today 6/11/2019.  In review of yesterday's note 6/10/2019, there are no changes except as documented above.

## 2019-06-12 PROCEDURE — A9270 NON-COVERED ITEM OR SERVICE: HCPCS | Performed by: INTERNAL MEDICINE

## 2019-06-12 PROCEDURE — 700105 HCHG RX REV CODE 258

## 2019-06-12 PROCEDURE — 700111 HCHG RX REV CODE 636 W/ 250 OVERRIDE (IP): Performed by: INTERNAL MEDICINE

## 2019-06-12 PROCEDURE — 700112 HCHG RX REV CODE 229: Performed by: INTERNAL MEDICINE

## 2019-06-12 PROCEDURE — 700102 HCHG RX REV CODE 250 W/ 637 OVERRIDE(OP): Performed by: INTERNAL MEDICINE

## 2019-06-12 PROCEDURE — 700105 HCHG RX REV CODE 258: Performed by: INTERNAL MEDICINE

## 2019-06-12 PROCEDURE — 770001 HCHG ROOM/CARE - MED/SURG/GYN PRIV*

## 2019-06-12 PROCEDURE — A9270 NON-COVERED ITEM OR SERVICE: HCPCS | Performed by: STUDENT IN AN ORGANIZED HEALTH CARE EDUCATION/TRAINING PROGRAM

## 2019-06-12 PROCEDURE — 99232 SBSQ HOSP IP/OBS MODERATE 35: CPT | Mod: GC | Performed by: HOSPITALIST

## 2019-06-12 PROCEDURE — 700111 HCHG RX REV CODE 636 W/ 250 OVERRIDE (IP): Performed by: STUDENT IN AN ORGANIZED HEALTH CARE EDUCATION/TRAINING PROGRAM

## 2019-06-12 PROCEDURE — 99232 SBSQ HOSP IP/OBS MODERATE 35: CPT | Performed by: INTERNAL MEDICINE

## 2019-06-12 PROCEDURE — 97535 SELF CARE MNGMENT TRAINING: CPT

## 2019-06-12 PROCEDURE — 700102 HCHG RX REV CODE 250 W/ 637 OVERRIDE(OP): Performed by: STUDENT IN AN ORGANIZED HEALTH CARE EDUCATION/TRAINING PROGRAM

## 2019-06-12 RX ORDER — SODIUM CHLORIDE 9 MG/ML
INJECTION, SOLUTION INTRAVENOUS
Status: COMPLETED
Start: 2019-06-12 | End: 2019-06-12

## 2019-06-12 RX ADMIN — VANCOMYCIN HYDROCHLORIDE 1500 MG: 100 INJECTION, POWDER, LYOPHILIZED, FOR SOLUTION INTRAVENOUS at 12:07

## 2019-06-12 RX ADMIN — METRONIDAZOLE 500 MG: 500 TABLET, FILM COATED ORAL at 20:10

## 2019-06-12 RX ADMIN — METRONIDAZOLE 500 MG: 500 TABLET, FILM COATED ORAL at 06:31

## 2019-06-12 RX ADMIN — POTASSIUM CHLORIDE 40 MEQ: 1500 TABLET, EXTENDED RELEASE ORAL at 16:49

## 2019-06-12 RX ADMIN — VANCOMYCIN HYDROCHLORIDE 1500 MG: 100 INJECTION, POWDER, LYOPHILIZED, FOR SOLUTION INTRAVENOUS at 03:13

## 2019-06-12 RX ADMIN — DOCUSATE SODIUM 100 MG: 100 CAPSULE, LIQUID FILLED ORAL at 18:00

## 2019-06-12 RX ADMIN — SODIUM CHLORIDE: 9 INJECTION, SOLUTION INTRAVENOUS at 11:00

## 2019-06-12 RX ADMIN — ENOXAPARIN SODIUM 40 MG: 100 INJECTION SUBCUTANEOUS at 06:31

## 2019-06-12 RX ADMIN — METRONIDAZOLE 500 MG: 500 TABLET, FILM COATED ORAL at 16:48

## 2019-06-12 RX ADMIN — POTASSIUM CHLORIDE 40 MEQ: 1500 TABLET, EXTENDED RELEASE ORAL at 06:31

## 2019-06-12 RX ADMIN — FERROUS SULFATE TAB 325 MG (65 MG ELEMENTAL FE) 325 MG: 325 (65 FE) TAB at 06:31

## 2019-06-12 RX ADMIN — CEFEPIME 2 G: 2 INJECTION, POWDER, FOR SOLUTION INTRAVENOUS at 16:49

## 2019-06-12 RX ADMIN — CEFEPIME 2 G: 2 INJECTION, POWDER, FOR SOLUTION INTRAVENOUS at 23:15

## 2019-06-12 RX ADMIN — POLYETHYLENE GLYCOL 3350 1 PACKET: 17 POWDER, FOR SOLUTION ORAL at 06:00

## 2019-06-12 RX ADMIN — CEFEPIME 2 G: 2 INJECTION, POWDER, FOR SOLUTION INTRAVENOUS at 06:00

## 2019-06-12 RX ADMIN — SENNOSIDES,DOCUSATE SODIUM 1 TABLET: 8.6; 5 TABLET, FILM COATED ORAL at 20:11

## 2019-06-12 RX ADMIN — QUETIAPINE FUMARATE 25 MG: 25 TABLET ORAL at 16:49

## 2019-06-12 RX ADMIN — QUETIAPINE FUMARATE 25 MG: 25 TABLET ORAL at 06:31

## 2019-06-12 RX ADMIN — DOCUSATE SODIUM 100 MG: 100 CAPSULE, LIQUID FILLED ORAL at 06:31

## 2019-06-12 RX ADMIN — POLYETHYLENE GLYCOL 3350 1 PACKET: 17 POWDER, FOR SOLUTION ORAL at 16:49

## 2019-06-12 RX ADMIN — VANCOMYCIN HYDROCHLORIDE 1500 MG: 100 INJECTION, POWDER, LYOPHILIZED, FOR SOLUTION INTRAVENOUS at 20:10

## 2019-06-12 ASSESSMENT — ENCOUNTER SYMPTOMS
FEVER: 0
VOMITING: 0
NAUSEA: 0
CHILLS: 0
HEADACHES: 0
ABDOMINAL PAIN: 0
DIARRHEA: 0
SENSORY CHANGE: 1

## 2019-06-12 ASSESSMENT — COGNITIVE AND FUNCTIONAL STATUS - GENERAL
DRESSING REGULAR UPPER BODY CLOTHING: A LITTLE
DAILY ACTIVITIY SCORE: 18
TOILETING: A LITTLE
SUGGESTED CMS G CODE MODIFIER DAILY ACTIVITY: CK
PERSONAL GROOMING: A LITTLE
DRESSING REGULAR LOWER BODY CLOTHING: A LITTLE
HELP NEEDED FOR BATHING: A LITTLE
EATING MEALS: A LITTLE

## 2019-06-12 NOTE — PROGRESS NOTES
"Pharmacy Kinetics 54 y.o. male on vancomycin day # 8  2019    Currently on Vancomycin 1500 mg iv q8hr   (0330, 1130, 1930)     Indication for Treatment: brain abscess      Pertinent history per medical record: Admitted on 2019 for confusion for the past week and a half.  He recently completed treatment with cefdinir for otitis media.  PMH of methamphetamine abuse.  ID and Neurosurgery are consulting. S/p craniotomy.  Pathology specimen collected in OR and sent to [facility]. Per ID - will treat it like a brain abscess in view of findings in the OR and pathology being nonspecific gliosis; re-start vancomycin; aim for ~6 weeks of abx (end ).      Other antibiotics: cefepime 2g IV q8h, metronidazole 500mg PO q8h     Allergies: Pcn [penicillins] and Sulfa drugs      List concerns for renal function: BUN/SCr ratio > 20:1, BMI 31, long term vanco dosing     Pertinent cultures to date:   19 brain abscess: no organisms seen  19 blood-peripheral x 2:  negative     MRSA nares swab if pneumonia is a concern (ordered/positive/negative/n-a): n/a    Recent Labs      06/10/19   0620  19   0330   WBC  8.7  7.7   NEUTSPOLYS  72.60*  70.70     Recent Labs      06/10/19   0620  19   0330   BUN  12  16   CREATININE  0.65  0.64   ALBUMIN  3.7   --      Recent Labs      19   0330   VANCOTROUGH  21.0*   No intake or output data in the 24 hours ending 19 0818   /70   Pulse 74   Temp 37 °C (98.6 °F) (Temporal)   Resp 17   Ht 1.803 m (5' 11\")   Wt 101.3 kg (223 lb 5.2 oz)   SpO2 93%  Temp (24hrs), Av.9 °C (98.4 °F), Min:36.6 °C (97.9 °F), Max:37 °C (98.6 °F)      A/P   1. Vancomycin dose change: none  2. Next vancomycin level: ~4-5 days  (not ordered)  3. Goal trough: 18-22 mcg/mL  4. Comments: NNL to assess WBC or renal function. No I&O's to assess. Continue current dose. Recommend a repeat trough in a few days to assess accumulation and dose appropriateness. Pharmacy to continue " to follow ID recommendations. Anticipate IV abx through 7/6.       Lea Conner, PharmD., BCPS

## 2019-06-12 NOTE — PROGRESS NOTES
Internal Medicine Interval Note    Note Author: Em Savage M.D.      Name Robert CALDERON 1965   Age 54 y.o. male   MRN [id number]   Code Status Full Code     After 5 PM or if no immediate response to page, please call for cross-coverage.    Attending: Dr. Aguilera  Team: Blue See patient list for primary contact information.  Call [phone] to page.    1st Call - Day Intern, R1  Dr. Savage 2nd Call - Day Sr. Resident, R2-R3  Dr. Montelongo       Principal Problem  Brain mass concerning for infection vs. malignancy      Interval History  No acute events overnight.  Resting comfortably in bed, still with short-term memory loss and expressive aphasia, denies any complaints.  Scheduled to undergo repeat craniotomy on 19 per NSG follow-up.  Attempting to contact NSG RN regarding when to hold anticoagulation prior to the patient's surgery.  Continuing to treat for brain abscess with vancomycin, cefepime and Flagyl for 6 weeks through 2019 per ID.    The patient's mother, Virginia, requested to meet with a physician; updated Virginia on the patient's plan of care, informed her of Neurosurgery's plan for repeat craniotomy on Monday at noon.      ROS  Constitutional: Negative for fever and chills.  HEN:Negative for sinus pain and congestion.  Throat: Negative for soreness and dysphasia.  Eyes: Negative for diplopia and pain.  Respiratory: Negative for cough and shortness of breath.  Cardiovascular: Negative for chest pain palpitations.  Gastrointestinal: Negative for constipation, diarrhea, nausea and vomiting.  Genitourinary: Negative for dysuria and hematuria.  Neurological: Positive for expressive aphasia and short-term memory loss. Negative for headache, dizziness and sensory change.  Psychiatric: Negative for depression and anxiety.      Disposition, Discharge Barriers  Inpatient for repeat craniotomy on 19. Medicaid and placement to a SNF  pending.      Consultants  Neurosurgery  Infectious Disease      Quality-Core Measures  [place] Catheter: No  Central Line: No  VTE Prophylaxis: Lovenox  Antibiotics: Flagyl, cefepime and vancomycin  Rehab: PT, OT, SLP following       Vitals  Vitals:    06/11/19 1635 06/11/19 2000 06/12/19 0400 06/12/19 0745   BP: 102/73 114/76 112/71 114/70   Pulse: 70 72 65 74   Resp: 17 16 16 17   Temp: 36.6 °C (97.9 °F) 37 °C (98.6 °F) 36.8 °C (98.3 °F) 37 °C (98.6 °F)   TempSrc: Temporal Temporal Temporal Temporal   SpO2: 96% 97% 93% 93%   Weight:       Height:           Physical Exam  General: No acute distress, cooperative.  Head: Normocephalic, left frontotemporal craniotomy healing well and without drainage or surrounding erythema.    Eyes: Normal conjunctiva, sclerae anicteric.  Throat: Oropharynx clear, oral mucosa moist.  Neck: Supple, no lymphadenopathy.  Lungs: Normal work of breathing, clear to auscultation bilaterally.  Heart: Normal rate, regular rhythm, no murmurs.  Abdomen: Soft, bowel sounds present, non-tender, no peritoneal signs.  Extremities: Nontender, no cyanosis, no edema. RUE PICC line.  Neuro: Alert, oriented to person place and time, expressive aphasia, no other focal deficits.   Skin: Warm, dry, intact, no suspicious rashes or lesions on visualized skin.      Labs and Imaging  Pertinent labs, imaging and diagnostic tests and procedures associated with this visit have been reviewed, specific comments can be found in the assessment and plan section below.     Recent Labs      06/10/19   0620  06/11/19   0330   SODIUM  139  140   POTASSIUM  4.0  4.1   CHLORIDE  108  108   CO2  25  23   BUN  12  16   CREATININE  0.65  0.64   GLUCOSE  113*  115*   CALCIUM  8.9  9.0   MAGNESIUM  1.8   --      Recent Labs      06/10/19   0620  06/11/19   0330   WBC  8.7  7.7   HEMOGLOBIN  12.8*  12.5*   HEMATOCRIT  42.0  41.3*   PLATELETCT  231  220     Recent Labs      06/10/19   0620   ALTSGPT  46   ASTSGOT  23   ALKPHOSPHAT   46   TBILIRUBIN  0.5   ALBUMIN  3.7       Assessment and Plan    Status post craniotomy   Assessment & Plan    No complications, sutures removed 6/6/2019.  Okay to resume anticoagulation per Dr. Young.     * Brain abscess   Assessment & Plan    CT head with contrast showed a fluid collection over the L frontotemporal area with mass-effect, midline shift and evidence of developing hydrocephalus; concerning for brain abscess vs. GBM.  Neurosurgery and ID following.  Underwent craniotomy on 5/22/19.    Gram stain negative, bacterial and fungal cx negative; HIV negative, blood cx negative.   Surgical specimen sig for mild reactive gliosis per [facility]'s final report.    Initially on vanc and flagyl, vanc discontinued 5/27/19 and cefepime started instead, vanc restarted per ID on 6/5/19.   Vanc trough 21.0, i.e. therapeutic, on 6/11/19.  ESR 8, CRP 0.31 on 6/7/19.   Will need a monitored setting for IV antibiotics, not a candidate for outpatient or home IV antibiotics via PICC given history of substance abuse.   Repeat MRB on 6/8/19 showed significant edema, fluid and enhancement around the patient's L temporal mass, as well as an 11 mm L to R midline shift unchaged from prior exam.  Scheduled for repeat craniotomy on 6/17/19 at 12:00 PM.    Plan:  Cefepime, metronidazole and vancomycin x 6 weeks - end date 7/6/19  Cleared by ID for discharge to SNF for rehab and IV antibiotics via PICC line; the patient will need Vanc troughs 3-times-weekly and a BMP and CBC weekly per ID.  Repeat craniotomy on 6/17/19 per NSG follow up.  Awaiting Medicaid for SNF placement.     Agitation   Assessment & Plan    Intermittently agitated and aggressive towards the staff.    Plan:  Modified Seroquel dose to 25 mg BID for better management.   Haldol PRN for agitation and aggression not managed with Seroquel.     Acute respiratory failure with hypoxia (HCC)   Assessment & Plan    Resolved.  Intubated on admission for airway  protection on 5/22/2019, extubated on 5/24/2019.  RT protocol, supplemental oxygen as needed.     Delirium   Assessment & Plan    Resolved.  On Seroquel 25 mg BID and Haldol PRN for intermittent aggression and agitation.   CTM.     Methamphetamine abuse (HCC)   Assessment & Plan    History of meth abuse via smoking per the patient's nephew, no history of IV drug use.  Substance abuse counseling prior to discharge.     Expressive aphasia   Assessment & Plan    Improving.  Still with some difficulty identifying objects and recalling current events.  Likely secondary to a lesion in the frontotemporal lobe of a left dominant brain.     Microcytic anemia   Assessment & Plan    Stable.  Fe 17, Fe Sat 4% and TIBC 386, ferritin 16.2.  Stool negative for occult blood.  No records of EGD or colonoscopy per chart review.  Started on ferrous sulfate supplementation this admission.   Stool H. pylori test negative.     Plan:  Continue ferrous sulfate daily.  Celiac panel negative.   Outpatient PCP follow-up, recommend GI cancer screening with EGD and colonoscopy.     Class 1 obesity due to excess calories without serious comorbidity with body mass index (BMI) of 33.0 to 33.9 in adult   Assessment & Plan    BMI 31.  Outpatient follow-up for weight loss management.  Concern for sleep apnea, recommend outpatient sleep study for further evaluation.     Smoker   Assessment & Plan    Smokes 3 cigarettes per day per his mother's report.  Smoking cessation counseling prior to discharge.

## 2019-06-12 NOTE — PROGRESS NOTES
Neurosurgery Progress Note    Subjective:  No acute events. Eating breakfast. No complaints of pain    Exam:   A&O x3. Fluent speech  4 PERRL, EOMI. Denies blurry or double vision.   Tongue midline without fasciculation. No facial droop.   No drift. Unremarkable finger-nose, SHAUNA testing.   Hearing intact.   Strength: Bilateral shoulder shrug, bicep, tricep, deltoid,  5/5.                   Bilateral IP, DF, PF 5/5.   Sensation: Intact throughout.   Incision: healing well. Open to air. Staples removed  Eating, drinking. Denies n/v.  Voiding.   No pain  Ambulatory.    CT head good evacuation left temporal cyst with persistent cerebral edema    BP  Min: 102/73  Max: 114/70  Pulse  Av.3  Min: 65  Max: 74  Resp  Av.5  Min: 16  Max: 17  Temp  Av.9 °C (98.4 °F)  Min: 36.6 °C (97.9 °F)  Max: 37 °C (98.6 °F)  SpO2  Av.8 %  Min: 93 %  Max: 97 %    No Data Recorded    Recent Labs      06/10/19   0620  19   0330   WBC  8.7  7.7   RBC  5.36  5.27   HEMOGLOBIN  12.8*  12.5*   HEMATOCRIT  42.0  41.3*   MCV  78.4*  78.4*   MCH  23.9*  23.7*   MCHC  30.5*  30.3*   RDW  57.4*  58.1*   PLATELETCT  231  220   MPV  10.2  10.6     Recent Labs      06/10/19   0620  19   0330   SODIUM  139  140   POTASSIUM  4.0  4.1   CHLORIDE  108  108   CO2  25  23   GLUCOSE  113*  115*   BUN  12  16   CREATININE  0.65  0.64   CALCIUM  8.9  9.0               Intake/Output       19 - 19 - 19 Total  Total       Intake    Total Intake -- -- -- -- -- --       Output    Urine  --  -- --  --  -- --    Number of Times Voided 1 x -- 1 x -- -- --    Stool  --  -- --  --  -- --    Number of Times Stooled 2 x -- 2 x -- -- --    Total Output -- -- -- -- -- --       Net I/O     -- -- -- -- -- --          No intake or output data in the 24 hours ending 19 1017         • potassium chloride SA  40 mEq BID   • cefepime  2 g Q8HRS   •  metroNIDAZOLE  500 mg Q8HRS   • MD Alert...Vancomycin per Pharmacy   PHARMACY TO DOSE   • enoxaparin  40 mg DAILY   • vancomycin  15 mg/kg Q8HR   • QUEtiapine  25 mg BID   • oxyCODONE immediate-release  5 mg Q6HRS PRN   • ferrous sulfate  325 mg QDAY with Breakfast   • haloperidol  1-5 mg Q4HRS PRN   • acetaminophen  650 mg Q8HRS PRN   • polyethylene glycol/lytes  1 Packet BID   • docusate sodium  100 mg BID   • senna-docusate  1 Tab Nightly   • cloNIDine  0.1 mg Q4HRS PRN   • magnesium hydroxide  30 mL QDAY PRN   • senna-docusate  1 Tab Q24HRS PRN   • Respiratory Care per Protocol   Continuous RT   • Pharmacy Consult Request  1 Each PHARMACY TO DOSE   • MD ALERT...DO NOT ADMINISTER NSAIDS or ASPIRIN unless ORDERED By Neurosurgery  1 Each PRN   • ondansetron  4 mg Q4HRS PRN   • scopolamine  1 Patch Q72HRS PRN   • labetalol  10 mg Q HOUR PRN   • hydrALAZINE  10 mg Q HOUR PRN   • bisacodyl  10 mg Q24HRS PRN   • fleet  1 Each Once PRN       Assessment and Plan:  Hospital day #22 left temporal peripherally enhancing cystic lesion  POD #21 left temporal cranitomy, cyst evacuation/biopsy  Prophylactic anticoagulation: no         Start date/time: pt mobile, ambulatory      Plan:   Stable neuro exam  Final pathology from [facility] mild reactive gliosis  ID plan for PICC line and IV antibiotics x 6 weeks, through 7/6/2019    Patient is on OR schedule for Monday 6/17/2019 at noon for repeat craniotomy to drain recurrent left temporal cyst  NPO Sunday at midnight  CBC, CMP, PT/INR, COD Monday morning  MRI brain stealth protocol Monday 10:00 am

## 2019-06-12 NOTE — PROGRESS NOTES
Infectious Disease Progress Note    Author: Roxana Dorado M.D. Date & Time of service: 6/12/2019  9:55 AM    Chief Complaint:  Follow-up for brain mass     Interval History:  53 y.o. male  admitted 5/21/2019 for AMS due to above. +meth abuse.  Recently in the ED on May 12 and was diagnosed with acute suppurative otitis media. S/p craniotomy 5/22/19.  Gray amorphous gelatinous material noted intraoperatively most consistent with abscess per operative report.  Pathology report+ mild reactive gliosis.    6/1/2019 no fevers.  No new issues.  Pathology is still not back.  6/5- no fevers. The path has come back positive for gliosis.  Denies any new complaints.  6/6/2019 no fevers.  No new issues overnight.  Tolerating the antibiotics  6/7 afebrile WBC 7.8.  Patient denies any headaches or neck pain.  He is tolerating IV antibiotics without any issues.  No diarrhea.  Family requesting home IV antibiotics if feasible.  6/8 afebrile WBC 7.5 patient denies any pain.  He had a PICC line placed yesterday without any complications  6/9 afebrile patient continues to be a poor historian and is forgetful.  He denies any pain.  Father and stepmother at bedside and agree with SNF placement.  Prior to this infection, father notes that he was highly functioning.  Point of care discussed with family  6/10 afebrile WBC 8.7 patient complaining of chills overnight.  He continues to have poor memory and is quite forgetful.  6/11 AF WBC 7.7 patient sleeping but arousable. Pt continues to be denied by SNFs. No clinical changes from yesterday  6/12 afebrile CBC not done today.  Patient is much more interactive verbally today than previous days.  He continues to have word finding difficulty and aphasia.  He also notes numbness in his hands.  Plan for repeat craniotomy per neurosurgery notes given results of repeat MRI    Labs Reviewed    Review of Systems:  Review of Systems   Constitutional: Negative for chills and fever.    Cardiovascular: Negative for chest pain.   Gastrointestinal: Negative for abdominal pain, diarrhea, nausea and vomiting.   Genitourinary: Negative for dysuria.   Skin: Negative for rash.   Neurological: Positive for sensory change. Negative for headaches.        Hands   Limited review of systems secondary to forgetfulness and poor memory    Hemodynamics:  Temp (24hrs), Av.9 °C (98.4 °F), Min:36.6 °C (97.9 °F), Max:37 °C (98.6 °F)  Temperature: 37 °C (98.6 °F)  Pulse  Av.1  Min: 40  Max: 98  Blood Pressure: 114/70       Physical Exam:  Physical Exam   Constitutional: He appears well-developed. No distress.   HENT:   Head: Normocephalic.   Mouth/Throat: Oropharynx is clear and moist. No oropharyngeal exudate.   Left craniotomy surgical site well approximated and healing well.  There is no drainage or surrounding erythema     Eyes: Pupils are equal, round, and reactive to light. Conjunctivae and EOM are normal. No scleral icterus.   Neck: Neck supple.   Cardiovascular: Normal rate and regular rhythm.    No murmur heard.  Pulmonary/Chest: Effort normal. No respiratory distress. He has no wheezes. He has no rales.   Abdominal: Soft. He exhibits no distension. There is no tenderness. There is no rebound and no guarding.   Musculoskeletal: He exhibits no edema.   Right upper extremity PICC line-nontender, no surrounding erythema   Lymphadenopathy:     He has no cervical adenopathy.   Neurological: He is alert. No cranial nerve deficit. Coordination normal.   Poor memory and forgetful  Aphasia  Word finding difficulty   Skin: Skin is warm. No rash noted. He is not diaphoretic. No erythema.   Psychiatric:   Flat affect   Nursing note and vitals reviewed.      Meds:    Current Facility-Administered Medications:   •  potassium chloride SA  •  cefepime  •  metroNIDAZOLE  •  MD Alert...Vancomycin per Pharmacy  •  enoxaparin  •  vancomycin  •  QUEtiapine  •  oxyCODONE immediate-release  •  ferrous sulfate  •   haloperidol  •  acetaminophen  •  polyethylene glycol/lytes  •  docusate sodium  •  senna-docusate  •  cloNIDine  •  magnesium hydroxide  •  senna-docusate  •  Respiratory Care per Protocol  •  Pharmacy Consult Request  •  MD ALERT...DO NOT ADMINISTER NSAIDS or ASPIRIN unless ORDERED By Neurosurgery  •  ondansetron  •  scopolamine  •  labetalol  •  hydrALAZINE  •  bisacodyl  •  fleet    Labs:  Recent Labs      06/10/19   0620  06/11/19   0330   WBC  8.7  7.7   RBC  5.36  5.27   HEMOGLOBIN  12.8*  12.5*   HEMATOCRIT  42.0  41.3*   MCV  78.4*  78.4*   MCH  23.9*  23.7*   RDW  57.4*  58.1*   PLATELETCT  231  220   MPV  10.2  10.6   NEUTSPOLYS  72.60*  70.70   LYMPHOCYTES  16.00*  16.60*   MONOCYTES  8.30  8.50   EOSINOPHILS  1.50  2.20   BASOPHILS  0.90  1.20     Recent Labs      06/10/19   0620  06/11/19   0330   SODIUM  139  140   POTASSIUM  4.0  4.1   CHLORIDE  108  108   CO2  25  23   GLUCOSE  113*  115*   BUN  12  16     Recent Labs      06/10/19   0620  06/11/19   0330   ALBUMIN  3.7   --    TBILIRUBIN  0.5   --    ALKPHOSPHAT  46   --    TOTPROTEIN  6.2   --    ALTSGPT  46   --    ASTSGOT  23   --    CREATININE  0.65  0.64       Imaging:  MRI of the brain on 6/8/2019  Impression       1.  Postsurgical changes adjacent to the LEFT temporal mass involving the inferior LEFT frontal lobe which is grossly unchanged in size, with edema, fluid and enhancement likely related to surgical dissection rather than spread of disease  2.  Lung mm of LEFT-to-RIGHT midline shift, unchanged with midbrain compression and displacement           Micro:  Results     Procedure Component Value Units Date/Time    AFB Culture [[id number]] Collected:  05/22/19 1700    Order Status:  Completed Specimen:  Wound from Cyst Updated:  06/12/19 0649     Significant Indicator NEG     Source WND     Site BRAIN ABSCESS     Culture Result Culture in progress.     AFB Smear Results No acid fast bacilli seen.    Narrative:       Collected By:[id number]  RADHA SAHA  Brain cyst.  Surgery Specimen    Fungal Culture [[id number]] Collected:  05/22/19 1700    Order Status:  Completed Specimen:  Wound from Cyst Updated:  06/12/19 0649     Significant Indicator NEG     Source WND     Site BRAIN ABSCESS     Culture Result No fungal growth to date    Narrative:       Collected By:[id number] RADHA SAHA  Brain cyst.  Surgery Specimen          Assessment:  Active Hospital Problems    Diagnosis   • *Brain abscess [G06.0]   • Delirium [R41.0]   • Methamphetamine abuse (HCC) [F15.10]   • S/P craniotomy [Z98.890]       Plan:  Cystic brain mass  Etiology unclear however was diagnosed with otitis media prior to admission  Neurologically with slow improvement but continues to have poor memory, aphasia and word finding difficulty  No fevers  Leukocytosis resolved  MRI with 4.7 cm left temporal lobe mass with midline shift  S/p craniotomy 5/22/19.  Gray amorphous gelatinous material noted intraoperatively most consistent with abscess per operative report  Gram stain neg; cxs neg final  CT c/a/p neg  Blood cxs neg  HIV neg  Final report from [place] reportedly - mild reactive gliosis  Will treat like a brain abscess in view of findings in the OR and pathology being nonspecific gliosis  Continue vancomycin, cefepime and Flagyl  Monitor renal function and Vanco trough  Last Vanco trough 21 on 6/11  Renal function is normal and stable  Plan for 6 weeks of antibiotics through 7/6/2019  ESR 8 and CRP 0.31  Repeat MRI of the brain - no changes  Plan for repeat craniotomy given recurrence of left temporal cyst per neurosurgery notes - TBD    Methamphetamine abuse  Will need monitored setting for IV abx   Not a candidate for PICC line and outpatient or home IV antibiotics given substance abuse    Disposition: Will need a monitored setting for IV antibiotic course given his history of methamphetamine abuse.  Plan for skilled nursing facility but currently being denied.  Patient will need 3  times weekly Vanco troughs and weekly BMP and CBC.      I have performed a physical exam and reviewed and updated ROS and plan today 6/12/2019.  In review of yesterday's note 6/11/2019, there are no changes except as documented above.

## 2019-06-12 NOTE — CARE PLAN
Problem: Communication  Goal: The ability to communicate needs accurately and effectively will improve  Pt encouraged to call for assistance.  Pt verbalizing his needs and feelings with staff.    Problem: Safety  Goal: Will remain free from falls  Pt calling staff for assistance to ambulate.  Pt is steady and free from falls/injury.    Problem: Pain Management  Goal: Pain level will decrease to patient's comfort goal  Pt reports no pain or discomfort.  Resting comfortably.

## 2019-06-12 NOTE — THERAPY
"Occupational Therapy Treatment completed with focus on ADLs, ADL transfers and patient education.  Functional Status:  SPV toileting ADL, Max Assist for safety awareness, SPV LB dressing, CGA functional mobility 2/2 pt distracted by lines.   Plan of Care: Will benefit from Occupational Therapy 3 times per week  Discharge Recommendations:  Equipment Will Continue to Assess for Equipment Needs. Post-acute therapy Discharge to a transitional care facility for continued skilled therapy services.    See \"Rehab Therapy-Acute\" Patient Summary Report for complete documentation.     Pt continues to make progress towards acute OT goals. POC updated to reflect current functional status. On this date noted delay on R UE during functional tasks. Pt continues to require max verbal cues for problem solving, sequencing and safety of higher level IADLs. Will continue to follow for Acute OT services. Recommend DC to post acute placement.   "

## 2019-06-12 NOTE — PROGRESS NOTES
Internal Medicine Interval Note    Note Author: Em Savage M.D.      Name Robert CALDERON 1965   Age 54 y.o. male   MRN [id number]   Code Status Full Code     After 5 PM or if no immediate response to page, please call for cross-coverage.    Attending: Dr. Aguilera  Team: Blue See patient list for primary contact information.  Call [phone] to page.    1st Call - Day Intern, R1  Dr. Savage 2nd Call - Day Sr. Resident, R2-R3  Dr. 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  Comfortably in bed, denies any complaints, continues to have expressive aphasia.  Repeat MRI brain with and without contrast on 2019 significant for considerable edema, fluid and enhancement around L pleural mass, as well as an 11 mm L to R midline shift unchanged from prior MRI on 2019.  Followed up with Dr. Young regarding Neurosurgery recommendations considering repeat MRB, awaiting response.  Continuing to treat for brain abscess with vancomycin, cefepime and Flagyl for 6 weeks through 2019 per ID.      ROS  Constitutional: Negative for fever and chills.  HEN:Negative for sinus pain and congestion.  Throat: Negative for soreness and dysphasia.  Eyes: Negative for diplopia and pain.  Respiratory: Negative for cough and shortness of breath.  Cardiovascular: Negative for chest pain palpitations.  Gastrointestinal: Negative for constipation, diarrhea, nausea and vomiting.  Genitourinary: Negative for dysuria and hematuria.  Neurological: Positive for expressive aphasia and short-term memory loss. Negative for headache, dizziness and sensory change.  Psychiatric: Negative for depression and anxiety.      Disposition, Discharge Barriers  Inpatient, pending insurance and placement to a skilled nursing facility.      Consultants  Neurosurgery  Infectious Disease      Quality-Core Measures  Richards Catheter: No  Central Line:  No  VTE Prophylaxis: Lovenox  Antibiotics: Flagyl, cefepime and vancomycin  Rehab: PT, OT, SLP following       Vitals  Vitals:    06/10/19 2000 06/11/19 0400 06/11/19 0715 06/11/19 1635   BP: 106/72 119/72 125/73 102/73   Pulse: 67 61 86 70   Resp: 16 16 17 17   Temp: 36.7 °C (98 °F) 36.6 °C (97.8 °F) 36.7 °C (98 °F) 36.6 °C (97.9 °F)   TempSrc: Temporal Temporal Temporal Temporal   SpO2: 94% 97% 95% 96%   Weight:       Height:           Physical Exam  General: No acute distress, cooperative.  Head: Normocephalic, left frontotemporal craniotomy well opposed and without drainage or surrounding erythema.    Eyes: Normal conjunctiva, sclerae anicteric.  Throat: Oropharynx clear, oral mucosa moist.  Neck: Supple, no lymphadenopathy.  Lungs: Normal work of breathing, clear to auscultation bilaterally.  Heart: Normal rate, regular rhythm, no murmurs.  Abdomen: Soft, bowel sounds present, non-tender, no peritoneal signs.  Extremities: Nontender, no cyanosis, no edema. RUE PICC line.  Neuro: Alert, oriented to person place and time, expressive aphasia, no other focal deficits.   Skin: Warm, dry, intact, no suspicious rashes or lesions on visualized skin.        Labs and Imaging  Pertinent labs, imaging and diagnostic tests and procedures associated with this visit have been reviewed, specific comments can be found in the assessment and plan section below.     Recent Labs      06/10/19   0620 06/11/19   0330   SODIUM  139  140   POTASSIUM  4.0  4.1   CHLORIDE  108  108   CO2  25  23   BUN  12  16   CREATININE  0.65  0.64   GLUCOSE  113*  115*   CALCIUM  8.9  9.0   MAGNESIUM  1.8   --      Recent Labs      06/10/19   0620  06/11/19   0330   WBC  8.7  7.7   HEMOGLOBIN  12.8*  12.5*   HEMATOCRIT  42.0  41.3*   PLATELETCT  231  220     Recent Labs      06/10/19   0620   ALTSGPT  46   ASTSGOT  23   ALKPHOSPHAT  46   TBILIRUBIN  0.5   ALBUMIN  3.7       Assessment and Plan    Status post craniotomy   Assessment & Plan    No  complications, sutures removed 6/6/2019.  Okay to resume anticoagulation per Dr. Young.     * Brain abscess   Assessment & Plan    CT head with contrast showed a fluid collection over the L frontotemporal area with mass-effect, midline shift and evidence of developing hydrocephalus.  Concerning for brain abscess vs. GBM.  Neurosurgery consulted, s.p. craniotomy on 5/22/19, specimen sent to [facility] for pathology.  ID on board, initially on vanc and flagyl, vanc discontinued 5/27/19 and cefepime started instead, vanc restarted per ID on 6/5/19.   Gram stain negative, bacterial and fungal cx negative, HIV negative, blood cx negative.     Mild reactive gliosis per [facility]'s final report.   Vanc trough 18.6, i.e. therapeutic, on 6/7/19.  ESR 8, CRP 0.31 on 6/7/19, no prior results for comparison.   Will need a monitored setting for IV antibiotics, not a candidate for outpatient or home IV antibiotics via PICC given history of substance abuse.   Repeat MRB with and without contrast on 6/8/19 showed significant edema, fluid and enhancement around the patient's L temporal mass, as well as an 11 mm L to R midline shift unchaged from prior exam.    Plan:  Cefepime, metronidazole and vancomycin x 6 weeks - end date 7/6/19  Cleared by ID for discharge to SNF for rehab and IV antibiotics via PICC line; the patient will need Vanc troughs 3-times-weekly and a BMP and CBC weekly per ID.  NSG to review 6/8/19 MRI and follow-up on recommendations.  Awaiting Medicaid for SNF placement.     Agitation   Assessment & Plan    Intermittently agitated and aggressive towards the staff.    Plan:  Modified Seroquel dose to 25 mg BID for better management.   Haldol PRN for agitation and aggression not managed with Seroquel.     Acute respiratory failure with hypoxia (HCC)   Assessment & Plan    Resolved.  Intubated on admission for airway protection on 5/22/2019, extubated on 5/24/2019.  RT protocol, supplemental oxygen as  needed.     Delirium   Assessment & Plan    Resolved.  On Seroquel 25 mg BID and Haldol PRN for intermittent aggression and agitation.   CTM.     Methamphetamine abuse (HCC)   Assessment & Plan    History of meth abuse via smoking per the patient's nephew, no history of IV drug use.  Substance abuse counseling prior to discharge.     Expressive aphasia   Assessment & Plan    Improving.  Still with some difficulty identifying objects and recalling current events.  Likely secondary to a lesion in the frontotemporal lobe of a left dominant brain.     Microcytic anemia   Assessment & Plan    Stable.  Fe 17, Fe Sat 4% and TIBC 386, ferritin 16.2.  Stool negative for occult blood.  No records of EGD or colonoscopy per chart review.  Started on ferrous sulfate supplementation this admission.   Stool H. pylori test negative.     Plan:  Continue ferrous sulfate daily.  Celiac panel negative.   Outpatient PCP follow-up, recommend GI cancer screening with EGD and colonoscopy.     Class 1 obesity due to excess calories without serious comorbidity with body mass index (BMI) of 33.0 to 33.9 in adult   Assessment & Plan    BMI 31.  Outpatient follow-up for weight loss management.  Concern for sleep apnea, recommend outpatient sleep study for further evaluation.     Smoker   Assessment & Plan    Smokes 3 cigarettes per day per his mother's report.  Smoking cessation counseling prior to discharge.

## 2019-06-13 LAB
ALBUMIN SERPL BCP-MCNC: 3.9 G/DL (ref 3.2–4.9)
ALBUMIN/GLOB SERPL: 1.3 G/DL
ALP SERPL-CCNC: 42 U/L (ref 30–99)
ALT SERPL-CCNC: 38 U/L (ref 2–50)
ANION GAP SERPL CALC-SCNC: 9 MMOL/L (ref 0–11.9)
ANISOCYTOSIS BLD QL SMEAR: ABNORMAL
AST SERPL-CCNC: 17 U/L (ref 12–45)
BASOPHILS # BLD AUTO: 0.9 % (ref 0–1.8)
BASOPHILS # BLD: 0.09 K/UL (ref 0–0.12)
BILIRUB SERPL-MCNC: 0.8 MG/DL (ref 0.1–1.5)
BUN SERPL-MCNC: 17 MG/DL (ref 8–22)
CALCIUM SERPL-MCNC: 9.2 MG/DL (ref 8.5–10.5)
CHLORIDE SERPL-SCNC: 106 MMOL/L (ref 96–112)
CO2 SERPL-SCNC: 25 MMOL/L (ref 20–33)
COMMENT 1642: NORMAL
CREAT SERPL-MCNC: 0.68 MG/DL (ref 0.5–1.4)
EOSINOPHIL # BLD AUTO: 0.05 K/UL (ref 0–0.51)
EOSINOPHIL NFR BLD: 0.5 % (ref 0–6.9)
ERYTHROCYTE [DISTWIDTH] IN BLOOD BY AUTOMATED COUNT: 61.6 FL (ref 35.9–50)
GLOBULIN SER CALC-MCNC: 3 G/DL (ref 1.9–3.5)
GLUCOSE SERPL-MCNC: 90 MG/DL (ref 65–99)
HCT VFR BLD AUTO: 44.6 % (ref 42–52)
HGB BLD-MCNC: 13 G/DL (ref 14–18)
HYPOCHROMIA BLD QL SMEAR: ABNORMAL
IMM GRANULOCYTES # BLD AUTO: 0.06 K/UL (ref 0–0.11)
IMM GRANULOCYTES NFR BLD AUTO: 0.6 % (ref 0–0.9)
LG PLATELETS BLD QL SMEAR: NORMAL
LYMPHOCYTES # BLD AUTO: 1.41 K/UL (ref 1–4.8)
LYMPHOCYTES NFR BLD: 14.4 % (ref 22–41)
MCH RBC QN AUTO: 23.6 PG (ref 27–33)
MCHC RBC AUTO-ENTMCNC: 29.1 G/DL (ref 33.7–35.3)
MCV RBC AUTO: 80.8 FL (ref 81.4–97.8)
MICROCYTES BLD QL SMEAR: ABNORMAL
MONOCYTES # BLD AUTO: 0.82 K/UL (ref 0–0.85)
MONOCYTES NFR BLD AUTO: 8.4 % (ref 0–13.4)
MORPHOLOGY BLD-IMP: NORMAL
NEUTROPHILS # BLD AUTO: 7.38 K/UL (ref 1.82–7.42)
NEUTROPHILS NFR BLD: 75.2 % (ref 44–72)
NRBC # BLD AUTO: 0 K/UL
NRBC BLD-RTO: 0 /100 WBC
OVALOCYTES BLD QL SMEAR: NORMAL
PLATELET # BLD AUTO: 240 K/UL (ref 164–446)
PLATELET BLD QL SMEAR: NORMAL
PMV BLD AUTO: 10.6 FL (ref 9–12.9)
POIKILOCYTOSIS BLD QL SMEAR: NORMAL
POTASSIUM SERPL-SCNC: 3.8 MMOL/L (ref 3.6–5.5)
PROT SERPL-MCNC: 6.9 G/DL (ref 6–8.2)
RBC # BLD AUTO: 5.52 M/UL (ref 4.7–6.1)
RBC BLD AUTO: PRESENT
SODIUM SERPL-SCNC: 140 MMOL/L (ref 135–145)
WBC # BLD AUTO: 9.8 K/UL (ref 4.8–10.8)

## 2019-06-13 PROCEDURE — 700111 HCHG RX REV CODE 636 W/ 250 OVERRIDE (IP): Performed by: STUDENT IN AN ORGANIZED HEALTH CARE EDUCATION/TRAINING PROGRAM

## 2019-06-13 PROCEDURE — 700102 HCHG RX REV CODE 250 W/ 637 OVERRIDE(OP): Performed by: STUDENT IN AN ORGANIZED HEALTH CARE EDUCATION/TRAINING PROGRAM

## 2019-06-13 PROCEDURE — A9270 NON-COVERED ITEM OR SERVICE: HCPCS | Performed by: INTERNAL MEDICINE

## 2019-06-13 PROCEDURE — 85025 COMPLETE CBC W/AUTO DIFF WBC: CPT

## 2019-06-13 PROCEDURE — 99233 SBSQ HOSP IP/OBS HIGH 50: CPT | Mod: GC | Performed by: HOSPITALIST

## 2019-06-13 PROCEDURE — 700102 HCHG RX REV CODE 250 W/ 637 OVERRIDE(OP): Performed by: HOSPITALIST

## 2019-06-13 PROCEDURE — A9270 NON-COVERED ITEM OR SERVICE: HCPCS | Performed by: HOSPITALIST

## 2019-06-13 PROCEDURE — 700105 HCHG RX REV CODE 258: Performed by: INTERNAL MEDICINE

## 2019-06-13 PROCEDURE — 306315 STOCKING THIGH HI XLRG REG: Performed by: HOSPITALIST

## 2019-06-13 PROCEDURE — 700111 HCHG RX REV CODE 636 W/ 250 OVERRIDE (IP): Performed by: INTERNAL MEDICINE

## 2019-06-13 PROCEDURE — 700111 HCHG RX REV CODE 636 W/ 250 OVERRIDE (IP): Performed by: NURSE PRACTITIONER

## 2019-06-13 PROCEDURE — 700102 HCHG RX REV CODE 250 W/ 637 OVERRIDE(OP): Performed by: INTERNAL MEDICINE

## 2019-06-13 PROCEDURE — A9270 NON-COVERED ITEM OR SERVICE: HCPCS | Performed by: STUDENT IN AN ORGANIZED HEALTH CARE EDUCATION/TRAINING PROGRAM

## 2019-06-13 PROCEDURE — 770001 HCHG ROOM/CARE - MED/SURG/GYN PRIV*

## 2019-06-13 PROCEDURE — 700112 HCHG RX REV CODE 229: Performed by: INTERNAL MEDICINE

## 2019-06-13 PROCEDURE — 99232 SBSQ HOSP IP/OBS MODERATE 35: CPT | Performed by: INTERNAL MEDICINE

## 2019-06-13 PROCEDURE — 80053 COMPREHEN METABOLIC PANEL: CPT

## 2019-06-13 RX ORDER — METRONIDAZOLE 500 MG/1
500 TABLET ORAL EVERY 6 HOURS
Status: DISCONTINUED | OUTPATIENT
Start: 2019-06-13 | End: 2019-06-14

## 2019-06-13 RX ORDER — DEXAMETHASONE SODIUM PHOSPHATE 4 MG/ML
4 INJECTION, SOLUTION INTRA-ARTICULAR; INTRALESIONAL; INTRAMUSCULAR; INTRAVENOUS; SOFT TISSUE EVERY 6 HOURS
Status: DISCONTINUED | OUTPATIENT
Start: 2019-06-13 | End: 2019-06-14

## 2019-06-13 RX ADMIN — ENOXAPARIN SODIUM 40 MG: 100 INJECTION SUBCUTANEOUS at 05:01

## 2019-06-13 RX ADMIN — VANCOMYCIN HYDROCHLORIDE 1500 MG: 100 INJECTION, POWDER, LYOPHILIZED, FOR SOLUTION INTRAVENOUS at 05:00

## 2019-06-13 RX ADMIN — VANCOMYCIN HYDROCHLORIDE 1500 MG: 100 INJECTION, POWDER, LYOPHILIZED, FOR SOLUTION INTRAVENOUS at 20:57

## 2019-06-13 RX ADMIN — POTASSIUM CHLORIDE 20 MEQ: 1500 TABLET, EXTENDED RELEASE ORAL at 16:25

## 2019-06-13 RX ADMIN — CEFEPIME 2 G: 2 INJECTION, POWDER, FOR SOLUTION INTRAVENOUS at 08:12

## 2019-06-13 RX ADMIN — VANCOMYCIN HYDROCHLORIDE 1500 MG: 100 INJECTION, POWDER, LYOPHILIZED, FOR SOLUTION INTRAVENOUS at 12:44

## 2019-06-13 RX ADMIN — QUETIAPINE FUMARATE 25 MG: 25 TABLET ORAL at 16:25

## 2019-06-13 RX ADMIN — CEFEPIME 2 G: 2 INJECTION, POWDER, FOR SOLUTION INTRAVENOUS at 16:26

## 2019-06-13 RX ADMIN — POLYETHYLENE GLYCOL 3350 1 PACKET: 17 POWDER, FOR SOLUTION ORAL at 16:26

## 2019-06-13 RX ADMIN — CEFEPIME 2 G: 2 INJECTION, POWDER, FOR SOLUTION INTRAVENOUS at 23:00

## 2019-06-13 RX ADMIN — FERROUS SULFATE TAB 325 MG (65 MG ELEMENTAL FE) 325 MG: 325 (65 FE) TAB at 13:02

## 2019-06-13 RX ADMIN — METRONIDAZOLE 500 MG: 500 TABLET, FILM COATED ORAL at 05:00

## 2019-06-13 RX ADMIN — OXYCODONE HYDROCHLORIDE 5 MG: 5 TABLET ORAL at 08:12

## 2019-06-13 RX ADMIN — POTASSIUM CHLORIDE 40 MEQ: 1500 TABLET, EXTENDED RELEASE ORAL at 05:00

## 2019-06-13 RX ADMIN — METRONIDAZOLE 500 MG: 500 TABLET, FILM COATED ORAL at 16:25

## 2019-06-13 RX ADMIN — QUETIAPINE FUMARATE 25 MG: 25 TABLET ORAL at 05:00

## 2019-06-13 RX ADMIN — DOCUSATE SODIUM 100 MG: 100 CAPSULE, LIQUID FILLED ORAL at 16:24

## 2019-06-13 RX ADMIN — DEXAMETHASONE SODIUM PHOSPHATE 4 MG: 4 INJECTION, SOLUTION INTRA-ARTICULAR; INTRALESIONAL; INTRAMUSCULAR; INTRAVENOUS; SOFT TISSUE at 16:25

## 2019-06-13 NOTE — DISCHARGE PLANNING
LSW met with case management supervisors re pt. Supervisors are aware of barriers to dc to a SNF. Barriers are: behaviors, care exceeds capacity, no medicaid beds and not accepting pt's insurance.

## 2019-06-13 NOTE — PROGRESS NOTES
Pharmacy Kinetics 54 y.o. male on vancomycin day # 9  6/13/2019    Currently Dose: Vancomycin 1500 mg iv q8hr (~15 mg/kg)  Received Load Dose: Yes    Indication for Treatment: brain abscess  ID Service Following: Yes    Pertinent history per medical record: Admitted on 5/21/2019 for suspected brain abscess. Noted methamphetamine abuse. Recent diagnosis of otitis media and completed course of cefdinir. Craniotomy 5/22/19. 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 ID planning on ~6 weeks antibiotics (tenative 7/6/19 EOT).    Other antibiotics: metronidazole 500 mg po q6h, cefepime 2 gm iv q8h    Allergies: Pcn [penicillins] and Sulfa drugs     List concerns for accumulation of vancomycin: age 54, BMI ~31, BUN:SCr ~20:1    Pertinent cultures to date:   Results     Procedure Component Value Units Date/Time    AFB Culture [[id number]] Collected:  05/22/19 1700    Order Status:  Completed Specimen:  Wound from Cyst Updated:  06/12/19 0649     Significant Indicator NEG     Source WND     Site BRAIN ABSCESS     Culture Result Culture in progress.     AFB Smear Results No acid fast bacilli seen.    Narrative:       Collected By:[id number] RADHA SAHA  Brain cyst.  Surgery Specimen    Fungal Culture [[id number]] Collected:  05/22/19 1700    Order Status:  Completed Specimen:  Wound from Cyst Updated:  06/12/19 0649     Significant Indicator NEG     Source WND     Site BRAIN ABSCESS     Culture Result No fungal growth to date    Narrative:       Collected By:[id number] RADHA SAHA  Brain cyst.  Surgery Specimen        MRSA nares swab if pneumonia is a concern (ordered/positive/negative/n-a): n/a    Recent Labs      06/11/19   0330   WBC  7.7   NEUTSPOLYS  70.70     Recent Labs      06/11/19   0330  06/13/19   0505   BUN  16  17   CREATININE  0.64  0.68   ALBUMIN   --   3.9     Recent Labs      06/11/19   0330   VANCOTROUGH  21.0*     Intake/Output Summary (Last 24 hours) at 06/13/19  "0727  Last data filed at 19   Gross per 24 hour   Intake              240 ml   Output                0 ml   Net              240 ml      /81   Pulse 70   Temp 37 °C (98.6 °F) (Temporal)   Resp 17   Ht 1.803 m (5' 11\")   Wt 101.3 kg (223 lb 5.2 oz)   SpO2 92%  Temp (24hrs), Av °C (98.6 °F), Min:36.9 °C (98.5 °F), Max:37 °C (98.6 °F)    Estimated Creatinine Clearance: 150.5 mL/min (by C-G formula based on SCr of 0.68 mg/dL).    A/P   1. Vancomycin dose change: not indicated   2. Next vancomycin level: ~2-3 days (not ordered)  3. Goal trough: 18-22 mcg/mL  4. Comments: VS stable. Afebrile. No new WBC. CrCl ~151 mL/min (SCr increased slightly). No new microbiology. Factors for accumulation of vancomycin noted. Most recent vancomycin trough noted and at goal range for indication. Likely to repeat vancomycin trough in ~2-3 days. BMP with AM labs. Pharmacy will continue to follow.    Teddy Orozco, PharmD  "

## 2019-06-13 NOTE — PROGRESS NOTES
Infectious Disease Progress Note    Author: Roxana Dorado M.D. Date & Time of service: 6/13/2019  11:24 AM    Chief Complaint:  Follow-up for brain mass     Interval History:  53 y.o. male  admitted 5/21/2019 for AMS due to above. +meth abuse.  Recently in the ED on May 12 and was diagnosed with acute suppurative otitis media. S/p craniotomy 5/22/19.  Gray amorphous gelatinous material noted intraoperatively most consistent with abscess per operative report.  Pathology report+ mild reactive gliosis.    6/1/2019 no fevers.  No new issues.  Pathology is still not back.  6/5- no fevers. The path has come back positive for gliosis.  Denies any new complaints.  6/6/2019 no fevers.  No new issues overnight.  Tolerating the antibiotics  6/7 afebrile WBC 7.8.  Patient denies any headaches or neck pain.  He is tolerating IV antibiotics without any issues.  No diarrhea.  Family requesting home IV antibiotics if feasible.  6/8 afebrile WBC 7.5 patient denies any pain.  He had a PICC line placed yesterday without any complications  6/9 afebrile patient continues to be a poor historian and is forgetful.  He denies any pain.  Father and stepmother at bedside and agree with SNF placement.  Prior to this infection, father notes that he was highly functioning.  Point of care discussed with family  6/10 afebrile WBC 8.7 patient complaining of chills overnight.  He continues to have poor memory and is quite forgetful.  6/11 AF WBC 7.7 patient sleeping but arousable. Pt continues to be denied by SNFs. No clinical changes from yesterday  6/12 afebrile CBC not done today.  Patient is much more interactive verbally today than previous days.  He continues to have word finding difficulty and aphasia.  He also notes numbness in his hands.  Plan for repeat craniotomy per neurosurgery notes given results of repeat MRI  6/13 afebrile WBC 9.8 sitter not bedside as patient has been impulsive and trying to pull out PICC line.  He is less  interactive and has significant word finding difficulty and aphasia today.  He is unaware that he will have surgery on 2019    Labs Reviewed    Review of Systems:  Review of Systems   Unable to perform ROS: Mental acuity       Hemodynamics:  Temp (24hrs), Av.8 °C (98.3 °F), Min:36.3 °C (97.3 °F), Max:37 °C (98.6 °F)  Temperature: 36.3 °C (97.3 °F)  Pulse  Av.3  Min: 40  Max: 98  Blood Pressure: 117/77       Physical Exam:  Physical Exam   Constitutional: He appears well-developed. No distress.   HENT:   Head: Normocephalic.   Mouth/Throat: Oropharynx is clear and moist. No oropharyngeal exudate.   Left craniotomy surgical site well approximated and healing well.  There is no drainage or surrounding erythema     Eyes: Pupils are equal, round, and reactive to light. Conjunctivae and EOM are normal. No scleral icterus.   Neck: Neck supple.   Cardiovascular: Normal rate and regular rhythm.    No murmur heard.  Pulmonary/Chest: Effort normal. No respiratory distress. He has no wheezes. He has no rales.   Abdominal: Soft. He exhibits no distension. There is no tenderness. There is no rebound and no guarding.   Musculoskeletal: He exhibits no edema.   Right upper extremity PICC line-nontender, no surrounding erythema   Lymphadenopathy:     He has no cervical adenopathy.   Neurological: He is alert. No cranial nerve deficit. Coordination normal.   Poor memory and forgetful  Aphasia  Word finding difficulty   Skin: Skin is warm. No rash noted. He is not diaphoretic. No erythema.   Psychiatric:   Flat affect   Nursing note and vitals reviewed.      Meds:    Current Facility-Administered Medications:   •  potassium chloride SA  •  cefepime  •  metroNIDAZOLE  •  MD Alert...Vancomycin per Pharmacy  •  vancomycin  •  QUEtiapine  •  oxyCODONE immediate-release  •  ferrous sulfate  •  haloperidol  •  acetaminophen  •  polyethylene glycol/lytes  •  docusate sodium  •  senna-docusate  •  cloNIDine  •  magnesium  hydroxide  •  senna-docusate  •  Respiratory Care per Protocol  •  Pharmacy Consult Request  •  MD ALERT...DO NOT ADMINISTER NSAIDS or ASPIRIN unless ORDERED By Neurosurgery  •  ondansetron  •  scopolamine  •  labetalol  •  hydrALAZINE  •  bisacodyl  •  fleet    Labs:  Recent Labs      06/11/19   0330  06/13/19   0505   WBC  7.7  9.8   RBC  5.27  5.52   HEMOGLOBIN  12.5*  13.0*   HEMATOCRIT  41.3*  44.6   MCV  78.4*  80.8*   MCH  23.7*  23.6*   RDW  58.1*  61.6*   PLATELETCT  220  240   MPV  10.6  10.6   NEUTSPOLYS  70.70  75.20*   LYMPHOCYTES  16.60*  14.40*   MONOCYTES  8.50  8.40   EOSINOPHILS  2.20  0.50   BASOPHILS  1.20  0.90   RBCMORPHOLO   --   Present     Recent Labs      06/11/19   0330  06/13/19   0505   SODIUM  140  140   POTASSIUM  4.1  3.8   CHLORIDE  108  106   CO2  23  25   GLUCOSE  115*  90   BUN  16  17     Recent Labs      06/11/19   0330  06/13/19   0505   ALBUMIN   --   3.9   TBILIRUBIN   --   0.8   ALKPHOSPHAT   --   42   TOTPROTEIN   --   6.9   ALTSGPT   --   38   ASTSGOT   --   17   CREATININE  0.64  0.68       Imaging:  MRI of the brain on 6/8/2019  Impression       1.  Postsurgical changes adjacent to the LEFT temporal mass involving the inferior LEFT frontal lobe which is grossly unchanged in size, with edema, fluid and enhancement likely related to surgical dissection rather than spread of disease  2.  Lung mm of LEFT-to-RIGHT midline shift, unchanged with midbrain compression and displacement           Micro:  Results     Procedure Component Value Units Date/Time    AFB Culture [[id number]] Collected:  05/22/19 1700    Order Status:  Completed Specimen:  Wound from Cyst Updated:  06/12/19 0649     Significant Indicator NEG     Source WND     Site BRAIN ABSCESS     Culture Result Culture in progress.     AFB Smear Results No acid fast bacilli seen.    Narrative:       Collected By:[id number] RADHA SAHA  Brain cyst.  Surgery Specimen    Fungal Culture [[id number]] Collected:  05/22/19 1700     Order Status:  Completed Specimen:  Wound from Cyst Updated:  06/12/19 0649     Significant Indicator NEG     Source WND     Site BRAIN ABSCESS     Culture Result No fungal growth to date    Narrative:       Collected By:[id number] RADHA SAHA  Brain cyst.  Surgery Specimen          Assessment:  Active Hospital Problems    Diagnosis   • *Brain abscess [G06.0]   • Delirium [R41.0]   • Methamphetamine abuse (HCC) [F15.10]   • S/P craniotomy [Z98.890]       Plan:  Cystic brain mass  Etiology unclear however was diagnosed with otitis media prior to admission  Neurologically with slow improvement but continues to have poor memory, aphasia and word finding difficulty  No fevers  Leukocytosis resolved  MRI with 4.7 cm left temporal lobe mass with midline shift  S/p craniotomy 5/22/19.  Gray amorphous gelatinous material noted intraoperatively most consistent with abscess per operative report  Gram stain neg; cxs neg final  CT c/a/p neg  Blood cxs neg  HIV neg  Final report from [place] reportedly - mild reactive gliosis  Will treat like a brain abscess in view of findings in the OR and pathology being nonspecific gliosis  Continue vancomycin, cefepime and Flagyl  Monitor renal function and Vanco trough  Last Vanco trough 21 on 6/11  Renal function is normal and stable  Plan for 6 weeks of antibiotics through 7/6/2019  ESR 8 and CRP 0.31  Repeat MRI of the brain - no changes  Plan for repeat craniotomy given recurrence of left temporal cyst per neurosurgery notes on 06/17/2019    Methamphetamine abuse  Will need monitored setting for IV abx   Not a candidate for PICC line and outpatient or home IV antibiotics given substance abuse    Disposition: Will need a monitored setting for IV antibiotic course given his history of methamphetamine abuse.  Plan for skilled nursing facility but currently being denied.  Patient will need 3 times weekly Vanco troughs and weekly BMP and CBC.      I have performed a physical exam and  reviewed and updated ROS and plan today 6/13/2019.  In review of yesterday's note 6/12/2019, there are no changes except as documented above.

## 2019-06-13 NOTE — PROGRESS NOTES
Pt unable to state last name or birth date this AM. Notified Catherine GODOY of pt increased confusion. Stated that pt scheduled for OR on Monday and she would add Decadron. Sitter at bedside for 1:1 observation d/t pt getting up without calling for assistance, pt almost ripped out PICC line as IV pole was still connected to wall when pt got up to BR this AM without assistance. Oxy given for HA. PICC line wrapped with coban to help prevent from getting pulled out.

## 2019-06-13 NOTE — PROGRESS NOTES
Internal Medicine Interval Note    Note Author: Em Savage M.D.      Name Robert CALDERON 1965   Age 54 y.o. male   MRN [id number]   Code Status Full Code     After 5 PM or if no immediate response to page, please call for cross-coverage.    Attending: Dr. Aguilera  Team: Blue See patient list for primary contact information.  Call [phone] to page.    1st Call - Day Intern, R1  Dr. Savage 2nd Call - Day Sr. Resident, R2-R3  Dr. Montelongo       Principal Problem  Brain mass concerning for infection vs. malignancy      Interval History  The patient had a gentle, assisted fall overnight without any significant injury.  The patient denied any pain and continues to report feeling well.  Discontinued Lovenox and started SCDs for DVT prophylaxis given the patient's upcoming repeat craniotomy on 19.  Continuing to treat for brain abscess with vancomycin, cefepime and Flagyl for 6 weeks through 2019 per ID.  The patient has been becoming more impulsive over time per RN, fall precautions in place.      ROS  Constitutional: Negative for fever and chills.  HEN: Negative for tinnitus and congestion.  Throat: Negative for soreness and difficulty swallowing.  Eyes: Negative for change in vision and photophobia.  Respiratory: Negative for cough and shortness of breath.  Cardiovascular: Negative for chest pain and palpitations.  Gastrointestinal: Negative for constipation, diarrhea, nausea and vomiting.  Genitourinary: Negative for dysuria and hematuria.  Neurological: Positive for expressive aphasia. Negative for headache, dizziness and sensory change.  Psychiatric: Negative for depression and insomnia.      Disposition, Discharge Barriers  Inpatient for repeat craniotomy on 19.   Medicaid and placement to a SNF pending.      Consultants  Neurosurgery  Infectious Disease      Quality-Core Measures  Richards Catheter: No  Central Line: RUE PICC line  VTE Prophylaxis: Holding Lovenox for repeat  craniotomy on 6/17/19  Antibiotics: Flagyl, cefepime and vancomycin  Rehab: PT, OT, SLP following       Vitals  Vitals:    06/12/19 1705 06/12/19 2000 06/13/19 0512 06/13/19 0715   BP: 120/81 110/77 121/81 117/77   Pulse: 78 79 70 74   Resp: 17 17 17 17   Temp: 36.9 °C (98.5 °F) 37 °C (98.6 °F) 37 °C (98.6 °F) 36.3 °C (97.3 °F)   TempSrc: Temporal Temporal Temporal Temporal   SpO2: 96% 94% 92% 96%   Weight:       Height:           Physical Exam  General: No acute distress, cooperative.  Head: Normocephalic, left frontotemporal craniotomy healing well, no discharge or surrounding erythema.    Eyes: Normal conjunctiva, sclerae anicteric.  Throat: Oropharynx clear, oral mucosa moist.  Neck: Supple, no lymphadenopathy.  Lungs: Normal work of breathing, clear to auscultation bilaterally.  Heart: Normal rate, regular rhythm, no murmurs.  Abdomen: Soft, bowel sounds present, non-tender, no peritoneal signs.  Extremities: Nontender, no cyanosis, no edema. RUE PICC line.  Neuro: Alert, oriented to person and place, expressive aphasia, no other focal deficits.   Skin: Warm, dry, intact, no suspicious rashes or lesions on visualized skin.      Labs and Imaging  Pertinent labs, imaging and diagnostic tests and procedures associated with this visit have been reviewed, specific comments can be found in the assessment and plan section below.     Recent Labs      06/11/19   0330  06/13/19   0505   SODIUM  140  140   POTASSIUM  4.1  3.8   CHLORIDE  108  106   CO2  23  25   BUN  16  17   CREATININE  0.64  0.68   GLUCOSE  115*  90   CALCIUM  9.0  9.2     Recent Labs      06/11/19   0330  06/13/19   0505   WBC  7.7  9.8   HEMOGLOBIN  12.5*  13.0*   HEMATOCRIT  41.3*  44.6   PLATELETCT  220  240     Recent Labs      06/13/19   0505   ALTSGPT  38   ASTSGOT  17   ALKPHOSPHAT  42   TBILIRUBIN  0.8   ALBUMIN  3.9       Assessment and Plan    Status post craniotomy   Assessment & Plan    No complications, sutures removed 6/6/2019.  Okay to  resume anticoagulation per Dr. Young.  Lovenox held for repeat craniotomy on 6/17/19.     * Brain abscess   Assessment & Plan    CT head with contrast showed a fluid collection over the L frontotemporal area with mass-effect, midline shift and evidence of developing hydrocephalus; concerning for brain abscess vs. GBM.  Neurosurgery and ID following.  Underwent craniotomy on 5/22/19.    Gram stain negative, bacterial and fungal cx negative; HIV negative, blood cx negative.   Surgical specimen sig for mild reactive gliosis per [facility]'s final report.    Initially on vanc and flagyl, vanc discontinued 5/27/19 and cefepime started instead, vanc restarted per ID on 6/5/19.   Vanc trough 21.0, i.e. therapeutic, on 6/11/19.  ESR 8, CRP 0.31 on 6/7/19.   Will need a monitored setting for IV antibiotics, not a candidate for outpatient or home IV antibiotics via PICC given history of substance abuse.   Repeat MRB on 6/8/19 showed significant edema, fluid and enhancement around the patient's L temporal mass, as well as an 11 mm L to R midline shift unchaged from prior exam.  Scheduled for repeat craniotomy on 6/17/19 at 12:00 PM.    Plan:  Cefepime, metronidazole and vancomycin x 6 weeks - end date 7/6/19  Cleared by ID for discharge to SNF for rehab and IV antibiotics via PICC line; the patient will need Vanc troughs 3-times-weekly and a BMP and CBC weekly per ID.  Repeat craniotomy on 6/17/19 per NSG follow up.  Awaiting Medicaid for SNF placement.     Agitation   Assessment & Plan    Intermittently agitated and aggressive towards the staff.    Plan:  Modified Seroquel dose to 25 mg BID for better management.   Haldol PRN for agitation and aggression not managed with Seroquel.     Acute respiratory failure with hypoxia (HCC)   Assessment & Plan    Resolved.  Intubated on admission for airway protection on 5/22/2019, extubated on 5/24/2019.  RT protocol, supplemental oxygen as needed.     Delirium   Assessment &  Plan    Resolved.  On Seroquel 25 mg BID and Haldol PRN for intermittent aggression and agitation.   CTM.     Methamphetamine abuse (HCC)   Assessment & Plan    History of meth abuse via smoking per the patient's nephew, no history of IV drug use.  Substance abuse counseling prior to discharge.     Expressive aphasia   Assessment & Plan    Stable.  Still with some difficulty identifying objects and recalling current events.  Likely secondary to lesion in frontotemporal lobe of left dominant brain.     Microcytic anemia   Assessment & Plan    Stable.  Fe 17, Fe Sat 4% and TIBC 386, ferritin 16.2.  Stool negative for occult blood.  No records of EGD or colonoscopy per chart review.  Started on ferrous sulfate supplementation this admission.   Stool H. pylori test negative.     Plan:  Continue ferrous sulfate daily.  Celiac panel negative.   Outpatient PCP follow-up, recommend GI cancer screening with EGD and colonoscopy.     Class 1 obesity due to excess calories without serious comorbidity with body mass index (BMI) of 33.0 to 33.9 in adult   Assessment & Plan    BMI 31.  Outpatient follow-up for weight loss management.  Concern for sleep apnea, recommend outpatient sleep study for further evaluation.     Smoker   Assessment & Plan    Smokes 3 cigarettes per day per his mother's report.  Smoking cessation counseling prior to discharge.

## 2019-06-13 NOTE — PROGRESS NOTES
Pt fell fell 0450. Tripped over foot and assisted to floor landed on bottom. Dr Rae rankin notified.

## 2019-06-13 NOTE — CARE PLAN
Problem: Safety  Goal: Will remain free from falls    Intervention: Implement fall precautions  Bed alarm in use. Call light w/in reach. Instructed pt to call for assistance before getting OOB- pt verbalized understanding. Safety sitter at bedside.        Problem: Pain Management  Goal: Pain level will decrease to patient's comfort goal    Intervention: Follow pain managment plan developed in collaboration with patient and Interdisciplinary Team  Oxy given PRN for HA.

## 2019-06-14 ENCOUNTER — APPOINTMENT (OUTPATIENT)
Dept: RADIOLOGY | Facility: MEDICAL CENTER | Age: 54
DRG: 025 | End: 2019-06-14
Attending: STUDENT IN AN ORGANIZED HEALTH CARE EDUCATION/TRAINING PROGRAM
Payer: MEDICAID

## 2019-06-14 PROBLEM — R13.10 SWALLOWING IMPAIRMENT: Status: ACTIVE | Noted: 2019-06-14

## 2019-06-14 LAB
ABO + RH BLD: NORMAL
CRP SERPL HS-MCNC: 0.07 MG/DL (ref 0–0.75)
ERYTHROCYTE [SEDIMENTATION RATE] IN BLOOD BY WESTERGREN METHOD: 13 MM/HOUR (ref 0–20)
GLUCOSE BLD-MCNC: 115 MG/DL (ref 65–99)
GLUCOSE BLD-MCNC: 144 MG/DL (ref 65–99)
INR PPP: 1.08 (ref 0.87–1.13)
PROTHROMBIN TIME: 14.3 SEC (ref 12–14.6)

## 2019-06-14 PROCEDURE — 700102 HCHG RX REV CODE 250 W/ 637 OVERRIDE(OP): Performed by: STUDENT IN AN ORGANIZED HEALTH CARE EDUCATION/TRAINING PROGRAM

## 2019-06-14 PROCEDURE — 700105 HCHG RX REV CODE 258: Performed by: STUDENT IN AN ORGANIZED HEALTH CARE EDUCATION/TRAINING PROGRAM

## 2019-06-14 PROCEDURE — A9270 NON-COVERED ITEM OR SERVICE: HCPCS | Performed by: HOSPITALIST

## 2019-06-14 PROCEDURE — 85610 PROTHROMBIN TIME: CPT

## 2019-06-14 PROCEDURE — 700105 HCHG RX REV CODE 258: Performed by: INTERNAL MEDICINE

## 2019-06-14 PROCEDURE — 70450 CT HEAD/BRAIN W/O DYE: CPT

## 2019-06-14 PROCEDURE — 700111 HCHG RX REV CODE 636 W/ 250 OVERRIDE (IP): Performed by: NURSE PRACTITIONER

## 2019-06-14 PROCEDURE — 700102 HCHG RX REV CODE 250 W/ 637 OVERRIDE(OP): Performed by: HOSPITALIST

## 2019-06-14 PROCEDURE — 82962 GLUCOSE BLOOD TEST: CPT | Mod: 91

## 2019-06-14 PROCEDURE — 700111 HCHG RX REV CODE 636 W/ 250 OVERRIDE (IP): Performed by: INTERNAL MEDICINE

## 2019-06-14 PROCEDURE — 700111 HCHG RX REV CODE 636 W/ 250 OVERRIDE (IP): Performed by: STUDENT IN AN ORGANIZED HEALTH CARE EDUCATION/TRAINING PROGRAM

## 2019-06-14 PROCEDURE — 85652 RBC SED RATE AUTOMATED: CPT

## 2019-06-14 PROCEDURE — 770001 HCHG ROOM/CARE - MED/SURG/GYN PRIV*

## 2019-06-14 PROCEDURE — 99233 SBSQ HOSP IP/OBS HIGH 50: CPT | Mod: GC | Performed by: HOSPITALIST

## 2019-06-14 PROCEDURE — 86140 C-REACTIVE PROTEIN: CPT

## 2019-06-14 PROCEDURE — 700101 HCHG RX REV CODE 250: Performed by: STUDENT IN AN ORGANIZED HEALTH CARE EDUCATION/TRAINING PROGRAM

## 2019-06-14 PROCEDURE — 99232 SBSQ HOSP IP/OBS MODERATE 35: CPT | Performed by: INTERNAL MEDICINE

## 2019-06-14 RX ORDER — DEXAMETHASONE SODIUM PHOSPHATE 10 MG/ML
10 INJECTION, SOLUTION INTRAMUSCULAR; INTRAVENOUS EVERY 6 HOURS
Status: DISCONTINUED | OUTPATIENT
Start: 2019-06-14 | End: 2019-06-18

## 2019-06-14 RX ORDER — DEXAMETHASONE SODIUM PHOSPHATE 4 MG/ML
8 INJECTION, SOLUTION INTRA-ARTICULAR; INTRALESIONAL; INTRAMUSCULAR; INTRAVENOUS; SOFT TISSUE EVERY 8 HOURS
Status: CANCELLED | OUTPATIENT
Start: 2019-06-14

## 2019-06-14 RX ORDER — HALOPERIDOL 5 MG/ML
2 INJECTION INTRAMUSCULAR
Status: DISCONTINUED | OUTPATIENT
Start: 2019-06-14 | End: 2019-06-14

## 2019-06-14 RX ORDER — DEXTROSE AND SODIUM CHLORIDE 5; .9 G/100ML; G/100ML
INJECTION, SOLUTION INTRAVENOUS CONTINUOUS
Status: DISCONTINUED | OUTPATIENT
Start: 2019-06-14 | End: 2019-06-15

## 2019-06-14 RX ORDER — HALOPERIDOL 5 MG/ML
1 INJECTION INTRAMUSCULAR
Status: COMPLETED | OUTPATIENT
Start: 2019-06-14 | End: 2019-06-15

## 2019-06-14 RX ADMIN — METRONIDAZOLE 500 MG: 500 TABLET, FILM COATED ORAL at 01:17

## 2019-06-14 RX ADMIN — VANCOMYCIN HYDROCHLORIDE 1500 MG: 100 INJECTION, POWDER, LYOPHILIZED, FOR SOLUTION INTRAVENOUS at 04:54

## 2019-06-14 RX ADMIN — DEXAMETHASONE SODIUM PHOSPHATE 10 MG: 10 INJECTION INTRAMUSCULAR; INTRAVENOUS at 12:20

## 2019-06-14 RX ADMIN — DEXAMETHASONE SODIUM PHOSPHATE 10 MG: 10 INJECTION INTRAMUSCULAR; INTRAVENOUS at 20:30

## 2019-06-14 RX ADMIN — VANCOMYCIN HYDROCHLORIDE 1500 MG: 100 INJECTION, POWDER, LYOPHILIZED, FOR SOLUTION INTRAVENOUS at 10:52

## 2019-06-14 RX ADMIN — DEXAMETHASONE SODIUM PHOSPHATE 4 MG: 4 INJECTION, SOLUTION INTRA-ARTICULAR; INTRALESIONAL; INTRAMUSCULAR; INTRAVENOUS; SOFT TISSUE at 01:17

## 2019-06-14 RX ADMIN — CEFEPIME 2 G: 2 INJECTION, POWDER, FOR SOLUTION INTRAVENOUS at 07:23

## 2019-06-14 RX ADMIN — DEXAMETHASONE SODIUM PHOSPHATE 4 MG: 4 INJECTION, SOLUTION INTRA-ARTICULAR; INTRALESIONAL; INTRAMUSCULAR; INTRAVENOUS; SOFT TISSUE at 04:54

## 2019-06-14 RX ADMIN — DEXTROSE AND SODIUM CHLORIDE: 5; 900 INJECTION, SOLUTION INTRAVENOUS at 13:10

## 2019-06-14 RX ADMIN — METRONIDAZOLE 500 MG: 500 INJECTION, SOLUTION INTRAVENOUS at 08:15

## 2019-06-14 RX ADMIN — VANCOMYCIN HYDROCHLORIDE 1500 MG: 100 INJECTION, POWDER, LYOPHILIZED, FOR SOLUTION INTRAVENOUS at 20:30

## 2019-06-14 RX ADMIN — METRONIDAZOLE 500 MG: 500 INJECTION, SOLUTION INTRAVENOUS at 16:31

## 2019-06-14 RX ADMIN — METRONIDAZOLE 500 MG: 500 INJECTION, SOLUTION INTRAVENOUS at 13:06

## 2019-06-14 RX ADMIN — CEFEPIME 2 G: 2 INJECTION, POWDER, FOR SOLUTION INTRAVENOUS at 14:23

## 2019-06-14 NOTE — PROGRESS NOTES
Neurosurgery Progress Note    Subjective:  No acute events. Lying in bed, sitter at bedside. Improved mentation today.     Exam:   A&O to self, year, [facility].     4 PERRL, EOMI.   Follows all commands.   Tongue midline without fasciculation. No facial droop.   Strength: Bilateral shoulder shrug, bicep, tricep, deltoid,  5/5.                   Bilateral IP, DF, PF 5/5.   Sensation: Intact throughout.   Incision: healing well. Open to air. Staples removed  Voiding.   Headache    BP  Min: 111/73  Max: 155/95  Pulse  Av.3  Min: 68  Max: 81  Resp  Av.7  Min: 17  Max: 18  Temp  Av.5 °C (97.7 °F)  Min: 36.4 °C (97.5 °F)  Max: 36.6 °C (97.9 °F)  SpO2  Av.7 %  Min: 90 %  Max: 95 %    No Data Recorded    Recent Labs      19   0505   WBC  9.8   RBC  5.52   HEMOGLOBIN  13.0*   HEMATOCRIT  44.6   MCV  80.8*   MCH  23.6*   MCHC  29.1*   RDW  61.6*   PLATELETCT  240   MPV  10.6     Recent Labs      19   0505   SODIUM  140   POTASSIUM  3.8   CHLORIDE  106   CO2  25   GLUCOSE  90   BUN  17   CREATININE  0.68   CALCIUM  9.2     Recent Labs      19   0635   INR  1.08           Intake/Output       19 0700 - 19 0659 19 0700 - 06/15/19 0659       Total 7577-7085 3764-5390 Total       Intake    P.O.  480  100 580  --  -- --    P.O. 480 100 580 -- -- --    IV Piggyback  100  -- 100  --  -- --    Volume (mL) (cefepime (MAXIPIME) 2 g in  mL IVPB) 100 -- 100 -- -- --    Total Intake 580 100 680 -- -- --       Output    Urine  --  -- --  --  -- --    Number of Times Voided 1 x 3 x 4 x -- -- --    Stool  --  -- --  --  -- --    Number of Times Stooled 2 x 1 x 3 x -- -- --    Total Output -- -- -- -- -- --       Net I/O     580 100 680 -- -- --            Intake/Output Summary (Last 24 hours) at 19 0915  Last data filed at 19 3461   Gross per 24 hour   Intake              340 ml   Output                0 ml   Net              340 ml            •  metroNIDAZOLE (FLAGYL) IV  500 mg Q6HRS   • haloperidol lactate  1 mg Once PRN   • dexamethasone  10 mg Q6HRS   • potassium chloride SA  40 mEq BID   • cefepime  2 g Q8HRS   • MD Alert...Vancomycin per Pharmacy   PHARMACY TO DOSE   • vancomycin  15 mg/kg Q8HR   • oxyCODONE immediate-release  5 mg Q6HRS PRN   • ferrous sulfate  325 mg QDAY with Breakfast   • acetaminophen  650 mg Q8HRS PRN   • polyethylene glycol/lytes  1 Packet BID   • docusate sodium  100 mg BID   • senna-docusate  1 Tab Nightly   • cloNIDine  0.1 mg Q4HRS PRN   • magnesium hydroxide  30 mL QDAY PRN   • senna-docusate  1 Tab Q24HRS PRN   • Respiratory Care per Protocol   Continuous RT   • Pharmacy Consult Request  1 Each PHARMACY TO DOSE   • MD ALERT...DO NOT ADMINISTER NSAIDS or ASPIRIN unless ORDERED By Neurosurgery  1 Each PRN   • ondansetron  4 mg Q4HRS PRN   • scopolamine  1 Patch Q72HRS PRN   • labetalol  10 mg Q HOUR PRN   • hydrALAZINE  10 mg Q HOUR PRN   • bisacodyl  10 mg Q24HRS PRN   • fleet  1 Each Once PRN       Assessment and Plan:  Hospital day #24 left temporal peripherally enhancing cystic lesion  POD #23 left temporal cranitomy, cyst evacuation/biopsy    Prophylactic anticoagulation: no         Start date/time: tbd. Surgery scheduled 6/17/19 at noon    Final pathology from [place] mild reactive gliosis  ID plan for PICC line and IV antibiotics x 6 weeks, through 7/6/2019      Plan:   Improved mentation & interaction today with steroids.   Continue decadron  Keep Na normalized. Na 140  Patient is on OR schedule for Sunday at 12:30 for repeat craniotomy to drain recurrent left temporal cyst  NPO Saturday at midnight  All labs reviewed for surgery. COD pending  MRI brain stealth protocol Sunday 10:00 am

## 2019-06-14 NOTE — PROGRESS NOTES
Infectious Disease Progress Note    Author: Cj Abbasi M.D. Date & Time of service: 6/14/2019  2:09 PM    Chief Complaint:  Follow-up for brain mass     Interval History:  53 y.o. male  admitted 5/21/2019 for AMS due to above. +meth abuse.  Recently in the ED on May 12 and was diagnosed with acute suppurative otitis media. S/p craniotomy 5/22/19.  Gray amorphous gelatinous material noted intraoperatively most consistent with abscess per operative report.  Pathology report+ mild reactive gliosis.    6/1/2019 no fevers.  No new issues.  Pathology is still not back.  6/5- no fevers. The path has come back positive for gliosis.  Denies any new complaints.  6/6/2019 no fevers.  No new issues overnight.  Tolerating the antibiotics  6/7 afebrile WBC 7.8.  Patient denies any headaches or neck pain.  He is tolerating IV antibiotics without any issues.  No diarrhea.  Family requesting home IV antibiotics if feasible.  6/8 afebrile WBC 7.5 patient denies any pain.  He had a PICC line placed yesterday without any complications  6/9 afebrile patient continues to be a poor historian and is forgetful.  He denies any pain.  Father and stepmother at bedside and agree with SNF placement.  Prior to this infection, father notes that he was highly functioning.  Point of care discussed with family  6/10 afebrile WBC 8.7 patient complaining of chills overnight.  He continues to have poor memory and is quite forgetful.  6/11 AF WBC 7.7 patient sleeping but arousable. Pt continues to be denied by SNFs. No clinical changes from yesterday  6/12 afebrile CBC not done today.  Patient is much more interactive verbally today than previous days.  He continues to have word finding difficulty and aphasia.  He also notes numbness in his hands.  Plan for repeat craniotomy per neurosurgery notes given results of repeat MRI  6/13 afebrile WBC 9.8 sitter not bedside as patient has been impulsive and trying to pull out PICC line.  He is less  interactive and has significant word finding difficulty and aphasia today.  He is unaware that he will have surgery on 2019 afebrile, no CBC today.  Sitter at bedside today.  Talking fluently this afternoon, knows that he is at [facility] but does not know what city or time.    Labs Reviewed    Review of Systems:  Review of Systems   Unable to perform ROS: Mental acuity       Hemodynamics:  Temp (24hrs), Av.5 °C (97.7 °F), Min:36.4 °C (97.5 °F), Max:36.6 °C (97.9 °F)  Temperature: 36.6 °C (97.9 °F)  Pulse  Av.4  Min: 40  Max: 98  Blood Pressure: 111/73       Physical Exam:  Physical Exam   Constitutional: He appears well-developed. No distress.   HENT:   Head: Normocephalic.   Mouth/Throat: Oropharynx is clear and moist. No oropharyngeal exudate.   Left craniotomy surgical site well approximated and healing well.  There is no drainage or surrounding erythema     Eyes: Pupils are equal, round, and reactive to light. Conjunctivae and EOM are normal. No scleral icterus.   Neck: Neck supple.   Cardiovascular: Normal rate and regular rhythm.    No murmur heard.  Pulmonary/Chest: Effort normal. No respiratory distress. He has no wheezes. He has no rales.   Abdominal: Soft. He exhibits no distension. There is no tenderness. There is no rebound and no guarding.   Musculoskeletal: He exhibits no edema.   Right upper extremity PICC line-nontender, no surrounding erythema   Lymphadenopathy:     He has no cervical adenopathy.   Neurological: He is alert. No cranial nerve deficit. Coordination normal.   Poor memory and forgetful  Aphasia  Moving all extremities   Skin: Skin is warm. No rash noted. He is not diaphoretic. No erythema.   Psychiatric:   Flat affect   Nursing note and vitals reviewed.      Meds:    Current Facility-Administered Medications:   •  metroNIDAZOLE (FLAGYL) IV  •  haloperidol lactate  •  dexamethasone  •  D5 NS  •  insulin lispro **AND** Accu-Chek Q6 if NPO **AND** NOTIFY MD and  PharmD **AND** glucose **AND** dextrose 10% bolus  •  potassium chloride SA  •  cefepime  •  MD Alert...Vancomycin per Pharmacy  •  vancomycin  •  oxyCODONE immediate-release  •  ferrous sulfate  •  acetaminophen  •  polyethylene glycol/lytes  •  docusate sodium  •  senna-docusate  •  cloNIDine  •  magnesium hydroxide  •  senna-docusate  •  Respiratory Care per Protocol  •  Pharmacy Consult Request  •  MD ALERT...DO NOT ADMINISTER NSAIDS or ASPIRIN unless ORDERED By Neurosurgery  •  ondansetron  •  scopolamine  •  labetalol  •  hydrALAZINE  •  bisacodyl  •  fleet    Labs:  Recent Labs      06/13/19   0505   WBC  9.8   RBC  5.52   HEMOGLOBIN  13.0*   HEMATOCRIT  44.6   MCV  80.8*   MCH  23.6*   RDW  61.6*   PLATELETCT  240   MPV  10.6   NEUTSPOLYS  75.20*   LYMPHOCYTES  14.40*   MONOCYTES  8.40   EOSINOPHILS  0.50   BASOPHILS  0.90   RBCMORPHOLO  Present     Recent Labs      06/13/19   0505   SODIUM  140   POTASSIUM  3.8   CHLORIDE  106   CO2  25   GLUCOSE  90   BUN  17     Recent Labs      06/13/19   0505   ALBUMIN  3.9   TBILIRUBIN  0.8   ALKPHOSPHAT  42   TOTPROTEIN  6.9   ALTSGPT  38   ASTSGOT  17   CREATININE  0.68       Imaging:  MRI of the brain on 6/8/2019  Impression       1.  Postsurgical changes adjacent to the LEFT temporal mass involving the inferior LEFT frontal lobe which is grossly unchanged in size, with edema, fluid and enhancement likely related to surgical dissection rather than spread of disease  2.  Lung mm of LEFT-to-RIGHT midline shift, unchanged with midbrain compression and displacement           Micro:  Results     Procedure Component Value Units Date/Time    AFB Culture [[id number]] Collected:  05/22/19 1700    Order Status:  Completed Specimen:  Wound from Cyst Updated:  06/12/19 0649     Significant Indicator NEG     Source WND     Site BRAIN ABSCESS     Culture Result Culture in progress.     AFB Smear Results No acid fast bacilli seen.    Narrative:       Collected By:[id number] RADHA  YIFAN  Brain cyst.  Surgery Specimen    Fungal Culture [[id number]] Collected:  05/22/19 1700    Order Status:  Completed Specimen:  Wound from Cyst Updated:  06/12/19 0649     Significant Indicator NEG     Source WND     Site BRAIN ABSCESS     Culture Result No fungal growth to date    Narrative:       Collected By:[id number] RADHA SAHA  Brain cyst.  Surgery Specimen          Assessment:  Active Hospital Problems    Diagnosis   • *Brain abscess [G06.0]   • Delirium [R41.0]   • Methamphetamine abuse (HCC) [F15.10]   • S/P craniotomy [Z98.890]       Plan:  Cystic brain mass  Etiology unclear however was diagnosed with otitis media prior to admission  Neurologically with slow improvement but continues to have poor memory, aphasia and word finding difficulty  No fevers  Leukocytosis resolved  MRI with 4.7 cm left temporal lobe mass with midline shift  S/p craniotomy 5/22/19.  Gray amorphous gelatinous material noted intraoperatively most consistent with abscess per operative report  Gram stain neg; cxs neg final  CT c/a/p neg  Blood cxs neg  HIV neg  Final report from [place] reportedly - mild reactive gliosis  Will treat like a brain abscess in view of findings in the OR and pathology being nonspecific gliosis  Continue vancomycin, cefepime and Flagyl  Monitor renal function and Vanco trough  Last Vanco trough 21 on 6/11  Renal function is normal and stable  Plan for 6 weeks of antibiotics through 7/6/2019  ESR 8 and CRP 0.31  Repeat MRI of the brain - no changes  Plan for repeat craniotomy given recurrence of left temporal cyst per neurosurgery notes on 06/17/2019  When this is done, would repeat biopsy, would send for regular, fungal, AFB cultures    Methamphetamine abuse  Will need monitored setting for IV abx   Not a candidate for PICC line and outpatient or home IV antibiotics given substance abuse    Disposition: Will need a monitored setting for IV antibiotic course given his history of methamphetamine abuse.   Plan for skilled nursing facility but currently being denied.  Patient will need 3 times weekly Vanco troughs and weekly BMP and CBC.      I have performed a physical exam and reviewed and updated ROS and plan today 6/14/2019.  In review of yesterday's note 6/13/2019, there are no changes except as documented above.

## 2019-06-14 NOTE — THERAPY
Speech Therapy Contact Note: Attempted to see pt for swallow reassessment, pt refusing to sit up despite max education and reasoning. Will re-attempt as able. RN and MD aware.

## 2019-06-14 NOTE — PROGRESS NOTES
Pharmacy Kinetics 54 y.o. male on vancomycin day # 10  6/14/2019    Currently Dose: Vancomycin 1500 mg iv q8hr (~15 mg/kg)  Received Load Dose: Yes     Indication for Treatment: brain abscess  ID Service Following: Yes     Pertinent history per medical record: Admitted on 5/21/2019 for suspected brain abscess. Noted methamphetamine abuse. Recent diagnosis of otitis media and completed course of cefdinir. Craniotomy 5/22/19. 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 ID planning on ~6 weeks antibiotics (tenative 7/6/19 EOT).     Other antibiotics: metronidazole 500 mg po q6h, cefepime 2 gm iv q8h     Allergies: Pcn [penicillins] and Sulfa drugs      List concerns for accumulation of vancomycin: age 54, BMI ~31, BUN:SCr ~20:1     Pertinent cultures to date:   Results     Procedure Component Value Units Date/Time    AFB Culture [[id number]] Collected:  05/22/19 1700    Order Status:  Completed Specimen:  Wound from Cyst Updated:  06/12/19 0649     Significant Indicator NEG     Source WND     Site BRAIN ABSCESS     Culture Result Culture in progress.     AFB Smear Results No acid fast bacilli seen.    Narrative:       Collected By:[id number] RADHA SAHA  Brain cyst.  Surgery Specimen    Fungal Culture [[id number]] Collected:  05/22/19 1700    Order Status:  Completed Specimen:  Wound from Cyst Updated:  06/12/19 0649     Significant Indicator NEG     Source WND     Site BRAIN ABSCESS     Culture Result No fungal growth to date    Narrative:       Collected By:[id number] RADHA SAHA  Brain cyst.  Surgery Specimen        MRSA nares swab if pneumonia is a concern (ordered/positive/negative/n-a): n/a    Recent Labs      06/13/19   0505   WBC  9.8   NEUTSPOLYS  75.20*     Recent Labs      06/13/19   0505   BUN  17   CREATININE  0.68   ALBUMIN  3.9     Intake/Output Summary (Last 24 hours) at 06/14/19 0737  Last data filed at 06/13/19 9147   Gross per 24 hour   Intake              680  "ml   Output                0 ml   Net              680 ml      /83   Pulse 81   Temp 36.6 °C (97.8 °F) (Temporal)   Resp 18   Ht 1.803 m (5' 11\")   Wt 101.3 kg (223 lb 5.2 oz)   SpO2 90%  Temp (24hrs), Av.5 °C (97.7 °F), Min:36.4 °C (97.5 °F), Max:36.6 °C (97.8 °F)    Estimated Creatinine Clearance: 150.5 mL/min (by C-G formula based on SCr of 0.68 mg/dL).    A/P   1. Vancomycin dose change: not indicated   2. Next vancomycin level: ~1-2 days (not ordered)  3. Goal trough: 18-22 mcg/mL  4. Comments: VS stable. Afebrile. Updated CBC and CMP processing. No new microbiology. Factors for accumulation of vancomycin noted. Most recent vancomycin level noted. Chart review of NSG note reveals likely repeat craniotomy 19 due to concerns for recurrent left temporal cyst. ID service following. Likely to repeat level in ~1-2 days pending clinical course. Pharmacy will continue to follow.    Teddy Orozco, PharmD  "

## 2019-06-14 NOTE — PROGRESS NOTES
Sitter at bedside for safety. Pt seems more confused. Expressive aphasia has increased. Pt more unsteady. Pt stood up and peed on floor and pooped in the bed then yelled at sitter when she attempted to take him to the bathroom.

## 2019-06-14 NOTE — PROGRESS NOTES
Internal Medicine Interval Note    Note Author: Em Savage M.D.      Name Robert CALDERON 1965   Age 54 y.o. male   MRN [id number]   Code Status Full Code     After 5 PM or if no immediate response to page, please call for cross-coverage.    Attending: Dr. Aguilera  Team: Blue See patient list for primary contact information.  Call [phone] to page.    1st Call - Day Intern, R1  Dr. Savage 2nd Call - Day Sr. Resident, R2-R3  Dr. Montelongo       Principal Problem  Brain mass concerning for infection vs. malignancy      Interval History  The patient failed his swallow evaluation this morning; made NPO, started D5 NS at 75 mL/hr.  Worsening expressive aphasia and altered mentation; Dr. Young contacted this morning for earlier OR time.  Decadron dose increased to 10 mg q6h.  Haldol 1 mg IV once PRN for anxiety/agitation.  Lovenox discontinued 19.  NPO since approximately 7:30 PM on 19 per RN.  Continuing to treat for brain abscess with vancomycin, cefepime and Flagyl for 6 weeks through 2019 per ID.  Metronidazole changed to IV given NPO status.   OR time changed to 19 at 12:30 PM per NSG note from today.       ROS  Constitutional: Negative for fever and chills.  HEN: Negative for tinnitus and congestion.  Throat: Negative for soreness and difficulty swallowing.  Eyes: Negative for change in vision and photophobia.  Respiratory: Negative for cough and shortness of breath.  Cardiovascular: Negative for chest pain and palpitations.  Gastrointestinal: Negative for constipation, diarrhea, nausea and vomiting.  Genitourinary: Negative for dysuria and hematuria.  Neurological: Positive for expressive aphasia. Negative for headache, dizziness and sensory change.  Psychiatric: Negative for depression and insomnia.      Disposition, Discharge Barriers  Inpatient for repeat craniotomy on 19.   Medicaid and placement to a SNF pending.      Consultants  Neurosurgery  Infectious  Disease      Quality-Core Measures  Richards Catheter: No  Central Line: RUE PICC line  VTE Prophylaxis: Holding Lovenox for repeat craniotomy on 6/17/19  Antibiotics: Flagyl, cefepime and vancomycin  Rehab: PT, OT, SLP following       Vitals  Vitals:    06/13/19 0512 06/13/19 0715 06/13/19 1600 06/13/19 2100   BP: 121/81 117/77 155/95 117/83   Pulse: 70 74 68 81   Resp: 17 17 18 18   Temp: 37 °C (98.6 °F) 36.3 °C (97.3 °F) 36.4 °C (97.5 °F) 36.6 °C (97.8 °F)   TempSrc: Temporal Temporal Temporal Temporal   SpO2: 92% 96% 95% 90%   Weight:       Height:           Physical Exam  General: No acute distress, cooperative.  Head: Normocephalic, left frontotemporal craniotomy healing well, no discharge or surrounding erythema.    Eyes: PERRLA, EOMI, normal conjunctiva, sclerae anicteric.  Throat: Oropharynx clear, oral mucosa moist.  Neck: Supple, no lymphadenopathy.  Lungs: Normal work of breathing, clear to auscultation bilaterally.  Heart: Normal rate, regular rhythm, no murmurs.  Abdomen: Soft, bowel sounds present, non-tender, no peritoneal signs.  Extremities: Nontender, no cyanosis, no edema. RUE PICC line.  Neuro: Alert, worsening expressive aphasia.   Skin: Warm, dry, intact, no suspicious rashes or lesions on visualized skin.      Labs and Imaging  Pertinent labs, imaging and diagnostic tests and procedures associated with this visit have been reviewed, specific comments can be found in the assessment and plan section below.     Recent Labs      06/13/19   0505   SODIUM  140   POTASSIUM  3.8   CHLORIDE  106   CO2  25   BUN  17   CREATININE  0.68   GLUCOSE  90   CALCIUM  9.2     Recent Labs      06/13/19   0505   WBC  9.8   HEMOGLOBIN  13.0*   HEMATOCRIT  44.6   PLATELETCT  240     Recent Labs      06/13/19   0505   ALTSGPT  38   ASTSGOT  17   ALKPHOSPHAT  42   TBILIRUBIN  0.8   ALBUMIN  3.9       Assessment and Plan    * Brain abscess   Assessment & Plan    Found to have a fluid collection over the L frontotemporal  area with mass-effect, midline shift and developing hydrocephalus on CT and MRI concerning for brain abscess vs. GBM.  The patient underwent a craniotomy by Dr. Young on 5/22/19 with pathology sig for mild reactive gliosis. 6 weeks of antibiotics recommended by ID; the patient's Gram stains and cultures have been negative. Repeat MRB on 6/8/19 showed recurrence of the patient's brain mass with significant edema and persistent midline shift.   The patient's clinical status has been deteriorating, his expressive aphasia and impulsive behaviors are worsening and he failed his swallow evaluation today.      Plan:  Repeat craniotomy on 6/16/19 at 12:30 PM.  Decadron 10 mg IV q6h.  NPO, D5 NS at 75 mL/hr  Low dose SSI with Humalog for hyperglycemia; hypoglycemia protocol ordered.   Cefepime, metronidazole and vancomycin x 6 weeks through 7/6/19.  Cleared by ID for discharge to SNF for rehab and IV antibiotics via PICC line; the patient will need Vanc troughs 3-times-weekly and a BMP and CBC weekly per ID.  Awaiting Medicaid for SNF placement.     Agitation   Assessment & Plan    Intermittently agitated and aggressive towards the staff.    Plan:  Modified Seroquel dose to 25 mg BID for better management.   Haldol PRN for agitation and aggression not managed with Seroquel.     Delirium   Assessment & Plan    Resolved.  On Seroquel 25 mg BID and Haldol PRN for intermittent aggression and agitation.   CTM.     Swallowing impairment   Assessment & Plan    Failed swallow evaluation today, made NPO, NSG contacted regarding worsening clinical status, repeat craniotomy scheduled for 6/16/19 at 12:30 PM.  Started on D5 NS at 75 mL/hr, CTM.     Microcytic anemia   Assessment & Plan    Stable.  Fe 17, Fe Sat 4% and TIBC 386, ferritin 16.2.  Stool negative for occult blood.  No records of EGD or colonoscopy per chart review.  Started on ferrous sulfate supplementation this admission.   Stool H. pylori test negative.      Plan:  Continue ferrous sulfate daily.  Celiac panel negative.   Outpatient PCP follow-up, recommend GI cancer screening with EGD and colonoscopy.     Class 1 obesity due to excess calories without serious comorbidity with body mass index (BMI) of 33.0 to 33.9 in adult   Assessment & Plan    BMI 31.  Outpatient follow-up for weight loss management.  Concern for sleep apnea, recommend outpatient sleep study for further evaluation.     Status post craniotomy   Assessment & Plan    Sutures removed 6/6/2019.  Okay to resume anticoagulation per Dr. Young.  Lovenox held for repeat craniotomy on 6/16/19.     Acute respiratory failure with hypoxia (HCC)   Assessment & Plan    Resolved.  Intubated on admission for airway protection on 5/22/2019, extubated on 5/24/2019.  RT protocol, supplemental oxygen as needed.     Smoker   Assessment & Plan    Smokes 3 cigarettes per day per his mother's report.  Smoking cessation counseling prior to discharge.     Methamphetamine abuse (HCC)   Assessment & Plan    History of meth abuse via smoking per the patient's nephew, no history of IV drug use.  Substance abuse counseling prior to discharge.

## 2019-06-15 LAB
ABO GROUP BLD: NORMAL
ANION GAP SERPL CALC-SCNC: 9 MMOL/L (ref 0–11.9)
BASOPHILS # BLD AUTO: 0.2 % (ref 0–1.8)
BASOPHILS # BLD: 0.03 K/UL (ref 0–0.12)
BLD GP AB SCN SERPL QL: NORMAL
BUN SERPL-MCNC: 20 MG/DL (ref 8–22)
CALCIUM SERPL-MCNC: 9 MG/DL (ref 8.5–10.5)
CHLORIDE SERPL-SCNC: 106 MMOL/L (ref 96–112)
CO2 SERPL-SCNC: 25 MMOL/L (ref 20–33)
CREAT SERPL-MCNC: 0.65 MG/DL (ref 0.5–1.4)
EOSINOPHIL # BLD AUTO: 0 K/UL (ref 0–0.51)
EOSINOPHIL NFR BLD: 0 % (ref 0–6.9)
ERYTHROCYTE [DISTWIDTH] IN BLOOD BY AUTOMATED COUNT: 60.2 FL (ref 35.9–50)
GLUCOSE BLD-MCNC: 116 MG/DL (ref 65–99)
GLUCOSE BLD-MCNC: 117 MG/DL (ref 65–99)
GLUCOSE BLD-MCNC: 141 MG/DL (ref 65–99)
GLUCOSE BLD-MCNC: 154 MG/DL (ref 65–99)
GLUCOSE SERPL-MCNC: 146 MG/DL (ref 65–99)
HCT VFR BLD AUTO: 42.6 % (ref 42–52)
HGB BLD-MCNC: 12.7 G/DL (ref 14–18)
IMM GRANULOCYTES # BLD AUTO: 0.09 K/UL (ref 0–0.11)
IMM GRANULOCYTES NFR BLD AUTO: 0.6 % (ref 0–0.9)
LYMPHOCYTES # BLD AUTO: 0.86 K/UL (ref 1–4.8)
LYMPHOCYTES NFR BLD: 5.9 % (ref 22–41)
MCH RBC QN AUTO: 23.7 PG (ref 27–33)
MCHC RBC AUTO-ENTMCNC: 29.8 G/DL (ref 33.7–35.3)
MCV RBC AUTO: 79.6 FL (ref 81.4–97.8)
MONOCYTES # BLD AUTO: 0.48 K/UL (ref 0–0.85)
MONOCYTES NFR BLD AUTO: 3.3 % (ref 0–13.4)
NEUTROPHILS # BLD AUTO: 13.15 K/UL (ref 1.82–7.42)
NEUTROPHILS NFR BLD: 90 % (ref 44–72)
NRBC # BLD AUTO: 0 K/UL
NRBC BLD-RTO: 0 /100 WBC
PLATELET # BLD AUTO: 245 K/UL (ref 164–446)
PMV BLD AUTO: 10.5 FL (ref 9–12.9)
POTASSIUM SERPL-SCNC: 3.8 MMOL/L (ref 3.6–5.5)
RBC # BLD AUTO: 5.35 M/UL (ref 4.7–6.1)
RH BLD: NORMAL
SODIUM SERPL-SCNC: 140 MMOL/L (ref 135–145)
WBC # BLD AUTO: 14.6 K/UL (ref 4.8–10.8)

## 2019-06-15 PROCEDURE — 700111 HCHG RX REV CODE 636 W/ 250 OVERRIDE (IP): Performed by: INTERNAL MEDICINE

## 2019-06-15 PROCEDURE — 86900 BLOOD TYPING SEROLOGIC ABO: CPT

## 2019-06-15 PROCEDURE — 700105 HCHG RX REV CODE 258: Performed by: INTERNAL MEDICINE

## 2019-06-15 PROCEDURE — 82962 GLUCOSE BLOOD TEST: CPT | Mod: 91

## 2019-06-15 PROCEDURE — 99232 SBSQ HOSP IP/OBS MODERATE 35: CPT | Performed by: INTERNAL MEDICINE

## 2019-06-15 PROCEDURE — 700101 HCHG RX REV CODE 250: Performed by: INTERNAL MEDICINE

## 2019-06-15 PROCEDURE — 700101 HCHG RX REV CODE 250: Performed by: STUDENT IN AN ORGANIZED HEALTH CARE EDUCATION/TRAINING PROGRAM

## 2019-06-15 PROCEDURE — 86850 RBC ANTIBODY SCREEN: CPT

## 2019-06-15 PROCEDURE — 85025 COMPLETE CBC W/AUTO DIFF WBC: CPT

## 2019-06-15 PROCEDURE — 770001 HCHG ROOM/CARE - MED/SURG/GYN PRIV*

## 2019-06-15 PROCEDURE — 86901 BLOOD TYPING SEROLOGIC RH(D): CPT

## 2019-06-15 PROCEDURE — 99232 SBSQ HOSP IP/OBS MODERATE 35: CPT | Mod: GC | Performed by: HOSPITALIST

## 2019-06-15 PROCEDURE — 80048 BASIC METABOLIC PNL TOTAL CA: CPT

## 2019-06-15 PROCEDURE — 700111 HCHG RX REV CODE 636 W/ 250 OVERRIDE (IP): Performed by: STUDENT IN AN ORGANIZED HEALTH CARE EDUCATION/TRAINING PROGRAM

## 2019-06-15 PROCEDURE — 92526 ORAL FUNCTION THERAPY: CPT

## 2019-06-15 RX ORDER — DEXTROSE MONOHYDRATE, SODIUM CHLORIDE, AND POTASSIUM CHLORIDE 50; 1.49; 4.5 G/1000ML; G/1000ML; G/1000ML
INJECTION, SOLUTION INTRAVENOUS CONTINUOUS
Status: DISCONTINUED | OUTPATIENT
Start: 2019-06-15 | End: 2019-06-16

## 2019-06-15 RX ORDER — DEXTROSE MONOHYDRATE, SODIUM CHLORIDE, AND POTASSIUM CHLORIDE 50; 1.49; 9 G/1000ML; G/1000ML; G/1000ML
INJECTION, SOLUTION INTRAVENOUS CONTINUOUS
Status: DISCONTINUED | OUTPATIENT
Start: 2019-06-15 | End: 2019-06-15

## 2019-06-15 RX ADMIN — CEFEPIME 2 G: 2 INJECTION, POWDER, FOR SOLUTION INTRAVENOUS at 13:23

## 2019-06-15 RX ADMIN — POTASSIUM CHLORIDE, DEXTROSE MONOHYDRATE AND SODIUM CHLORIDE: 150; 5; 900 INJECTION, SOLUTION INTRAVENOUS at 08:36

## 2019-06-15 RX ADMIN — POTASSIUM CHLORIDE, DEXTROSE MONOHYDRATE AND SODIUM CHLORIDE: 150; 5; 450 INJECTION, SOLUTION INTRAVENOUS at 12:08

## 2019-06-15 RX ADMIN — VANCOMYCIN HYDROCHLORIDE 1500 MG: 100 INJECTION, POWDER, LYOPHILIZED, FOR SOLUTION INTRAVENOUS at 13:34

## 2019-06-15 RX ADMIN — HALOPERIDOL LACTATE 1 MG: 5 INJECTION, SOLUTION INTRAMUSCULAR at 08:39

## 2019-06-15 RX ADMIN — DEXAMETHASONE SODIUM PHOSPHATE 10 MG: 10 INJECTION INTRAMUSCULAR; INTRAVENOUS at 23:41

## 2019-06-15 RX ADMIN — CEFEPIME 2 G: 2 INJECTION, POWDER, FOR SOLUTION INTRAVENOUS at 08:20

## 2019-06-15 RX ADMIN — VANCOMYCIN HYDROCHLORIDE 1500 MG: 100 INJECTION, POWDER, LYOPHILIZED, FOR SOLUTION INTRAVENOUS at 07:10

## 2019-06-15 RX ADMIN — DEXAMETHASONE SODIUM PHOSPHATE 10 MG: 10 INJECTION INTRAMUSCULAR; INTRAVENOUS at 17:04

## 2019-06-15 RX ADMIN — METRONIDAZOLE 500 MG: 500 INJECTION, SOLUTION INTRAVENOUS at 20:11

## 2019-06-15 RX ADMIN — CEFEPIME 2 G: 2 INJECTION, POWDER, FOR SOLUTION INTRAVENOUS at 01:29

## 2019-06-15 RX ADMIN — CEFEPIME 2 G: 2 INJECTION, POWDER, FOR SOLUTION INTRAVENOUS at 23:41

## 2019-06-15 RX ADMIN — DEXAMETHASONE SODIUM PHOSPHATE 10 MG: 10 INJECTION INTRAMUSCULAR; INTRAVENOUS at 06:00

## 2019-06-15 RX ADMIN — METRONIDAZOLE 500 MG: 500 INJECTION, SOLUTION INTRAVENOUS at 05:39

## 2019-06-15 RX ADMIN — VANCOMYCIN HYDROCHLORIDE 1500 MG: 100 INJECTION, POWDER, LYOPHILIZED, FOR SOLUTION INTRAVENOUS at 20:08

## 2019-06-15 RX ADMIN — METRONIDAZOLE 500 MG: 500 INJECTION, SOLUTION INTRAVENOUS at 12:08

## 2019-06-15 RX ADMIN — DEXAMETHASONE SODIUM PHOSPHATE 10 MG: 10 INJECTION INTRAMUSCULAR; INTRAVENOUS at 01:29

## 2019-06-15 RX ADMIN — DEXAMETHASONE SODIUM PHOSPHATE 10 MG: 10 INJECTION INTRAMUSCULAR; INTRAVENOUS at 12:12

## 2019-06-15 NOTE — PROGRESS NOTES
Neurosurgery Progress Note    Subjective:  No acute events.   Refusing to participate in SLP swallow eval- NPO       Exam:   A&O to self, year, [facility].     4 PERRL, EOMI.   Follows all commands but requires frequent repeating  Tongue midline without fasciculation. No facial droop.   Strength: Bilateral shoulder shrug, bicep, tricep, deltoid,  5/5.                   Bilateral IP, DF, PF 5/5.   Sensation: Intact throughout.   Incision: healing well. Open to air      BP  Min: 103/66  Max: 116/71  Pulse  Av  Min: 60  Max: 93  Resp  Av.3  Min: 16  Max: 18  Temp  Av.9 °C (98.5 °F)  Min: 36.6 °C (97.8 °F)  Max: 37.2 °C (98.9 °F)  SpO2  Av.7 %  Min: 87 %  Max: 93 %    No Data Recorded    Recent Labs      19   0505  06/15/19   0555   WBC  9.8  14.6*   RBC  5.52  5.35   HEMOGLOBIN  13.0*  12.7*   HEMATOCRIT  44.6  42.6   MCV  80.8*  79.6*   MCH  23.6*  23.7*   MCHC  29.1*  29.8*   RDW  61.6*  60.2*   PLATELETCT  240  245   MPV  10.6  10.5     Recent Labs      19   0505  06/15/19   0555   SODIUM  140  140   POTASSIUM  3.8  3.8   CHLORIDE  106  106   CO2  25  25   GLUCOSE  90  146*   BUN  17  20   CREATININE  0.68  0.65   CALCIUM  9.2  9.0     Recent Labs      19   0635   INR  1.08           Intake/Output       19 0700 - 06/15/19 0659 06/15/19 07 - 19 0659      1900-0659 Total  Total       Intake    Total Intake -- -- -- -- -- --       Output    Urine  1000  -- 1000  --  -- --    Number of Times Voided 3 x -- 3 x -- -- --    Urine Void (mL) 1000 -- 1000 -- -- --    Stool  --  -- --  --  -- --    Number of Times Stooled -- 0 x 0 x 1 x -- 1 x    Total Output 1000 -- 1000 -- -- --       Net I/O     -1000 -- -1000 -- -- --            Intake/Output Summary (Last 24 hours) at 06/15/19 1058  Last data filed at 19 1825   Gross per 24 hour   Intake                0 ml   Output              700 ml   Net             -700 ml            • dextrose  5 % and 0.9 % NaCl with KCl 20 mEq   Continuous   • dexamethasone pf  10 mg Q6HRS   • insulin lispro  1-6 Units Q6HRS    And   • glucose  16 g Q15 MIN PRN    And   • dextrose 10% bolus  250 mL Q15 MIN PRN   • metroNIDAZOLE (FLAGYL) IV  500 mg Q8HRS   • cefepime  2 g Q8HRS   • MD Alert...Vancomycin per Pharmacy   PHARMACY TO DOSE   • vancomycin  15 mg/kg Q8HR   • oxyCODONE immediate-release  5 mg Q6HRS PRN   • ferrous sulfate  325 mg QDAY with Breakfast   • acetaminophen  650 mg Q8HRS PRN   • polyethylene glycol/lytes  1 Packet BID   • docusate sodium  100 mg BID   • senna-docusate  1 Tab Nightly   • cloNIDine  0.1 mg Q4HRS PRN   • magnesium hydroxide  30 mL QDAY PRN   • senna-docusate  1 Tab Q24HRS PRN   • Respiratory Care per Protocol   Continuous RT   • Pharmacy Consult Request  1 Each PHARMACY TO DOSE   • MD ALERT...DO NOT ADMINISTER NSAIDS or ASPIRIN unless ORDERED By Neurosurgery  1 Each PRN   • ondansetron  4 mg Q4HRS PRN   • scopolamine  1 Patch Q72HRS PRN   • labetalol  10 mg Q HOUR PRN   • hydrALAZINE  10 mg Q HOUR PRN   • bisacodyl  10 mg Q24HRS PRN   • fleet  1 Each Once PRN       Assessment and Plan:  Hospital day #25 left temporal peripherally enhancing cystic lesion  POD #24 left temporal cranitomy, cyst evacuation/biopsy    Prophylactic anticoagulation: no         Start date/time: tbd. Surgery scheduled 6/17/19 at noon    Final pathology from [facility] mild reactive gliosis  ID plan for PICC line and IV antibiotics x 6 weeks, through 7/6/2019      Plan:   Neuro exam stable, on steroids.   Continue decadron  Keep Na normalized. Na 140  Patient is on OR schedule for Sunday at 12:30 for repeat craniotomy to drain recurrent left temporal cyst  NPO tonight at midnight  All labs reviewed for surgery. COD pending  MRI brain stealth protocol Sunday 10:00 am

## 2019-06-15 NOTE — THERAPY
"Speech Language Therapy dysphagia treatment completed.     Functional Status: Pt seen this date for repeat swallow evaluation. This SLP was standing outside pt's room following initial attempt, and pt was requesting burrito that was sitting near the sink. SLP returned to pt's room to initiate CSE. Pt was refusing to consume PO other than burrito. Attempted tsp of puree, however pt tasted a very small amount before refusing. Pt consumed 2 small bites of soft solids (burrito with meat and cheese, without tortilla) that resulted in prolonged mastication and slightly delayed initiation of swallow trigger, however functional with hyolaryngeal elevation palpated as complete. Pt did not present with any overt s/sx concerning for aspiration. However, following second bite, the pt proceeded to lie back prior to clearing PO and refused to sit up. Considering only scant trials were completed today and the pt is not consistently following directives, cannot safely recommend a modified diet at this time. Recommend pt remain NPO. May need to consider an alternative source of nutrition should the pt refuse to participate. SLP to continue following.     Recommendations:   1. NPO. May need to consider alternative source of nutrition if patient continues to refuse to participate     Plan of Care: Will benefit from Speech Therapy 5 times per week  Post-Acute Therapy: Recommend inpatient transitional care services for continued speech therapy services.      See \"Rehab Therapy-Acute\" Patient Summary Report for complete documentation.     "

## 2019-06-15 NOTE — THERAPY
SPEECH THERAPY CONTACT NOTE: Attempted repeat CSE. Pt refusing. Will re-attempt as pt is willing to participate.

## 2019-06-15 NOTE — PROGRESS NOTES
Internal Medicine Interval Note    Note Author: Em Savage M.D.      Name Robert CALDERON 1965   Age 54 y.o. male   MRN [id number]   Code Status Full Code     After 5 PM or if no immediate response to page, please call for cross-coverage.    Attending: Dr. Aguilera  Team: Blue See patient list for primary contact information.  Call [phone] to page.    1st Call - Day Intern, R1  Dr. Savage 2nd Call - Day Sr. Resident, R2-R3  Dr. Montelongo       Principal Problem  Brain mass concerning for infection vs. malignancy      Interval History  No acute events overnight, angry and agitated this morning, given IV Haldol 1 mg with improvement in mood/behavior.  Still with expressive aphasia, denies any complaints.  COD ordered for repeat craniotomy with [a]list gamesalth MRB tomorrow, 19, at 10:00 AM, NSG to send tissue for pathology and cultures.  NPO and off Lovenox since 19.  Neurosurgery and ID following, appreciate recommendations.      ROS  Constitutional: Negative for fever and chills.  HEN: Negative for tinnitus and congestion.  Throat: Negative for soreness and difficulty swallowing.  Eyes: Negative for change in vision and photophobia.  Respiratory: Negative for cough and shortness of breath.  Cardiovascular: Negative for chest pain and palpitations.  Gastrointestinal: Negative for constipation, diarrhea, nausea and vomiting.  Genitourinary: Negative for dysuria and hematuria.  Neurological: Positive for expressive aphasia. Negative for headache, dizziness and sensory change.  Psychiatric: Negative for depression and insomnia.      Disposition, Discharge Barriers  Inpatient for repeat craniotomy on 19.   Medicaid and placement to a SNF pending.      Consultants  Neurosurgery  Infectious Disease      Quality-Core Measures  Richards Catheter: No  Central Line: RUE PICC line  VTE Prophylaxis: Holding Lovenox for repeat craniotomy on 19  Antibiotics: Flagyl, cefepime and vancomycin  Rehab:  PT, OT, SLP following       Vitals  Vitals:    06/14/19 0740 06/14/19 1555 06/14/19 2020 06/15/19 0500   BP: 111/73 111/72 111/72 116/71   Pulse: 71 60 93 60   Resp: 17 17 18 16   Temp: 36.6 °C (97.9 °F) 36.6 °C (97.8 °F) 37.1 °C (98.8 °F) 36.9 °C (98.5 °F)   TempSrc: Temporal Temporal Temporal Temporal   SpO2: 90% (!) 87% 92%    Weight:       Height:           Physical Exam  General: No acute distress, cooperative.  Head: Normocephalic, left frontotemporal craniotomy healing well, no discharge or surrounding erythema.    Eyes: PERRLA, EOMI, normal conjunctiva, sclerae anicteric.  Throat: Oropharynx clear, oral mucosa moist.  Neck: Supple, no lymphadenopathy.  Lungs: Normal work of breathing, clear to auscultation bilaterally.  Heart: Normal rate, regular rhythm, no murmurs.  Abdomen: Soft, bowel sounds present, non-tender, no peritoneal signs.  Extremities: Nontender, no cyanosis, no edema. RUE PICC line.  Neuro: Alert, expressive aphasia.   Skin: Warm, dry, intact, no suspicious rashes or lesions on visualized skin.      Labs and Imaging  Pertinent labs, imaging and diagnostic tests and procedures associated with this visit have been reviewed, specific comments can be found in the assessment and plan section below.     Recent Labs      06/13/19   0505   SODIUM  140   POTASSIUM  3.8   CHLORIDE  106   CO2  25   BUN  17   CREATININE  0.68   GLUCOSE  90   CALCIUM  9.2     Recent Labs      06/13/19   0505   WBC  9.8   HEMOGLOBIN  13.0*   HEMATOCRIT  44.6   PLATELETCT  240     Recent Labs      06/13/19   0505   ALTSGPT  38   ASTSGOT  17   ALKPHOSPHAT  42   TBILIRUBIN  0.8   ALBUMIN  3.9       Assessment and Plan    * Brain abscess   Assessment & Plan    Found to have a fluid collection over the L frontotemporal area with mass-effect, midline shift and developing hydrocephalus on CT and MRI concerning for brain abscess vs. GBM.  The patient underwent a craniotomy by Dr. Young on 5/22/19 with pathology sig for mild  reactive gliosis. 6 weeks of antibiotics recommended by ID; the patient's Gram stains and cultures have been negative. Repeat MRB on 6/8/19 showed recurrence of the patient's brain mass with significant edema and persistent midline shift.   The patient's clinical status has been deteriorating, his expressive aphasia and impulsive behaviors are worsening and he failed his swallow evaluation today.      Plan:  Repeat craniotomy with stealth MRB on 6/16/19 at 10:00 AM.  Decadron 10 mg IV q6h.  NPO, D5 0.5 NS 20 mEq KCl at 75 mL/hr.  Low dose SSI with Humalog for hyperglycemia; hypoglycemia protocol ordered.   Cefepime, metronidazole and vancomycin x 6 weeks through 7/6/19.  Cleared by ID for discharge to SNF for rehab and IV antibiotics via PICC line; the patient will need Vanc troughs 3-times-weekly and a BMP and CBC weekly per ID.  Awaiting Medicaid for SNF placement.     Agitation   Assessment & Plan    Intermittently agitated and aggressive towards the staff.    Plan:  Seroquel dose to 25 mg BID. for better management.   Haldol PRN for agitation and aggression not managed with Seroquel.     Delirium   Assessment & Plan    Resolved.  On Seroquel 25 mg BID and Haldol PRN for intermittent aggression and agitation.   CTM.     Swallowing impairment   Assessment & Plan    Failed swallow evaluation today, made NPO, NSG contacted regarding worsening clinical status, repeat craniotomy scheduled for 6/16/19 at 12:30 PM.  Started on D5 NS at 75 mL/hr, CTM.     Iron deficiency anemia   Assessment & Plan    Stable.   FOBT negative, no records of EGD or colonoscopy per chart review, stool H. pylori test negative, celiac panel negative.    Plan:  Ferrous sulfate daily.  Outpatient PCP follow-up, recommend GI cancer screening with EGD and colonoscopy.     Class 1 obesity due to excess calories without serious comorbidity with body mass index (BMI) of 33.0 to 33.9 in adult   Assessment & Plan    BMI 31.  Outpatient follow-up for  weight loss management.  Concern for sleep apnea, recommend outpatient sleep study for further evaluation.     Status post craniotomy   Assessment & Plan    Sutures removed 6/6/2019.  Okay to resume anticoagulation per Dr. Young.  Holding Lovenox for repeat craniotomy on 6/16/19.     Acute respiratory failure with hypoxia (HCC)   Assessment & Plan    Resolved.  Intubated on admission for airway protection on 5/22/2019, extubated on 5/24/2019.  RT protocol, supplemental oxygen as needed.     Smoker   Assessment & Plan    Smokes 3 cigarettes per day per his mother's report.  Smoking cessation counseling prior to discharge.     Methamphetamine abuse (HCC)   Assessment & Plan    History of meth abuse via smoking per the patient's nephew, no history of IV drug use.  Substance abuse counseling prior to discharge.

## 2019-06-15 NOTE — PROGRESS NOTES
"Pharmacy Kinetics 54 y.o. male on vancomycin day # 11  6/15/2019    Currently Dose: Vancomycin 1500 mg iv q8hr (~15 mg/kg)  Received Load Dose: Yes     Indication for Treatment: brain abscess  ID Service Following: Yes     Pertinent history per medical record: Admitted on 2019 for suspected brain abscess. Noted methamphetamine abuse. Recent diagnosis of otitis media and completed course of cefdinir. Craniotomy 19. NSG planning for repeat craniotomy 19 due to concern for recurrent temporal cyst. ID planning on ~6 weeks antibiotics (tenative 19 EOT).     Other antibiotics: metronidazole 500 mg iv q6h, cefepime 2 gm iv q8h     Allergies: Pcn [penicillins] and Sulfa drugs      List concerns for accumulation of vancomycin: age 54, BMI ~31, BUN:SCr ~20:1     Pertinent cultures to date:   19 brain abscess: no organisms seen  19 blood-peripheral x 2:  negative    MRSA nares swab if pneumonia is a concern (ordered/positive/negative/n-a): n/a    Recent Labs      19   0505  06/15/19   0555   WBC  9.8  14.6*   NEUTSPOLYS  75.20*  90.00*     Recent Labs      19   0505  06/15/19   0555   BUN  17  20   CREATININE  0.68  0.65   ALBUMIN  3.9   --      Intake/Output Summary (Last 24 hours) at 06/15/19 1010  Last data filed at 19 1825   Gross per 24 hour   Intake                0 ml   Output              700 ml   Net             -700 ml      /66   Pulse 67   Temp 37.2 °C (98.9 °F) (Temporal)   Resp 18   Ht 1.803 m (5' 11\")   Wt 101.3 kg (223 lb 5.2 oz)   SpO2 93%  Temp (24hrs), Av.9 °C (98.5 °F), Min:36.6 °C (97.8 °F), Max:37.2 °C (98.9 °F)    Estimated Creatinine Clearance: 157.5 mL/min (by C-G formula based on SCr of 0.65 mg/dL).    A/P   1. Vancomycin dose change: not indicated   2. Next vancomycin level: 19 @1100 (ordered)  3. Goal trough: 18-22 mcg/mL  4. Comments: VS stable. Afebrile. WBC elevated (noted dexamethasone dose increase). CrCl ~158 mL/min (SCr " stable). No new microbiology. Factors for accumulation of vancomycin noted. Level in place 6/16/19 to assess vancomycin clearance. BMP with AM labs. ID consulted. NSG consulted and appears to plan for intervention in OR tomorrow. Pharmacy will continue to follow.    Teddy Orozco, PharmD

## 2019-06-16 ENCOUNTER — ANESTHESIA EVENT (OUTPATIENT)
Dept: SURGERY | Facility: MEDICAL CENTER | Age: 54
DRG: 025 | End: 2019-06-16
Payer: MEDICAID

## 2019-06-16 ENCOUNTER — APPOINTMENT (OUTPATIENT)
Dept: RADIOLOGY | Facility: MEDICAL CENTER | Age: 54
DRG: 025 | End: 2019-06-16
Attending: NURSE PRACTITIONER
Payer: MEDICAID

## 2019-06-16 ENCOUNTER — APPOINTMENT (OUTPATIENT)
Dept: RADIOLOGY | Facility: MEDICAL CENTER | Age: 54
DRG: 025 | End: 2019-06-16
Attending: INTERNAL MEDICINE
Payer: MEDICAID

## 2019-06-16 ENCOUNTER — ANESTHESIA (OUTPATIENT)
Dept: SURGERY | Facility: MEDICAL CENTER | Age: 54
DRG: 025 | End: 2019-06-16
Payer: MEDICAID

## 2019-06-16 PROBLEM — G93.89 BRAIN MASS: Status: ACTIVE | Noted: 2019-05-21

## 2019-06-16 LAB
ANION GAP SERPL CALC-SCNC: 8 MMOL/L (ref 0–11.9)
BASOPHILS # BLD AUTO: 0.1 % (ref 0–1.8)
BASOPHILS # BLD: 0.02 K/UL (ref 0–0.12)
BUN SERPL-MCNC: 21 MG/DL (ref 8–22)
CALCIUM SERPL-MCNC: 9.1 MG/DL (ref 8.5–10.5)
CHLORIDE SERPL-SCNC: 106 MMOL/L (ref 96–112)
CO2 SERPL-SCNC: 23 MMOL/L (ref 20–33)
CREAT SERPL-MCNC: 0.6 MG/DL (ref 0.5–1.4)
EOSINOPHIL # BLD AUTO: 0 K/UL (ref 0–0.51)
EOSINOPHIL NFR BLD: 0 % (ref 0–6.9)
ERYTHROCYTE [DISTWIDTH] IN BLOOD BY AUTOMATED COUNT: 58.7 FL (ref 35.9–50)
GLUCOSE BLD-MCNC: 128 MG/DL (ref 65–99)
GLUCOSE BLD-MCNC: 136 MG/DL (ref 65–99)
GLUCOSE SERPL-MCNC: 166 MG/DL (ref 65–99)
HCT VFR BLD AUTO: 42 % (ref 42–52)
HGB BLD-MCNC: 13 G/DL (ref 14–18)
IMM GRANULOCYTES # BLD AUTO: 0.09 K/UL (ref 0–0.11)
IMM GRANULOCYTES NFR BLD AUTO: 0.6 % (ref 0–0.9)
LYMPHOCYTES # BLD AUTO: 0.84 K/UL (ref 1–4.8)
LYMPHOCYTES NFR BLD: 5.7 % (ref 22–41)
MCH RBC QN AUTO: 24.2 PG (ref 27–33)
MCHC RBC AUTO-ENTMCNC: 31 G/DL (ref 33.7–35.3)
MCV RBC AUTO: 78.1 FL (ref 81.4–97.8)
MONOCYTES # BLD AUTO: 0.53 K/UL (ref 0–0.85)
MONOCYTES NFR BLD AUTO: 3.6 % (ref 0–13.4)
NEUTROPHILS # BLD AUTO: 13.33 K/UL (ref 1.82–7.42)
NEUTROPHILS NFR BLD: 90 % (ref 44–72)
NRBC # BLD AUTO: 0 K/UL
NRBC BLD-RTO: 0 /100 WBC
PLATELET # BLD AUTO: 238 K/UL (ref 164–446)
PMV BLD AUTO: 10.2 FL (ref 9–12.9)
POTASSIUM SERPL-SCNC: 3.8 MMOL/L (ref 3.6–5.5)
RBC # BLD AUTO: 5.38 M/UL (ref 4.7–6.1)
SODIUM SERPL-SCNC: 137 MMOL/L (ref 135–145)
VANCOMYCIN TROUGH SERPL-MCNC: 20.6 UG/ML (ref 10–20)
WBC # BLD AUTO: 14.8 K/UL (ref 4.8–10.8)

## 2019-06-16 PROCEDURE — 80202 ASSAY OF VANCOMYCIN: CPT

## 2019-06-16 PROCEDURE — A6404 STERILE GAUZE > 48 SQ IN: HCPCS | Performed by: NEUROLOGICAL SURGERY

## 2019-06-16 PROCEDURE — 700101 HCHG RX REV CODE 250: Performed by: STUDENT IN AN ORGANIZED HEALTH CARE EDUCATION/TRAINING PROGRAM

## 2019-06-16 PROCEDURE — 88307 TISSUE EXAM BY PATHOLOGIST: CPT | Mod: 59

## 2019-06-16 PROCEDURE — A9585 GADOBUTROL INJECTION: HCPCS | Performed by: NURSE PRACTITIONER

## 2019-06-16 PROCEDURE — 700111 HCHG RX REV CODE 636 W/ 250 OVERRIDE (IP): Performed by: ANESTHESIOLOGY

## 2019-06-16 PROCEDURE — C1781 MESH (IMPLANTABLE): HCPCS | Performed by: NEUROLOGICAL SURGERY

## 2019-06-16 PROCEDURE — 87206 SMEAR FLUORESCENT/ACID STAI: CPT

## 2019-06-16 PROCEDURE — 00U20KZ SUPPLEMENT DURA MATER WITH NONAUTOLOGOUS TISSUE SUBSTITUTE, OPEN APPROACH: ICD-10-PCS | Performed by: NEUROLOGICAL SURGERY

## 2019-06-16 PROCEDURE — 700101 HCHG RX REV CODE 250: Performed by: HOSPITALIST

## 2019-06-16 PROCEDURE — 500075 HCHG BLADE, CLIPPER NEURO: Performed by: NEUROLOGICAL SURGERY

## 2019-06-16 PROCEDURE — 700105 HCHG RX REV CODE 258: Performed by: INTERNAL MEDICINE

## 2019-06-16 PROCEDURE — 87116 MYCOBACTERIA CULTURE: CPT

## 2019-06-16 PROCEDURE — 88331 PATH CONSLTJ SURG 1 BLK 1SPC: CPT | Mod: 91

## 2019-06-16 PROCEDURE — 700111 HCHG RX REV CODE 636 W/ 250 OVERRIDE (IP)

## 2019-06-16 PROCEDURE — 85025 COMPLETE CBC W/AUTO DIFF WBC: CPT

## 2019-06-16 PROCEDURE — 87075 CULTR BACTERIA EXCEPT BLOOD: CPT | Mod: 91

## 2019-06-16 PROCEDURE — 700117 HCHG RX CONTRAST REV CODE 255: Performed by: NURSE PRACTITIONER

## 2019-06-16 PROCEDURE — 87102 FUNGUS ISOLATION CULTURE: CPT

## 2019-06-16 PROCEDURE — 00B70ZZ EXCISION OF CEREBRAL HEMISPHERE, OPEN APPROACH: ICD-10-PCS | Performed by: NEUROLOGICAL SURGERY

## 2019-06-16 PROCEDURE — 501838 HCHG SUTURE GENERAL: Performed by: NEUROLOGICAL SURGERY

## 2019-06-16 PROCEDURE — 700111 HCHG RX REV CODE 636 W/ 250 OVERRIDE (IP): Performed by: NURSE PRACTITIONER

## 2019-06-16 PROCEDURE — 770022 HCHG ROOM/CARE - ICU (200)

## 2019-06-16 PROCEDURE — 160031 HCHG SURGERY MINUTES - 1ST 30 MINS LEVEL 5: Performed by: NEUROLOGICAL SURGERY

## 2019-06-16 PROCEDURE — 82962 GLUCOSE BLOOD TEST: CPT

## 2019-06-16 PROCEDURE — 502240 HCHG MISC OR SUPPLY RC 0272: Performed by: NEUROLOGICAL SURGERY

## 2019-06-16 PROCEDURE — 700111 HCHG RX REV CODE 636 W/ 250 OVERRIDE (IP): Performed by: INTERNAL MEDICINE

## 2019-06-16 PROCEDURE — 87015 SPECIMEN INFECT AGNT CONCNTJ: CPT

## 2019-06-16 PROCEDURE — 160036 HCHG PACU - EA ADDL 30 MINS PHASE I: Performed by: NEUROLOGICAL SURGERY

## 2019-06-16 PROCEDURE — 501445 HCHG STAPLER, SKIN DISP: Performed by: NEUROLOGICAL SURGERY

## 2019-06-16 PROCEDURE — 700101 HCHG RX REV CODE 250: Performed by: INTERNAL MEDICINE

## 2019-06-16 PROCEDURE — 80048 BASIC METABOLIC PNL TOTAL CA: CPT

## 2019-06-16 PROCEDURE — 160042 HCHG SURGERY MINUTES - EA ADDL 1 MIN LEVEL 5: Performed by: NEUROLOGICAL SURGERY

## 2019-06-16 PROCEDURE — 87070 CULTURE OTHR SPECIMN AEROBIC: CPT

## 2019-06-16 PROCEDURE — 700105 HCHG RX REV CODE 258: Performed by: ANESTHESIOLOGY

## 2019-06-16 PROCEDURE — 700111 HCHG RX REV CODE 636 W/ 250 OVERRIDE (IP): Performed by: STUDENT IN AN ORGANIZED HEALTH CARE EDUCATION/TRAINING PROGRAM

## 2019-06-16 PROCEDURE — A6402 STERILE GAUZE <= 16 SQ IN: HCPCS | Performed by: NEUROLOGICAL SURGERY

## 2019-06-16 PROCEDURE — 00B70ZX EXCISION OF CEREBRAL HEMISPHERE, OPEN APPROACH, DIAGNOSTIC: ICD-10-PCS | Performed by: NEUROLOGICAL SURGERY

## 2019-06-16 PROCEDURE — 160048 HCHG OR STATISTICAL LEVEL 1-5: Performed by: NEUROLOGICAL SURGERY

## 2019-06-16 PROCEDURE — 700101 HCHG RX REV CODE 250: Performed by: ANESTHESIOLOGY

## 2019-06-16 PROCEDURE — 160002 HCHG RECOVERY MINUTES (STAT): Performed by: NEUROLOGICAL SURGERY

## 2019-06-16 PROCEDURE — 160009 HCHG ANES TIME/MIN: Performed by: NEUROLOGICAL SURGERY

## 2019-06-16 PROCEDURE — 70553 MRI BRAIN STEM W/O & W/DYE: CPT

## 2019-06-16 PROCEDURE — 500331 HCHG COTTONOID, SURG PATTIE: Performed by: NEUROLOGICAL SURGERY

## 2019-06-16 PROCEDURE — A6222 GAUZE <=16 IN NO W/SAL W/O B: HCPCS | Performed by: NEUROLOGICAL SURGERY

## 2019-06-16 PROCEDURE — 99232 SBSQ HOSP IP/OBS MODERATE 35: CPT | Mod: GC | Performed by: HOSPITALIST

## 2019-06-16 PROCEDURE — 500889 HCHG PACK, NEURO: Performed by: NEUROLOGICAL SURGERY

## 2019-06-16 PROCEDURE — 160035 HCHG PACU - 1ST 60 MINS PHASE I: Performed by: NEUROLOGICAL SURGERY

## 2019-06-16 PROCEDURE — 500367 HCHG DRAIN KIT, HEMOVAC: Performed by: NEUROLOGICAL SURGERY

## 2019-06-16 PROCEDURE — 99291 CRITICAL CARE FIRST HOUR: CPT | Performed by: INTERNAL MEDICINE

## 2019-06-16 PROCEDURE — 87205 SMEAR GRAM STAIN: CPT

## 2019-06-16 RX ORDER — MIDAZOLAM HYDROCHLORIDE 1 MG/ML
1 INJECTION INTRAMUSCULAR; INTRAVENOUS
Status: DISCONTINUED | OUTPATIENT
Start: 2019-06-16 | End: 2019-06-16 | Stop reason: HOSPADM

## 2019-06-16 RX ORDER — DIAZEPAM 5 MG/ML
5 INJECTION, SOLUTION INTRAMUSCULAR; INTRAVENOUS
Status: COMPLETED | OUTPATIENT
Start: 2019-06-16 | End: 2019-06-16

## 2019-06-16 RX ORDER — DIAZEPAM 5 MG/ML
5 INJECTION, SOLUTION INTRAMUSCULAR; INTRAVENOUS
Status: ACTIVE | OUTPATIENT
Start: 2019-06-16 | End: 2019-06-17

## 2019-06-16 RX ORDER — BACITRACIN 50000 [IU]/1
INJECTION, POWDER, FOR SOLUTION INTRAMUSCULAR
Status: DISCONTINUED | OUTPATIENT
Start: 2019-06-16 | End: 2019-06-16 | Stop reason: HOSPADM

## 2019-06-16 RX ORDER — BUPIVACAINE HYDROCHLORIDE AND EPINEPHRINE 5; 5 MG/ML; UG/ML
INJECTION, SOLUTION PERINEURAL
Status: DISCONTINUED | OUTPATIENT
Start: 2019-06-16 | End: 2019-06-16 | Stop reason: HOSPADM

## 2019-06-16 RX ORDER — GADOBUTROL 604.72 MG/ML
10 INJECTION INTRAVENOUS ONCE
Status: COMPLETED | OUTPATIENT
Start: 2019-06-16 | End: 2019-06-16

## 2019-06-16 RX ORDER — MIDAZOLAM HYDROCHLORIDE 1 MG/ML
INJECTION INTRAMUSCULAR; INTRAVENOUS
Status: COMPLETED
Start: 2019-06-16 | End: 2019-06-16

## 2019-06-16 RX ORDER — HYDROMORPHONE HYDROCHLORIDE 1 MG/ML
0.2 INJECTION, SOLUTION INTRAMUSCULAR; INTRAVENOUS; SUBCUTANEOUS
Status: DISCONTINUED | OUTPATIENT
Start: 2019-06-16 | End: 2019-06-16 | Stop reason: HOSPADM

## 2019-06-16 RX ORDER — ONDANSETRON 2 MG/ML
4 INJECTION INTRAMUSCULAR; INTRAVENOUS
Status: DISCONTINUED | OUTPATIENT
Start: 2019-06-16 | End: 2019-06-16 | Stop reason: HOSPADM

## 2019-06-16 RX ORDER — METOPROLOL TARTRATE 1 MG/ML
1 INJECTION, SOLUTION INTRAVENOUS
Status: DISCONTINUED | OUTPATIENT
Start: 2019-06-16 | End: 2019-06-16 | Stop reason: HOSPADM

## 2019-06-16 RX ORDER — DIPHENHYDRAMINE HYDROCHLORIDE 50 MG/ML
25 INJECTION INTRAMUSCULAR; INTRAVENOUS EVERY 6 HOURS PRN
Status: DISCONTINUED | OUTPATIENT
Start: 2019-06-16 | End: 2019-06-16

## 2019-06-16 RX ORDER — DEXTROSE MONOHYDRATE, SODIUM CHLORIDE, AND POTASSIUM CHLORIDE 50; 1.49; 9 G/1000ML; G/1000ML; G/1000ML
INJECTION, SOLUTION INTRAVENOUS CONTINUOUS
Status: DISPENSED | OUTPATIENT
Start: 2019-06-16 | End: 2019-06-16

## 2019-06-16 RX ORDER — HALOPERIDOL 5 MG/ML
1 INJECTION INTRAMUSCULAR
Status: COMPLETED | OUTPATIENT
Start: 2019-06-16 | End: 2019-06-16

## 2019-06-16 RX ORDER — DIPHENHYDRAMINE HYDROCHLORIDE 50 MG/ML
12.5 INJECTION INTRAMUSCULAR; INTRAVENOUS
Status: DISCONTINUED | OUTPATIENT
Start: 2019-06-16 | End: 2019-06-16 | Stop reason: HOSPADM

## 2019-06-16 RX ORDER — CEFOTETAN DISODIUM 2 G/20ML
INJECTION, POWDER, FOR SOLUTION INTRAMUSCULAR; INTRAVENOUS PRN
Status: DISCONTINUED | OUTPATIENT
Start: 2019-06-16 | End: 2019-06-16 | Stop reason: SURG

## 2019-06-16 RX ORDER — HYDRALAZINE HYDROCHLORIDE 20 MG/ML
5 INJECTION INTRAMUSCULAR; INTRAVENOUS
Status: DISCONTINUED | OUTPATIENT
Start: 2019-06-16 | End: 2019-06-16 | Stop reason: HOSPADM

## 2019-06-16 RX ORDER — MEPERIDINE HYDROCHLORIDE 25 MG/ML
6.25 INJECTION INTRAMUSCULAR; INTRAVENOUS; SUBCUTANEOUS
Status: DISCONTINUED | OUTPATIENT
Start: 2019-06-16 | End: 2019-06-16 | Stop reason: HOSPADM

## 2019-06-16 RX ORDER — MORPHINE SULFATE 10 MG/ML
4 INJECTION, SOLUTION INTRAMUSCULAR; INTRAVENOUS
Status: DISCONTINUED | OUTPATIENT
Start: 2019-06-16 | End: 2019-06-18

## 2019-06-16 RX ORDER — DEXAMETHASONE SODIUM PHOSPHATE 4 MG/ML
INJECTION, SOLUTION INTRA-ARTICULAR; INTRALESIONAL; INTRAMUSCULAR; INTRAVENOUS; SOFT TISSUE PRN
Status: DISCONTINUED | OUTPATIENT
Start: 2019-06-16 | End: 2019-06-16 | Stop reason: SURG

## 2019-06-16 RX ORDER — HYDROMORPHONE HYDROCHLORIDE 1 MG/ML
0.1 INJECTION, SOLUTION INTRAMUSCULAR; INTRAVENOUS; SUBCUTANEOUS
Status: DISCONTINUED | OUTPATIENT
Start: 2019-06-16 | End: 2019-06-16 | Stop reason: HOSPADM

## 2019-06-16 RX ORDER — ONDANSETRON 2 MG/ML
INJECTION INTRAMUSCULAR; INTRAVENOUS PRN
Status: DISCONTINUED | OUTPATIENT
Start: 2019-06-16 | End: 2019-06-16 | Stop reason: SURG

## 2019-06-16 RX ORDER — HALOPERIDOL 5 MG/ML
1 INJECTION INTRAMUSCULAR
Status: DISCONTINUED | OUTPATIENT
Start: 2019-06-16 | End: 2019-06-16 | Stop reason: HOSPADM

## 2019-06-16 RX ORDER — LABETALOL HYDROCHLORIDE 5 MG/ML
5 INJECTION, SOLUTION INTRAVENOUS
Status: DISCONTINUED | OUTPATIENT
Start: 2019-06-16 | End: 2019-06-16 | Stop reason: HOSPADM

## 2019-06-16 RX ORDER — BACITRACIN ZINC 500 [USP'U]/G
OINTMENT TOPICAL
Status: DISCONTINUED | OUTPATIENT
Start: 2019-06-16 | End: 2019-06-16 | Stop reason: HOSPADM

## 2019-06-16 RX ORDER — SODIUM CHLORIDE, SODIUM LACTATE, POTASSIUM CHLORIDE, CALCIUM CHLORIDE 600; 310; 30; 20 MG/100ML; MG/100ML; MG/100ML; MG/100ML
INJECTION, SOLUTION INTRAVENOUS
Status: DISCONTINUED | OUTPATIENT
Start: 2019-06-16 | End: 2019-06-16 | Stop reason: SURG

## 2019-06-16 RX ORDER — HYDROMORPHONE HYDROCHLORIDE 1 MG/ML
0.4 INJECTION, SOLUTION INTRAMUSCULAR; INTRAVENOUS; SUBCUTANEOUS
Status: DISCONTINUED | OUTPATIENT
Start: 2019-06-16 | End: 2019-06-16 | Stop reason: HOSPADM

## 2019-06-16 RX ORDER — DEXTROSE MONOHYDRATE, SODIUM CHLORIDE, AND POTASSIUM CHLORIDE 50; 1.49; 4.5 G/1000ML; G/1000ML; G/1000ML
INJECTION, SOLUTION INTRAVENOUS CONTINUOUS
Status: DISCONTINUED | OUTPATIENT
Start: 2019-06-16 | End: 2019-06-16

## 2019-06-16 RX ADMIN — FENTANYL CITRATE 50 MCG: 50 INJECTION INTRAMUSCULAR; INTRAVENOUS at 17:50

## 2019-06-16 RX ADMIN — SUGAMMADEX 200 MG: 100 INJECTION, SOLUTION INTRAVENOUS at 16:10

## 2019-06-16 RX ADMIN — ONDANSETRON 4 MG: 2 INJECTION INTRAMUSCULAR; INTRAVENOUS at 11:42

## 2019-06-16 RX ADMIN — DIAZEPAM 5 MG: 5 INJECTION, SOLUTION INTRAMUSCULAR; INTRAVENOUS at 09:53

## 2019-06-16 RX ADMIN — HALOPERIDOL LACTATE 1 MG: 5 INJECTION, SOLUTION INTRAMUSCULAR at 17:51

## 2019-06-16 RX ADMIN — METRONIDAZOLE 500 MG: 500 INJECTION, SOLUTION INTRAVENOUS at 04:05

## 2019-06-16 RX ADMIN — MIDAZOLAM HYDROCHLORIDE 1 MG: 1 INJECTION, SOLUTION INTRAMUSCULAR; INTRAVENOUS at 18:00

## 2019-06-16 RX ADMIN — CEFEPIME 2 G: 2 INJECTION, POWDER, FOR SOLUTION INTRAVENOUS at 05:03

## 2019-06-16 RX ADMIN — SODIUM CHLORIDE, POTASSIUM CHLORIDE, SODIUM LACTATE AND CALCIUM CHLORIDE: 600; 310; 30; 20 INJECTION, SOLUTION INTRAVENOUS at 11:36

## 2019-06-16 RX ADMIN — LIDOCAINE HYDROCHLORIDE 80 MG: 20 INJECTION, SOLUTION INTRAVENOUS at 11:42

## 2019-06-16 RX ADMIN — CEFOTETAN DISODIUM 2 G: 2 INJECTION, POWDER, FOR SOLUTION INTRAMUSCULAR; INTRAVENOUS at 12:08

## 2019-06-16 RX ADMIN — FENTANYL CITRATE 75 MCG: 50 INJECTION, SOLUTION INTRAMUSCULAR; INTRAVENOUS at 13:08

## 2019-06-16 RX ADMIN — VANCOMYCIN HYDROCHLORIDE 1500 MG: 100 INJECTION, POWDER, LYOPHILIZED, FOR SOLUTION INTRAVENOUS at 05:03

## 2019-06-16 RX ADMIN — MORPHINE SULFATE 4 MG: 10 INJECTION INTRAVENOUS at 22:40

## 2019-06-16 RX ADMIN — HALOPERIDOL LACTATE 1 MG: 5 INJECTION, SOLUTION INTRAMUSCULAR at 18:16

## 2019-06-16 RX ADMIN — DEXAMETHASONE SODIUM PHOSPHATE 8 MG: 4 INJECTION, SOLUTION INTRA-ARTICULAR; INTRALESIONAL; INTRAMUSCULAR; INTRAVENOUS; SOFT TISSUE at 11:42

## 2019-06-16 RX ADMIN — ROCURONIUM BROMIDE 100 MG: 10 INJECTION, SOLUTION INTRAVENOUS at 11:42

## 2019-06-16 RX ADMIN — POTASSIUM CHLORIDE, DEXTROSE MONOHYDRATE AND SODIUM CHLORIDE: 150; 5; 900 INJECTION, SOLUTION INTRAVENOUS at 05:16

## 2019-06-16 RX ADMIN — CEFEPIME 2 G: 2 INJECTION, POWDER, FOR SOLUTION INTRAVENOUS at 22:42

## 2019-06-16 RX ADMIN — FENTANYL CITRATE 50 MCG: 50 INJECTION INTRAMUSCULAR; INTRAVENOUS at 17:45

## 2019-06-16 RX ADMIN — HYDROMORPHONE HYDROCHLORIDE 0.4 MG: 1 INJECTION, SOLUTION INTRAMUSCULAR; INTRAVENOUS; SUBCUTANEOUS at 17:55

## 2019-06-16 RX ADMIN — VANCOMYCIN HYDROCHLORIDE 1500 MG: 100 INJECTION, POWDER, LYOPHILIZED, FOR SOLUTION INTRAVENOUS at 21:31

## 2019-06-16 RX ADMIN — DEXAMETHASONE SODIUM PHOSPHATE 10 MG: 10 INJECTION INTRAMUSCULAR; INTRAVENOUS at 23:37

## 2019-06-16 RX ADMIN — DEXAMETHASONE SODIUM PHOSPHATE 10 MG: 10 INJECTION INTRAMUSCULAR; INTRAVENOUS at 04:09

## 2019-06-16 RX ADMIN — GADOBUTROL 10 ML: 604.72 INJECTION INTRAVENOUS at 10:44

## 2019-06-16 RX ADMIN — PROPOFOL 100 MG: 10 INJECTION, EMULSION INTRAVENOUS at 11:42

## 2019-06-16 RX ADMIN — FENTANYL CITRATE 175 MCG: 50 INJECTION, SOLUTION INTRAMUSCULAR; INTRAVENOUS at 12:55

## 2019-06-16 RX ADMIN — FENTANYL CITRATE 250 MCG: 50 INJECTION, SOLUTION INTRAMUSCULAR; INTRAVENOUS at 11:42

## 2019-06-16 RX ADMIN — METRONIDAZOLE 500 MG: 500 INJECTION, SOLUTION INTRAVENOUS at 22:43

## 2019-06-16 RX ADMIN — MIDAZOLAM HYDROCHLORIDE 1 MG: 1 INJECTION, SOLUTION INTRAMUSCULAR; INTRAVENOUS at 18:05

## 2019-06-16 ASSESSMENT — PAIN SCALES - GENERAL: PAIN_LEVEL: 0

## 2019-06-16 ASSESSMENT — ENCOUNTER SYMPTOMS
SPEECH CHANGE: 1
STRIDOR: 0
COUGH: 0
FEVER: 0
ABDOMINAL PAIN: 0
CHILLS: 0
SEIZURES: 0
HEMOPTYSIS: 0
NAUSEA: 0
NECK PAIN: 0
PHOTOPHOBIA: 0
HALLUCINATIONS: 0
SHORTNESS OF BREATH: 0
BLURRED VISION: 0
HEADACHES: 1
BRUISES/BLEEDS EASILY: 0
PALPITATIONS: 0
DOUBLE VISION: 0
VOMITING: 0
MYALGIAS: 0

## 2019-06-16 NOTE — THERAPY
SPEECH THERAPY CONTACT NOTE: Spoke to RN regarding pt's NPO status. RN reporting that pt is scheduled for surgery today at 1230. Will hold and follow up as pt is medically appropriate.

## 2019-06-16 NOTE — ANESTHESIA PROCEDURE NOTES
Central Venous Line  Performed by: DRAKE FARMER  Authorized by: DRAKE FARMER     Start Time:  6/16/2019 11:50 AM  End Time:  6/16/2019 11:59 AM  Patient Location:  OR  Indication: central venous access    provider hand hygiene performed prior to central venous catheter insertion, all 5 sterile barriers used (gloves, gown, cap, mask, large sterile drape) during central venous catheter insertion and skin prep agent completely dried prior to procedure    Patient Position:  Trendelenburg  Laterality:  Right  Site:  Internal jugular  Prep:  Chlorhexidine  Catheter Size:  7 Fr  Catheter Length (cm):  20  Number of Lumens:  Triple lumen  target vein identified, needle advanced into vein and blood aspirated and guidewire advanced into vein    Seldinger Technique?: Yes    Ultrasound-Guided: ultrasound-guided  Image captured, interpreted and electronically stored.  Sterile Gel and Probe Cover Used for Ultrasound?: Yes    Intravenous Verification: verified by ultrasound, venous blood return and chest x-ray pending    all ports aspirated, all ports flushed easily, guidewire was removed intact, biopatch was applied, line was sutured in place and dressing was applied    Events: patient tolerated procedure well with no complications

## 2019-06-16 NOTE — ANESTHESIA PROCEDURE NOTES
Arterial Line  Performed by: DRAKE FARMER  Authorized by: DRAKE FARMER     Start Time:  6/16/2019 11:47 AM  End Time:  6/16/2019 11:50 AM  Localization: surface landmarks    Patient Location:  OR  Indication: continuous blood pressure monitoring    Catheter Size:  20 G  Seldinger Technique?: Yes    Site:  Radial artery  Line Secured:  Antimicrobial disc, tape and transparent dressing  Events: patient tolerated procedure well with no complications

## 2019-06-16 NOTE — PROGRESS NOTES
"Pharmacy Kinetics 54 y.o. male on vancomycin day # 12  2019    Currently Dose: Vancomycin 1500 mg iv q8hr  Received Load Dose: Yes    Indication for Treatment: brain abscess  ID Service Following: Yes     Pertinent history per medical record: Admitted on 2019 for suspected brain abscess. Noted methamphetamine abuse. Recent diagnosis of otitis media and completed course of cefdinir. Craniotomy 19. NSG planning for repeat craniotomy 19 due to concern for recurrent temporal cyst. ID planning on ~6 weeks antibiotics (tenative 19 EOT).     Other antibiotics: metronidazole 500 mg iv q6h, cefepime 2 gm iv q8h     Allergies: Pcn [penicillins] and Sulfa drugs      List concerns for accumulation of vancomycin: age 54, BMI ~31, BUN:SCr ~20:1     Pertinent cultures to date:   19 brain abscess: no organisms seen  19 blood-peripheral x 2:  negative     MRSA nares swab if pneumonia is a concern (ordered/positive/negative/n-a): n/a    Recent Labs      06/15/19   0555  19   0325   WBC  14.6*  14.8*   NEUTSPOLYS  90.00*  90.00*     Recent Labs      06/15/19   0555  19   0325   BUN  20  21   CREATININE  0.65  0.60     Recent Labs      19   0325   VANCOTROUGH  20.6*     /82   Pulse 68   Temp 37.1 °C (98.7 °F) (Temporal)   Resp 17   Ht 1.803 m (5' 11\")   Wt 101.3 kg (223 lb 5.2 oz)   SpO2 96%  Temp (24hrs), Av.1 °C (98.7 °F), Min:36.9 °C (98.4 °F), Max:37.2 °C (98.9 °F)    Estimated Creatinine Clearance: 170.6 mL/min (by C-G formula based on SCr of 0.6 mg/dL).    A/P   1. Vancomycin dose change: not indicated   2. Next vancomycin level: ~2-3 days (not ordered)  3. Goal trough: 18-22 mcg/mL  4. Comments: VS stable. Afebrile. WBC elevated/stable and utilizing corticosteroids. CrCl ~171 mL/min. No new microbiology. Concerns for accumulation of vancomycin noted. Most recent vancomycin level noted and at goal. Likely to repeat vancomycin level in ~2-3 days pending clinical " course. BMP with AM labs. Tentative plans for OR excursion today. ID and NSG consulted. Pharmacy will continue to follow.    Teddy Orozco, PharmD

## 2019-06-16 NOTE — ANESTHESIA PROCEDURE NOTES
Airway  Date/Time: 6/16/2019 11:44 AM  Performed by: DRAKE FARMER  Authorized by: DRAKE FARMER     Location:  OR  Urgency:  Elective  Indications for Airway Management:  Anesthesia  Spontaneous Ventilation: absent    Sedation Level:  Deep  Preoxygenated: Yes    Patient Position:  Sniffing  Final Airway Type:  Endotracheal airway  Final Endotracheal Airway:  ETT  Cuffed: Yes    Technique Used for Successful ETT Placement:  Direct laryngoscopy  Insertion Site:  Oral  Blade Type:  Kacy  Laryngoscope Blade/Videolaryngoscope Blade Size:  3  ETT Size (mm):  7.5  Measured from:  Teeth  ETT to Teeth (cm):  21  Placement Verified by: auscultation and capnometry    Cormack-Lehane Classification:  Grade I - full view of glottis  Number of Attempts at Approach:  1

## 2019-06-16 NOTE — ANESTHESIA PREPROCEDURE EVALUATION
Relevant Problems   No relevant active problems       Physical Exam    Airway   Mallampati: II  TM distance: >3 FB  Neck ROM: full       Cardiovascular - normal exam  Rhythm: regular  Rate: normal  (-) murmur     Dental - normal exam         Pulmonary - normal exam  Breath sounds clear to auscultation     Abdominal    Neurological - normal exam    Comments: Delerious             Anesthesia Plan    ASA 2 - emergent   ASA physical status emergent criteria: neurologic compromise requiring immediate intervention    Plan - general       Airway plan will be ETT        Induction: intravenous    Postoperative Plan: Postoperative administration of opioids is intended.    Pertinent diagnostic labs and testing reviewed    Informed Consent:    Anesthetic plan and risks discussed with patient.    Use of blood products discussed with: patient whom consented to blood products.

## 2019-06-16 NOTE — CARE PLAN
Problem: Safety  Goal: Will remain free from injury  Outcome: PROGRESSING AS EXPECTED  Hourly rounding.  Non-skid socks. Bed locked & in low position. Personal belongings and call light  within reach.1:1 sitter at bedside. .      Problem: Skin Integrity  Goal: Risk for impaired skin integrity will decrease  Outcome: PROGRESSING AS EXPECTED  Kept dry and clean. place pillows on bony prominences to prevent skin breakdown.

## 2019-06-16 NOTE — PROGRESS NOTES
Pharmacist contacted about Vanco trough of 20.6.  Goal for the patient is 18-22 so we can hang the 0330 dose.

## 2019-06-16 NOTE — ANESTHESIA QCDR
2019 [facility] Clinical Data Registry (for Quality Improvement)     Postoperative nausea/vomiting risk protocol (Adult = 18 yrs and Pediatric 3-17 yrs)- (430 and 463)  General inhalation anesthetic (NOT TIVA) with PONV risk factors: Yes  Provision of anti-emetic therapy with at least 2 different classes of agents: Yes   Patient DID NOT receive anti-emetic therapy and reason is documented in Medical Record:  N/A    Multimodal Pain Management- (AQI59)  Patient undergoing Elective Surgery (i.e. Outpatient, or ASC, or Prescheduled Surgery prior to Hospital Admission): Yes  Use of Multimodal Pain Management, two or more drugs and/or interventions, NOT including systemic opioids:    Exception: Documented allergy to multiple classes of analgesics:      PACU assessment of acute postoperative pain prior to Anesthesia Care End- Applies to Patients Age = 18- (ABG7)  Initial PACU pain score is which of the following: < 7/10  Patient unable to report pain score: N/A    Post-anesthetic transfer of care checklist/protocol to PACU/ICU- (426 and 427)  Upon conclusion of case, patient transferred to which of the following locations: PACU/Non-ICU  Use of transfer checklist/protocol:   Exclusion: Service Performed in Patient Hospital Room (and thus did not require transfer):     PACU Reintubation- (AQI31)  General anesthesia requiring endotracheal intubation (ETT) along with subsequent extubation in OR or PACU: No  Required reintubation in the PACU: N/A  Extubation was a planned trial documented in the medical record prior to removal of the original airway device: N/A    Unplanned admission to ICU related to anesthesia service up through end of PACU care- (MD51)  Unplanned admission to ICU (not initially anticipated at anesthesia start time): No

## 2019-06-16 NOTE — PROGRESS NOTES
Internal Medicine Interval Note    Note Author: Em Savage M.D.      Name Robert CALDERON 1965   Age 54 y.o. male   MRN [id number]   Code Status Full Code     After 5 PM or if no immediate response to page, please call for cross-coverage.    Attending: Dr. Aguilera  Team: Blue See patient list for primary contact information.  Call [phone] to page.    1st Call - Day Intern, R1  Dr. Savage 2nd Call - Day Sr. Resident, R2-R3  Dr. Montelongo       Principal Problem  Brain mass concerning for infection vs. malignancy      Interval History  No acute events overnight.   NPO for repeat craniotomy with antithrombotic stockings for VTE ppx.   Leukocytosis on CBC is likely secondary to steroid therapy.   CMP unremarkable, Na 137 WNL, changed mIVF to D5 NS 20 mEq KCl this morning.  Repeat craniotomy today at 12:30 PM, [facility] protocol to begin at 10:00 AM, touched base with Dr. Young - he will send surgical specimens for repeat pathology and the cultures recommended by ID.  ICU team notified of possible ICU admission following brain surgery.      ROS  Constitutional: Negative for fever and chills.  HEN: Negative for tinnitus and headache.  Throat: Positive for dysphagia. Negative for soreness.  Eyes: Negative for change in vision and photophobia.  Respiratory: Negative for cough and shortness of breath.  Cardiovascular: Negative for chest pain and palpitations.  Gastrointestinal: Negative for constipation, diarrhea, nausea and vomiting.  Genitourinary: Negative for dysuria and hematuria.  Neurological: Positive for expressive aphasia. Negative for dizziness and sensory change.      Disposition, Discharge Barriers  Inpatient for repeat craniotomy on 19.   Medicaid and placement to a SNF pending.      Consultants  Neurosurgery  Infectious Disease      Quality-Core Measures  Richards Catheter: No  Central Line: RUE PICC line  VTE Prophylaxis: Holding Lovenox for repeat craniotomy on  6/16/19  Antibiotics: Flagyl, cefepime and vancomycin  Rehab: PT, OT, SLP following       Vitals  Vitals:    06/15/19 0752 06/15/19 1548 06/15/19 2000 06/16/19 0400   BP: 103/66 110/75 105/68 111/82   Pulse: 67 63 66 68   Resp: 18 18 17 17   Temp: 37.2 °C (98.9 °F) 36.9 °C (98.4 °F) 37 °C (98.6 °F) 37.1 °C (98.7 °F)   TempSrc: Temporal Temporal Temporal Temporal   SpO2: 93% 93% 94% 96%   Weight:       Height:           Physical Exam  General: No acute distress, cooperative.  Head: Normocephalic, L frontotemporal craniotomy healing well, no discharge or surrounding erythema.    Eyes: PERRLA, EOMI, normal conjunctiva, sclerae anicteric.  Throat: Oropharynx clear, oral mucosa moist.  Neck: Supple, no lymphadenopathy.  Lungs: Normal work of breathing, clear to auscultation bilaterally.  Heart: Normal rate, regular rhythm, no murmurs.  Abdomen: Soft, bowel sounds present, non-tender, no peritoneal signs.  Extremities: Nontender, no cyanosis, no edema. RUE PICC line intact.  Neuro: Alert, expressive aphasia, moving all extremities.  Skin: Warm, dry, intact, no suspicious rashes or lesions on visualized skin.      Labs and Imaging  Pertinent labs, imaging and diagnostic tests and procedures associated with this visit have been reviewed, specific comments can be found in the assessment and plan section below.     Recent Labs      06/15/19   0555  06/16/19   0325   SODIUM  140  137   POTASSIUM  3.8  3.8   CHLORIDE  106  106   CO2  25  23   BUN  20  21   CREATININE  0.65  0.60   GLUCOSE  146*  166*   CALCIUM  9.0  9.1     Recent Labs      06/15/19   0555  06/16/19   0325   WBC  14.6*  14.8*   HEMOGLOBIN  12.7*  13.0*   HEMATOCRIT  42.6  42.0   PLATELETCT  245  238     No results for input(s): ALTSGPT, ASTSGOT, ALKPHOSPHAT, TBILIRUBIN, DBILIRUBIN, GAMMAGT, AMYLASE, LIPASE, ALBUMIN in the last 72 hours.    Assessment and Plan    * Brain abscess   Assessment & Plan    Found to have a L frontotemporal fluid collection with  mass-effect, midline shift and developing hydrocephalus on CT and MRI concerning for abscess vs. GBM.   Underwent a craniotomy by Dr. Young on 5/22/19 with negative Gram stain and cultures but mild reactive gliosis on pathology, 6 weeks of antibiotics recommended by ID.   Repeat MRB on 6/8/19 showed recurrence of L frontotemporal brain mass with significant edema and persistent midline shift.   Demonstrated clinical decline with worsening expressive aphasia and mood disturbances; failed his swallow evaluation on 6/13/19.      Plan:  Repeat craniotomy with stealth MRB on 6/16/19  Decadron 10 mg IV q6h.  NPO  mIVF  Low dose SSI and hypoglycemia protocol  Cefepime, metronidazole and vancomycin x 6 weeks through 7/6/19  Once medically stable for discharge, SNF recommended for rehab and IV antibiotics via PICC line with Vanc troughs 3-times-weekly and BMP + CBC weekly per ID.  Awaiting Medicaid for SNF placement.     Agitation   Assessment & Plan    Intermittently agitated and aggressive towards the staff.    Plan:  Seroquel dose to 25 mg BID. for better management.   Haldol PRN for agitation and aggression not managed with Seroquel.     Delirium   Assessment & Plan    Resolved.  On Seroquel 25 mg BID and Haldol PRN for intermittent aggression and agitation.   CTM.     Swallowing impairment   Assessment & Plan    Failed swallow evaluation today, made NPO, NSG contacted regarding worsening clinical status, repeat craniotomy scheduled for 6/16/19 at 12:30 PM.  Started on D5 NS at 75 mL/hr, CTM.     Iron deficiency anemia   Assessment & Plan    Stable.   FOBT negative, no records of EGD or colonoscopy per chart review, stool H. pylori test negative, celiac panel negative.    Plan:  Ferrous sulfate daily.  Outpatient PCP follow-up, recommend GI cancer screening with EGD and colonoscopy.     Class 1 obesity due to excess calories without serious comorbidity with body mass index (BMI) of 33.0 to 33.9 in adult   Assessment &  Plan    BMI 31.  Outpatient follow-up for weight loss management.  Concern for sleep apnea, recommend outpatient sleep study for further evaluation.     Status post craniotomy   Assessment & Plan    Sutures removed 6/6/2019.  Okay to resume anticoagulation per Dr. Young.  Holding Lovenox for repeat craniotomy on 6/16/19.     Acute respiratory failure with hypoxia (HCC)   Assessment & Plan    Resolved.  Intubated on admission for airway protection on 5/22/2019, extubated on 5/24/2019.  RT protocol, supplemental oxygen as needed.     Smoker   Assessment & Plan    Smokes 3 cigarettes per day per his mother's report.  Smoking cessation counseling prior to discharge.     Methamphetamine abuse (HCC)   Assessment & Plan    History of meth abuse via smoking per the patient's nephew, no history of IV drug use.  Substance abuse counseling prior to discharge.

## 2019-06-16 NOTE — ANESTHESIA TIME REPORT
Anesthesia Start and Stop Event Times     Date Time Event    6/16/2019 1136 Anesthesia Start     1636 Anesthesia Stop        Responsible Staff  06/16/19    Name Role Begin End    Golden Randle M.D. Anesth 1136 1636        Preop Diagnosis (Free Text):  Pre-op Diagnosis     left temporal peripherally enhancing cystic lesion        Preop Diagnosis (Codes):  Diagnosis Information     Diagnosis Code(s):         Post op Diagnosis  Brain mass      Premium Reason  E. Weekend    Comments: Central line, I[place], Premium Razia #70

## 2019-06-16 NOTE — ANESTHESIA POSTPROCEDURE EVALUATION
Patient: Robert Dorantes    Procedure Summary     Date:  06/16/19 Room / Location:  [facility] 08 / SURGERY [facility]    Anesthesia Start:  1136 Anesthesia Stop:      Procedure:  LEFT TEMPORAL CRANIOTOMY, EXCISION OF BRAIN MASS  (Left Head) Diagnosis:  (left temporal peripherally enhancing cystic lesion)    Surgeon:  Chase Young M.D. Responsible Provider:  Golden Randle M.D.    Anesthesia Type:  general ASA Status:  2 - Emergent          Final Anesthesia Type: general  Last vitals  BP   Blood Pressure: 108/71, NIBP: 111/70    Temp   36.6 °C (97.9 °F)    Pulse   Pulse: 63   Resp   16    SpO2   97 %      Anesthesia Post Evaluation    Patient location during evaluation: PACU  Patient participation: complete - patient participated  Level of consciousness: responsive to physical stimuli  Pain score: 0    Airway patency: patent  Anesthetic complications: no  Cardiovascular status: hemodynamically stable  Respiratory status: acceptable  Hydration status: euvolemic    PONV: none           Nurse Pain Score: 0 (NPRS)

## 2019-06-17 ENCOUNTER — APPOINTMENT (OUTPATIENT)
Dept: RADIOLOGY | Facility: MEDICAL CENTER | Age: 54
DRG: 025 | End: 2019-06-17
Attending: NURSE PRACTITIONER
Payer: MEDICAID

## 2019-06-17 LAB
ANION GAP SERPL CALC-SCNC: 7 MMOL/L (ref 0–11.9)
BASOPHILS # BLD AUTO: 0.2 % (ref 0–1.8)
BASOPHILS # BLD: 0.02 K/UL (ref 0–0.12)
BUN SERPL-MCNC: 23 MG/DL (ref 8–22)
CALCIUM SERPL-MCNC: 8.5 MG/DL (ref 8.5–10.5)
CHLORIDE SERPL-SCNC: 106 MMOL/L (ref 96–112)
CO2 SERPL-SCNC: 26 MMOL/L (ref 20–33)
CREAT SERPL-MCNC: 0.75 MG/DL (ref 0.5–1.4)
EOSINOPHIL # BLD AUTO: 0 K/UL (ref 0–0.51)
EOSINOPHIL NFR BLD: 0 % (ref 0–6.9)
ERYTHROCYTE [DISTWIDTH] IN BLOOD BY AUTOMATED COUNT: 61.1 FL (ref 35.9–50)
GLUCOSE BLD-MCNC: 108 MG/DL (ref 65–99)
GLUCOSE BLD-MCNC: 109 MG/DL (ref 65–99)
GLUCOSE BLD-MCNC: 116 MG/DL (ref 65–99)
GLUCOSE SERPL-MCNC: 135 MG/DL (ref 65–99)
GRAM STN SPEC: NORMAL
GRAM STN SPEC: NORMAL
HCT VFR BLD AUTO: 39.9 % (ref 42–52)
HGB BLD-MCNC: 11.8 G/DL (ref 14–18)
IMM GRANULOCYTES # BLD AUTO: 0.1 K/UL (ref 0–0.11)
IMM GRANULOCYTES NFR BLD AUTO: 0.8 % (ref 0–0.9)
LYMPHOCYTES # BLD AUTO: 0.67 K/UL (ref 1–4.8)
LYMPHOCYTES NFR BLD: 5.1 % (ref 22–41)
MCH RBC QN AUTO: 24.1 PG (ref 27–33)
MCHC RBC AUTO-ENTMCNC: 29.6 G/DL (ref 33.7–35.3)
MCV RBC AUTO: 81.4 FL (ref 81.4–97.8)
MONOCYTES # BLD AUTO: 0.87 K/UL (ref 0–0.85)
MONOCYTES NFR BLD AUTO: 6.6 % (ref 0–13.4)
MORPHOLOGY BLD-IMP: NORMAL
NEUTROPHILS # BLD AUTO: 11.49 K/UL (ref 1.82–7.42)
NEUTROPHILS NFR BLD: 87.3 % (ref 44–72)
NRBC # BLD AUTO: 0 K/UL
NRBC BLD-RTO: 0 /100 WBC
PATHOLOGY CONSULT NOTE: NORMAL
PLATELET # BLD AUTO: 227 K/UL (ref 164–446)
PMV BLD AUTO: 10.4 FL (ref 9–12.9)
POTASSIUM SERPL-SCNC: 4.1 MMOL/L (ref 3.6–5.5)
RBC # BLD AUTO: 4.9 M/UL (ref 4.7–6.1)
SIGNIFICANT IND 70042: NORMAL
SIGNIFICANT IND 70042: NORMAL
SITE SITE: NORMAL
SITE SITE: NORMAL
SODIUM SERPL-SCNC: 139 MMOL/L (ref 135–145)
SOURCE SOURCE: NORMAL
SOURCE SOURCE: NORMAL
WBC # BLD AUTO: 13.2 K/UL (ref 4.8–10.8)

## 2019-06-17 PROCEDURE — 87535 HIV-1 PROBE&REVERSE TRNSCRPJ: CPT

## 2019-06-17 PROCEDURE — 700111 HCHG RX REV CODE 636 W/ 250 OVERRIDE (IP): Performed by: NURSE PRACTITIONER

## 2019-06-17 PROCEDURE — 700105 HCHG RX REV CODE 258: Performed by: INTERNAL MEDICINE

## 2019-06-17 PROCEDURE — 700102 HCHG RX REV CODE 250 W/ 637 OVERRIDE(OP): Performed by: INTERNAL MEDICINE

## 2019-06-17 PROCEDURE — 700101 HCHG RX REV CODE 250: Performed by: HOSPITALIST

## 2019-06-17 PROCEDURE — A9270 NON-COVERED ITEM OR SERVICE: HCPCS | Performed by: INTERNAL MEDICINE

## 2019-06-17 PROCEDURE — 700102 HCHG RX REV CODE 250 W/ 637 OVERRIDE(OP): Performed by: STUDENT IN AN ORGANIZED HEALTH CARE EDUCATION/TRAINING PROGRAM

## 2019-06-17 PROCEDURE — 99232 SBSQ HOSP IP/OBS MODERATE 35: CPT | Performed by: INTERNAL MEDICINE

## 2019-06-17 PROCEDURE — 700112 HCHG RX REV CODE 229: Performed by: INTERNAL MEDICINE

## 2019-06-17 PROCEDURE — 85025 COMPLETE CBC W/AUTO DIFF WBC: CPT

## 2019-06-17 PROCEDURE — 700111 HCHG RX REV CODE 636 W/ 250 OVERRIDE (IP): Performed by: INTERNAL MEDICINE

## 2019-06-17 PROCEDURE — 92526 ORAL FUNCTION THERAPY: CPT

## 2019-06-17 PROCEDURE — 99223 1ST HOSP IP/OBS HIGH 75: CPT | Performed by: PSYCHIATRY & NEUROLOGY

## 2019-06-17 PROCEDURE — 70553 MRI BRAIN STEM W/O & W/DYE: CPT

## 2019-06-17 PROCEDURE — 82962 GLUCOSE BLOOD TEST: CPT | Mod: 91

## 2019-06-17 PROCEDURE — 80048 BASIC METABOLIC PNL TOTAL CA: CPT

## 2019-06-17 PROCEDURE — 770022 HCHG ROOM/CARE - ICU (200)

## 2019-06-17 PROCEDURE — 99233 SBSQ HOSP IP/OBS HIGH 50: CPT | Performed by: INTERNAL MEDICINE

## 2019-06-17 PROCEDURE — 700117 HCHG RX CONTRAST REV CODE 255: Performed by: NURSE PRACTITIONER

## 2019-06-17 PROCEDURE — A9585 GADOBUTROL INJECTION: HCPCS | Performed by: NURSE PRACTITIONER

## 2019-06-17 PROCEDURE — 700111 HCHG RX REV CODE 636 W/ 250 OVERRIDE (IP): Performed by: STUDENT IN AN ORGANIZED HEALTH CARE EDUCATION/TRAINING PROGRAM

## 2019-06-17 RX ORDER — METRONIDAZOLE 500 MG/1
500 TABLET ORAL EVERY 8 HOURS
Status: DISCONTINUED | OUTPATIENT
Start: 2019-06-17 | End: 2019-06-19

## 2019-06-17 RX ORDER — SODIUM CHLORIDE 9 MG/ML
INJECTION, SOLUTION INTRAVENOUS CONTINUOUS
Status: DISCONTINUED | OUTPATIENT
Start: 2019-06-17 | End: 2019-06-18

## 2019-06-17 RX ORDER — GADOBUTROL 604.72 MG/ML
10 INJECTION INTRAVENOUS ONCE
Status: COMPLETED | OUTPATIENT
Start: 2019-06-17 | End: 2019-06-17

## 2019-06-17 RX ADMIN — VANCOMYCIN HYDROCHLORIDE 1500 MG: 100 INJECTION, POWDER, LYOPHILIZED, FOR SOLUTION INTRAVENOUS at 22:10

## 2019-06-17 RX ADMIN — MAGNESIUM HYDROXIDE 30 ML: 400 SUSPENSION ORAL at 16:31

## 2019-06-17 RX ADMIN — DEXAMETHASONE SODIUM PHOSPHATE 10 MG: 10 INJECTION INTRAMUSCULAR; INTRAVENOUS at 23:28

## 2019-06-17 RX ADMIN — CEFEPIME 2 G: 2 INJECTION, POWDER, FOR SOLUTION INTRAVENOUS at 13:45

## 2019-06-17 RX ADMIN — CEFEPIME 2 G: 2 INJECTION, POWDER, FOR SOLUTION INTRAVENOUS at 05:10

## 2019-06-17 RX ADMIN — CEFEPIME 2 G: 2 INJECTION, POWDER, FOR SOLUTION INTRAVENOUS at 22:11

## 2019-06-17 RX ADMIN — GADOBUTROL 10 ML: 604.72 INJECTION INTRAVENOUS at 10:26

## 2019-06-17 RX ADMIN — OXYCODONE HYDROCHLORIDE 5 MG: 5 TABLET ORAL at 23:26

## 2019-06-17 RX ADMIN — METRONIDAZOLE 500 MG: 500 TABLET, FILM COATED ORAL at 22:11

## 2019-06-17 RX ADMIN — METRONIDAZOLE 500 MG: 500 INJECTION, SOLUTION INTRAVENOUS at 11:52

## 2019-06-17 RX ADMIN — DEXAMETHASONE SODIUM PHOSPHATE 10 MG: 10 INJECTION INTRAMUSCULAR; INTRAVENOUS at 11:52

## 2019-06-17 RX ADMIN — DEXAMETHASONE SODIUM PHOSPHATE 10 MG: 10 INJECTION INTRAMUSCULAR; INTRAVENOUS at 05:10

## 2019-06-17 RX ADMIN — SODIUM CHLORIDE: 9 INJECTION, SOLUTION INTRAVENOUS at 02:19

## 2019-06-17 RX ADMIN — SODIUM CHLORIDE: 9 INJECTION, SOLUTION INTRAVENOUS at 22:14

## 2019-06-17 RX ADMIN — MORPHINE SULFATE 4 MG: 10 INJECTION INTRAVENOUS at 05:13

## 2019-06-17 RX ADMIN — VANCOMYCIN HYDROCHLORIDE 1500 MG: 100 INJECTION, POWDER, LYOPHILIZED, FOR SOLUTION INTRAVENOUS at 05:10

## 2019-06-17 RX ADMIN — DOCUSATE SODIUM 100 MG: 100 CAPSULE, LIQUID FILLED ORAL at 16:31

## 2019-06-17 RX ADMIN — POLYETHYLENE GLYCOL 3350 1 PACKET: 17 POWDER, FOR SOLUTION ORAL at 16:32

## 2019-06-17 RX ADMIN — DEXAMETHASONE SODIUM PHOSPHATE 10 MG: 10 INJECTION INTRAMUSCULAR; INTRAVENOUS at 16:39

## 2019-06-17 RX ADMIN — METRONIDAZOLE 500 MG: 500 INJECTION, SOLUTION INTRAVENOUS at 05:10

## 2019-06-17 RX ADMIN — INSULIN LISPRO 1 UNITS: 100 INJECTION, SOLUTION INTRAVENOUS; SUBCUTANEOUS at 17:52

## 2019-06-17 RX ADMIN — VANCOMYCIN HYDROCHLORIDE 1500 MG: 100 INJECTION, POWDER, LYOPHILIZED, FOR SOLUTION INTRAVENOUS at 14:51

## 2019-06-17 ASSESSMENT — ENCOUNTER SYMPTOMS
DOUBLE VISION: 0
COUGH: 0
PALPITATIONS: 0
PHOTOPHOBIA: 0
EYE DISCHARGE: 0
DEPRESSION: 0
ABDOMINAL PAIN: 0
BLURRED VISION: 0
SPEECH CHANGE: 1
HALLUCINATIONS: 0
SORE THROAT: 0
HEMOPTYSIS: 0
NECK PAIN: 0
VOMITING: 0
SENSORY CHANGE: 0
MEMORY LOSS: 1
SEIZURES: 0
BRUISES/BLEEDS EASILY: 0
MYALGIAS: 0
FEVER: 0
HEADACHES: 1
WEAKNESS: 0
SHORTNESS OF BREATH: 0
FALLS: 0
WEIGHT LOSS: 0
NAUSEA: 0
CHILLS: 0
STRIDOR: 0
SPEECH CHANGE: 0

## 2019-06-17 NOTE — PROGRESS NOTES
Infectious Disease Progress Note    Author: Rae Reyes M.D. Date & Time of service: 6/17/2019  12:10 PM    Chief Complaint:  Follow-up for brain mass     Interval History:  53 y.o. male  admitted 5/21/2019 for AMS due to above. +meth abuse.  Recently in the ED on May 12 and was diagnosed with acute suppurative otitis media. S/p craniotomy 5/22/19.  Gray amorphous gelatinous material noted intraoperatively most consistent with abscess per operative report.  Pathology report+ mild reactive gliosis.    6/1/2019 no fevers.  No new issues.  Pathology is still not back.  6/5- no fevers. The path has come back positive for gliosis.  Denies any new complaints.  6/6/2019 no fevers.  No new issues overnight.  Tolerating the antibiotics  6/7 afebrile WBC 7.8.  Patient denies any headaches or neck pain.  He is tolerating IV antibiotics without any issues.  No diarrhea.  Family requesting home IV antibiotics if feasible.  6/8 afebrile WBC 7.5 patient denies any pain.  He had a PICC line placed yesterday without any complications  6/9 afebrile patient continues to be a poor historian and is forgetful.  He denies any pain.  Father and stepmother at bedside and agree with SNF placement.  Prior to this infection, father notes that he was highly functioning.  Point of care discussed with family  6/10 afebrile WBC 8.7 patient complaining of chills overnight.  He continues to have poor memory and is quite forgetful.  6/11 AF WBC 7.7 patient sleeping but arousable. Pt continues to be denied by SNFs. No clinical changes from yesterday  6/12 afebrile CBC not done today.  Patient is much more interactive verbally today than previous days.  He continues to have word finding difficulty and aphasia.  He also notes numbness in his hands.  Plan for repeat craniotomy per neurosurgery notes given results of repeat MRI  6/13 afebrile WBC 9.8 sitter not bedside as patient has been impulsive and trying to pull out PICC line.  He is less  interactive and has significant word finding difficulty and aphasia today.  He is unaware that he will have surgery on 2019 afebrile, no CBC today.  Sitter at bedside today.  Talking fluently this afternoon, knows that he is at [facility] but does not know what city or time.  6/15 afebrile, white count 14.6.  Follows commands but remains disoriented.  No new issues, tolerating antibiotics.  2019-no fevers.  Underwent another surgery on 2019.  Patient denies any new issues.  Labs Reviewed    Review of Systems:  Review of Systems   Constitutional: Positive for malaise/fatigue. Negative for fever.   HENT: Negative for hearing loss.    Respiratory: Negative for cough and shortness of breath.    Cardiovascular: Negative for chest pain and leg swelling.   Gastrointestinal: Negative for nausea and vomiting.   Genitourinary: Negative for dysuria.   Musculoskeletal: Negative for myalgias.   Neurological: Negative for sensory change and speech change.        Complains of slight headache on the left side       Hemodynamics:  Temp (24hrs), Av °C (96.8 °F), Min:36 °C (96.8 °F), Max:36 °C (96.8 °F)  Temperature: 36 °C (96.8 °F), Monitored Temp: 36.6 °C (97.9 °F)  Pulse  Av.3  Min: 40  Max: 98Heart Rate (Monitored): (!) 57  Arterial BP: 119/60, NIBP: 106/69       Physical Exam:  Physical Exam   Constitutional: He appears well-developed. No distress.   HENT:   Head: Normocephalic.   Mouth/Throat: Oropharynx is clear and moist. No oropharyngeal exudate.   Head is bandaged.     Eyes: Pupils are equal, round, and reactive to light. Conjunctivae and EOM are normal. No scleral icterus.   Neck: Neck supple.   Cardiovascular: Normal rate and regular rhythm.    No murmur heard.  Pulmonary/Chest: Effort normal. No respiratory distress. He has no wheezes. He has no rales.   Abdominal: Soft. He exhibits no distension. There is no tenderness. There is no rebound and no guarding.   Musculoskeletal: He exhibits  no edema.   Right upper extremity PICC line-nontender, no surrounding erythema   Lymphadenopathy:     He has no cervical adenopathy.   Neurological: He is alert. No cranial nerve deficit. Coordination normal.   Somewhat forgetful l  Aphasia  Moving all extremities   Skin: Skin is warm. No rash noted. He is not diaphoretic. No erythema.   Psychiatric:   Flat affect   Nursing note and vitals reviewed.      Meds:    Current Facility-Administered Medications:   •  NS  •  diazePAM  •  morphine injection  •  dexamethasone pf  •  insulin lispro **AND** Accu-Chek Q6 if NPO **AND** NOTIFY MD and PharmD **AND** glucose **AND** dextrose 10% bolus  •  metroNIDAZOLE (FLAGYL) IV  •  cefepime  •  MD Alert...Vancomycin per Pharmacy  •  vancomycin  •  oxyCODONE immediate-release  •  ferrous sulfate  •  acetaminophen  •  polyethylene glycol/lytes  •  docusate sodium  •  senna-docusate  •  cloNIDine  •  magnesium hydroxide  •  senna-docusate  •  Respiratory Care per Protocol  •  Pharmacy Consult Request  •  MD ALERT...DO NOT ADMINISTER NSAIDS or ASPIRIN unless ORDERED By Neurosurgery  •  ondansetron  •  scopolamine  •  labetalol  •  hydrALAZINE  •  bisacodyl  •  fleet    Labs:  Recent Labs      06/15/19   0555  06/16/19   0325  06/17/19   0415   WBC  14.6*  14.8*  13.2*   RBC  5.35  5.38  4.90   HEMOGLOBIN  12.7*  13.0*  11.8*   HEMATOCRIT  42.6  42.0  39.9*   MCV  79.6*  78.1*  81.4   MCH  23.7*  24.2*  24.1*   RDW  60.2*  58.7*  61.1*   PLATELETCT  245  238  227   MPV  10.5  10.2  10.4   NEUTSPOLYS  90.00*  90.00*  87.30*   LYMPHOCYTES  5.90*  5.70*  5.10*   MONOCYTES  3.30  3.60  6.60   EOSINOPHILS  0.00  0.00  0.00   BASOPHILS  0.20  0.10  0.20     Recent Labs      06/15/19   0555  06/16/19 0325  06/17/19   0415   SODIUM  140  137  139   POTASSIUM  3.8  3.8  4.1   CHLORIDE  106  106  106   CO2  25  23  26   GLUCOSE  146*  166*  135*   BUN  20  21  23*     Recent Labs      06/15/19   0555  06/16/19   0325  06/17/19   0415    CREATININE  0.65  0.60  0.75       Imaging:  MRI of the brain on 6/8/2019  Impression       1.  Postsurgical changes adjacent to the LEFT temporal mass involving the inferior LEFT frontal lobe which is grossly unchanged in size, with edema, fluid and enhancement likely related to surgical dissection rather than spread of disease  2.  Lung mm of LEFT-to-RIGHT midline shift, unchanged with midbrain compression and displacement           Micro:  Results     Procedure Component Value Units Date/Time    FLUID CULTURE W/GRAM STAIN [[id number]]     Order Status:  No result Specimen:  Cyst from Other Body Fluid     FLUID CULTURE W/GRAM STAIN [[id number]]     Order Status:  Canceled Specimen:  Cyst from Other Body Fluid     Fungal Culture [[id number]]     Order Status:  No result Specimen:  Mass from Other Tissue     CULTURE TISSUE W/ GRM STAIN [[id number]]     Order Status:  No result Specimen:  Tissue from Other Tissue     AFB Culture [[id number]]     Order Status:  No result Specimen:  Tissue from Brain     AFB Culture [[id number]] Collected:  05/22/19 1700    Order Status:  Completed Specimen:  Wound from Cyst Updated:  06/12/19 0649     Significant Indicator NEG     Source WND     Site BRAIN ABSCESS     Culture Result Culture in progress.     AFB Smear Results No acid fast bacilli seen.    Narrative:       Collected By:[id number] RADHA SAHA  Brain cyst.  Surgery Specimen    Fungal Culture [[id number]] Collected:  05/22/19 1700    Order Status:  Completed Specimen:  Wound from Cyst Updated:  06/12/19 0649     Significant Indicator NEG     Source WND     Site BRAIN ABSCESS     Culture Result No fungal growth to date    Narrative:       Collected By:[id number] RADHA SAHA  Brain cyst.  Surgery Specimen          Assessment:  Active Hospital Problems    Diagnosis   • *Brain abscess [G06.0]   • Delirium [R41.0]   • Methamphetamine abuse (HCC) [F15.10]   • S/P craniotomy [Z98.890]       Plan:  Cystic brain mass, presumed  abscess  Etiology unclear however was diagnosed with otitis media prior to admission  Neurologically with slow improvement but continues to have poor memory, aphasia and word finding difficulty  No fevers  Leukocytosis resolved  MRI with 4.7 cm left temporal lobe mass with midline shift  S/p craniotomy 5/22/19.  Gray amorphous gelatinous material noted intraoperatively most consistent with abscess per operative report  Gram stain neg; cxs neg final  CT c/a/p neg  Blood cxs neg  HIV neg  Final report from [place] reportedly - mild reactive gliosis  Will treat like a brain abscess in view of findings in the OR and pathology being nonspecific gliosis  Continue vancomycin, cefepime and Flagyl  Monitor renal function and Vanco trough  Last Vanco trough 21 on 6/11  Renal function is normal and stable  Plan for 6 weeks of antibiotics through 7/6/2019  ESR 8 and CRP 0.31  Underwent another craniotomy on 6/16/2019.  Postop MRI from 6/17/2019 noted    Methamphetamine abuse  Will need monitored setting for IV abx   Not a candidate for PICC line and outpatient or home IV antibiotics given substance abuse    Disposition: Will need a monitored setting for IV antibiotic course given his history of methamphetamine abuse.  Plan for skilled nursing facility but currently being denied.  Patient will need 3 times weekly vanco troughs and weekly BMP and CBC.      I have performed a physical exam and reviewed and updated ROS and plan today 6/17/2019.  In review of yesterday's note 6/16/2019, there are no changes except as documented above.    Discussed with nursing    ID will follow.  Please call with questions.

## 2019-06-17 NOTE — CONSULTS
Chief Complaint/Reason for Consult: Brain mass  Date of Admission: 5/21/2019  Today's Date: 06/17/19  Requesting  Physician: SANJANA Aguilear M.D.    Duration/timing: Middle of May 2019  Context: Brain mass; patient presented after 9 days of confusion on May 12 and was diagnosed with acute supratip otitis media.  He was treated with Ceftin ear without improvement.  On the morning of May 21, 2019 mom noticed a right facial droop.  On evaluation in the emergency department he has a 4 cm mass lesion in the left frontotemporal region with features favoring an abscess.  There was extensive edema and mass-effect.  There is 10 mm left right midline shift with effacement of ventricles.  Neurosurgery was consulted and underwent drainage of the cyst with biopsy of the cyst wall, pathology and cultures were nondiagnostic.  On follow-up MRI there is recurrence of the lesion.  He is status post craniotomy with resection June 16, 2019. Known history of meth abuse, inhaled (last use May 19, 2019).   Location: Left frontal temporal  Quality: Mass  Severity: Severe with midline shift  Modifying factors: Improved with intervention  Associated signs/symptoms: Altered mental status, right facial droop, possible effusion  Denies: bladder incontinence, bowel incontinence, weakness, numbness/tingling, swallowing difficulties, speech disturbance, incoordination, abnormal movements and falls, fevers, chills, falls, neck stiffness    Past medical history:   Past Medical History:   Diagnosis Date   • Smoker        Past surgical history:   Past Surgical History:   Procedure Laterality Date   • CRANIOTOMY STEALTH Left 6/16/2019    Procedure: LEFT TEMPORAL CRANIOTOMY, EXCISION OF BRAIN MASS, DURAL AUGMENTATION, STEALTH NAVIGATION ;  Surgeon: Chase Young M.D.;  Location: SURGERY [facility];  Service: Neurosurgery   • CRANIOTOMY STEALTH Left 5/22/2019    Procedure: LEFT TEMPORAL CRANIOTOMY for DRAINAGE OF BRAIN CYST, RESECTION OF  BRAIN MASS;  Surgeon: Chase Young M.D.;  Location: SURGERY [facility];  Service: Neurosurgery       Family history:   History reviewed. No pertinent family history.    Social history:   Social History   • Marital status:      Social History Main Topics   • Smoking status: Current Every Day Smoker     Packs/day: 0.25     Types: Cigarettes     Last attempt to quit: 2/24/2013   • Smokeless tobacco: Never Used   • Alcohol use Yes      Comment: rare   • Drug use: Yes     Types: Methamphetamines     Current medications:   Current Facility-Administered Medications   Medication Dose   • NS infusion     • diazePAM (VALIUM) injection 5 mg  5 mg   • morphine (pf) 10 mg/mL injection 4 mg  4 mg   • dexamethasone pf (DECADRON) injection 10 mg  10 mg   • insulin lispro (HUMALOG) injection 1-6 Units  1-6 Units    And   • glucose 4 g chewable tablet 16 g  16 g    And   • DEXTROSE 10% BOLUS 250 mL  250 mL   • metroNIDAZOLE (FLAGYL) IVPB 500 mg  500 mg   • cefepime (MAXIPIME) 2 g in  mL IVPB  2 g   • MD Alert...Vancomycin per Pharmacy     • vancomycin 1,500 mg in  mL IVPB  15 mg/kg   • oxyCODONE immediate-release (ROXICODONE) tablet 5 mg  5 mg   • ferrous sulfate tablet 325 mg  325 mg   • acetaminophen (TYLENOL) tablet 650 mg  650 mg   • polyethylene glycol/lytes (MIRALAX) PACKET 1 Packet  1 Packet   • docusate sodium (COLACE) capsule 100 mg  100 mg   • senna-docusate (PERICOLACE or SENOKOT S) 8.6-50 MG per tablet 1 Tab  1 Tab   • cloNIDine (CATAPRES) tablet 0.1 mg  0.1 mg   • magnesium hydroxide (MILK OF MAGNESIA) suspension 30 mL  30 mL   • senna-docusate (PERICOLACE or SENOKOT S) 8.6-50 MG per tablet 1 Tab  1 Tab   • Respiratory Care per Protocol     • Pharmacy Consult Request ...Pain Management Review 1 Each  1 Each   • MD ALERT...DO NOT ADMINISTER NSAIDS or ASPIRIN unless ORDERED By Neurosurgery 1 Each  1 Each   • ondansetron (ZOFRAN) syringe/vial injection 4 mg  4 mg   • scopolamine (TRANSDERM-SCOP)  patch 1 Patch  1 Patch   • labetalol (NORMODYNE,TRANDATE) injection 10 mg  10 mg   • hydrALAZINE (APRESOLINE) injection 10 mg  10 mg   • bisacodyl (DULCOLAX) suppository 10 mg  10 mg   • fleet enema 133 mL  1 Each       Medication Allergy:  Allergies   Allergen Reactions   • Pcn [Penicillins]      Has tolerated cefdinir in May 2019   • Sulfa Drugs        Review of Systems   Constitutional: Negative for fever and weight loss.   HENT: Negative for sore throat.    Eyes: Negative for discharge.   Respiratory: Negative for shortness of breath.    Cardiovascular: Negative for leg swelling.   Gastrointestinal: Negative for abdominal pain.   Musculoskeletal: Negative for falls.   Skin: Negative for rash.   Neurological:        As per HPI   Psychiatric/Behavioral: Positive for memory loss. Negative for hallucinations.        He does not remember events leading up to him waking up in the ICU       Physical examination:   Vitals:    06/17/19 0700 06/17/19 0800 06/17/19 0900 06/17/19 1000   BP:       Pulse: 60 (!) 57 (!) 53 (!) 57   Resp: 15 16 17 20   Temp:       TempSrc:       SpO2: 97% 94% 94% 96%   Weight:       Height:       Blood pressure on admission 108/71    General: Patient in well nourished in no apparent distress.  Elevated BMI.  HENT: Left drain present on head  Cardiovascular: No lower extremity edema.  Respiratory: Normal respiratory effort.   Skin: No appreciable signs of acute rashes or bruising.   Musculoskeletal: No signs of joint or muscle swelling.   Psychiatric: Pleasant.     NEUROLOGICAL EXAM:   Mental status: Awake, alert and fully oriented to person, place, time and situation. Normal attention and concentration.   Speech and language: Speech is fluent without errors and clear.  Cranial nerve exam:  II: Pupils are equally round and reactive to light. Visual fields are intact by confrontation.  III, IV, VI: EOMI, no diplopia, no ptosis.  V: Sensation to light touch is normal over V1-3 distributions  bilaterally.  .  VII: Facial movements are symmetrical. There is no facial droop. .  VIII: Hearing intact to soft speech  IX:  Dysarthria is not present.  XI: Shoulder shrug are symmetrical and strong.   XII: Tongue protrudes midline.    Motor exam:  Muscle tone is normal in all 4 limbs.    Muscle strength: 5 out of 5 proximal upper and lower extremity strength    Sensory exam:  Intact to Light touch in bilateral upper and lower extremity.  No extinction    Deep tendon reflexes: Trace reflexes throughout  bilateral toes are equivocal to plantar stimulation..    Coordination: shows a normal finger-nose-finger   Gait: Not observed due to concern for patient instability and drain in head      ANCILLARY DATA REVIEWED:   Lab Data Review:  Recent Labs      06/15/19   0555  06/16/19   0325  06/17/19   0415   WBC  14.6*  14.8*  13.2*   RBC  5.35  5.38  4.90   HEMOGLOBIN  12.7*  13.0*  11.8*   HEMATOCRIT  42.6  42.0  39.9*   MCV  79.6*  78.1*  81.4   MCH  23.7*  24.2*  24.1*   MCHC  29.8*  31.0*  29.6*   RDW  60.2*  58.7*  61.1*   PLATELETCT  245  238  227   MPV  10.5  10.2  10.4     Recent Labs      06/15/19   0555 06/16/19 0325 06/17/19   0415   SODIUM  140  137  139   POTASSIUM  3.8  3.8  4.1   CHLORIDE  106  106  106   CO2  25  23  26   GLUCOSE  146*  166*  135*   BUN  20  21  23*   CREATININE  0.65  0.60  0.75   CALCIUM  9.0  9.1  8.5                     Recent Labs      06/15/19   0555 06/16/19 0325  06/17/19   0415   SODIUM  140  137  139   POTASSIUM  3.8  3.8  4.1   CHLORIDE  106  106  106   CO2  25  23  26   GLUCOSE  146*  166*  135*   BUN  20  21  23*     Recent Labs      06/15/19   0555 06/16/19 0325 06/17/19   0415   SODIUM  140  137  139   POTASSIUM  3.8  3.8  4.1   CHLORIDE  106  106  106   CO2  25  23  26   BUN  20  21  23*   CREATININE  0.65  0.60  0.75   CALCIUM  9.0  9.1  8.5         Imaging:   MRI brain with and without contrast June 17, 2019:  1.  Recent left frontal temporal craniotomy for  resection of intra-axial brain neoplasm.  2.  Irregular postsurgical cavity noted in the left anterior temporal lobe and extending superiorly near the lateral aspect of the basal ganglia. Further there is irregular peripheral enhancement about the resection site which may represent postoperative   change, however a small amount of residual neoplasm cannot be excluded with certainty especially involving the superior medial margin of the resection site.  3.  Postoperative hemorrhagic debris noted in the postsurgical cavity and about the resection site.  4.  There is a marked amount of postoperative edema surrounding the resection site in the left frontal and temporal lobes.  5.  Prominent persistent increased T2 signal intensity is noted involving the left thalamus and left parahippocampal gyrus which is suspicious for residual neoplasm if the pathology reveals a glioblastoma.  6.  Persistent marked mass effect on the left lateral ventricle and temporal horn causing 1 cm of left-to-right midline shift the ventricular system. Further there is mild dilatation of the right temporal horn and lateral ventricle.    ASSESSMENT AND PLAN:    1. Brain mass, recurrent: with edema and midline shift initially; cultures are negative as of May 22.  Blood cultures negative.  HIV negative.  [facility] pathology report reveals mild reactive gliosis.  Currently is a presumed abscess given gray amorphous gelatinous material consistent with abscess per operative report.  Etiology is still unclear given negative cultures though he did have otitis media prior to admission.  Prior reports of a aphasia, confusion and memory difficulties have improved.  On exam today he is fluent without signs of a aphasia.  I suspect he does have persistent memory difficulties.  No signs of seizures.  -Await pathology of specimen  -Patient on Decadron 10 mg every 6 hours  -Neurosurgery on board  -Agree with ID recommendations, plan to continue  antibiotics  -Depending on pathology results, he may need brain imaging follow-up as outpatient  -Continue neurochecks when downgraded to floor at least every 4 hours  -Recommend PT/OT/speech prior to discharge when clinically appropriate    2. Leukocytosis: As of Shayy 15, 2019.  Patient is on vancomycin, Flagyl, cefepime.  Infectious disease involved.    3. Methamphetamine abuse: Reportedly inhaled use only.  Recommend  consult when clinically appropriate.    Case personally discussed with Dr. Mei, ICU attending.    This dictation was created using voice recognition software. I have made every reasonable attempt to avoid dictation errors, but this document may contain an error not identified before finalizing. If the error changes the accuracy of the document, I would appreciate it being brought to my attention. Thank you very much.     Karena Ayala MD  Neurology, Neurophysiology  [facility]

## 2019-06-17 NOTE — ADDENDUM NOTE
Addendum  created 06/16/19 1804 by Golden Randle M.D.    Order list changed, Order sets accessed

## 2019-06-17 NOTE — PROGRESS NOTES
Critical Care Progress Note    Date of admission  5/21/2019    Chief Complaint  54 y.o. male admitted 5/21/2019 with headache and altered mental status.    Hospital Course    This gentleman was admitted to the ICU with a possible brain abscess.  He underwent craniotomy with drainage of the cystic mass.  Cultures were negative.  Pathology revealed reactive gliosis.  He has been treated with broad-spectrum antibiotics per infectious diseases.  Today he went back to the operating theater for resection of a left temporal intraparenchymal mass.      Interval Problem Update  Reviewed last 24 hour events:    He is currently in the ICU following repeat left temporal craniotomy    Review of Systems  Review of Systems   Constitutional: Negative for chills and fever.   HENT: Negative for ear pain and nosebleeds.    Eyes: Negative for blurred vision, double vision and photophobia.   Respiratory: Negative for cough, hemoptysis, shortness of breath and stridor.    Cardiovascular: Negative for chest pain, palpitations and leg swelling.   Gastrointestinal: Negative for abdominal pain, nausea and vomiting.   Genitourinary: Negative for dysuria, hematuria and urgency.   Musculoskeletal: Negative for myalgias and neck pain.   Skin: Negative for rash.   Neurological: Positive for speech change and headaches. Negative for seizures.   Endo/Heme/Allergies: Does not bruise/bleed easily.   Psychiatric/Behavioral: Negative for hallucinations.        Vital Signs for last 24 hours   Temp:  [36 °C (96.8 °F)-37.1 °C (98.7 °F)] 36 °C (96.8 °F)  Pulse:  [58-68] 58  Resp:  [10-22] 14  BP: (108-111)/(71-82) 108/71  SpO2:  [92 %-100 %] 100 %    Hemodynamic parameters for last 24 hours       Respiratory Information for the last 24 hours       Physical Exam   Physical Exam   HENT:   Right Ear: External ear normal.   Left Ear: External ear normal.   Nose: Nose normal.   Mouth/Throat: Oropharynx is clear and moist.   Dressing in place.  Drain in place.    Eyes: Pupils are equal, round, and reactive to light. Conjunctivae are normal. Right eye exhibits no discharge. Left eye exhibits no discharge.   Neck: Normal range of motion. Neck supple. No JVD present. No tracheal deviation present.   Cardiovascular: Intact distal pulses.  Exam reveals no gallop.    No murmur heard.  Sinus rhythm   Pulmonary/Chest: No stridor. He has no wheezes. He has no rales.   Abdominal: Soft. Bowel sounds are normal. He exhibits no distension. There is no tenderness. There is no rebound.   Musculoskeletal: He exhibits no edema or tenderness.   No clubbing or cyanosis   Neurological:   Awake and alert.  Fully oriented.  Mild right facial droop.  Mild slurred speech.  No focal weakness in his extremities.   Skin: Skin is warm and dry. No rash noted. He is not diaphoretic. No erythema.       Medications  Current Facility-Administered Medications   Medication Dose Route Frequency Provider Last Rate Last Dose   • diazePAM (VALIUM) injection 5 mg  5 mg Intravenous Once PRN Catherine Magallon A.P.R.N.       • morphine (pf) 10 mg/mL injection 4 mg  4 mg Intravenous Q2HRS PRN ROBERT Gil.P.R.N.       • dexamethasone pf (DECADRON) injection 10 mg  10 mg Intravenous Q6HRS Em Savage M.D.   10 mg at 06/16/19 0409   • insulin lispro (HUMALOG) injection 1-6 Units  1-6 Units Subcutaneous Q6HRS Em Savaeg M.D.   Stopped at 06/14/19 1230    And   • glucose 4 g chewable tablet 16 g  16 g Oral Q15 MIN PRN Em Savage M.D.        And   • DEXTROSE 10% BOLUS 250 mL  250 mL Intravenous Q15 MIN PRN Em Savage M.D.       • metroNIDAZOLE (FLAGYL) IVPB 500 mg  500 mg Intravenous Q8HRS SANJANA Aguilera M.D.   Stopped at 06/16/19 0505   • cefepime (MAXIPIME) 2 g in  mL IVPB  2 g Intravenous Q8HRS Roxana Dorado M.D.   Stopped at 06/16/19 0533   • MD Alert...Vancomycin per Pharmacy   Other PHARMACY TO DOSE Roxana Dorado M.D.       • vancomycin 1,500 mg in  mL IVPB  15 mg/kg  Intravenous Q8HR Rae Ryees M.D. 125 mL/hr at 06/16/19 2131 1,500 mg at 06/16/19 2131   • oxyCODONE immediate-release (ROXICODONE) tablet 5 mg  5 mg Oral Q6HRS PRN Ranjana Wodo M.D.   5 mg at 06/13/19 0812   • ferrous sulfate tablet 325 mg  325 mg Oral QDAY with Breakfast Ranjana Wood M.D.   Stopped at 06/14/19 0730   • acetaminophen (TYLENOL) tablet 650 mg  650 mg Oral Q8HRS PRN Lou Taylor M.D.   650 mg at 06/11/19 1426   • polyethylene glycol/lytes (MIRALAX) PACKET 1 Packet  1 Packet Oral BID Amilcar Reaves M.D.   Stopped at 06/14/19 0600   • docusate sodium (COLACE) capsule 100 mg  100 mg Oral BID Amilcar Reaves M.D.   Stopped at 06/14/19 0600   • senna-docusate (PERICOLACE or SENOKOT S) 8.6-50 MG per tablet 1 Tab  1 Tab Oral Nightly Amilcar Reaves M.D.   Stopped at 06/14/19 2100   • cloNIDine (CATAPRES) tablet 0.1 mg  0.1 mg Oral Q4HRS PRN Amilcar Reaves M.D.       • magnesium hydroxide (MILK OF MAGNESIA) suspension 30 mL  30 mL Oral QDAY PRN Amilcar Reaves M.D.   30 mL at 06/07/19 2005   • senna-docusate (PERICOLACE or SENOKOT S) 8.6-50 MG per tablet 1 Tab  1 Tab Oral Q24HRS PRN Amilcar Reaves M.D.   1 Tab at 06/09/19 1622   • Respiratory Care per Protocol   Nebulization Continuous RT Jeremy M Gonda, M.D.       • Pharmacy Consult Request ...Pain Management Review 1 Each  1 Each Other PHARMACY TO DOSE Yanelis Austin M.D.       • MD ALERT...DO NOT ADMINISTER NSAIDS or ASPIRIN unless ORDERED By Neurosurgery 1 Each  1 Each Other PRN Yanelis Austin M.D.       • ondansetron (ZOFRAN) syringe/vial injection 4 mg  4 mg Intravenous Q4HRS PRN Yanelis Austin M.D.   4 mg at 06/05/19 2133   • scopolamine (TRANSDERM-SCOP) patch 1 Patch  1 Patch Transdermal Q72HRS PRN Yanelis Austin M.D.   1 Patch at 05/31/19 1957   • labetalol (NORMODYNE,TRANDATE) injection 10 mg  10 mg Intravenous Q HOUR PRN Yanelis Austin M.D.       • hydrALAZINE (APRESOLINE) injection 10 mg  10 mg  Intravenous Q HOUR PRN Yanelis Austin M.D.       • bisacodyl (DULCOLAX) suppository 10 mg  10 mg Rectal Q24HRS PRN Yanelis Austin M.D.       • fleet enema 133 mL  1 Each Rectal Once PRN Yanelis Austin M.D.           Fluids    Intake/Output Summary (Last 24 hours) at 06/16/19 2153  Last data filed at 06/16/19 2000   Gross per 24 hour   Intake              800 ml   Output              715 ml   Net               85 ml       Laboratory          Recent Labs      06/15/19   0555  06/16/19   0325   SODIUM  140  137   POTASSIUM  3.8  3.8   CHLORIDE  106  106   CO2  25  23   BUN  20  21   CREATININE  0.65  0.60   CALCIUM  9.0  9.1     Recent Labs      06/15/19   0555  06/16/19   0325   GLUCOSE  146*  166*     Recent Labs      06/15/19   0555  06/16/19   0325   WBC  14.6*  14.8*   NEUTSPOLYS  90.00*  90.00*   LYMPHOCYTES  5.90*  5.70*   MONOCYTES  3.30  3.60   EOSINOPHILS  0.00  0.00   BASOPHILS  0.20  0.10     Recent Labs      06/14/19   0635  06/15/19   0555  06/16/19   0325   RBC   --   5.35  5.38   HEMOGLOBIN   --   12.7*  13.0*   HEMATOCRIT   --   42.6  42.0   PLATELETCT   --   245  238   PROTHROMBTM  14.3   --    --    INR  1.08   --    --        Imaging  CT:    I personally reviewed his CT scan of the head.  There is a left temporal mass with associated edema and mass-effect.    Assessment/Plan  * Brain mass- (present on admission)   Assessment & Plan    Left temporal brain mass with surrounding white matter edema  Significant mass-effect with subfalcine herniation, dilation of the right lateral ventricle and left to right shift  Initial suspicion of brain abscess  S/P left temporal craniotomy with evacuation of left temporal cystic mass on 5/22 - all cultures negative - pathology with reactive gliosis  Continue cefepime, Flagyl and vancomycin per infectious diseases  S/P left temporal craniotomy with resection of left temporal intraparenchymal mass on 6/16 - visual appearance consistent with high-grade  glioma  Decadron, 10 mg IV every 6 hours  Hourly neuro checks     Acute respiratory failure with hypoxia (HCC)- (present on admission)   Assessment & Plan    Intubated 5/22-5/24  Continue oxygen     Methamphetamine abuse (HCC)- (present on admission)   Assessment & Plan    History of          VTE:  Contraindicated  Ulcer: Not Indicated  Lines: None    I have performed a physical exam and reviewed and updated ROS and Plan today (6/16/2019). In review of yesterday's note (6/15/2019), there are no changes except as documented above.     This gentleman is critically ill in the ICU following his second left temporal craniotomy during this hospitalization.  He has a left temporal intraparenchymal mass which has been resected.  He has significant associated cerebral edema and mass-effect.  He requires very close observation in the ICU with frequent neurologic checks and strict blood pressure control.  I have assessed and reassessed his blood pressure, hemodynamics, cardiovascular status, respiratory status and neurologic status.  He is at increased risk for worsening CNS system dysfunction.    Discussed patient condition and risk of morbidity and/or mortality with RN     The patient remains critically ill.  Critical care time = 35 minutes in directly providing and coordinating critical care and extensive data review.  No time overlap and excludes procedures.    Casper Serrano MD  Pulmonary and Critical Care Medicine

## 2019-06-17 NOTE — CARE PLAN
Problem: Communication  Goal: The ability to communicate needs accurately and effectively will improve    Intervention: Educate patient and significant other/support system about the plan of care, procedures, treatments, medications and allow for questions  Pt updated on plan of care, Qs and concerns were addressed. Pt intermittently tearful with mild anxiety, frequent emotional support provided.      Problem: Infection  Goal: Will remain free from infection    Intervention: Assess for removal of potential routes of infection, such as IV, central line, intra-arterial or urinary catheters  Planning for removal of art line and rojo catheter per MD order      Problem: Respiratory:  Goal: Respiratory status will improve    Intervention: Educate and encourage incentive spirometry usage  Great effort and technique with IS use, best effort at 4000 cc.

## 2019-06-17 NOTE — PROGRESS NOTES
Dr Mei and team here for morning rounds. Update given, orders reviewed, plan of care was discussed. New orders were received.

## 2019-06-17 NOTE — PROGRESS NOTES
"Pharmacy Kinetics 54 y.o. male on vancomycin day # 13 2019    Currently on Vancomycin 1500 mg iv q8hr   (0330, 1130, 1930)     Indication for Treatment: brain abscess      Pertinent history per medical record: Admitted on 2019 for confusion for the past week and a half.  He recently completed treatment with cefdinir for otitis media.  PMH of methamphetamine abuse.  ID and Neurosurgery are consulting. S/p craniotomy.  Pathology specimen collected in OR and sent to [facility]. Per ID - will treat it like a brain abscess in view of findings in the OR and pathology being nonspecific gliosis; re-start vancomycin; aim for ~6 weeks of abx (end ). Taken back to the OR  for resection of intraparenchymal mass.      Other antibiotics: cefepime 2g IV q8h, metronidazole 500mg PO q8h     Allergies: Pcn [penicillins] and Sulfa drugs      List concerns for renal function: BUN/SCr ratio > 20:1, BMI 31, long term vanco dosing at high trough goal     Pertinent cultures to date:   19 brain abscess: no organisms seen  19 blood-peripheral x 2:  negative     MRSA nares swab if pneumonia is a concern (ordered/positive/negative/n-a): n/a    Recent Labs      06/15/19   0555  19   0325  19   0415   WBC  14.6*  14.8*  13.2*   NEUTSPOLYS  90.00*  90.00*  87.30*     Recent Labs      06/15/19   0555  19   0325  19   0415   BUN  20  21  23*   CREATININE  0.65  0.60  0.75     Recent Labs      19   0325   VANCOTROUGH  20.6*     Intake/Output Summary (Last 24 hours) at 19 1145  Last data filed at 19 1000   Gross per 24 hour   Intake          2397.09 ml   Output             1850 ml   Net           547.09 ml      /71   Pulse (!) 57   Temp 36 °C (96.8 °F) (Axillary)   Resp 20   Ht 1.803 m (5' 11\")   Wt 98 kg (216 lb 0.8 oz)   SpO2 96%  Temp (24hrs), Av °C (96.8 °F), Min:36 °C (96.8 °F), Max:36 °C (96.8 °F)      A/P   1. Vancomycin dose change: No change  2. Next " vancomycin level: 4 days  3. Goal trough: 18-22 mcg/mL  4. Comments: Consistently therapeutic on current dose. Next recommended level in 4 days unless renal function deteriorates. Current renal indices at baseline with adequate urine output.     Bony Martínez, PharmD

## 2019-06-17 NOTE — CARE PLAN
Problem: Safety  Goal: Will remain free from falls  Outcome: PROGRESSING AS EXPECTED  Bed in low position with bed alarm on. Call light within reach. Lower bed rails in place. Treaded socks in use. Pt near nurses station.       Problem: Skin Integrity  Goal: Risk for impaired skin integrity will decrease  Outcome: PROGRESSING AS EXPECTED  Skin assessment complete. Appropriate barriers in place. Extra pillows in place for Q2hr turns, floating heels and padding bony prominences.

## 2019-06-17 NOTE — ASSESSMENT & PLAN NOTE
Patient is status post craniotomy x2 the second was done last night was for increased pressure effect and change in neurologic exam.  At this point that the differential remains broad to my knowledge there is no new infectious disease information or cancer or other atypical processes it could be causing this event or events pneumonia.  Plan is to continue empiric antibiotics pending more data and clinical outcome and wait for pathology from the craniotomy last night

## 2019-06-17 NOTE — THERAPY
"Speech Language Therapy dysphagia treatment completed.   Functional Status:  Pt with orders to reassess swallow.  Pt tolerating sm bites and sips of ntl, thin liquids, soft texture, mixed textures with intermittent gurgly voice post textured solids and thin liquids.  Pt IS able to clear throat and double swallow with cue, and clear voice, however he is verbose, as well as slightly impulsive during attempted self-fdg.  Modified diet rec with upgrade expected later this week.  Pathology pending post-op crani 6/16; margins unclear per MRI; SLP to follow.  Recommendations: D2/NTL with sips of thins btwn meals. Strategies; discontinue with any s/s of difficulty and contact SLP at [phone].  Cues to clear throat and double swallow as needed when/if gurgly post swallow.  Assisted and supervised fdg due to impulsivity.  Sm bites/sips, no straws.  Meds floated in puree.  Plan of Care: Will benefit from Speech Therapy 5 times per week  Post-Acute Therapy: Discharge to a transitional care facility for continued skilled therapy services.    See \"Rehab Therapy-Acute\" Patient Summary Report for complete documentation.     "

## 2019-06-17 NOTE — PROGRESS NOTES
1925: Pt to S111 accompanied by PACU RN x2 on transport monitor on 2L NC with arterial line in place. Pt in bilat soft wrist restraints due to reported aggressiveness following surgery in PACU. Pt AOx2 (disoriented to event and time) and fatigued. VS:  HR: 60-65  BP: 111/59 (art)  RR: 18  SpO2: 99%  T: 97.7F (Richards probe)    2-RN skin check performed with primary RN Robert.   -Crani incison site covered by surgical bonnet dressing. No outward drainage to dsg, CDI. L hemovac x1 in place to suction.Small puncture site to mid-forehead, surgical bonnet covering.   -Bruising to R bicep and generalized LUE.   -Heels calloused and dry.    No pressure concerns. Sacrum intact, Mepilex applied, rigid cannula switched to SNC.    2000: Page placed to MD Munguia to update on pt arrival.

## 2019-06-17 NOTE — OR NURSING
Pt's mother, Virginia, called for update. Inquiring if pt has bed assignment. No room assigned as of yet. Virginia states she will go home for the night but okay to call her once pt in ICU room.

## 2019-06-17 NOTE — PROGRESS NOTES
Internal Medicine Interval Note    Note Author: Soumya Maharaj M.D.      Name Robert Dorantes    1965   Age 54 y.o. male   MRN [id number]   Code Status Full Code     After 5 PM or if no immediate response to page, please call for cross-coverage.    Attending: Dr. Mei  Team: Rick See patient list for primary contact information.  Call [phone] to page.          Principal Problem  Brain mass concerning for infection vs. malignancy      HPI: As per Dr. Guerin on :    Robert Dorantes is a 53 y.o. Male with medical history of meth abuse presented to the emergency department with 9 days history of confusion accompanied by his mother, patient was seen in the emergency department on May 12 diagnosed with acute suppurative otitis media, was treated with cefdinir for 7 days, per mother patient has been complaining of of left side earache, sleeping more than usual, feeling tired and increasing confusion, over the past 2 days he had difficulty recognizing family members, speech difficulties mostly finding appropriate words.  Also this morning mom noticed right-sided facial droop.  Denies fever, chills, falls and neck stiffness.  He is not on any anticoagulation.  Also patient has a history of meth abuse, last dose was 2 days ago, per nephew he uses it by smoking, does not use IV.  In the ED CT scan of head showed 4 cm mass lesion in the left frontotemporal lesion, imaging features favor an abscess of for a solid lesion, extensive edema and mass-effect, 10 mm left-to-right midline shift, effacement of the left and enlargement of the right lateral ventricles, possibly developing hydrocephalus.  Neurosurgery consulted planning to do surgery tomorrow, ID consulted Dr. Dorado, started the patient on ceftriaxone, vancomycin and Flagyl.       Patient initially underwent left temporal craniotomy and evacuation of cystic mass on 2019. Culture negative for infection. Patient developed worsening  aphasia and altered mentation. Underwent repeat craniotomy with resection on 6/16.       Interval History:  No acute events overnight  Afebrile, HR ~60s, -110/70  Underwent L temporal craniotomy with resection of intraparenchymal mass on 6/16  Continue Vancomycin, cefepime, Flagyl  Continue dexamethasone, wean as per NS  MRI brain pending  OK to d/c rojo, art line  Repeat SLP eval ordered  Neurosurgery, Neurology consulted, appreciate rec's        ROS:  Review of Systems   Constitutional: Negative for chills and fever.   HENT: Negative for hearing loss, sore throat and tinnitus.    Eyes: Negative for blurred vision and double vision.   Respiratory: Negative for cough and shortness of breath.    Cardiovascular: Negative for chest pain and leg swelling.   Gastrointestinal: Negative for abdominal pain, nausea and vomiting.   Genitourinary: Negative for dysuria and urgency.   Musculoskeletal: Negative for joint pain and myalgias.   Skin: Negative for rash.   Neurological: Positive for headaches. Negative for sensory change and weakness.   Psychiatric/Behavioral: Negative for depression.       Disposition:  Inpatient for repeat craniotomy on 6/16/19.   Medicaid and placement to a SNF for IVF abx      Consultants  Neurosurgery  Infectious Disease      Quality-Core Measures  Rojo Catheter: No  Central Line: RUE PICC line  VTE Prophylaxis: Holding Lovenox for repeat craniotomy on 6/16/19  Antibiotics: Flagyl, cefepime and vancomycin  Rehab: PT, OT, SLP following       Vitals  Vitals:    06/17/19 0700 06/17/19 0800 06/17/19 0900 06/17/19 1000   BP:       Pulse: 60 (!) 57 (!) 53 (!) 57   Resp: 15 16 17 20   Temp:       TempSrc:       SpO2: 97% 94% 94% 96%   Weight:       Height:           Physical Exam  General: No acute distress, cooperative.  Head: Normocephalic, L frontotemporal craniotomy bandage, no discharge or surrounding erythema.    Eyes: PERRLA, EOMI, normal conjunctiva, sclerae anicteric.  Throat:  Oropharynx clear, oral mucosa moist.  Neck: Supple, no lymphadenopathy.  Lungs: Normal work of breathing, clear to auscultation bilaterally.  Heart: Normal rate, regular rhythm, no murmurs.  Abdomen: Soft, bowel sounds present, non-tender, no peritoneal signs.  Extremities: Nontender, no cyanosis, no edema. RUE PICC line intact.  Neuro: Alert, expressive aphasia, moving all extremities.  Skin: Warm, dry, intact, no suspicious rashes or lesions on visualized skin.      Labs and Imaging  Pertinent labs, imaging and diagnostic tests and procedures associated with this visit have been reviewed, specific comments can be found in the assessment and plan section below.     Recent Labs      06/15/19   0555  06/16/19   0325  06/17/19   0415   SODIUM  140  137  139   POTASSIUM  3.8  3.8  4.1   CHLORIDE  106  106  106   CO2  25  23  26   BUN  20  21  23*   CREATININE  0.65  0.60  0.75   GLUCOSE  146*  166*  135*   CALCIUM  9.0  9.1  8.5     Recent Labs      06/15/19   0555  06/16/19   0325  06/17/19   0415   WBC  14.6*  14.8*  13.2*   HEMOGLOBIN  12.7*  13.0*  11.8*   HEMATOCRIT  42.6  42.0  39.9*   PLATELETCT  245  238  227     No results for input(s): ALTSGPT, ASTSGOT, ALKPHOSPHAT, TBILIRUBIN, DBILIRUBIN, GAMMAGT, AMYLASE, LIPASE, ALBUMIN in the last 72 hours.        Assessment and Plan    * Brain mass   Assessment & Plan    Found to have a L frontotemporal fluid collection with mass-effect, midline shift and developing hydrocephalus on presentation  Underwent craniotomy on 5/22/19 with negative cultures, mild reactive gliosis on pathology  Repeat MRB on 6/8/19 showed recurrence of L frontotemporal brain mass, persistent midline shift.  On 6/13/2019, demonstrated clinical decline with worsening expressive aphasia and mood disturbances  Underwent repeat craniotomy with resection on 6/16/19  Continue NPO - repeat SLP eval pending  Continue Decadron  Continue cefepime, metronidazole and vancomycin x 6 weeks through  7/6/19  Remains unclear if malignancy v infectious etiology  Repeat MRI pending  Will consult neurology for assistance with diagnosis/management  Neurosurgery consulted, appreciate rec's  Once medically stable for discharge, SNF recommended for rehab, IV antibiotics     Swallowing impairment   Assessment & Plan    Repeat SLP eval pending     Agitation   Assessment & Plan    Intermittently agitated and aggressive towards the staff.  Improved     Acute respiratory failure with hypoxia (HCC)   Assessment & Plan    Resolved.  Intubated on admission for airway protection on 5/22/2019, extubated on 5/24/2019.  RT protocol, supplemental oxygen as needed.     Iron deficiency anemia   Assessment & Plan    FOBT negative, no records of EGD or colonoscopy per chart review, stool H. pylori test negative, celiac panel negative.  Continue ferrous sulfate daily  Recommend outpatient follow-up     Class 1 obesity due to excess calories without serious comorbidity with body mass index (BMI) of 33.0 to 33.9 in adult   Assessment & Plan    BMI 31.  Outpatient follow-up for weight loss management.  Concern for sleep apnea, recommend outpatient sleep study for further evaluation.     Status post craniotomy   Assessment & Plan    S/p craniotomy on 5/22, 6/16  SCDs for mechanical DVT prophylaxis  Restart chemical DVT prophylaxis as per NS     Delirium   Assessment & Plan    Resolved     Smoker   Assessment & Plan    Smokes 3 cigarettes per day per his mother's report.  Smoking cessation counseling prior to discharge.     Methamphetamine abuse (HCC)   Assessment & Plan    History of meth abuse via smoking per the patient's nephew, no history of IV drug use.  Substance abuse counseling

## 2019-06-17 NOTE — OR NURSING
"VSS. Pt on 2 L of oxygen via nasal cannula. Pt A&OX1, responds to name. Pt followed commands at times and answered some questions by shaking head/nodding and responding \"no.\" IV pain medication administered. 5/5 strength BUE and BLE. PERRLA.  Pt teary and emotional once arrived to pacu and drowsy, became aggressive and combative as more awake. 2-point soft wrist restraints placed and  notified. Administered IV medication for anxiety and agitation per 's orders. Pt's mother, [place], notified of pt being placed in restraints in pacu. Dressing to head, CDI. Hemovac with 15 cc of sanguinous drainage out. Richards to down drain. ART line in place to R radial. PICC to RUE infusing. R IJ central line placed in OR and saline locked, pending CXR for confirmation. Report called to GRACIELA Jones. Pt out of PACU to SICU with PACU RNs. Pt on monitor.   "

## 2019-06-17 NOTE — PROGRESS NOTES
Critical Care Progress Note    Date of admission  5/21/2019    Chief Complaint  54 y.o. male admitted 5/21/2019 with headache and altered mental status.    Hospital Course    This gentleman was admitted to the ICU with a possible brain abscess.  He underwent craniotomy with drainage of the cystic mass.  Cultures were negative.  Pathology revealed reactive gliosis.  He has been treated with broad-spectrum antibiotics per infectious diseases.  Today he went back to the operating theater for resection of a left temporal intraparenchymal mass.      Interval Problem Update  Reviewed last 24 hour events:  left temporal craniotomy last night  on room air to 2 L  Awake alert oriented x3  Slight possible arm drift  Up and walking in room  Pathology from the OR pending  Remains on triple empiric antibiotics for possible CNS infection  Afebrile  Laboratory evaluation essentially unremarkable today  Neurology consulted to assist with the differential and possible further evaluation both inpatient and outpatient  HIV test requested by me today and results pending   discussed case with patient and answered questions  Viewed case with neurologist who consulted today    Review of Systems  Review of Systems   Constitutional: Negative for chills and fever.   HENT: Negative for ear pain and nosebleeds.    Eyes: Negative for blurred vision, double vision and photophobia.   Respiratory: Negative for cough, hemoptysis, shortness of breath and stridor.    Cardiovascular: Negative for chest pain, palpitations and leg swelling.   Gastrointestinal: Negative for abdominal pain, nausea and vomiting.   Genitourinary: Negative for dysuria, hematuria and urgency.   Musculoskeletal: Negative for myalgias and neck pain.   Skin: Negative for rash.   Neurological: Positive for speech change and headaches. Negative for seizures.   Endo/Heme/Allergies: Does not bruise/bleed easily.   Psychiatric/Behavioral: Negative for hallucinations.        Vital  Signs for last 24 hours   Temp:  [36 °C (96.8 °F)-36.7 °C (98 °F)] 36.7 °C (98 °F)  Pulse:  [51-73] 73  Resp:  [10-28] 20  SpO2:  [90 %-100 %] 96 %    Hemodynamic parameters for last 24 hours       Respiratory Information for the last 24 hours       Physical Exam   Physical Exam   Constitutional: He is oriented to person, place, and time.   HENT:   Right Ear: External ear normal.   Left Ear: External ear normal.   Nose: Nose normal.   Mouth/Throat: Oropharynx is clear and moist.   Dressing in place.  Drain in place.   Eyes: Pupils are equal, round, and reactive to light. Conjunctivae are normal. Right eye exhibits no discharge. Left eye exhibits no discharge.   Neck: Normal range of motion. Neck supple. No JVD present. No tracheal deviation present.   Cardiovascular: Intact distal pulses.  Exam reveals no gallop.    No murmur heard.  Sinus rhythm   Pulmonary/Chest: No stridor. He has no wheezes. He has no rales.   Abdominal: Soft. Bowel sounds are normal. He exhibits no distension. There is no tenderness. There is no rebound.   Musculoskeletal: He exhibits no edema or tenderness.   No clubbing or cyanosis   Neurological: He is alert and oriented to person, place, and time.   Awake and alert.  Fully oriented.  Mild right facial droop.  Mild slurred speech.  No focal weakness in his extremities.   Skin: Skin is warm and dry. No rash noted. He is not diaphoretic. No erythema.       Medications  Current Facility-Administered Medications   Medication Dose Route Frequency Provider Last Rate Last Dose   • NS infusion   Intravenous Continuous Casper Serrano M.D.   Stopped at 06/17/19 0920   • diazePAM (VALIUM) injection 5 mg  5 mg Intravenous Once PRN Catherine Magallon, A.P.R.N.       • morphine (pf) 10 mg/mL injection 4 mg  4 mg Intravenous Q2HRS PRN Catherine Magallon, A.P.R.N.   4 mg at 06/17/19 0513   • dexamethasone pf (DECADRON) injection 10 mg  10 mg Intravenous Q6HRS Em Savage M.D.   10 mg at 06/17/19  1152   • insulin lispro (HUMALOG) injection 1-6 Units  1-6 Units Subcutaneous Q6HRS Em Savage M.D.   Stopped at 06/14/19 1230    And   • glucose 4 g chewable tablet 16 g  16 g Oral Q15 MIN PRN Em Savage M.D.        And   • DEXTROSE 10% BOLUS 250 mL  250 mL Intravenous Q15 MIN PRN Em Savage M.D.       • metroNIDAZOLE (FLAGYL) IVPB 500 mg  500 mg Intravenous Q8HRS SANJANA Aguilera M.D.   Stopped at 06/17/19 1252   • cefepime (MAXIPIME) 2 g in  mL IVPB  2 g Intravenous Q8HRS Roxana Dorado M.D. 200 mL/hr at 06/17/19 1345 2 g at 06/17/19 1345   • MD Alert...Vancomycin per Pharmacy   Other PHARMACY TO DOSE Roxana Dorado M.D.       • vancomycin 1,500 mg in  mL IVPB  15 mg/kg Intravenous Q8HR Rae Reyes M.D. 125 mL/hr at 06/17/19 1451 1,500 mg at 06/17/19 1451   • oxyCODONE immediate-release (ROXICODONE) tablet 5 mg  5 mg Oral Q6HRS PRN Ranjana Wood M.D.   5 mg at 06/13/19 0812   • ferrous sulfate tablet 325 mg  325 mg Oral QDAY with Breakfast Ranjana Wood M.D.   Stopped at 06/14/19 0730   • acetaminophen (TYLENOL) tablet 650 mg  650 mg Oral Q8HRS PRN Lou Taylor M.D.   650 mg at 06/11/19 1426   • polyethylene glycol/lytes (MIRALAX) PACKET 1 Packet  1 Packet Oral BID Amilcar Reaves M.D.   Stopped at 06/14/19 0600   • docusate sodium (COLACE) capsule 100 mg  100 mg Oral BID Amilcar Reaves M.D.   Stopped at 06/14/19 0600   • senna-docusate (PERICOLACE or SENOKOT S) 8.6-50 MG per tablet 1 Tab  1 Tab Oral Nightly Amilcar Reaves M.D.   Stopped at 06/14/19 2100   • cloNIDine (CATAPRES) tablet 0.1 mg  0.1 mg Oral Q4HRS PRN Amilcar Reaves M.D.       • magnesium hydroxide (MILK OF MAGNESIA) suspension 30 mL  30 mL Oral QDAY PRN Amilcar Reaves M.D.   30 mL at 06/07/19 2005   • senna-docusate (PERICOLACE or SENOKOT S) 8.6-50 MG per tablet 1 Tab  1 Tab Oral Q24HRS PRN Amilcar Reaves M.D.   1 Tab at 06/09/19 1622   • Respiratory Care per Protocol   Nebulization  Continuous RT Jeremy M Gonda, M.D.       • Pharmacy Consult Request ...Pain Management Review 1 Each  1 Each Other PHARMACY TO DOSE Yanelis Austin M.D.       • MD ALERT...DO NOT ADMINISTER NSAIDS or ASPIRIN unless ORDERED By Neurosurgery 1 Each  1 Each Other PRN Yanelis Austin M.D.       • ondansetron (ZOFRAN) syringe/vial injection 4 mg  4 mg Intravenous Q4HRS PRN Yanelis Austin M.D.   4 mg at 06/05/19 2133   • scopolamine (TRANSDERM-SCOP) patch 1 Patch  1 Patch Transdermal Q72HRS PRN Yanelis Austin M.D.   1 Patch at 05/31/19 1957   • labetalol (NORMODYNE,TRANDATE) injection 10 mg  10 mg Intravenous Q HOUR PRN Yanelis Austin M.D.       • hydrALAZINE (APRESOLINE) injection 10 mg  10 mg Intravenous Q HOUR PRN Yanelis Austin M.D.       • bisacodyl (DULCOLAX) suppository 10 mg  10 mg Rectal Q24HRS PRN Yanelis Austin M.D.       • fleet enema 133 mL  1 Each Rectal Once PRN Yanelis Austin M.D.           Fluids    Intake/Output Summary (Last 24 hours) at 06/17/19 1513  Last data filed at 06/17/19 1300   Gross per 24 hour   Intake          2397.09 ml   Output             2175 ml   Net           222.09 ml       Laboratory          Recent Labs      06/15/19   0555  06/16/19   0325  06/17/19   0415   SODIUM  140  137  139   POTASSIUM  3.8  3.8  4.1   CHLORIDE  106  106  106   CO2  25  23  26   BUN  20  21  23*   CREATININE  0.65  0.60  0.75   CALCIUM  9.0  9.1  8.5     Recent Labs      06/15/19   0555  06/16/19   0325  06/17/19   0415   GLUCOSE  146*  166*  135*     Recent Labs      06/15/19   0555  06/16/19   0325  06/17/19   0415   WBC  14.6*  14.8*  13.2*   NEUTSPOLYS  90.00*  90.00*  87.30*   LYMPHOCYTES  5.90*  5.70*  5.10*   MONOCYTES  3.30  3.60  6.60   EOSINOPHILS  0.00  0.00  0.00   BASOPHILS  0.20  0.10  0.20     Recent Labs      06/15/19   0555  06/16/19   0325  06/17/19   0415   RBC  5.35  5.38  4.90   HEMOGLOBIN  12.7*  13.0*  11.8*   HEMATOCRIT  42.6  42.0  39.9*   PLATELETCT  245  238  227        Imaging  CT:    I personally reviewed his CT scan of the head.  There is a left temporal mass with associated edema and mass-effect.    Assessment/Plan  * Brain mass- (present on admission)   Assessment & Plan    Differential is remains uncertain whether this is tumor glioma or primary infectious or some combination.  Appreciated with neurology and neurosurgery and infectious disease.  Plan is in addition to watchful waiting observation is continue antibiotics for a full 4 to 6-week course empirically.  We also await pathology results from the most recent craniotomy.     Swallowing impairment- (present on admission)   Assessment & Plan    Follow-up 6 swallow study today shows that swallowing appears intact and patient is being given the diet recommended by speech.  This is an improvement from preoperative status     Acute respiratory failure with hypoxia (HCC)- (present on admission)   Assessment & Plan    Resolved     Status post craniotomy- (present on admission)   Assessment & Plan    Patient is status post craniotomy x2 the second was done last night was for increased pressure effect and change in neurologic exam.  At this point that the differential remains broad to my knowledge there is no new infectious disease information or cancer or other atypical processes it could be causing this event or events pneumonia.  Plan is to continue empiric antibiotics pending more data and clinical outcome and wait for pathology from the craniotomy last night     Delirium- (present on admission)   Assessment & Plan    Delirium was transient and the patient does not show evidence of delirium currently     Methamphetamine abuse (HCC)- (present on admission)   Assessment & Plan    History of drug abuse including methamphetamine abuse which makes him a poor candidate for outpatient management with a PICC line orders are equivalent          VTE:  Contraindicated  Ulcer: Not Indicated  Lines: None    I have performed a  physical exam and reviewed and updated ROS and Plan today (6/17/2019). In review of yesterday's note (6/16/2019), there are no changes except as documented above.     This gentleman is critically ill in the ICU following his second left temporal craniotomy during this hospitalization.  He has a left temporal intraparenchymal mass which has been resected.  He has significant associated cerebral edema and mass-effect.  He requires very close observation in the ICU with frequent neurologic checks and strict blood pressure control.  I have assessed and reassessed his blood pressure, hemodynamics, cardiovascular status, respiratory status and neurologic status.  He is at increased risk for worsening CNS system dysfunction.    Discussed patient condition and risk of morbidity and/or mortality with RN

## 2019-06-18 LAB
ALBUMIN SERPL BCP-MCNC: 3.3 G/DL (ref 3.2–4.9)
ALBUMIN/GLOB SERPL: 1.5 G/DL
ALP SERPL-CCNC: 37 U/L (ref 30–99)
ALT SERPL-CCNC: 14 U/L (ref 2–50)
ANION GAP SERPL CALC-SCNC: 7 MMOL/L (ref 0–11.9)
AST SERPL-CCNC: 7 U/L (ref 12–45)
BASOPHILS # BLD AUTO: 0.1 % (ref 0–1.8)
BASOPHILS # BLD: 0.01 K/UL (ref 0–0.12)
BILIRUB SERPL-MCNC: 0.6 MG/DL (ref 0.1–1.5)
BUN SERPL-MCNC: 23 MG/DL (ref 8–22)
CALCIUM SERPL-MCNC: 8.3 MG/DL (ref 8.5–10.5)
CHLORIDE SERPL-SCNC: 105 MMOL/L (ref 96–112)
CO2 SERPL-SCNC: 24 MMOL/L (ref 20–33)
CREAT SERPL-MCNC: 0.6 MG/DL (ref 0.5–1.4)
EKG IMPRESSION: NORMAL
EOSINOPHIL # BLD AUTO: 0 K/UL (ref 0–0.51)
EOSINOPHIL NFR BLD: 0 % (ref 0–6.9)
ERYTHROCYTE [DISTWIDTH] IN BLOOD BY AUTOMATED COUNT: 58.2 FL (ref 35.9–50)
FUNGUS SPEC CULT: NORMAL
GLOBULIN SER CALC-MCNC: 2.2 G/DL (ref 1.9–3.5)
GLUCOSE BLD-MCNC: 107 MG/DL (ref 65–99)
GLUCOSE BLD-MCNC: 127 MG/DL (ref 65–99)
GLUCOSE BLD-MCNC: 128 MG/DL (ref 65–99)
GLUCOSE BLD-MCNC: 153 MG/DL (ref 65–99)
GLUCOSE SERPL-MCNC: 141 MG/DL (ref 65–99)
HCT VFR BLD AUTO: 37.6 % (ref 42–52)
HGB BLD-MCNC: 11.3 G/DL (ref 14–18)
IMM GRANULOCYTES # BLD AUTO: 0.17 K/UL (ref 0–0.11)
IMM GRANULOCYTES NFR BLD AUTO: 1.4 % (ref 0–0.9)
LYMPHOCYTES # BLD AUTO: 0.56 K/UL (ref 1–4.8)
LYMPHOCYTES NFR BLD: 4.7 % (ref 22–41)
MCH RBC QN AUTO: 23.9 PG (ref 27–33)
MCHC RBC AUTO-ENTMCNC: 30.1 G/DL (ref 33.7–35.3)
MCV RBC AUTO: 79.7 FL (ref 81.4–97.8)
MONOCYTES # BLD AUTO: 0.9 K/UL (ref 0–0.85)
MONOCYTES NFR BLD AUTO: 7.5 % (ref 0–13.4)
NEUTROPHILS # BLD AUTO: 10.33 K/UL (ref 1.82–7.42)
NEUTROPHILS NFR BLD: 86.3 % (ref 44–72)
NRBC # BLD AUTO: 0 K/UL
NRBC BLD-RTO: 0 /100 WBC
PLATELET # BLD AUTO: 215 K/UL (ref 164–446)
PMV BLD AUTO: 10.8 FL (ref 9–12.9)
POTASSIUM SERPL-SCNC: 4 MMOL/L (ref 3.6–5.5)
PROT SERPL-MCNC: 5.5 G/DL (ref 6–8.2)
RBC # BLD AUTO: 4.72 M/UL (ref 4.7–6.1)
RHODAMINE-AURAMINE STN SPEC: NORMAL
RHODAMINE-AURAMINE STN SPEC: NORMAL
SIGNIFICANT IND 70042: NORMAL
SITE SITE: NORMAL
SODIUM SERPL-SCNC: 136 MMOL/L (ref 135–145)
SOURCE SOURCE: NORMAL
WBC # BLD AUTO: 12 K/UL (ref 4.8–10.8)

## 2019-06-18 PROCEDURE — 97162 PT EVAL MOD COMPLEX 30 MIN: CPT

## 2019-06-18 PROCEDURE — 85025 COMPLETE CBC W/AUTO DIFF WBC: CPT

## 2019-06-18 PROCEDURE — A9270 NON-COVERED ITEM OR SERVICE: HCPCS | Performed by: INTERNAL MEDICINE

## 2019-06-18 PROCEDURE — 700111 HCHG RX REV CODE 636 W/ 250 OVERRIDE (IP): Performed by: INTERNAL MEDICINE

## 2019-06-18 PROCEDURE — 700111 HCHG RX REV CODE 636 W/ 250 OVERRIDE (IP): Performed by: STUDENT IN AN ORGANIZED HEALTH CARE EDUCATION/TRAINING PROGRAM

## 2019-06-18 PROCEDURE — A9270 NON-COVERED ITEM OR SERVICE: HCPCS | Performed by: STUDENT IN AN ORGANIZED HEALTH CARE EDUCATION/TRAINING PROGRAM

## 2019-06-18 PROCEDURE — 700102 HCHG RX REV CODE 250 W/ 637 OVERRIDE(OP): Performed by: INTERNAL MEDICINE

## 2019-06-18 PROCEDURE — 93010 ELECTROCARDIOGRAM REPORT: CPT | Performed by: INTERNAL MEDICINE

## 2019-06-18 PROCEDURE — 93005 ELECTROCARDIOGRAM TRACING: CPT | Performed by: STUDENT IN AN ORGANIZED HEALTH CARE EDUCATION/TRAINING PROGRAM

## 2019-06-18 PROCEDURE — 82962 GLUCOSE BLOOD TEST: CPT | Mod: 91

## 2019-06-18 PROCEDURE — 700105 HCHG RX REV CODE 258: Performed by: INTERNAL MEDICINE

## 2019-06-18 PROCEDURE — 80053 COMPREHEN METABOLIC PANEL: CPT

## 2019-06-18 PROCEDURE — 99232 SBSQ HOSP IP/OBS MODERATE 35: CPT | Performed by: INTERNAL MEDICINE

## 2019-06-18 PROCEDURE — 700102 HCHG RX REV CODE 250 W/ 637 OVERRIDE(OP): Performed by: STUDENT IN AN ORGANIZED HEALTH CARE EDUCATION/TRAINING PROGRAM

## 2019-06-18 PROCEDURE — 99233 SBSQ HOSP IP/OBS HIGH 50: CPT | Mod: GC | Performed by: INTERNAL MEDICINE

## 2019-06-18 PROCEDURE — 770001 HCHG ROOM/CARE - MED/SURG/GYN PRIV*

## 2019-06-18 PROCEDURE — 97168 OT RE-EVAL EST PLAN CARE: CPT

## 2019-06-18 PROCEDURE — 92526 ORAL FUNCTION THERAPY: CPT

## 2019-06-18 RX ORDER — DEXAMETHASONE SODIUM PHOSPHATE 10 MG/ML
6 INJECTION, SOLUTION INTRAMUSCULAR; INTRAVENOUS EVERY 6 HOURS
Status: DISCONTINUED | OUTPATIENT
Start: 2019-06-18 | End: 2019-06-20

## 2019-06-18 RX ORDER — HYDRALAZINE HYDROCHLORIDE 20 MG/ML
10 INJECTION INTRAMUSCULAR; INTRAVENOUS EVERY 6 HOURS PRN
Status: DISCONTINUED | OUTPATIENT
Start: 2019-06-18 | End: 2019-06-20

## 2019-06-18 RX ORDER — CALCIUM CARBONATE 500 MG/1
500 TABLET, CHEWABLE ORAL EVERY 4 HOURS PRN
Status: DISCONTINUED | OUTPATIENT
Start: 2019-06-18 | End: 2019-06-20

## 2019-06-18 RX ORDER — HALOPERIDOL 5 MG/ML
1 INJECTION INTRAMUSCULAR EVERY 4 HOURS PRN
Status: DISCONTINUED | OUTPATIENT
Start: 2019-06-18 | End: 2019-06-18

## 2019-06-18 RX ORDER — HALOPERIDOL 5 MG/ML
2-5 INJECTION INTRAMUSCULAR EVERY 4 HOURS PRN
Status: DISCONTINUED | OUTPATIENT
Start: 2019-06-18 | End: 2019-06-18

## 2019-06-18 RX ORDER — LABETALOL HYDROCHLORIDE 5 MG/ML
10 INJECTION, SOLUTION INTRAVENOUS EVERY 6 HOURS PRN
Status: DISCONTINUED | OUTPATIENT
Start: 2019-06-18 | End: 2019-06-18

## 2019-06-18 RX ADMIN — VANCOMYCIN HYDROCHLORIDE 1500 MG: 100 INJECTION, POWDER, LYOPHILIZED, FOR SOLUTION INTRAVENOUS at 13:01

## 2019-06-18 RX ADMIN — DEXAMETHASONE SODIUM PHOSPHATE 6 MG: 10 INJECTION INTRAMUSCULAR; INTRAVENOUS at 11:47

## 2019-06-18 RX ADMIN — METRONIDAZOLE 500 MG: 500 TABLET, FILM COATED ORAL at 22:53

## 2019-06-18 RX ADMIN — VANCOMYCIN HYDROCHLORIDE 1500 MG: 100 INJECTION, POWDER, LYOPHILIZED, FOR SOLUTION INTRAVENOUS at 20:46

## 2019-06-18 RX ADMIN — DEXAMETHASONE SODIUM PHOSPHATE 6 MG: 10 INJECTION INTRAMUSCULAR; INTRAVENOUS at 17:26

## 2019-06-18 RX ADMIN — CEFEPIME 2 G: 2 INJECTION, POWDER, FOR SOLUTION INTRAVENOUS at 22:55

## 2019-06-18 RX ADMIN — FERROUS SULFATE TAB 325 MG (65 MG ELEMENTAL FE) 325 MG: 325 (65 FE) TAB at 09:17

## 2019-06-18 RX ADMIN — DEXAMETHASONE SODIUM PHOSPHATE 10 MG: 10 INJECTION INTRAMUSCULAR; INTRAVENOUS at 05:16

## 2019-06-18 RX ADMIN — CEFEPIME 2 G: 2 INJECTION, POWDER, FOR SOLUTION INTRAVENOUS at 15:07

## 2019-06-18 RX ADMIN — ANTACID TABLETS 500 MG: 500 TABLET, CHEWABLE ORAL at 05:25

## 2019-06-18 RX ADMIN — CEFEPIME 2 G: 2 INJECTION, POWDER, FOR SOLUTION INTRAVENOUS at 05:15

## 2019-06-18 RX ADMIN — VANCOMYCIN HYDROCHLORIDE 1500 MG: 100 INJECTION, POWDER, LYOPHILIZED, FOR SOLUTION INTRAVENOUS at 04:37

## 2019-06-18 RX ADMIN — METRONIDAZOLE 500 MG: 500 TABLET, FILM COATED ORAL at 15:07

## 2019-06-18 RX ADMIN — METRONIDAZOLE 500 MG: 500 TABLET, FILM COATED ORAL at 05:16

## 2019-06-18 ASSESSMENT — ENCOUNTER SYMPTOMS
WEAKNESS: 0
NAUSEA: 0
ABDOMINAL PAIN: 0
MYALGIAS: 0
SENSORY CHANGE: 0
DOUBLE VISION: 0
SPEECH CHANGE: 0
SHORTNESS OF BREATH: 0
DEPRESSION: 0
SORE THROAT: 0
HEADACHES: 1
COUGH: 0
FEVER: 0
CHILLS: 0
BLURRED VISION: 0
VOMITING: 0

## 2019-06-18 ASSESSMENT — COGNITIVE AND FUNCTIONAL STATUS - GENERAL
TOILETING: A LITTLE
HELP NEEDED FOR BATHING: A LITTLE
MOVING TO AND FROM BED TO CHAIR: A LITTLE
DRESSING REGULAR UPPER BODY CLOTHING: A LITTLE
MOBILITY SCORE: 16
WALKING IN HOSPITAL ROOM: A LITTLE
SUGGESTED CMS G CODE MODIFIER MOBILITY: CK
DAILY ACTIVITIY SCORE: 18
CLIMB 3 TO 5 STEPS WITH RAILING: A LITTLE
TURNING FROM BACK TO SIDE WHILE IN FLAT BAD: A LITTLE
EATING MEALS: A LITTLE
STANDING UP FROM CHAIR USING ARMS: A LITTLE
DRESSING REGULAR LOWER BODY CLOTHING: A LITTLE
PERSONAL GROOMING: A LITTLE
SUGGESTED CMS G CODE MODIFIER DAILY ACTIVITY: CK
MOVING FROM LYING ON BACK TO SITTING ON SIDE OF FLAT BED: UNABLE

## 2019-06-18 ASSESSMENT — GAIT ASSESSMENTS
GAIT LEVEL OF ASSIST: MINIMAL ASSIST
ASSISTIVE DEVICE: FRONT WHEEL WALKER
DISTANCE (FEET): 400

## 2019-06-18 NOTE — CARE PLAN
Problem: Safety  Goal: Will remain free from injury  Outcome: PROGRESSING AS EXPECTED  Educated patient on the need to call for assistance. Bed alarm on and in place. Educated about how to use call button. Will continue to monitor closely.     Problem: Mobility  Goal: Risk for activity intolerance will decrease    Intervention: Encourage patient to increase activity level in collaboration with Interdisciplinary Team  Educated patient on the need to increase his mobility. Educated about ambulating the unit more, and sitting up in chair for all meals.

## 2019-06-18 NOTE — CARE PLAN
Problem: Safety  Goal: Will remain free from injury  Patient has strip alarm while in chiar, bed alarm on at all times.  Call light within reach, education provided, patient still needs frequent reminders to use call light    Problem: Mobility  Goal: Risk for activity intolerance will decrease  Patient up to chair for all meals. One person assist, PT and OT at bedside to work with patient.

## 2019-06-18 NOTE — PROGRESS NOTES
ICU transfer note     Attending: Dr Mei  Resident: Dr. Soumya Maharaj  Date of admission: 5/21/2019  Date of transferring out from ICU:  06/18/19    Primary diagnosis:   Brain mass status post left temporal craniectomy with resection      Secondary diagnoses:  Acute hypoxic respiratory failure  Dysphagia  Agitation  Methamphetamine use       HPI and ICU Course Summary (Brief Narrative):  Robert Dorantes is 54 y.o. with a past medical history of methamphetamine use who presented to the ER on 5/21/2019 for ear pain, hearing loss, headache, unsteady gait.  He was noted to have a history of otitis media a few days prior to presentation.  CT head showed a 4 cm mass in the left frontotemporal lobe with edema, mass-effect, 12 mm midline shift.  Neurosurgery and ID were consulted for possible abscess versus malignancy.  Patient was started on dexamethasone, vancomycin, Flagyl, cefepime.  Patient underwent left temporal craniotomy with evacuation of cystic mass on 5/22/2019.  Culture was negative for infection.  Pathology showed mild reactive gliosis.  Patient was eventually weaned off hypertonic saline and Decadron for cerebral edema.  Patient was recommended to continue treatment for possible brain abscess with 6 weeks of antibiotics as per ID.      During his hospital course, patient developed worsening neurological status including aphasia and altered mentation.  Repeat CT head on 6/14/2019 showed large left temporal mass with surrounding edema, 14 to 15 mm midline shift.  Patient was taken back to the operating theater on 6/16/2019 and underwent left temporal craniotomy with resection of left intraparenchymal mass.  MRI brain on 6/17 showed poor surgical deficit of left anterior temporal and adjacent frontal lobe, irregular peripheral enhancement of postsurgical cavity.  Neurology was also consulted who recommended continuing antibiotics for possible  brain abscess patient did develop some agitation consistent with delirium overnight that was treated with redirection. Repeat surgical cultures were negative. Pathology was consistent with glioblastoma. Antibiotics were stopped. Patient was evaluated by speech therapy and cleared for a diet.  PT/OT/SLP are following and recommend post acute rehab.        Important Events in the ICU:   - Central Line: N/A  - Intubation: N/A  - Antibiotics: Vancomycin, cefepime, Flagyl stopped on 6/19      Labs and imaging studies to be continued with their indications:  - CBC: N/a  - BMP/POCT glucose: Decadron use      Things to follow:   -Neurosurgery/neurology recommendations  -Weaning of Decadron  -PT/OT/SLP recommendations

## 2019-06-18 NOTE — PROGRESS NOTES
"Patient \"fired nurse\", escalated to charge RN.   Dr. Valverde, new orders for CBC and CMP, haldol PRN for agitation.     "

## 2019-06-18 NOTE — PROGRESS NOTES
Infectious Disease Progress Note    Author: Rae Reyes M.D. Date & Time of service: 6/18/2019  3:13 PM    Chief Complaint:  Follow-up for brain mass     Interval History:  53 y.o. male  admitted 5/21/2019 for AMS due to above. +meth abuse.  Recently in the ED on May 12 and was diagnosed with acute suppurative otitis media. S/p craniotomy 5/22/19.  Gray amorphous gelatinous material noted intraoperatively most consistent with abscess per operative report.  Pathology report+ mild reactive gliosis.    6/1/2019 no fevers.  No new issues.  Pathology is still not back.  6/5- no fevers. The path has come back positive for gliosis.  Denies any new complaints.  6/6/2019 no fevers.  No new issues overnight.  Tolerating the antibiotics  6/7 afebrile WBC 7.8.  Patient denies any headaches or neck pain.  He is tolerating IV antibiotics without any issues.  No diarrhea.  Family requesting home IV antibiotics if feasible.  6/8 afebrile WBC 7.5 patient denies any pain.  He had a PICC line placed yesterday without any complications  6/9 afebrile patient continues to be a poor historian and is forgetful.  He denies any pain.  Father and stepmother at bedside and agree with SNF placement.  Prior to this infection, father notes that he was highly functioning.  Point of care discussed with family  6/10 afebrile WBC 8.7 patient complaining of chills overnight.  He continues to have poor memory and is quite forgetful.  6/11 AF WBC 7.7 patient sleeping but arousable. Pt continues to be denied by SNFs. No clinical changes from yesterday  6/12 afebrile CBC not done today.  Patient is much more interactive verbally today than previous days.  He continues to have word finding difficulty and aphasia.  He also notes numbness in his hands.  Plan for repeat craniotomy per neurosurgery notes given results of repeat MRI  6/13 afebrile WBC 9.8 sitter not bedside as patient has been impulsive and trying to pull out PICC line.  He is less  interactive and has significant word finding difficulty and aphasia today.  He is unaware that he will have surgery on 2019 afebrile, no CBC today.  Sitter at bedside today.  Talking fluently this afternoon, knows that he is at [facility] but does not know what city or time.  6/15 afebrile, white count 14.6.  Follows commands but remains disoriented.  No new issues, tolerating antibiotics.  2019-no fevers.  Underwent another surgery on 2019.  Patient denies any new issues.  2019-pulling on his PICC line.  No fevers.  Wants to go home.  Labs Reviewed    Review of Systems:  Review of Systems   Constitutional: Positive for malaise/fatigue. Negative for fever.   HENT: Negative for hearing loss.    Respiratory: Negative for cough and shortness of breath.    Cardiovascular: Negative for chest pain and leg swelling.   Gastrointestinal: Negative for nausea and vomiting.   Genitourinary: Negative for dysuria.   Musculoskeletal: Negative for myalgias.   Neurological: Negative for sensory change and speech change.        Complains of slight headache on the left side       Hemodynamics:  Temp (24hrs), Av.8 °C (98.3 °F), Min:36.4 °C (97.6 °F), Max:37 °C (98.6 °F)  Temperature: 36.9 °C (98.4 °F)  Pulse  Av.3  Min: 40  Max: 120Heart Rate (Monitored): 68  NIBP: 110/68       Physical Exam:  Physical Exam   Constitutional: He appears well-developed. No distress.   HENT:   Head: Normocephalic.   Mouth/Throat: Oropharynx is clear and moist. No oropharyngeal exudate.   Craniotomy incision on the left side is clean.     Eyes: Pupils are equal, round, and reactive to light. Conjunctivae and EOM are normal. No scleral icterus.   Neck: Neck supple.   Cardiovascular: Normal rate and regular rhythm.    No murmur heard.  Pulmonary/Chest: Effort normal. No respiratory distress. He has no wheezes. He has no rales.   Abdominal: Soft. He exhibits no distension. There is no tenderness. There is no rebound and  no guarding.   Musculoskeletal: He exhibits no edema.   Right upper extremity PICC line-nontender, no surrounding erythema   Lymphadenopathy:     He has no cervical adenopathy.   Neurological: He is alert. No cranial nerve deficit. Coordination normal.   Somewhat forgetful -not fully oriented  Aphasia  Moving all extremities   Skin: Skin is warm. No rash noted. He is not diaphoretic. No erythema.   Psychiatric:   Flat affect   Nursing note and vitals reviewed.      Meds:    Current Facility-Administered Medications:   •  calcium carbonate  •  dexamethasone pf  •  hydrALAZINE  •  metroNIDAZOLE  •  insulin lispro **AND** Accu-Chek Q6 if NPO **AND** NOTIFY MD and PharmD **AND** glucose **AND** dextrose 10% bolus  •  cefepime  •  MD Alert...Vancomycin per Pharmacy  •  vancomycin  •  oxyCODONE immediate-release  •  ferrous sulfate  •  acetaminophen  •  polyethylene glycol/lytes  •  docusate sodium  •  senna-docusate  •  Respiratory Care per Protocol  •  Pharmacy Consult Request  •  MD ALERT...DO NOT ADMINISTER NSAIDS or ASPIRIN unless ORDERED By Neurosurgery  •  ondansetron  •  bisacodyl  •  fleet    Labs:  Recent Labs      06/16/19 0325 06/17/19 0415 06/18/19   0434   WBC  14.8*  13.2*  12.0*   RBC  5.38  4.90  4.72   HEMOGLOBIN  13.0*  11.8*  11.3*   HEMATOCRIT  42.0  39.9*  37.6*   MCV  78.1*  81.4  79.7*   MCH  24.2*  24.1*  23.9*   RDW  58.7*  61.1*  58.2*   PLATELETCT  238  227  215   MPV  10.2  10.4  10.8   NEUTSPOLYS  90.00*  87.30*  86.30*   LYMPHOCYTES  5.70*  5.10*  4.70*   MONOCYTES  3.60  6.60  7.50   EOSINOPHILS  0.00  0.00  0.00   BASOPHILS  0.10  0.20  0.10     Recent Labs      06/16/19 0325 06/17/19 0415 06/18/19   0434   SODIUM  137  139  136   POTASSIUM  3.8  4.1  4.0   CHLORIDE  106  106  105   CO2  23  26  24   GLUCOSE  166*  135*  141*   BUN  21  23*  23*     Recent Labs      06/16/19 0325 06/17/19 0415 06/18/19   0434   ALBUMIN   --    --   3.3   TBILIRUBIN   --    --   0.6    ALKPHOSPHAT   --    --   37   TOTPROTEIN   --    --   5.5*   ALTSGPT   --    --   14   ASTSGOT   --    --   7*   CREATININE  0.60  0.75  0.60       Imaging:  MRI of the brain on 6/8/2019  Impression       1.  Postsurgical changes adjacent to the LEFT temporal mass involving the inferior LEFT frontal lobe which is grossly unchanged in size, with edema, fluid and enhancement likely related to surgical dissection rather than spread of disease  2.  Lung mm of LEFT-to-RIGHT midline shift, unchanged with midbrain compression and displacement           Micro:  Results     Procedure Component Value Units Date/Time    Fungal Culture [[id number]] Collected:  06/16/19 1343    Order Status:  Completed Specimen:  Wound Updated:  06/18/19 1350     Significant Indicator NEG     Source WND     Site Left Temporal Mass Swab     Culture Result No fungal growth to date.    Narrative:       Surgery - swabs received    CULTURE WOUND W/ GRAM STAIN [[id number]] Collected:  06/16/19 1343    Order Status:  Completed Specimen:  Wound Updated:  06/18/19 1350     Significant Indicator NEG     Source WND     Site Left Temporal Mass Swab     Culture Result No growth at 24 hours.     Gram Stain Result Rare WBCs.  No organisms seen.      Narrative:       Surgery - swabs received    Anaerobic Culture [[id number]] Collected:  06/16/19 1343    Order Status:  Completed Specimen:  Wound Updated:  06/18/19 1350     Significant Indicator NEG     Source WND     Site Left Temporal Mass Swab     Culture Result Culture in progress.    Narrative:       Surgery - swabs received    AFB Culture [[id number]] Collected:  06/16/19 1343    Order Status:  Completed Specimen:  Wound Updated:  06/18/19 1350     Significant Indicator NEG     Source WND     Site Left Temporal Mass Swab     Culture Result Culture in progress.  NOTE:  Swabs are not recommended for the isolation of  Mycobacteria, since they provide limited material.  They  are acceptable only if a specimen  cannot be collected by  any other means.  Negative results obtained from these  specimens submitted on swabs are not reliable.       AFB Smear Results -    Narrative:       Surgery - swabs received    Anaerobic Culture [[id number]] Collected:  06/16/19 1347    Order Status:  Completed Specimen:  Tissue Updated:  06/18/19 1350     Significant Indicator NEG     Source TISS     Site Left Temporal Mass Tissue     Culture Result Culture in progress.    Narrative:       Surgery Specimen    CULTURE TISSUE W/ GRM STAIN [[id number]] Collected:  06/16/19 1347    Order Status:  Completed Specimen:  Tissue Updated:  06/18/19 1350     Significant Indicator NEG     Source TISS     Site Left Temporal Mass Tissue     Culture Result No growth at 24 hours.     Gram Stain Result No organisms seen.    Narrative:       Surgery Specimen    AFB Culture [[id number]] Collected:  06/16/19 1347    Order Status:  Completed Specimen:  Tissue Updated:  06/18/19 1350     Significant Indicator NEG     Source TISS     Site Left Temporal Mass Tissue     Culture Result Culture in progress.     AFB Smear Results -    Narrative:       Surgery Specimen    AFB Culture [[id number]] Collected:  05/22/19 1700    Order Status:  Completed Specimen:  Wound from Cyst Updated:  06/18/19 0650     Significant Indicator NEG     Source WND     Site BRAIN ABSCESS     Culture Result Culture in progress.     AFB Smear Results No acid fast bacilli seen.    Narrative:       Collected By:[id number] RENDelyROBERT YIFAN  Brain cyst.  Surgery Specimen    Fungal Culture [[id number]] Collected:  05/22/19 1700    Order Status:  Completed Specimen:  Wound from Cyst Updated:  06/18/19 0650     Significant Indicator NEG     Source WND     Site BRAIN ABSCESS     Culture Result No fungal growth.    Narrative:       Collected By:[id number] [employer] YIFAN  Brain cyst.  Surgery Specimen    GRAM STAIN [[id number]] Collected:  06/16/19 1343    Order Status:  Completed Specimen:  Wound Updated:  06/17/19  1630     Significant Indicator .     Source WND     Site Left Temporal Mass Swab     Gram Stain Result Rare WBCs.  No organisms seen.      Narrative:       Surgery - swabs received    GRAM STAIN [[id number]] Collected:  06/16/19 1347    Order Status:  Completed Specimen:  Tissue Updated:  06/17/19 1630     Significant Indicator .     Source TISS     Site Left Temporal Mass Tissue     Gram Stain Result No organisms seen.    Narrative:       Surgery Specimen    FLUID CULTURE W/GRAM STAIN [[id number]]     Order Status:  No result Specimen:  Cyst from Other Body Fluid     FLUID CULTURE W/GRAM STAIN [[id number]]     Order Status:  Canceled Specimen:  Cyst from Other Body Fluid     Fungal Culture [[id number]]     Order Status:  No result Specimen:  Mass from Other Tissue     CULTURE TISSUE W/ GRM STAIN [[id number]]     Order Status:  No result Specimen:  Tissue from Other Tissue     AFB Culture [[id number]]     Order Status:  No result Specimen:  Tissue from Brain           Assessment:  Active Hospital Problems    Diagnosis   • *Brain abscess [G06.0]   • Delirium [R41.0]   • Methamphetamine abuse (HCC) [F15.10]   • S/P craniotomy [Z98.890]       Plan:  Cystic brain mass, presumed abscess  Etiology unclear however was diagnosed with otitis media prior to admission  Neurologically with slow improvement but continues to have poor memory, aphasia and word finding difficulty  No fevers  Leukocytosis resolved  MRI with 4.7 cm left temporal lobe mass with midline shift  S/p craniotomy 5/22/19.  Gray amorphous gelatinous material noted intraoperatively most consistent with abscess per operative report  Gram stain neg; cxs neg final  CT c/a/p neg  Blood cxs neg  HIV neg  Final report from [place] reportedly - mild reactive gliosis  Will treat like a brain abscess in view of findings in the OR and pathology being nonspecific gliosis  Continue vancomycin, cefepime and Flagyl  Monitor renal function and Vanco trough  Last Vanco trough 21  on 6/11  Renal function is normal and stable  Plan for 6 weeks of antibiotics through 7/6/2019  ESR 8 and CRP 0.31  Underwent another craniotomy on 6/16/2019.  Postop MRI from 6/17/2019 noted    Methamphetamine abuse  Will need monitored setting for IV abx       Disposition: Will need a monitored setting for IV antibiotic course given his history of methamphetamine abuse.  Plan for skilled nursing facility but currently being denied.  Patient will need 3 times weekly vanco troughs and weekly BMP and CBC.      I have performed a physical exam and reviewed and updated ROS and plan today 6/18/2019.  In review of yesterday's note 6/17/2019, there are no changes except as documented above.    Discussed with nursing    ID will follow.  Please call with questions.

## 2019-06-18 NOTE — PROGRESS NOTES
2 RN skin check complete with GRACIELA Saldana.  Devices in place right upper arm PICC.   Skin assessed under the following devices listed above.   Preventative measures in place including Mepilex to coccyx, turns, mobility.  Following areas of concern:   · Left sided crani site- staples open to air, hemovac drain removed by MD, band aid in place.  The following interventions in place n/a    Wound consult placedYES/NO: no    Wound reported YES/NO: no  Appropriate LDAs opened YES/NO: no

## 2019-06-18 NOTE — PROGRESS NOTES
Pharmacy Kinetics 54 y.o. male on vancomycin day # 14 2019    Currently on Vancomycin 1500 mg iv q8hr   (3990, 1470, 1930)     Indication for Treatment: brain abscess      Pertinent history per medical record: Admitted on 2019 for confusion for the past week and a half.  He recently completed treatment with cefdinir for otitis media.  PMH of methamphetamine abuse.  ID and Neurosurgery are consulting. S/p craniotomy.  Pathology specimen collected in OR and sent to [place]. Per ID - will treat it like a brain abscess in view of findings in the OR and pathology being nonspecific gliosis; re-start vancomycin; aim for ~6 weeks of abx (end ). Taken back to the OR  for resection of intraparenchymal mass.      Other antibiotics: cefepime 2g IV q8h, metronidazole 500mg PO q8h     Imagin19 CTA- head - A 4 cm mass lesion in the left frontotemporal region. Imaging features favor an abscess over a solid lesion  19 CT head - Large left temporal mass with surrounding white matter edema  19 CT head - Large left temporal mass with surrounding white matter edema, mass effect, left-to-right shift, and subfalcine herniation    Allergies: Pcn [penicillins] and Sulfa drugs      List concerns for renal function: BUN/SCr ratio > 20:1, BMI 31, long term vanco dosing at high trough goal     Pertinent cultures to date:   19 tissue: No growth to date  19 brain abscess: no organisms seen  19 blood-peripheral x 2:  negative       MRSA nares swab if pneumonia is a concern (ordered/positive/negative/n-a): n/a    Recent Labs      195  19   0434   WBC  14.8*  13.2*  12.0*   NEUTSPOLYS  90.00*  87.30*  86.30*     Recent Labs      19   0325  19   0415  19   0434   BUN  21  23*  23*   CREATININE  0.60  0.75  0.60   ALBUMIN   --    --   3.3     Recent Labs      19   VANCOTROUGH  20.6*     Intake/Output Summary (Last 24 hours) at 19  "1156  Last data filed at 19 1000   Gross per 24 hour   Intake          2353.75 ml   Output             1455 ml   Net           898.75 ml      /71   Pulse 63   Temp 36.4 °C (97.6 °F) (Temporal)   Resp (!) 22   Ht 1.803 m (5' 11\")   Wt 101 kg (222 lb 10.6 oz)   SpO2 94%  Temp (24hrs), Av.8 °C (98.3 °F), Min:36.4 °C (97.6 °F), Max:37 °C (98.6 °F)      A/P   1. Vancomycin dose change: No change  2. Next vancomycin level: 3 days (19 @ 1230) - not ordered  3. Goal trough: 18-22 mcg/mL  1. Comments: No significant changes. Patient remains in stable condition following yesterday's procedure. Urine output remains adequate. No significant change in renal indices.     Bony Martínez, PharmD      "

## 2019-06-18 NOTE — PROGRESS NOTES
"Patient increasingly agitated. Removing SCDs, pulse ox, and stocking cap. Patient refusing dressing over craniotomy site, extensive education given to patient. Pt stating, \"No thank you.\" Pulse ox replaced on patient finger. Pt to remove pulse ox once more, when educated on the importance of the pulse ox prop, pt once again to state, \" no thank you, I am just saying no.\" Patient to push RN arm away when attempting to replace EKG leads, pulse ox, and when attempting to place in restraints.     RN to page [facility] gold resident about patient increase in agitation.   "

## 2019-06-18 NOTE — PROGRESS NOTES
Neurosurgery Progress Note    Subjective:  No complaints    Exam:  Awake interactive  Speech fluent appropriate  Bilateral visual fields full to confrontation  Bilateral  bicep IP DF PF 5/5   Sensation intact touch x 4 extremties    Pulse  Av.9  Min: 51  Max: 96  Resp  Av.9  Min: 11  Max: 28  Temp  Av.9 °C (98.4 °F)  Min: 36.7 °C (98 °F)  Max: 37 °C (98.6 °F)  Monitored Temp 2  Av.4 °C (97.6 °F)  Min: 36.2 °C (97.2 °F)  Max: 36.6 °C (97.9 °F)  SpO2  Av.8 %  Min: 90 %  Max: 100 %    No Data Recorded    Recent Labs      06/15/19   0555  06/16/19   0325  06/17/19   041   WBC  14.6*  14.8*  13.2*   RBC  5.35  5.38  4.90   HEMOGLOBIN  12.7*  13.0*  11.8*   HEMATOCRIT  42.6  42.0  39.9*   MCV  79.6*  78.1*  81.4   MCH  23.7*  24.2*  24.1*   MCHC  29.8*  31.0*  29.6*   RDW  60.2*  58.7*  61.1*   PLATELETCT  245  238  227   MPV  10.5  10.2  10.4     Recent Labs      06/15/19   0555  06/16/19   0325  06/17/19   0415   SODIUM  140  137  139   POTASSIUM  3.8  3.8  4.1   CHLORIDE  106  106  106   CO2  25  23  26   GLUCOSE  146*  166*  135*   BUN  20  21  23*   CREATININE  0.65  0.60  0.75   CALCIUM  9.0  9.1  8.5               Intake/Output       19 - 19 - 19 Total  Total       Intake    P.O.  --  0 0  --  240 240    P.O. -- 0 0 -- 240 240    I.V.  800  276.3 1076.3  625  150 775    Volume (mL) (lactated ringers infusion) 800 -- 800 -- -- --    Volume (mL) (NS infusion) -- 276.3 276.3 625 150 775    IV Piggyback  --  804.2 804.2  266.7  -- 266.7    Volume (mL) (vancomycin 1,500 mg in  mL IVPB) -- 354.2 354.2 250 -- 250    Volume (mL) (cefepime (MAXIPIME) 2 g in  mL IVPB) -- 266.7 266.7 0 -- 0    Volume (mL) (metroNIDAZOLE (FLAGYL) IVPB 500 mg) -- 183.3 183.3 16.7 -- 16.7    Total Intake 800 1080.4 1880.4 891.7 390 1281.7       Output    Urine  200  800 1000  730  -- 730    Number of Times Voided  -- -- -- -- 1 x 1 x    Output (mL) ([REMOVED] Urethral Catheter Latex 16 Fr.)  730 -- 730    Drains  15  30 45  20  -- 20    Output (mL) (Closed/Suction Drain Left Scalp Hemovac 10 Fr.) 15 30 45 20 -- 20    Stool  --  -- --  --  -- --    Number of Times Stooled 1 x 0 x 1 x 0 x 1 x 1 x    Blood  400  -- 400  --  -- --    Est. Blood Loss 400 -- 400 -- -- --    Total Output  750 -- 750       Net I/O     185 250.4 435.4 141.7 390 531.7            Intake/Output Summary (Last 24 hours) at 06/17/19 2131  Last data filed at 06/17/19 2000   Gross per 24 hour   Intake          2362.09 ml   Output             1480 ml   Net           882.09 ml            • NS   Continuous   • metroNIDAZOLE  500 mg Q8HRS   • diazePAM  5 mg Once PRN   • morphine injection  4 mg Q2HRS PRN   • dexamethasone pf  10 mg Q6HRS   • insulin lispro  1-6 Units Q6HRS    And   • glucose  16 g Q15 MIN PRN    And   • dextrose 10% bolus  250 mL Q15 MIN PRN   • cefepime  2 g Q8HRS   • MD Alert...Vancomycin per Pharmacy   PHARMACY TO DOSE   • vancomycin  15 mg/kg Q8HR   • oxyCODONE immediate-release  5 mg Q6HRS PRN   • ferrous sulfate  325 mg QDAY with Breakfast   • acetaminophen  650 mg Q8HRS PRN   • polyethylene glycol/lytes  1 Packet BID   • docusate sodium  100 mg BID   • senna-docusate  1 Tab Nightly   • cloNIDine  0.1 mg Q4HRS PRN   • magnesium hydroxide  30 mL QDAY PRN   • senna-docusate  1 Tab Q24HRS PRN   • Respiratory Care per Protocol   Continuous RT   • Pharmacy Consult Request  1 Each PHARMACY TO DOSE   • MD ALERT...DO NOT ADMINISTER NSAIDS or ASPIRIN unless ORDERED By Neurosurgery  1 Each PRN   • ondansetron  4 mg Q4HRS PRN   • scopolamine  1 Patch Q72HRS PRN   • labetalol  10 mg Q HOUR PRN   • hydrALAZINE  10 mg Q HOUR PRN   • bisacodyl  10 mg Q24HRS PRN   • fleet  1 Each Once PRN       Assessment and Plan:  Hospital day #27 left temporal peripherally enhancing cystic lesion  POD #26 left temporal cranitomy, cyst  evacuation/biopsy  POD 1 resection recurrent left temporal recurrent cyst/mass     Prophylactic anticoagulation: no         Start date/time: tbd.    Initial Final pathology from [facility] mild reactive gliosis; cultures negative  ID plan for PICC line and IV antibiotics x 6 weeks, through 7/6/2019.  Improved exam  MRI pending  OK to transfer out of ICU to floor  Awaiting pathology

## 2019-06-18 NOTE — CARE PLAN
Problem: Safety  Goal: Will remain free from injury    Intervention: Provide assistance with mobility  Pt mobilized around the unit with wheel chair push stabilization. Pt tolerated activity well, VSS, no pain or headache reported with activity. PT/OT ordered.       Problem: Bowel/Gastric:  Goal: Normal bowel function is maintained or improved    Intervention: Educate patient and significant other/support system about diet, fluid intake, medications and activity to promote bowel function  MOM given as needed for no BM x 4 days. Pt had results with large BM in commode chair.

## 2019-06-18 NOTE — PROGRESS NOTES
ICU Interval Note  Note Author: Soumya Maharaj M.D.      Name Robert Dorantes    1965   Age 54 y.o. male   MRN [id number]   Code Status Full Code     After 5 PM or if no immediate response to page, please call for cross-coverage.    Attending: Dr. Mei  Team: Rick See patient list for primary contact information.  Call [phone] to page.          Principal Problem  Brain mass concerning for infection vs. malignancy      HPI: As per Dr. Guerin on :    Robert Dorantes is a 53 y.o. Male with medical history of meth abuse presented to the emergency department with 9 days history of confusion accompanied by his mother, patient was seen in the emergency department on May 12 diagnosed with acute suppurative otitis media, was treated with cefdinir for 7 days, per mother patient has been complaining of of left side earache, sleeping more than usual, feeling tired and increasing confusion, over the past 2 days he had difficulty recognizing family members, speech difficulties mostly finding appropriate words.  Also this morning mom noticed right-sided facial droop.  Denies fever, chills, falls and neck stiffness.  He is not on any anticoagulation.  Also patient has a history of meth abuse, last dose was 2 days ago, per nephew he uses it by smoking, does not use IV.  In the ED CT scan of head showed 4 cm mass lesion in the left frontotemporal lesion, imaging features favor an abscess of for a solid lesion, extensive edema and mass-effect, 10 mm left-to-right midline shift, effacement of the left and enlargement of the right lateral ventricles, possibly developing hydrocephalus.  Neurosurgery consulted planning to do surgery tomorrow, ID consulted Dr. Dorado, started the patient on ceftriaxone, vancomycin and Flagyl.       Patient initially underwent left temporal craniotomy and evacuation of cystic mass on 2019. Culture negative for infection. Patient developed worsening aphasia and altered  mentation. Underwent repeat craniotomy with resection on 6/16.       Interval History:  Patient agitated overnight, required security and physical restraints  Afebrile, HR ~50-60s, -130/70  Underwent L temporal craniotomy with resection of intraparenchymal mass on 6/16  MRI brain on 6/17 shows postsurgical defect of left anterior temporal and adjacent frontal lobe, irregular peripheral enhancement of postsurgical cavity  Continue Vancomycin, Cefepime, Flagyl  Continue dexamethasone, will being to wean, appreciate NS help  Patient cleared for diet as per SLP  Neurosurgery/Neurology/ID following, appreciate rec's  Stable for transfer to neurosurgical floor   PT/OT following        ROS:  Review of Systems   Constitutional: Negative for chills and fever.   HENT: Negative for hearing loss, sore throat and tinnitus.    Eyes: Negative for blurred vision and double vision.   Respiratory: Negative for cough and shortness of breath.    Cardiovascular: Negative for chest pain and leg swelling.   Gastrointestinal: Negative for abdominal pain, nausea and vomiting.   Genitourinary: Negative for dysuria and urgency.   Musculoskeletal: Negative for joint pain and myalgias.   Skin: Negative for rash.   Neurological: Positive for headaches. Negative for sensory change and weakness.   Psychiatric/Behavioral: Negative for depression.       Disposition:  Inpatient for repeat craniotomy on 6/16/19.   Medicaid and placement to a SNF for IVF abx      Consultants  Neurosurgery  Infectious Disease  Neurology      Quality-Core Measures  Richards Catheter: No  Central Line: RUE PICC line  VTE Prophylaxis: Holding Lovenox for recent surgery, SCDs  Antibiotics: Flagyl, cefepime and vancomycin  Rehab: PT, OT, SLP following       Vitals  Vitals:    06/18/19 0500 06/18/19 0600 06/18/19 0700 06/18/19 0800   BP:       Pulse: (!) 55 (!) 54 (!) 58 61   Resp: 16 (!) 23 (!) 24 19   Temp:    36.4 °C (97.6 °F)   TempSrc:    Temporal   SpO2: 97% 96% 98%  97%   Weight:       Height:           Physical Exam  General: No acute distress, cooperative.  Head: Normocephalic, L frontotemporal craniotomy bandage, no discharge or surrounding erythema.    Eyes: PERRLA, EOMI, normal conjunctiva, sclerae anicteric.  Throat: Oropharynx clear, oral mucosa moist.  Neck: Supple, no lymphadenopathy.  Lungs: Normal work of breathing, clear to auscultation bilaterally.  Heart: Normal rate, regular rhythm, no murmurs.  Abdomen: Soft, bowel sounds present, non-tender, no peritoneal signs.  Extremities: Nontender, no cyanosis, no edema. RUE PICC line intact.  Neuro: Alert, Oriented x2, moving all extremities.  Skin: Warm, dry, intact, no suspicious rashes or lesions on visualized skin.      Labs and Imaging  Pertinent labs, imaging and diagnostic tests and procedures associated with this visit have been reviewed, specific comments can be found in the assessment and plan section below.     Recent Labs      06/16/19 0325 06/17/19 0415  06/18/19   0434   SODIUM  137  139  136   POTASSIUM  3.8  4.1  4.0   CHLORIDE  106  106  105   CO2  23  26  24   BUN  21  23*  23*   CREATININE  0.60  0.75  0.60   GLUCOSE  166*  135*  141*   CALCIUM  9.1  8.5  8.3*     Recent Labs      06/16/19 0325 06/17/19   0415  06/18/19   0434   WBC  14.8*  13.2*  12.0*   HEMOGLOBIN  13.0*  11.8*  11.3*   HEMATOCRIT  42.0  39.9*  37.6*   PLATELETCT  238  227  215     Recent Labs      06/18/19   0434   ALTSGPT  14   ASTSGOT  7*   ALKPHOSPHAT  37   TBILIRUBIN  0.6   ALBUMIN  3.3           Assessment and Plan    * Brain mass   Assessment & Plan    Found to have a L frontotemporal fluid collection with mass-effect, midline shift and developing hydrocephalus on presentation  Underwent craniotomy on 5/22/19 with negative cultures, mild reactive gliosis on pathology  Repeat MRB on 6/8/19 showed recurrence of L frontotemporal brain mass, persistent midline shift.  On 6/13/2019, demonstrated clinical decline with  worsening expressive aphasia and mood disturbances  Underwent repeat craniotomy with resection on 6/16/19  Repeat MRI on 6/17 shows postsurgical defect of left anterior temporal and adjacent frontal lobe, irregular peripheral enhancement of postsurgical cavity  Continue Decadron, will begin weaning as toelerated  Continue cefepime, metronidazole and vancomycin x 6 weeks through 7/6/19  Remains unclear if malignancy v infectious etiology  NS/Neurology/ID consulted, appreciate rec's  Repeat cultures/path from recent surgery pending  Once medically stable for discharge, SNF recommended for rehab, IV antibiotics     Swallowing impairment   Assessment & Plan    Passed repeat SLP eval on 6/17  Cleared for diet     Agitation   Assessment & Plan    Intermittently agitated and aggressive towards the staff.  Improved with redirection  Consider anti-psychotics only as needed     Acute respiratory failure with hypoxia (HCC)   Assessment & Plan    Resolved.  Intubated on admission for airway protection on 5/22/2019, extubated on 5/24/2019.  RT protocol, supplemental oxygen as needed.     Iron deficiency anemia   Assessment & Plan    FOBT negative, no records of EGD or colonoscopy per chart review, stool H. pylori test negative, celiac panel negative.  Continue ferrous sulfate daily  Recommend outpatient follow-up     Class 1 obesity due to excess calories without serious comorbidity with body mass index (BMI) of 33.0 to 33.9 in adult   Assessment & Plan    BMI 31.  Outpatient follow-up for weight loss management.  Concern for sleep apnea, recommend outpatient sleep study for further evaluation.     Status post craniotomy   Assessment & Plan    S/p craniotomy on 5/22, 6/16  SCDs for mechanical DVT prophylaxis  Restart chemical DVT prophylaxis as per NS     Delirium   Assessment & Plan    Resolved     Smoker   Assessment & Plan    Smokes 3 cigarettes per day per his mother's report.  Smoking cessation counseling prior to  discharge.     Methamphetamine abuse (HCC)   Assessment & Plan    History of meth abuse via smoking per the patient's nephew, no history of IV drug use.  Substance abuse counseling

## 2019-06-18 NOTE — THERAPY
"Speech Language Therapy dysphagia treatment completed.   Functional Status:  Pt receiving luci crackers by nurs. SLP provided educ to pt and nurs that D2 NTL texture normally does not have crackers as a recommended texture. Pt seen for dysphagia tx to assess pt's toleration for requested luci crackers. Pt requires frequent verbal cues to not verbalize while masticating. No to slight oral residue post small amounts of luci cracker. No coughing post swallow. Pt given sips of thin water using a spoon with pt cued to close his lips and swallow. No coughing post swallow. Pt is labile, easily distracted, and with language and probable compreh difficulty. Pt with closing of his eyes x1 when cued to \"close your lips and swallow.\" Swallow strategies amended to indicate small bites of luci crackers with superv as tolerated. Cont D2 NTL with sips of thin liquids with superv. Pt with frequent word finding difficulty.   Recommendations: see above.   Plan of Care: Will benefit from Speech Therapy 5 times per week  Post-Acute Therapy: Discharge to a transitional care facility for continued skilled therapy services.Thanks, Adalberto    See \"Rehab Therapy-Acute\" Patient Summary Report for complete documentation.     "

## 2019-06-18 NOTE — PROGRESS NOTES
Patient becoming increasingly agitated. Security called. Pt removed IV.     0405- Security arrived on scene. Pt placed in restraints. Patient medical equipment replaced on patient.     Dr. Sanchez at bedside. To evaluate patient.

## 2019-06-18 NOTE — THERAPY
"Physical Therapy Evaluation completed.   Bed Mobility:  Supine to Sit:  (Not assessed, pt in chair pre/post)  Transfers: Sit to Stand: Minimal Assist  Gait: Level Of Assist: Minimal Assist with Front-Wheel Walker       Plan of Care: Will benefit from Physical Therapy 3 times per week  Discharge Recommendations: Equipment: Will Continue to Assess for Equipment Needs. Post-acute therapy will continue to assess while pt in house.    See \"Rehab Therapy-Acute\" Patient Summary Report for complete documentation.     Pt is a 53 y/o male that presents to acute care PT w/ an altered mental status. S/p craniotomy resection 6/16/19. Pt demonstrated impaired dynamic balance and ambulation. Pt required tactile and vc during ambulation as pt would veer to the R. Pt had a LOB while turning R, was unable to self correct and required therapist. During ambulation pt report feeling woozy. Recommend continued acute care PT to address impairments in ambulation and balance. Will continue to placement post acute care while in house.   "

## 2019-06-18 NOTE — THERAPY
"Occupational Therapy Re-evaluation completed.   Functional Status:  Min A for sit>Stand, standing grooming and functional transfers; setup for self-feeding  Plan of Care: Will benefit from Occupational Therapy 3 times per week  Discharge Recommendations:  Equipment: Will Continue to Assess for Equipment Needs. Recommend post-acute placement for additional occupational therapy services prior to discharge home. Patient can tolerate post-acute therapies at a 5x/week frequency.    See \"Rehab Therapy-Acute\" Patient Summary Report for complete documentation.    OT re-eval completed on 55 YO M s/p crani on 6/17. Pt with poor insight into current deficits and continues to require cues for problem solving and sequencing. Pt with multiple LOBs during tx session requiring tx assist. Pt motivated and cooperative. Will continue to follow for acute OT services while in-house.   "

## 2019-06-19 PROBLEM — C71.9 GLIOBLASTOMA (HCC): Status: ACTIVE | Noted: 2019-05-21

## 2019-06-19 LAB
ANION GAP SERPL CALC-SCNC: 6 MMOL/L (ref 0–11.9)
BASOPHILS # BLD AUTO: 0.1 % (ref 0–1.8)
BASOPHILS # BLD: 0.01 K/UL (ref 0–0.12)
BUN SERPL-MCNC: 21 MG/DL (ref 8–22)
CALCIUM SERPL-MCNC: 8.7 MG/DL (ref 8.5–10.5)
CHLORIDE SERPL-SCNC: 103 MMOL/L (ref 96–112)
CO2 SERPL-SCNC: 25 MMOL/L (ref 20–33)
CREAT SERPL-MCNC: 0.53 MG/DL (ref 0.5–1.4)
EOSINOPHIL # BLD AUTO: 0 K/UL (ref 0–0.51)
EOSINOPHIL NFR BLD: 0 % (ref 0–6.9)
ERYTHROCYTE [DISTWIDTH] IN BLOOD BY AUTOMATED COUNT: 56.1 FL (ref 35.9–50)
GLUCOSE BLD-MCNC: 124 MG/DL (ref 65–99)
GLUCOSE BLD-MCNC: 130 MG/DL (ref 65–99)
GLUCOSE BLD-MCNC: 140 MG/DL (ref 65–99)
GLUCOSE BLD-MCNC: 175 MG/DL (ref 65–99)
GLUCOSE SERPL-MCNC: 129 MG/DL (ref 65–99)
HCT VFR BLD AUTO: 38.7 % (ref 42–52)
HGB BLD-MCNC: 12.3 G/DL (ref 14–18)
IMM GRANULOCYTES # BLD AUTO: 0.12 K/UL (ref 0–0.11)
IMM GRANULOCYTES NFR BLD AUTO: 1.1 % (ref 0–0.9)
LYMPHOCYTES # BLD AUTO: 0.68 K/UL (ref 1–4.8)
LYMPHOCYTES NFR BLD: 6.3 % (ref 22–41)
MCH RBC QN AUTO: 25.1 PG (ref 27–33)
MCHC RBC AUTO-ENTMCNC: 31.8 G/DL (ref 33.7–35.3)
MCV RBC AUTO: 78.8 FL (ref 81.4–97.8)
MONOCYTES # BLD AUTO: 0.81 K/UL (ref 0–0.85)
MONOCYTES NFR BLD AUTO: 7.4 % (ref 0–13.4)
NEUTROPHILS # BLD AUTO: 9.26 K/UL (ref 1.82–7.42)
NEUTROPHILS NFR BLD: 85.1 % (ref 44–72)
NRBC # BLD AUTO: 0 K/UL
NRBC BLD-RTO: 0 /100 WBC
PLATELET # BLD AUTO: 212 K/UL (ref 164–446)
PMV BLD AUTO: 10.7 FL (ref 9–12.9)
POTASSIUM SERPL-SCNC: 3.9 MMOL/L (ref 3.6–5.5)
RBC # BLD AUTO: 4.91 M/UL (ref 4.7–6.1)
SODIUM SERPL-SCNC: 134 MMOL/L (ref 135–145)
WBC # BLD AUTO: 10.9 K/UL (ref 4.8–10.8)

## 2019-06-19 PROCEDURE — 700102 HCHG RX REV CODE 250 W/ 637 OVERRIDE(OP): Performed by: INTERNAL MEDICINE

## 2019-06-19 PROCEDURE — A9270 NON-COVERED ITEM OR SERVICE: HCPCS | Performed by: INTERNAL MEDICINE

## 2019-06-19 PROCEDURE — 700105 HCHG RX REV CODE 258: Performed by: INTERNAL MEDICINE

## 2019-06-19 PROCEDURE — 99231 SBSQ HOSP IP/OBS SF/LOW 25: CPT | Mod: GC | Performed by: INTERNAL MEDICINE

## 2019-06-19 PROCEDURE — 92507 TX SP LANG VOICE COMM INDIV: CPT

## 2019-06-19 PROCEDURE — 770006 HCHG ROOM/CARE - MED/SURG/GYN SEMI*

## 2019-06-19 PROCEDURE — 80048 BASIC METABOLIC PNL TOTAL CA: CPT

## 2019-06-19 PROCEDURE — 700111 HCHG RX REV CODE 636 W/ 250 OVERRIDE (IP): Performed by: INTERNAL MEDICINE

## 2019-06-19 PROCEDURE — 82962 GLUCOSE BLOOD TEST: CPT | Mod: 91

## 2019-06-19 PROCEDURE — 85025 COMPLETE CBC W/AUTO DIFF WBC: CPT

## 2019-06-19 PROCEDURE — 99231 SBSQ HOSP IP/OBS SF/LOW 25: CPT | Performed by: INTERNAL MEDICINE

## 2019-06-19 PROCEDURE — 92526 ORAL FUNCTION THERAPY: CPT

## 2019-06-19 PROCEDURE — 700111 HCHG RX REV CODE 636 W/ 250 OVERRIDE (IP): Performed by: STUDENT IN AN ORGANIZED HEALTH CARE EDUCATION/TRAINING PROGRAM

## 2019-06-19 RX ORDER — METRONIDAZOLE 500 MG/1
500 TABLET ORAL EVERY 8 HOURS
Status: DISCONTINUED | OUTPATIENT
Start: 2019-06-19 | End: 2019-06-19

## 2019-06-19 RX ADMIN — INSULIN LISPRO 1 UNITS: 100 INJECTION, SOLUTION INTRAVENOUS; SUBCUTANEOUS at 11:32

## 2019-06-19 RX ADMIN — DEXAMETHASONE SODIUM PHOSPHATE 6 MG: 10 INJECTION INTRAMUSCULAR; INTRAVENOUS at 05:08

## 2019-06-19 RX ADMIN — SENNOSIDES,DOCUSATE SODIUM 1 TABLET: 8.6; 5 TABLET, FILM COATED ORAL at 20:47

## 2019-06-19 RX ADMIN — DEXAMETHASONE SODIUM PHOSPHATE 6 MG: 10 INJECTION INTRAMUSCULAR; INTRAVENOUS at 11:29

## 2019-06-19 RX ADMIN — FERROUS SULFATE TAB 325 MG (65 MG ELEMENTAL FE) 325 MG: 325 (65 FE) TAB at 08:49

## 2019-06-19 RX ADMIN — CEFEPIME 2 G: 2 INJECTION, POWDER, FOR SOLUTION INTRAVENOUS at 05:07

## 2019-06-19 RX ADMIN — VANCOMYCIN HYDROCHLORIDE 1500 MG: 100 INJECTION, POWDER, LYOPHILIZED, FOR SOLUTION INTRAVENOUS at 12:16

## 2019-06-19 RX ADMIN — VANCOMYCIN HYDROCHLORIDE 1500 MG: 100 INJECTION, POWDER, LYOPHILIZED, FOR SOLUTION INTRAVENOUS at 04:06

## 2019-06-19 RX ADMIN — METRONIDAZOLE 500 MG: 500 TABLET, FILM COATED ORAL at 05:07

## 2019-06-19 RX ADMIN — DEXAMETHASONE SODIUM PHOSPHATE 6 MG: 10 INJECTION INTRAMUSCULAR; INTRAVENOUS at 16:57

## 2019-06-19 RX ADMIN — DEXAMETHASONE SODIUM PHOSPHATE 6 MG: 10 INJECTION INTRAMUSCULAR; INTRAVENOUS at 00:26

## 2019-06-19 RX ADMIN — HYDRALAZINE HYDROCHLORIDE 10 MG: 20 INJECTION INTRAMUSCULAR; INTRAVENOUS at 17:12

## 2019-06-19 ASSESSMENT — ENCOUNTER SYMPTOMS
HEADACHES: 1
COUGH: 0
FEVER: 0
DOUBLE VISION: 0
CHILLS: 0
MYALGIAS: 0
VOMITING: 0
BLURRED VISION: 0
NAUSEA: 0
DEPRESSION: 0
SORE THROAT: 0
WEAKNESS: 0
SHORTNESS OF BREATH: 0
SENSORY CHANGE: 0
ABDOMINAL PAIN: 0

## 2019-06-19 NOTE — PROGRESS NOTES
[facility] with speech therapist, Melba and back to S[facility]. Vital signs stable.

## 2019-06-19 NOTE — CARE PLAN
Problem: Pain Management  Goal: Pain level will decrease to patient's comfort goal    Intervention: Follow pain managment plan developed in collaboration with patient and Interdisciplinary Team  Assess pain every two hours, administering pain meds per MAR      Problem: Skin Integrity  Goal: Risk for impaired skin integrity will decrease    Intervention: Assess risk factors for impaired skin integrity and/or pressure ulcers  Assess skin during shift, turning every two hours, using pillows for support.

## 2019-06-19 NOTE — PROGRESS NOTES
Patient remains afebrile with no new issues overnight.  The pathology on the repeat biopsy has come back positive for glioblastoma.  Since this is not a brain abscess we will discontinue all antibiotics.  Infectious disease will sign off.  Please call us as needed.  Final diagnosis-glioblastoma

## 2019-06-19 NOTE — THERAPY
"Speech Language Therapy speech treatment completed.   Functional Status:  Pt with fluent aphasia and demos improved speech/lang vs when seen 6/17 by this SLP.  Intermittent emotional lability, pt stating, 'Im gonna die, but I'm ok,' reflecting understanding of recent news re biopsy pathology ([facility]).  [facility]; provided emotional support and pt with stop/pause, circumlocution strategies to facilitate communicating basic wants and needs for self and concerns for family, with 'good' comm of messages with shared context.   Recommendations: continue dysphagia/sp-lang tx  Plan of Care: Will benefit from Speech Therapy 5 times per week  Post-Acute Therapy: Discharge to a transitional care facility for continued skilled therapy services vs home with family with support    See \"Rehab Therapy-Acute\" Patient Summary Report for complete documentation.     "

## 2019-06-19 NOTE — PROGRESS NOTES
2 RN skin check complete.  -left crani site with staples, open to air  No other areas of skin breakdown noted at this time. Skin breakdown preventative measures in place.

## 2019-06-19 NOTE — PROGRESS NOTES
ICU Interval Note  Note Author: Soumya Maharaj M.D.      Name Robert Dorantes    1965   Age 54 y.o. male   MRN [id number]   Code Status Full Code     After 5 PM or if no immediate response to page, please call for cross-coverage.    Attending: Dr. Mei  Team: Rick See patient list for primary contact information.  Call [phone] to page.          Principal Problem  Brain mass concerning for infection vs. malignancy      HPI: As per Dr. Guerin on :    Robert Dorantes is a 53 y.o. Male with medical history of meth abuse presented to the emergency department with 9 days history of confusion accompanied by his mother, patient was seen in the emergency department on May 12 diagnosed with acute suppurative otitis media, was treated with cefdinir for 7 days, per mother patient has been complaining of of left side earache, sleeping more than usual, feeling tired and increasing confusion, over the past 2 days he had difficulty recognizing family members, speech difficulties mostly finding appropriate words.  Also this morning mom noticed right-sided facial droop.  Denies fever, chills, falls and neck stiffness.  He is not on any anticoagulation.  Also patient has a history of meth abuse, last dose was 2 days ago, per nephew he uses it by smoking, does not use IV.  In the ED CT scan of head showed 4 cm mass lesion in the left frontotemporal lesion, imaging features favor an abscess of for a solid lesion, extensive edema and mass-effect, 10 mm left-to-right midline shift, effacement of the left and enlargement of the right lateral ventricles, possibly developing hydrocephalus.  Neurosurgery consulted planning to do surgery tomorrow, ID consulted Dr. Dorado, started the patient on ceftriaxone, vancomycin and Flagyl.       Patient initially underwent left temporal craniotomy and evacuation of cystic mass on 2019. Culture negative for infection. Patient developed worsening aphasia and altered  mentation. Underwent repeat craniotomy with resection on 6/16.       Interval History:  No acute events overnight  Afebrile, HR ~60-70s, -130/70s  Underwent L temporal craniotomy with resection of intraparenchymal mass on 6/16  MRI brain on 6/17 shows postsurgical defect of left anterior temporal and adjacent frontal lobe, irregular peripheral enhancement of postsurgical cavity  Continue Vancomycin, Cefepime, Flagyl  Continue dexamethasone, continue to wean  Pathology pending, cultures show NGTD  Neurosurgery/Neurology/ID following, appreciate rec's  Stable for transfer to neurosurgical floor   PT/OT following      ROS:  Review of Systems   Constitutional: Negative for chills and fever.   HENT: Negative for hearing loss, sore throat and tinnitus.    Eyes: Negative for blurred vision and double vision.   Respiratory: Negative for cough and shortness of breath.    Cardiovascular: Negative for chest pain and leg swelling.   Gastrointestinal: Negative for abdominal pain, nausea and vomiting.   Genitourinary: Negative for dysuria and urgency.   Musculoskeletal: Negative for joint pain and myalgias.   Skin: Negative for rash.   Neurological: Positive for headaches. Negative for sensory change and weakness.   Psychiatric/Behavioral: Negative for depression.       Disposition:  Inpatient for repeat craniotomy on 6/16/19.   Medicaid and placement to a SNF for IVF abx      Consultants  Neurosurgery  Infectious Disease  Neurology      Quality-Core Measures  Richards Catheter: No  Central Line: RUE PICC line  VTE Prophylaxis: Holding Lovenox for recent surgery, SCDs  Antibiotics: Flagyl, cefepime and vancomycin  Rehab: PT, OT, SLP following       Vitals  Vitals:    06/19/19 0100 06/19/19 0200 06/19/19 0400 06/19/19 0600   BP:       Pulse: 71      Resp:       Temp:  37.1 °C (98.7 °F) 37.1 °C (98.7 °F) 36.9 °C (98.4 °F)   TempSrc:  Temporal Temporal Temporal   SpO2: 94%      Weight:   100.2 kg (220 lb 14.4 oz)    Height:            Physical Exam  General: No acute distress, cooperative.  Head: Normocephalic, L frontotemporal craniotomy bandage, no discharge or surrounding erythema.    Eyes: PERRLA, EOMI, normal conjunctiva, sclerae anicteric.  Throat: Oropharynx clear, oral mucosa moist.  Neck: Supple, no lymphadenopathy.  Lungs: Normal work of breathing, clear to auscultation bilaterally.  Heart: Normal rate, regular rhythm, no murmurs.  Abdomen: Soft, bowel sounds present, non-tender, no peritoneal signs.  Extremities: Nontender, no cyanosis, no edema. RUE PICC line intact.  Neuro: Alert, Oriented x2, moving all extremities.  Skin: Warm, dry, intact, no suspicious rashes or lesions on visualized skin.      Labs and Imaging  Pertinent labs, imaging and diagnostic tests and procedures associated with this visit have been reviewed, specific comments can be found in the assessment and plan section below.     Recent Labs      06/17/19   0415  06/18/19   0434  06/19/19   0415   SODIUM  139  136  134*   POTASSIUM  4.1  4.0  3.9   CHLORIDE  106  105  103   CO2  26  24  25   BUN  23*  23*  21   CREATININE  0.75  0.60  0.53   GLUCOSE  135*  141*  129*   CALCIUM  8.5  8.3*  8.7     Recent Labs      06/17/19   0415  06/18/19   0434  06/19/19   0415   WBC  13.2*  12.0*  10.9*   HEMOGLOBIN  11.8*  11.3*  12.3*   HEMATOCRIT  39.9*  37.6*  38.7*   PLATELETCT  227  215  212     Recent Labs      06/18/19   0434   ALTSGPT  14   ASTSGOT  7*   ALKPHOSPHAT  37   TBILIRUBIN  0.6   ALBUMIN  3.3         Assessment and Plan    * Brain mass   Assessment & Plan    Found to have a L frontotemporal fluid collection with mass-effect, midline shift and developing hydrocephalus on presentation  Underwent craniotomy on 5/22/19 with negative cultures, mild reactive gliosis on pathology  Repeat MRB on 6/8/19 showed recurrence of L frontotemporal brain mass, persistent midline shift.  On 6/13/2019, demonstrated clinical decline with worsening expressive aphasia and mood  disturbances  Underwent repeat craniotomy with resection on 6/16/19  Repeat MRI on 6/17 shows postsurgical defect of left anterior temporal and adjacent frontal lobe, irregular peripheral enhancement of postsurgical cavity  Continue Decadron, will begin weaning as toelerated  Continue cefepime, metronidazole and vancomycin x 6 weeks through 7/6/19  Remains unclear if malignancy v infectious etiology  NS/Neurology/ID consulted, appreciate rec's  Repeat cultures/path from recent surgery pending  Once medically stable for discharge, SNF recommended for rehab, IV antibiotics     Swallowing impairment   Assessment & Plan    Passed repeat SLP eval on 6/17  Cleared for diet     Agitation   Assessment & Plan    Intermittently agitated and aggressive towards the staff.  Improved with redirection  Consider anti-psychotics only as needed     Acute respiratory failure with hypoxia (HCC)   Assessment & Plan    Resolved.  Intubated on admission for airway protection on 5/22/2019, extubated on 5/24/2019.  RT protocol, supplemental oxygen as needed.     Iron deficiency anemia   Assessment & Plan    FOBT negative, no records of EGD or colonoscopy per chart review, stool H. pylori test negative, celiac panel negative.  Continue ferrous sulfate daily  Recommend outpatient follow-up     Class 1 obesity due to excess calories without serious comorbidity with body mass index (BMI) of 33.0 to 33.9 in adult   Assessment & Plan    BMI 31.  Outpatient follow-up for weight loss management.  Concern for sleep apnea, recommend outpatient sleep study for further evaluation.     Status post craniotomy   Assessment & Plan    S/p craniotomy on 5/22, 6/16  SCDs for mechanical DVT prophylaxis  Restart chemical DVT prophylaxis as per NS     Delirium   Assessment & Plan    Resolved     Smoker   Assessment & Plan    Smokes 3 cigarettes per day per his mother's report.  Smoking cessation counseling prior to discharge.     Methamphetamine abuse (HCC)    Assessment & Plan    History of meth abuse via smoking per the patient's nephew, no history of IV drug use.  Substance abuse counseling

## 2019-06-19 NOTE — CARE PLAN
Problem: Safety  Goal: Will remain free from falls    Intervention: Assess risk factors for falls  Bed in the lowest position, call light within reach, room near nursing station, frequent rounding, bed alarm in use, range of motion and mobility as tolerated, distraction techniques      Problem: Psychosocial Needs:  Goal: Level of anxiety will decrease    Intervention: Identify and develop with patient strategies to cope with anxiety triggers  Frequent reorientation and as needed, communication with pt as needed

## 2019-06-19 NOTE — PROGRESS NOTES
Neurosurgery Progress Note    Subjective:  No acute events. Sitting up in bed. No c/o headache at this time.     Exam:  AAOx3, disoriented to why he is in the hospital. Fluent speech  Pupils 3 mm PERRL, EOMI  Tongue midline, no facial droop  Bilateral  bicep tricep IP DF 5/5  Sensation intact touch x 4 extremties  Left scalp incision c/d/i with staples  Eating. Drinking. Denies n/v  Voiding  Ambulatory      BP  Min: 135/86  Max: 135/86  Pulse  Av.6  Min: 52  Max: 78  Resp  Av  Min: 14  Max: 22  Temp  Av °C (98.6 °F)  Min: 36.7 °C (98 °F)  Max: 37.3 °C (99.1 °F)  SpO2  Av.6 %  Min: 90 %  Max: 98 %    No Data Recorded    Recent Labs      19   WBC  13.2*  12.0*  10.9*   RBC  4.90  4.72  4.91   HEMOGLOBIN  11.8*  11.3*  12.3*   HEMATOCRIT  39.9*  37.6*  38.7*   MCV  81.4  79.7*  78.8*   MCH  24.1*  23.9*  25.1*   MCHC  29.6*  30.1*  31.8*   RDW  61.1*  58.2*  56.1*   PLATELETCT  227  215  212   MPV  10.4  10.8  10.7     Recent Labs      195  194  19   SODIUM  139  136  134*   POTASSIUM  4.1  4.0  3.9   CHLORIDE  106  105  103   CO2  26  24  25   GLUCOSE  135*  141*  129*   BUN  23*  23*  21   CREATININE  0.75  0.60  0.53   CALCIUM  8.5  8.3*  8.7               Intake/Output       19 - 19 - 19 Total  Total       Intake    P.O.  1150  240 1390  --  -- --    P.O. 1011 402 9033 -- -- --    I.V.  488.8  700 1188.8  --  -- --    IV Piggyback Volume (IV Piggyback)  -- -- --    Volume (mL) (NS infusion) 138.8 -- 138.8 -- -- --    Other  --  240 240  --  -- --    Medications (PO/Enteral Liquids) -- 240 240 -- -- --    Total Intake 1638.8 1180 2818.8 -- -- --       Output    Urine  1250  2750 4000  450  -- 450    Number of Times Voided 4 x 6 x 10 x 1 x -- 1 x    Urine Void (mL) 1250 2750 4000 450 -- 450    Stool  0  -- 0   --  -- --    Number of Times Stooled 2 x 1 x 3 x 0 x -- 0 x    Measurable Stool (mL) 0 -- 0 -- -- --    Total Output 1250 2750 4000 450 -- 450       Net I/O     388.8 -1570 -1181.3 -450 -- -450            Intake/Output Summary (Last 24 hours) at 06/19/19 0920  Last data filed at 06/19/19 0800   Gross per 24 hour   Intake             2680 ml   Output             4050 ml   Net            -1370 ml            • metroNIDAZOLE  500 mg Q8HRS   • calcium carbonate  500 mg Q4HRS PRN   • dexamethasone pf  6 mg Q6HRS   • hydrALAZINE  10 mg Q6HRS PRN   • insulin lispro  1-6 Units Q6HRS    And   • glucose  16 g Q15 MIN PRN    And   • dextrose 10% bolus  250 mL Q15 MIN PRN   • cefepime  2 g Q8HRS   • MD Alert...Vancomycin per Pharmacy   PHARMACY TO DOSE   • vancomycin  15 mg/kg Q8HR   • oxyCODONE immediate-release  5 mg Q6HRS PRN   • ferrous sulfate  325 mg QDAY with Breakfast   • acetaminophen  650 mg Q8HRS PRN   • polyethylene glycol/lytes  1 Packet BID   • docusate sodium  100 mg BID   • senna-docusate  1 Tab Nightly   • Respiratory Care per Protocol   Continuous RT   • Pharmacy Consult Request  1 Each PHARMACY TO DOSE   • MD ALERT...DO NOT ADMINISTER NSAIDS or ASPIRIN unless ORDERED By Neurosurgery  1 Each PRN   • ondansetron  4 mg Q4HRS PRN   • bisacodyl  10 mg Q24HRS PRN   • fleet  1 Each Once PRN       Assessment and Plan:  Hospital day #29 left temporal peripherally enhancing cystic lesion  POD #28 left temporal cranitomy, cyst evacuation/biopsy  POD #3 resection recurrent left temporal recurrent cyst/mass     Prophylactic anticoagulation: no         Start date/time: tbd.      Final Pathology: Glioblastoma    Plan:  Neuro stable. Sig improvement in mentation  Final pathology Glioblastoma.   Can discontinue antibiotics with final pathology results  Patient will need oncology consult to Cancer Care Specialist. And radiation oncology consult with Dr Brian ROTHMAN to transfer out of ICU to floor  Clear from NSG standpoint to  transfer to SNF/Rehab/Home once medically cleared  Wean steroids

## 2019-06-19 NOTE — PROGRESS NOTES
Neurosurgery Progress Note    Subjective:  No acute events    Exam:  Awake interactive sitting in chair  Pupils 3 mm midline conjugate gaze  Visual fields full to confrontation  Bilateral  bicep tricep IP DF 5/5  Sensation intact touch x 4 extremties  Left scalp incision c/d/i    HVac 30 cc/24 hr    Pulse  Av.6  Min: 52  Max: 120  Resp  Av  Min: 14  Max: 36  Temp  Av.9 °C (98.4 °F)  Min: 36.4 °C (97.6 °F)  Max: 37.3 °C (99.1 °F)  SpO2  Av.4 %  Min: 92 %  Max: 98 %    No Data Recorded    Recent Labs      19   043   WBC  14.8*  13.2*  12.0*   RBC  5.38  4.90  4.72   HEMOGLOBIN  13.0*  11.8*  11.3*   HEMATOCRIT  42.0  39.9*  37.6*   MCV  78.1*  81.4  79.7*   MCH  24.2*  24.1*  23.9*   MCHC  31.0*  29.6*  30.1*   RDW  58.7*  61.1*  58.2*   PLATELETCT  238  227  215   MPV  10.2  10.4  10.8     Recent Labs      19   0434   SODIUM  137  139  136   POTASSIUM  3.8  4.1  4.0   CHLORIDE  106  106  105   CO2  23  26  24   GLUCOSE  166*  135*  141*   BUN  21  23*  23*   CREATININE  0.60  0.75  0.60   CALCIUM  9.1  8.5  8.3*               Intake/Output       19 - 19 - 1959       Total 1900-0659 Total       Intake    P.O.  --  590 590  1150  -- 1150    P.O. --  -- 1150    I.V.  625  900 1525  488.8  -- 488.8    IV Piggyback Volume (IV Piggyback) -- -- -- 350 -- 350    Volume (mL) (NS infusion)  138.8 -- 138.8    IV Piggyback  266.7  -- 266.7  --  -- --    Volume (mL) (vancomycin 1,500 mg in  mL IVPB) 250 -- 250 -- -- --    Volume (mL) (cefepime (MAXIPIME) 2 g in  mL IVPB) 0 -- 0 -- -- --    Volume (mL) (metroNIDAZOLE (FLAGYL) IVPB 500 mg) 16.7 -- 16.7 -- -- --    Total Intake 891.7 1490 2381.7 1638.8 -- 1638.8       Output    Urine  730  400 1130  1250  500 1750    Number of Times Voided -- 4 x 4 x 4 x 1 x 5 x     Urine Void (mL) --  500 1750    Output (mL) ([REMOVED] Urethral Catheter Latex 16 Fr.) 730 -- 730 -- -- --    Drains  20  10 30  --  -- --    Output (mL) ([REMOVED] Closed/Suction Drain Left Scalp Hemovac 10 Fr.) 20 10 30 -- -- --    Stool  --  -- --  0  -- 0    Number of Times Stooled 0 x 3 x 3 x 2 x -- 2 x    Measurable Stool (mL) -- -- -- 0 -- 0    Total Output  0243 371 7768       Net I/O     141.7 1080 1221.7 388.8 -500 -111.3            Intake/Output Summary (Last 24 hours) at 06/18/19 2033  Last data filed at 06/18/19 2000   Gross per 24 hour   Intake          2738.75 ml   Output             2160 ml   Net           578.75 ml            • calcium carbonate  500 mg Q4HRS PRN   • dexamethasone pf  6 mg Q6HRS   • hydrALAZINE  10 mg Q6HRS PRN   • metroNIDAZOLE  500 mg Q8HRS   • insulin lispro  1-6 Units Q6HRS    And   • glucose  16 g Q15 MIN PRN    And   • dextrose 10% bolus  250 mL Q15 MIN PRN   • cefepime  2 g Q8HRS   • MD Alert...Vancomycin per Pharmacy   PHARMACY TO DOSE   • vancomycin  15 mg/kg Q8HR   • oxyCODONE immediate-release  5 mg Q6HRS PRN   • ferrous sulfate  325 mg QDAY with Breakfast   • acetaminophen  650 mg Q8HRS PRN   • polyethylene glycol/lytes  1 Packet BID   • docusate sodium  100 mg BID   • senna-docusate  1 Tab Nightly   • Respiratory Care per Protocol   Continuous RT   • Pharmacy Consult Request  1 Each PHARMACY TO DOSE   • MD ALERT...DO NOT ADMINISTER NSAIDS or ASPIRIN unless ORDERED By Neurosurgery  1 Each PRN   • ondansetron  4 mg Q4HRS PRN   • bisacodyl  10 mg Q24HRS PRN   • fleet  1 Each Once PRN       Assessment and Plan:  Hospital day #28 left temporal peripherally enhancing cystic lesion  POD #27 left temporal cranitomy, cyst evacuation/biopsy  POD 2 resection recurrent left temporal recurrent cyst/mass     Prophylactic anticoagulation: no         Start date/time: tbd.    Initial Final pathology from [facility] mild reactive gliosis; cultures negative  ID plan  for PICC line and IV antibiotics x 6 weeks, through 7/6/2019.  Improved exam  MRI some enhancement anterior surgical cavity and lateral basal ganglia  OK to transfer out of ICU to floor  Awaiting pathology  Wean steroids

## 2019-06-19 NOTE — PROGRESS NOTES
Transport at the bedside to take pt to new room by bed. All belongings and chart with meds sent with the pt.

## 2019-06-19 NOTE — THERAPY
"Speech Language Therapy dysphagia treatment completed.   Functional Status:  Trials of upgraded textures and liquids with cues to clear throat as needed with verbosity interfering with self-monitor.  Ok for upgraded tastes with SLP/staff. Continue current diet  Recommendations: D2/NTL with trials up with SLP/RN per strategies posted in room.  Plan of Care: Will benefit from Speech Therapy 5 times per week  Post-Acute Therapy: Discharge to a transitional care facility for continued skilled therapy services. and Discharge to home vs outpatient or home health for additional skilled therapy services vs no addtl services once ed/training completed.    See \"Rehab Therapy-Acute\" Patient Summary Report for complete documentation.     "

## 2019-06-19 NOTE — CONSULTS
Medical chart review completed.     Patient is a 54 y.o. year-old male with a past medical history significant for meth abuse admitted to [facility] on 5/21/2019  7:14 PM with neurologic changes.  Patient found to have left temporal lobe enhancing cystic lesion s/p craniotomy with repeat craniotomy ~1 month ago and resection on 6/16/19 with Dr. Young.  Patient has had prolonged hospitalization due to concern for infectious process and has been maintained on IV antibiotics. Per most recent NSG note, final pathology is GBM.  Cultures have been negative.  Per most recent CM note on 6/7/19, family would like to take patient home and can provide assistance and supervision. If patient no longer requires IV antibiotics would recommend home with home health therapies with assistance and supervision from family.

## 2019-06-19 NOTE — PROGRESS NOTES
11:05: Pt had an assisted fall out of the chair. All fall precautions in place including treaded socks, chair alarm, call light in reach, room near nursing station, frequent rounding and repeated education provided to pt. Pt denies hitting head.    11:10: Dr.Tolberts HARRINGTON, Catherine updated on pt's fall. No new orders received at this time.

## 2019-06-19 NOTE — PROGRESS NOTES
2 Rn skin check complete. Left crani site open to air. No other areas of concern noted. All interventions in place.

## 2019-06-20 ENCOUNTER — PATIENT OUTREACH (OUTPATIENT)
Dept: OTHER | Facility: MEDICAL CENTER | Age: 54
End: 2019-06-20

## 2019-06-20 LAB
BACTERIA TISS AEROBE CULT: NORMAL
BACTERIA WND AEROBE CULT: NORMAL
GLUCOSE BLD-MCNC: 102 MG/DL (ref 65–99)
GLUCOSE BLD-MCNC: 123 MG/DL (ref 65–99)
GLUCOSE BLD-MCNC: 123 MG/DL (ref 65–99)
GLUCOSE BLD-MCNC: 132 MG/DL (ref 65–99)
GRAM STN SPEC: NORMAL
GRAM STN SPEC: NORMAL
SIGNIFICANT IND 70042: NORMAL
SIGNIFICANT IND 70042: NORMAL
SITE SITE: NORMAL
SITE SITE: NORMAL
SOURCE SOURCE: NORMAL
SOURCE SOURCE: NORMAL

## 2019-06-20 PROCEDURE — 92526 ORAL FUNCTION THERAPY: CPT

## 2019-06-20 PROCEDURE — A9270 NON-COVERED ITEM OR SERVICE: HCPCS | Performed by: STUDENT IN AN ORGANIZED HEALTH CARE EDUCATION/TRAINING PROGRAM

## 2019-06-20 PROCEDURE — 700102 HCHG RX REV CODE 250 W/ 637 OVERRIDE(OP): Performed by: INTERNAL MEDICINE

## 2019-06-20 PROCEDURE — 700111 HCHG RX REV CODE 636 W/ 250 OVERRIDE (IP): Performed by: STUDENT IN AN ORGANIZED HEALTH CARE EDUCATION/TRAINING PROGRAM

## 2019-06-20 PROCEDURE — 770006 HCHG ROOM/CARE - MED/SURG/GYN SEMI*

## 2019-06-20 PROCEDURE — 97112 NEUROMUSCULAR REEDUCATION: CPT

## 2019-06-20 PROCEDURE — 82962 GLUCOSE BLOOD TEST: CPT

## 2019-06-20 PROCEDURE — 700102 HCHG RX REV CODE 250 W/ 637 OVERRIDE(OP): Performed by: STUDENT IN AN ORGANIZED HEALTH CARE EDUCATION/TRAINING PROGRAM

## 2019-06-20 PROCEDURE — 99232 SBSQ HOSP IP/OBS MODERATE 35: CPT | Mod: GC | Performed by: HOSPITALIST

## 2019-06-20 PROCEDURE — A9270 NON-COVERED ITEM OR SERVICE: HCPCS | Performed by: INTERNAL MEDICINE

## 2019-06-20 PROCEDURE — 97535 SELF CARE MNGMENT TRAINING: CPT

## 2019-06-20 PROCEDURE — 97116 GAIT TRAINING THERAPY: CPT

## 2019-06-20 PROCEDURE — 700112 HCHG RX REV CODE 229: Performed by: INTERNAL MEDICINE

## 2019-06-20 RX ORDER — HYDRALAZINE HYDROCHLORIDE 20 MG/ML
10 INJECTION INTRAMUSCULAR; INTRAVENOUS EVERY 6 HOURS PRN
Status: DISCONTINUED | OUTPATIENT
Start: 2019-06-20 | End: 2019-06-21 | Stop reason: HOSPADM

## 2019-06-20 RX ORDER — DOCUSATE SODIUM 100 MG/1
100 CAPSULE, LIQUID FILLED ORAL 2 TIMES DAILY
Status: DISCONTINUED | OUTPATIENT
Start: 2019-06-20 | End: 2019-06-21 | Stop reason: HOSPADM

## 2019-06-20 RX ORDER — ACETAMINOPHEN 325 MG/1
650 TABLET ORAL EVERY 6 HOURS PRN
Status: DISCONTINUED | OUTPATIENT
Start: 2019-06-20 | End: 2019-06-21 | Stop reason: HOSPADM

## 2019-06-20 RX ORDER — CALCIUM CARBONATE 500 MG/1
500 TABLET, CHEWABLE ORAL EVERY 4 HOURS PRN
Status: DISCONTINUED | OUTPATIENT
Start: 2019-06-20 | End: 2019-06-21 | Stop reason: HOSPADM

## 2019-06-20 RX ORDER — FERROUS SULFATE 325(65) MG
325 TABLET ORAL
Status: DISCONTINUED | OUTPATIENT
Start: 2019-06-21 | End: 2019-06-21 | Stop reason: HOSPADM

## 2019-06-20 RX ORDER — DEXAMETHASONE 6 MG/1
6 TABLET ORAL EVERY 8 HOURS
Status: DISCONTINUED | OUTPATIENT
Start: 2019-06-20 | End: 2019-06-20

## 2019-06-20 RX ORDER — POLYETHYLENE GLYCOL 3350 17 G/17G
1 POWDER, FOR SOLUTION ORAL 2 TIMES DAILY
Status: DISCONTINUED | OUTPATIENT
Start: 2019-06-20 | End: 2019-06-21 | Stop reason: HOSPADM

## 2019-06-20 RX ORDER — DEXAMETHASONE 6 MG/1
6 TABLET ORAL EVERY 8 HOURS
Status: DISCONTINUED | OUTPATIENT
Start: 2019-06-20 | End: 2019-06-21 | Stop reason: HOSPADM

## 2019-06-20 RX ORDER — OXYCODONE HYDROCHLORIDE 5 MG/1
5 TABLET ORAL EVERY 6 HOURS PRN
Status: DISCONTINUED | OUTPATIENT
Start: 2019-06-20 | End: 2019-06-21 | Stop reason: HOSPADM

## 2019-06-20 RX ORDER — AMOXICILLIN 250 MG
1 CAPSULE ORAL NIGHTLY
Status: DISCONTINUED | OUTPATIENT
Start: 2019-06-20 | End: 2019-06-21 | Stop reason: HOSPADM

## 2019-06-20 RX ADMIN — DEXAMETHASONE 6 MG: 6 TABLET ORAL at 22:00

## 2019-06-20 RX ADMIN — SENNOSIDES, DOCUSATE SODIUM 1 TABLET: 50; 8.6 TABLET, FILM COATED ORAL at 20:20

## 2019-06-20 RX ADMIN — DEXAMETHASONE SODIUM PHOSPHATE 6 MG: 10 INJECTION INTRAMUSCULAR; INTRAVENOUS at 05:57

## 2019-06-20 RX ADMIN — DOCUSATE SODIUM 100 MG: 100 CAPSULE, LIQUID FILLED ORAL at 05:57

## 2019-06-20 RX ADMIN — DEXAMETHASONE SODIUM PHOSPHATE 6 MG: 10 INJECTION INTRAMUSCULAR; INTRAVENOUS at 00:40

## 2019-06-20 RX ADMIN — DEXAMETHASONE SODIUM PHOSPHATE 6 MG: 10 INJECTION INTRAMUSCULAR; INTRAVENOUS at 11:13

## 2019-06-20 RX ADMIN — OXYCODONE HYDROCHLORIDE 5 MG: 5 TABLET ORAL at 22:36

## 2019-06-20 RX ADMIN — FERROUS SULFATE TAB 325 MG (65 MG ELEMENTAL FE) 325 MG: 325 (65 FE) TAB at 07:48

## 2019-06-20 ASSESSMENT — ENCOUNTER SYMPTOMS
FEVER: 0
VOMITING: 0
WEAKNESS: 0
COUGH: 0
BLURRED VISION: 0
SHORTNESS OF BREATH: 0
SENSORY CHANGE: 0
MYALGIAS: 0
DOUBLE VISION: 0
DEPRESSION: 0
NAUSEA: 0
CHILLS: 0
ABDOMINAL PAIN: 0
HEADACHES: 0
SORE THROAT: 0

## 2019-06-20 ASSESSMENT — COGNITIVE AND FUNCTIONAL STATUS - GENERAL
CLIMB 3 TO 5 STEPS WITH RAILING: A LITTLE
WALKING IN HOSPITAL ROOM: A LITTLE
MOVING FROM LYING ON BACK TO SITTING ON SIDE OF FLAT BED: A LITTLE
STANDING UP FROM CHAIR USING ARMS: A LITTLE
SUGGESTED CMS G CODE MODIFIER MOBILITY: CJ
MOBILITY SCORE: 20

## 2019-06-20 ASSESSMENT — GAIT ASSESSMENTS: GAIT LEVEL OF ASSIST: SUPERVISED

## 2019-06-20 NOTE — THERAPY
OT tx attempted. Pt observed mobilizing well with PT and was sitting cross-legged in bed upon arrival with good balance. Pt requesting to eat snack. Per nsg, pt continues to have mild cognitive deficits but has good family support for DC. Will remain available to see pt tomorrow if needed prior to DC.

## 2019-06-20 NOTE — PROGRESS NOTES
"        ICU Interval Note  Note Author: Meaghan Montelongo M.D.      Name Robert CALDERNO 1965   Age 54 y.o. male   MRN [id number]   Code Status Full Code     After 5 PM or if no immediate response to page, please call for cross-coverage.    Attending: Dr. Aguilera  Team: Blue See patient list for primary contact information.  Call [phone] to page.    First page: Dr. Savage Second page: Dr. Montelongo     Principal Problem  Glioblastoma    Interval History:  Transferred to Neurosurgery floor from ICU overnight.  No acute events overnight.  2019 pathology of left temporal mass showed glioblastoma.  Given that ID has stopped all antibiotics.  Patient was evaluated by speech therapy and cleared for dsyphagia 2/NTL diet.  Patient was evaluated by Physiatry; however, patient and his family would prefer to go home with home health as patient's family report they can provide adequate assistance/supervision.  This morning patient doing \"well.\"  Reports he cannot remember a lot of last week, but states he is feeling a lot better today with \"less heaviness\" in his head.  Denies headache, chest pain, shortness of breath, abdominal pain.  Afebrile.  Medical Oncology was consulted today; Dr. De Luna to see patient.  Radiation Oncology also consulted today with specific question of whether or not mapping was preferred to be done in house or on outpatient follow-up as well as for further outpatient management.  Case Management is also on board for assistance with home health.        ROS:  Review of Systems   Constitutional: Negative for chills and fever.   HENT: Negative for hearing loss, sore throat and tinnitus.    Eyes: Negative for blurred vision and double vision.        Reports right eye has clear vision the left eye, but that he can see well from both.   Respiratory: Negative for cough and shortness of breath.    Cardiovascular: Negative for chest pain and leg swelling.   Gastrointestinal: Negative for " "abdominal pain, nausea and vomiting.   Genitourinary: Negative for dysuria and urgency.   Musculoskeletal: Negative for joint pain and myalgias.   Skin: Negative for rash.   Neurological: Negative for sensory change, weakness and headaches.   Psychiatric/Behavioral: Negative for depression.       Disposition:  Inpatient pending further recommendations by medical oncology and radiation oncology as well as set up with home health for outpatient care.  Patient and patient's family prefer for patient to discharge to home with home health rather than acute care rehab.      Consultants  Neurosurgery - Dr. Vincent.  Infectious Disease - Dr. Dorado.  Neurology   Medical Oncology - Dr. De Luna.  Radiation Oncology - Dr. Waters.       Quality-Core Measures  Richards Catheter: No  Central Line: RUE PICC line - ordered to remove today, 6/20.   VTE Prophylaxis: Holding Lovenox for recent surgery, SCDs.   Antibiotics: None.   Rehab: PT, OT, SLP following       Vitals  Vitals:    06/19/19 1800 06/19/19 2000 06/20/19 0400 06/20/19 0800   BP: 156/84 110/75 126/77 126/88   Pulse:  100 75 78   Resp:  16 14 18   Temp:  36.8 °C (98.2 °F) 36.8 °C (98.2 °F) 36.7 °C (98.1 °F)   TempSrc:  Temporal Temporal Temporal   SpO2:  95% 94% 98%   Weight:       Height:           Physical Exam  General: No acute distress, cooperative.  Speaking with ease.    Head: Normocephalic, L frontotemporal craniotomy staples in place - c/d/i, nonerythematous, no discharge.   Eyes: PERRLA, EOMI, normal conjunctiva, sclerae anicteric.  Throat: Oropharynx clear, oral mucosa moist.  Neck: Supple, no lymphadenopathy.  Lungs: Normal work of breathing, clear to auscultation bilaterally.  Heart: Normal rate, regular rhythm, no murmurs.  Abdomen: Soft, bowel sounds present, non-tender, no peritoneal signs.  Extremities: Nontender, no cyanosis, no edema. RUE PICC line intact - to be removed today.   Neuro: Alert, Oriented to person, place, situation (\"I had brain surgery for " "cancer.\").  B/l UE/LE strength 5/5.  Sensation grossly intact.    Skin: Warm, dry, intact, no suspicious rashes or lesions on visualized skin.  Psych: mood and affect appropriate.       Labs and Imaging  Pertinent labs, imaging and diagnostic tests and procedures associated with this visit have been reviewed, specific comments can be found in the assessment and plan section below.     Recent Labs      06/18/19 0434  06/19/19   0415   SODIUM  136  134*   POTASSIUM  4.0  3.9   CHLORIDE  105  103   CO2  24  25   BUN  23*  21   CREATININE  0.60  0.53   GLUCOSE  141*  129*   CALCIUM  8.3*  8.7     Recent Labs      06/18/19 0434  06/19/19   0415   WBC  12.0*  10.9*   HEMOGLOBIN  11.3*  12.3*   HEMATOCRIT  37.6*  38.7*   PLATELETCT  215  212     Recent Labs      06/18/19 0434   ALTSGPT  14   ASTSGOT  7*   ALKPHOSPHAT  37   TBILIRUBIN  0.6   ALBUMIN  3.3       Assessment and Plan    * Glioblastoma (HCC)- (present on admission)   Assessment & Plan    Craniotomy on 5/22/19 with negative cultures, mild reactive gliosis on pathology.   Repeat craniotomy with resection on 6/16/19 - pathology positive for glioblastoma.  Per Neurosurgery, okay for discharge.  Will wean dexamethasone: Decrease to 6 mg p.o. 3 times daily x3 days, followed by 6 mg twice daily x3 days, followed by 4 mg twice daily x3 days.  Patient to follow-up with neurosurgery, medical oncology, radiation oncology.  Neurosurgery to complete taper outpatient.  SLP cleared for diet  Medical Oncology, Dr. De Luna, consulted on 6/20; pending recs; appreciated.    Radiation Oncology, Dr. Waters, consulted on 6/20 with specific question of whether he prefers mapping done in house or on outpatient follow-up as well as for outpatient management.   Case Management and Oncology Nurse Navigator on board for assistance with home health and oncology appointments, respectively.     Status post craniotomy- (present on admission)   Assessment & Plan    S/p craniotomy on " 5/22, 6/16.  SCDs for mechanical DVT prophylaxis.  Restart chemical DVT prophylaxis as per Neurosurgery.     Swallowing impairment- (present on admission)   Assessment & Plan    Evaluated by SLP.  On dysphagia 2/nectar thick liquids.     Agitation- (present on admission)   Assessment & Plan    Improved/resolved since 6/16 repeat craniotomy.    Monitor.      Acute respiratory failure with hypoxia (HCC)- (present on admission)   Assessment & Plan    Resolved.  Intubated on admission for airway protection on 5/22/2019, extubated on 5/24/2019.  RT protocol, supplemental oxygen as needed.     Iron deficiency anemia- (present on admission)   Assessment & Plan    FOBT negative, no records of EGD or colonoscopy per chart review, stool H. pylori test negative, celiac panel negative.  Continue ferrous sulfate daily  Recommend outpatient follow-up     Class 1 obesity due to excess calories without serious comorbidity with body mass index (BMI) of 33.0 to 33.9 in adult- (present on admission)   Assessment & Plan    BMI 31.  Outpatient follow-up for weight loss management.  Concern for sleep apnea, recommend outpatient sleep study for further evaluation.     Delirium- (present on admission)   Assessment & Plan    Resolved.     Smoker- (present on admission)   Assessment & Plan    Smokes 3 cigarettes per day per his mother's report.  Smoking cessation counseling prior to discharge.     Methamphetamine abuse (HCC)- (present on admission)   Assessment & Plan    History of meth abuse via smoking per the patient's nephew, no history of IV drug use.  Substance abuse counseling

## 2019-06-20 NOTE — PROGRESS NOTES
Neurosurgery Progress Note    Subjective:  No acute events. Sitting up in bed. No c/o headache at this time.     Exam:Seen in conjunction with Dr Hannah James, disoriented to why he is in the hospital. Fluent speech  Pupils 3 mm PERRL, EOMI  Tongue midline, no facial droop  Bilateral  bicep tricep IP DF 5/5  Sensation intact touch x 4 extremties  Left scalp incision c/d/i with staples  Eating. Drinking. Denies n/v  Voiding  Ambulatory      BP  Min: 110/75  Max: 161/83  Pulse  Av  Min: 75  Max: 100  Resp  Av  Min: 14  Max: 18  Temp  Av.7 °C (98.1 °F)  Min: 36.7 °C (98 °F)  Max: 36.8 °C (98.2 °F)  SpO2  Av.5 %  Min: 94 %  Max: 98 %    No Data Recorded    Recent Labs      19   0434  195   WBC  12.0*  10.9*   RBC  4.72  4.91   HEMOGLOBIN  11.3*  12.3*   HEMATOCRIT  37.6*  38.7*   MCV  79.7*  78.8*   MCH  23.9*  25.1*   MCHC  30.1*  31.8*   RDW  58.2*  56.1*   PLATELETCT  215  212   MPV  10.8  10.7     Recent Labs      19   0434  19   SODIUM  136  134*   POTASSIUM  4.0  3.9   CHLORIDE  105  103   CO2  24  25   GLUCOSE  141*  129*   BUN  23*  21   CREATININE  0.60  0.53   CALCIUM  8.3*  8.7               Intake/Output       19 - 19 - 1959       Total  Total       Intake    P.O.  200  -- 200  500  -- 500    P.O. 200 -- 200 500 -- 500    Total Intake 200 -- 200 500 -- 500       Output    Urine  850  -- 850  --  -- --    Number of Times Voided 2 x 1 x 3 x 2 x -- 2 x    Urine Void (mL) 850 -- 850 -- -- --    Stool  --  -- --  --  -- --    Number of Times Stooled 1 x -- 1 x 1 x -- 1 x    Total Output 850 -- 850 -- -- --       Net I/O     -650 -- -650 500 -- 500            Intake/Output Summary (Last 24 hours) at 19 1610  Last data filed at 19 0800   Gross per 24 hour   Intake              500 ml   Output                0 ml   Net              500 ml            •  acetaminophen  650 mg Q6HRS PRN   • insulin lispro  1-6 Units Q6HRS    And   • glucose  16 g Q15 MIN PRN    And   • dextrose 10% bolus  250 mL Q15 MIN PRN   • oxyCODONE immediate-release  5 mg Q6HRS PRN   • hydrALAZINE  10 mg Q6HRS PRN   • dexamethasone  6 mg Q8HRS   • calcium carbonate  500 mg Q4HRS PRN   • docusate sodium  100 mg BID   • polyethylene glycol/lytes  1 Packet BID   • senna-docusate  1 Tab Nightly   • [START ON 6/21/2019] ferrous sulfate  325 mg QDAY with Breakfast   • Respiratory Care per Protocol   Continuous RT   • Pharmacy Consult Request  1 Each PHARMACY TO DOSE   • MD ALERT...DO NOT ADMINISTER NSAIDS or ASPIRIN unless ORDERED By Neurosurgery  1 Each PRN       Assessment and Plan:  Hospital day #30 left temporal peripherally enhancing cystic lesion  POD #29 left temporal cranitomy, cyst evacuation/biopsy  POD #4 resection recurrent left temporal recurrent cyst/mass     Prophylactic anticoagulation: no         Start date/time: tbd.      Final Pathology: Glioblastoma    Plan:  Neuro stable.   Final pathology Glioblastoma.   Patient will need oncology consult to Cancer Care Specialist. And radiation oncology consult with Dr Torres. Our office will arrange this as outpatient.   Clear from NSG standpoint to transfer to SNF/Rehab/Home once medically cleared  Wean steroids

## 2019-06-20 NOTE — FACE TO FACE
Face to Face Supporting Documentation - Home Health    The encounter with this patient was in whole or in part the primary reason for home health admission.    Date of encounter:   Patient:                    MRN:                       YOB: 2019  Robert Dorantes  [id number]  1965     Home health to see patient for:  Skilled Nursing care for assessment, interventions & education, Home health aide, Physical Therapy evaluation and treatment, Occupational therapy evaluation and treatment and Speech Language Pathology evaluation and treatment    Skilled need for:  Surgical Aftercare staples and Recent Deterioration of Health Status glioblastoma    Skilled nursing interventions to include:  Wound Care    Homebound status evidenced by:  Require the use of special transportation or Needs the assistance of another person in order to leave the home. Leaving home requires a considerable and taxing effort. There is a normal inability to leave the home.    Community Physician to provide follow up care: Pcp Pt States None     Optional Interventions? No      I certify the face to face encounter for this home health care referral meets the CMS requirements and the encounter/clinical assessment with the patient was, in whole, or in part, for the medical condition(s) listed above, which is the primary reason for home health care. Based on my clinical findings: the service(s) are medically necessary, support the need for home health care, and the homebound criteria are met.  I certify that this patient has had a face to face encounter by myself.  Meaghan Montelongo M.D. - MARIII: [id number]

## 2019-06-20 NOTE — CARE PLAN
Problem: Knowledge Deficit  Goal: Knowledge of disease process/condition, treatment plan, diagnostic tests, and medications will improve  Outcome: PROGRESSING AS EXPECTED  Pt asks questions about his condition; questions answered; pt forgetful at times but is showing great interest in regaining full orientation     Problem: Pain Management  Goal: Pain level will decrease to patient's comfort goal  Outcome: PROGRESSING AS EXPECTED  Pt free of pain at this time

## 2019-06-20 NOTE — DISCHARGE PLANNING
Anticipated Discharge Disposition: Home with HH    Action: LSW met with pt at bedside to obtain choose for HH care. Pt selected [facility] and form signed. Form faxed to [facility] at ext [phone].    Barriers to Discharge: Medical Clearance, HH Acceptance, no current PCP     Plan: LSW to follow up with [facility]

## 2019-06-20 NOTE — CARE PLAN
Problem: Safety  Goal: Will remain free from falls  Outcome: PROGRESSING SLOWER THAN EXPECTED  Treaded socks in place. Bed alarm on. Bed locked and in lowest position. Call light within reach.     Problem: Mobility  Goal: Risk for activity intolerance will decrease  Outcome: PROGRESSING AS EXPECTED  Pt ambulating x1 assist to bathroom.

## 2019-06-20 NOTE — PROGRESS NOTES
Call from Dr Montelongo, new referral for cancer nurse navigation on newly diagnosed GBM patient.  Pt would benefit from inpatient consults from radiation oncology and medical oncology prior to discharge.  Nurse navigator will follow up with patient tomorrow to provide contact information, education on role of cancer nurse navigator and working with patient after discharge to assure follow up on new cancer diagnosis.    Appreciate referral.

## 2019-06-20 NOTE — PROGRESS NOTES
Pharmacy Pharmacotherapy Consult for LOS >30 days    Admit Date: 5/21/2019      Medications were reviewed for appropriateness and ongoing need.     Current Facility-Administered Medications   Medication Dose Route Frequency Provider Last Rate Last Dose   • dexamethasone (DECADRON) tablet 6 mg  6 mg Oral Q8HRS Meaghan Montelongo M.D.       • calcium carbonate (TUMS) chewable tab 500 mg  500 mg Oral Q4HRS PRN Sadiq Guerin M.D.   500 mg at 06/18/19 0525   • hydrALAZINE (APRESOLINE) injection 10 mg  10 mg Intravenous Q6HRS PRN Soumya Maharaj M.D.   10 mg at 06/19/19 1712   • insulin lispro (HUMALOG) injection 1-6 Units  1-6 Units Subcutaneous Q6HRS Em Savage M.D.   Stopped at 06/19/19 1800    And   • glucose 4 g chewable tablet 16 g  16 g Oral Q15 MIN PRN Em Savage M.D.        And   • DEXTROSE 10% BOLUS 250 mL  250 mL Intravenous Q15 MIN PRN Em Savage M.D.       • oxyCODONE immediate-release (ROXICODONE) tablet 5 mg  5 mg Oral Q6HRS PRN Ranjana Wood M.D.   5 mg at 06/17/19 2326   • ferrous sulfate tablet 325 mg  325 mg Oral QDAY with Breakfast Ranjana Wood M.D.   325 mg at 06/20/19 0748   • acetaminophen (TYLENOL) tablet 650 mg  650 mg Oral Q8HRS PRN Lou Taylor M.D.   650 mg at 06/11/19 1426   • polyethylene glycol/lytes (MIRALAX) PACKET 1 Packet  1 Packet Oral BID Amilcar Reaves M.D.   Stopped at 06/18/19 0600   • docusate sodium (COLACE) capsule 100 mg  100 mg Oral BID Amilcar Reaves M.D.   100 mg at 06/20/19 0557   • senna-docusate (PERICOLACE or SENOKOT S) 8.6-50 MG per tablet 1 Tab  1 Tab Oral Nightly Amilcar Reaves M.D.   1 Tab at 06/19/19 2047   • Respiratory Care per Protocol   Nebulization Continuous RT Jeremy M Gonda, M.D.       • Pharmacy Consult Request ...Pain Management Review 1 Each  1 Each Other PHARMACY TO DOSE Yanelis Austin M.D.       • MD ALERT...DO NOT ADMINISTER NSAIDS or ASPIRIN unless ORDERED By Neurosurgery 1 Each  1 Each Other PRN Yanelis WOOD  ADAN Austin       • ondansetron (ZOFRAN) syringe/vial injection 4 mg  4 mg Intravenous Q4HRS PRN Yanelis Austin M.D.   4 mg at 06/05/19 2133   • bisacodyl (DULCOLAX) suppository 10 mg  10 mg Rectal Q24HRS PRN Yanelis Austin M.D.       • fleet enema 133 mL  1 Each Rectal Once PRN Yanelis Austin M.D.           Recommendations:    Medications are appropriate for patient.    Immunizations:  -1 dose PCV13 followed by 1 dose PPSV23 at least 8 weeks later, then another dose PPSV23 at least 5 years after previous PPSV23    Amy Mariscal, Pharmacy Student    Pharmacy Addendum:  Pharmacy Pharmacotherapy Consult   LOS >30 days  Admit Date: 5/21/2019    Medications were reviewed for appropriateness and ongoing need.   Current Facility-Administered Medications   Medication Dose Route Frequency Provider Last Rate Last Dose   • dexamethasone (DECADRON) tablet 6 mg  6 mg Oral Q8HRS Meaghan Montelongo M.D.       • calcium carbonate (TUMS) chewable tab 500 mg  500 mg Oral Q4HRS PRN Sadiq Guerin M.D.   500 mg at 06/18/19 0525   • hydrALAZINE (APRESOLINE) injection 10 mg  10 mg Intravenous Q6HRS PRN Soumya Maharaj M.D.   10 mg at 06/19/19 1712   • insulin lispro (HUMALOG) injection 1-6 Units  1-6 Units Subcutaneous Q6HRS Em Savage M.D.   Stopped at 06/19/19 1800    And   • glucose 4 g chewable tablet 16 g  16 g Oral Q15 MIN PRN Em Savage M.D.        And   • DEXTROSE 10% BOLUS 250 mL  250 mL Intravenous Q15 MIN PRN Em Savage M.D.       • oxyCODONE immediate-release (ROXICODONE) tablet 5 mg  5 mg Oral Q6HRS PRN Ranjana Wood M.D.   5 mg at 06/17/19 2326   • ferrous sulfate tablet 325 mg  325 mg Oral QDAY with Breakfast Ranjana Wood M.D.   325 mg at 06/20/19 0748   • acetaminophen (TYLENOL) tablet 650 mg  650 mg Oral Q8HRS PRN Lou Taylor M.D.   650 mg at 06/11/19 1426   • polyethylene glycol/lytes (MIRALAX) PACKET 1 Packet  1 Packet Oral BID Amilcar Reaves M.D.   Stopped at 06/18/19 0600    • docusate sodium (COLACE) capsule 100 mg  100 mg Oral BID Amilcar Reaves M.D.   100 mg at 06/20/19 0557   • senna-docusate (PERICOLACE or SENOKOT S) 8.6-50 MG per tablet 1 Tab  1 Tab Oral Nightly Amilcar Reaves M.D.   1 Tab at 06/19/19 2047   • Respiratory Care per Protocol   Nebulization Continuous RT Jeremy M Gonda, M.D.       • Pharmacy Consult Request ...Pain Management Review 1 Each  1 Each Other PHARMACY TO DOSE Yanelis Austin M.D.       • MD ALERT...DO NOT ADMINISTER NSAIDS or ASPIRIN unless ORDERED By Neurosurgery 1 Each  1 Each Other PRN Yanelis Austin M.D.       • ondansetron (ZOFRAN) syringe/vial injection 4 mg  4 mg Intravenous Q4HRS PRN Yanelis Austin M.D.   4 mg at 06/05/19 2133   • bisacodyl (DULCOLAX) suppository 10 mg  10 mg Rectal Q24HRS PRN Yanelis Austin M.D.       • fleet enema 133 mL  1 Each Rectal Once PRN Yanelis Austin M.D.         Case discussed with student pharmacist via phone.    Recommendations:  1. Would consider dose adjustment of acetaminophen to 500 mg PO Q6H PRN.  2. Would consider discontinuation of as needed ondansetron IV due to lack of use (last noted 6/5/19).  3. Would consider discontinuation of as needed fleet enema MD and bisacodyl MD given no documented use.    Teddy Orozco, PharmD

## 2019-06-20 NOTE — THERAPY
"Physical Therapy Treatment completed.   Bed Mobility:  Supine to Sit: Supervised  Transfers: Sit to Stand: Supervised  Gait: Level Of Assist: Supervised with No Equipment Needed       Plan of Care: Patient with no further skilled PT needs in the acute care setting at this time  Discharge Recommendations: Equipment: No Equipment Needed.   Recommend home w/24-7 supervision from PT standpoint. See ST for cognitive assessment. PT will be available for d/c needs only. Pt has met all PT goals.       See \"Rehab Therapy-Acute\" Patient Summary Report for complete documentation.       "

## 2019-06-20 NOTE — PROGRESS NOTES
Assumed pt care at 1900. Received bedside report from Shilpa. Pt A&O x 3. Pt denies numbness and tingling. Pt is continent. Ambulates with SBA. Patient tolerating Dys II, nectar thick diet. Plan of care discussed. Fall, seizure, and aspiration precautions in place. Q4 hour neuro checks and hourly rounding in place.

## 2019-06-20 NOTE — THERAPY
"Speech Language Therapy dysphagia treatment completed.   Functional Status:  Patient sitting upright in bed. He was given trials of ice, thin water, soft mixed textures, and dry solids. He had a strong immediate cough on 1 out of 3 trials of water from the spoon. Breath hold strategy was introduced. He was able to follow through with this strategy and no other signs of aspiration were noted when drinking water from the spoon, cup or straw. Soft mixed textures and dry solids were consumed without difficulty. However, the patient reported his bite was now different after surgery and so he felt like he was chewing funny. We discussed different options for meals. Patient in agreement for D3, NTL diet and to sip thin liquids between meal times using the breath hold strategy.   Recommendations: Dysphagia 3, nectar thick liquids. Okay for sips of thin liquids between meals using the breath hold strategy. Pills whole with thick liquids.   Plan of Care: Will benefit from Speech Therapy 5 times per week  Post-Acute Therapy: Recommend inpatient transitional care services for continued speech therapy services.        See \"Rehab Therapy-Acute\" Patient Summary Report for complete documentation.     "

## 2019-06-20 NOTE — PROGRESS NOTES
Assumed care of pt at 0700.   Pt is A&Ox3, disoriented to event.   Pt denies n/v, n/t, and pain at this time.   Pt is standby assist to bathroom.   Plan of care discussed.   Hourly rounding in place.

## 2019-06-20 NOTE — DISCHARGE PLANNING
Anticipated Discharge Disposition: Home w/ HH    Action: Pt is new to the unit. Transferred in from SICU.    LSW spoke with [facility] Resident, Jayda and requested a HH order. She anticipates pt to be medically clear tomorrow (6/21).    HH choice obtained for [place]. Choice form faxed to [facility]. Pt does not have a PCP.    LSW made tc to scheduling ([phone]) and spoke with Devorah. Clinic is booked out until September. [facility] is booked out until August. [facility] is booked out two months. Pt has been scheduled for a PCP for August 13th unless another one in [place] can be arranged.      Barriers to Discharge: Medical Clearance; HH Acceptance    Plan: LSW to f/u with [facility].

## 2019-06-21 ENCOUNTER — HOME HEALTH ADMISSION (OUTPATIENT)
Dept: HOME HEALTH SERVICES | Facility: HOME HEALTHCARE | Age: 54
End: 2019-06-21
Payer: MEDICAID

## 2019-06-21 ENCOUNTER — PATIENT OUTREACH (OUTPATIENT)
Dept: OTHER | Facility: MEDICAL CENTER | Age: 54
End: 2019-06-21

## 2019-06-21 VITALS
RESPIRATION RATE: 18 BRPM | TEMPERATURE: 97.2 F | HEART RATE: 69 BPM | BODY MASS INDEX: 30.93 KG/M2 | WEIGHT: 220.9 LBS | DIASTOLIC BLOOD PRESSURE: 91 MMHG | SYSTOLIC BLOOD PRESSURE: 134 MMHG | OXYGEN SATURATION: 97 % | HEIGHT: 71 IN

## 2019-06-21 LAB
ANION GAP SERPL CALC-SCNC: 8 MMOL/L (ref 0–11.9)
BUN SERPL-MCNC: 22 MG/DL (ref 8–22)
CALCIUM SERPL-MCNC: 8.6 MG/DL (ref 8.5–10.5)
CHLORIDE SERPL-SCNC: 103 MMOL/L (ref 96–112)
CO2 SERPL-SCNC: 25 MMOL/L (ref 20–33)
CREAT SERPL-MCNC: 0.53 MG/DL (ref 0.5–1.4)
ERYTHROCYTE [DISTWIDTH] IN BLOOD BY AUTOMATED COUNT: 56.6 FL (ref 35.9–50)
GLUCOSE BLD-MCNC: 116 MG/DL (ref 65–99)
GLUCOSE BLD-MCNC: 161 MG/DL (ref 65–99)
GLUCOSE BLD-MCNC: 163 MG/DL (ref 65–99)
GLUCOSE SERPL-MCNC: 133 MG/DL (ref 65–99)
HCT VFR BLD AUTO: 38 % (ref 42–52)
HGB BLD-MCNC: 11.8 G/DL (ref 14–18)
MCH RBC QN AUTO: 24.1 PG (ref 27–33)
MCHC RBC AUTO-ENTMCNC: 31.1 G/DL (ref 33.7–35.3)
MCV RBC AUTO: 77.6 FL (ref 81.4–97.8)
PLATELET # BLD AUTO: 254 K/UL (ref 164–446)
PMV BLD AUTO: 10.9 FL (ref 9–12.9)
POTASSIUM SERPL-SCNC: 3.9 MMOL/L (ref 3.6–5.5)
RBC # BLD AUTO: 4.9 M/UL (ref 4.7–6.1)
SODIUM SERPL-SCNC: 136 MMOL/L (ref 135–145)
WBC # BLD AUTO: 14 K/UL (ref 4.8–10.8)

## 2019-06-21 PROCEDURE — 36415 COLL VENOUS BLD VENIPUNCTURE: CPT

## 2019-06-21 PROCEDURE — 99239 HOSP IP/OBS DSCHRG MGMT >30: CPT | Mod: GC | Performed by: HOSPITALIST

## 2019-06-21 PROCEDURE — A9270 NON-COVERED ITEM OR SERVICE: HCPCS | Performed by: STUDENT IN AN ORGANIZED HEALTH CARE EDUCATION/TRAINING PROGRAM

## 2019-06-21 PROCEDURE — 99223 1ST HOSP IP/OBS HIGH 75: CPT | Performed by: RADIOLOGY

## 2019-06-21 PROCEDURE — 85027 COMPLETE CBC AUTOMATED: CPT

## 2019-06-21 PROCEDURE — 700102 HCHG RX REV CODE 250 W/ 637 OVERRIDE(OP): Performed by: STUDENT IN AN ORGANIZED HEALTH CARE EDUCATION/TRAINING PROGRAM

## 2019-06-21 PROCEDURE — 80048 BASIC METABOLIC PNL TOTAL CA: CPT

## 2019-06-21 PROCEDURE — 700112 HCHG RX REV CODE 229: Performed by: STUDENT IN AN ORGANIZED HEALTH CARE EDUCATION/TRAINING PROGRAM

## 2019-06-21 PROCEDURE — 82962 GLUCOSE BLOOD TEST: CPT | Mod: 91

## 2019-06-21 RX ORDER — DEXAMETHASONE 6 MG/1
6 TABLET ORAL 2 TIMES DAILY
Qty: 6 TAB | Refills: 0 | Status: SHIPPED | OUTPATIENT
Start: 2019-06-24 | End: 2019-06-27

## 2019-06-21 RX ORDER — POTASSIUM CHLORIDE 1.5 G/1.58G
40 POWDER, FOR SOLUTION ORAL ONCE
Status: COMPLETED | OUTPATIENT
Start: 2019-06-21 | End: 2019-06-21

## 2019-06-21 RX ORDER — DEXAMETHASONE 4 MG/1
4 TABLET ORAL 2 TIMES DAILY
Qty: 6 TAB | Refills: 0 | Status: SHIPPED | OUTPATIENT
Start: 2019-06-27 | End: 2019-06-30

## 2019-06-21 RX ORDER — DEXAMETHASONE 6 MG/1
6 TABLET ORAL 3 TIMES DAILY
Qty: 7 TAB | Refills: 0 | Status: SHIPPED | OUTPATIENT
Start: 2019-06-21 | End: 2019-06-24

## 2019-06-21 RX ORDER — FERROUS SULFATE 325(65) MG
325 TABLET ORAL
Qty: 90 TAB | Refills: 0 | Status: SHIPPED | OUTPATIENT
Start: 2019-06-22 | End: 2019-09-20

## 2019-06-21 RX ADMIN — FERROUS SULFATE TAB 325 MG (65 MG ELEMENTAL FE) 325 MG: 325 (65 FE) TAB at 08:08

## 2019-06-21 RX ADMIN — DEXAMETHASONE 6 MG: 6 TABLET ORAL at 06:00

## 2019-06-21 RX ADMIN — POTASSIUM CHLORIDE 40 MEQ: 1.5 POWDER, FOR SOLUTION ORAL at 08:08

## 2019-06-21 RX ADMIN — INSULIN LISPRO 1 UNITS: 100 INJECTION, SOLUTION INTRAVENOUS; SUBCUTANEOUS at 00:13

## 2019-06-21 RX ADMIN — DEXAMETHASONE 6 MG: 6 TABLET ORAL at 14:18

## 2019-06-21 RX ADMIN — INSULIN LISPRO 1 UNITS: 100 INJECTION, SOLUTION INTRAVENOUS; SUBCUTANEOUS at 12:02

## 2019-06-21 RX ADMIN — DOCUSATE SODIUM 100 MG: 100 CAPSULE ORAL at 06:00

## 2019-06-21 NOTE — DISCHARGE PLANNING
Anticipated Discharge Disposition: Home with Home Health Care    Action: [facility] representative met, Leanne, met with pt at bedside. Pt completed admission paperwork with agency to be followed by primary care. Leanne indicated that a PCP, Dr. Nguyen would establish care early next week Monday/Tuesday and will be able to follow up with home heatlh orders/needs.     Barriers to Discharge: acceptance from HH agency     Plan: LSW will follow up with [facility] regarding HH agency acceptance.

## 2019-06-21 NOTE — PROGRESS NOTES
Assumed pt care at 1900. Received bedside report from GRACIELA Smiley. Pt A&O x 3-4. Pt denies numbness and tingling. Pt is continent. Ambulates with SBA. Patient tolerating Dys II, nectar thick diet. Plan of care discussed. Fall, seizure, and aspiration precautions in place. Q4 hour neuro checks and hourly rounding in place.

## 2019-06-21 NOTE — CONSULTS
DATE OF SERVICE:  06/21/2019    INPATIENT ONCOLOGY CONSULTATION    REASON FOR CONSULT:  New diagnosis of GBM.    HISTORY OF PRESENT ILLNESS:  The patient is a very nice 54-year-old man   without much medical history, except in the chart there is a reported history   of methamphetamine abuse, although to me he adamantly denies this.  He came to   the emergency room on 05/21/2019 with altered mental status including   confusion and word finding difficulties.  He also had a right facial droop and   some right-sided weakness.  His symptoms have been going on for about 9 days,   but were progressively worse 2 days prior to admission.  He had a history of   a recent acute suppurative otitis media of the left ear that was treated with   7 days of cefdinir antibiotics, which he completed about a week before his   presentation.  He underwent a CT scan of the head that showed a 4 cm left   frontal temporal lesion favoring abscess versus a solid mass.  There was   extensive edema with 1 cm of left to right shift.    He was admitted to the hospital.  He had infectious disease consult, and he was   put on ceftriaxone, vancomycin, and Flagyl.  He had a neurosurgery consult on   05/22.  Dr. Young took him to the operating room and performed a left   temporal craniotomy with evacuation of a cystic mass.  The pathology showed   only reactive gliosis.  It was reviewed at [place] [place] who agreed as well.  There   is no obvious infection and no obvious malignancy.    He continued in the hospital with prolonged recovery.  He underwent repeat MRI   imaging of the brain later in June that showed recurrence of the cystic   process.  Because of that, on 06/16, he was brought to the operating room by   Dr. Young and underwent repeat resection.  At this time, the pathology   returned showing a glioblastoma multiforme (IDH-1 and MGMT studies are   pending).  Because of this new diagnosis, I have been asked to consult on the   case.  The report  says he has been referred to Dr. Robert Waters for   radiation as well.    PAST MEDICAL HISTORY:  Limited medical history.    PAST SURGICAL HISTORY:  No prior surgeries.    ALLERGIES:  PENICILLIN AND SULFA.    HOME MEDICATIONS:  He was not on any medications at home.    FAMILY HISTORY:  He denies any family history of malignancy.    SOCIAL HISTORY:  He was born in [place], [place].  He lives in   [place], [place] with his mother.  He is single and has no children.  He works   as a .  He smoked cigarettes at half-a-pack per day for about 15   years, but quit in 2017.  He drinks beer 1 time per week.  He adamantly denies   any methamphetamine or other illicit drug use.    REVIEW OF SYSTEMS:  Review of systems is positive for illness listed above.    He has had some mild headaches.  He has noticed his vision is a little bit   more blurry.  He has had some heartburn and throat clearing off and on.  He   has had some decreased dexterity of the right arm.  Otherwise, complete review   of systems per the AMA and CMS criteria was negative.    PHYSICAL EXAMINATION:  VITAL SIGNS:  His height is 5 feet 11 inches, his weight is 100.2 kilograms.    His BSA is 2.24 meter squared.  His T-max is 98.6, pulse 69, blood pressure   134/91, respirations 18, satting 97% on room air.  GENERAL:  No acute distress, pleasant gentleman, appears stated age.  HEENT:  He has a large left temporal incision, which is healing well.    Otherwise normocephalic.  NCAT, sclerae are anicteric.  Conjunctivae clear.    Oropharynx is clear without erythema, exudate or discharge.  NECK:  Supple, nontender, no JVP, no carotid bruits, no thyromegaly.  CHEST:  Clear to auscultation and percussion bilaterally.  No wheezes, rales   or rhonchi.  CARDIOVASCULAR:  Regular rate and rhythm.  No murmurs, gallops or rubs.    Normal S1, S2.  ABDOMEN:  Soft, nontender, and nondistended.  No hepatosplenomegaly.  No   rebound, no masses.  EXTREMITIES:   No cyanosis, clubbing or edema.  Full range of motion.  No joint   swelling.  SKIN:  No rashes, bruising, petechiae, ulcerations.  NEUROLOGIC:  Cranial nerves II-XII are intact.  He has intact sensation to   light touch throughout.  He moves all 4 extremities appropriately.  He has   some subtle mild right-sided weakness.  He has some occasional word finding   difficulties.  Otherwise, he seems alert and oriented.    LABORATORY DATA:  White blood cell count 14, hemoglobin 11.8, hematocrit 38%,   platelets 254.  MCV 78.    Sodium 136, potassium 3.9, chloride 103, CO2 of 25, BUN 22, creatinine 0.53,   glucose 133, calcium 8.6, AST 7, ALT 14, alkaline phosphatase 37, bilirubin   0.6, albumin 3.3, protein 5.5.    ASSESSMENT AND PLAN:  Patient is a very nice 54-year-old gentleman with a   limited medical history who has not been diagnosed with a glioblastoma   multiforme (IDH-1 and MGMT studies are pending).  He underwent initial biopsy   on 05/22, which was nondiagnostic.  He underwent repeat resection on 06/16.    He continues to recover from the surgery.  We have been asked to consult on   the case.    I talked with patient about his disease.  Unfortunately, glioblastoma   multiforme is not traditionally a curative malignancy.  It is treated with   surgical resection, but it cannot be completely resected given to its somewhat   diffuse microscopic spread of the tumor.  Even though it is not curative,   there are treatment options for palliative purposes as well as to provide more   time.  Typically if this tumor is left untreated, the median overall survival   is about 3-4 months.  With traditional chemotherapy and radiation treatments,   the median overall survival is about 9-12 months.  That being said, there are   about 10% of patients whose tumor biology shows great response to therapy and   some of these patients are alive, even 5 years out.  The IDH-1 and MGMT   testing will help prognostically with this.    We  talked about the standard course of treatment.  He would have about 3-4   weeks to heal up from surgery, and then he would go on to a daily radiation   therapy, which would extend over 7 weeks given on Monday through Friday.  We   talked about some of the side effects with this including some permanent hair   loss on the left side of his head.  During this time, he would receive oral   Temodar chemotherapy, which would be given daily, 7 days a week throughout the   7-week treatment.  He will need to be on prophylactic Bactrim during this   time.  We talked about some of the side effects of chemo.  Interestingly   enough, this chemo does not cause hair loss.  There can be some nausea,   although this is typically well tolerated.  Overtime, there can be some   increasing fatigue as well as lowering of the blood counts.  We would have to   monitor his blood counts.    After completing this treatment, he would have a month off to recuperate.  He   would then go on a maintenance phase of Temodar, which would be Temodar given   daily for 5 days out of a 28-day cycle.  This would be done for 6 months to a   year as tolerated.    At this point, he would like to move forward with the treatment.  He is   supposed to see Dr. Waters as an outpatient.  The neurosurgery service is   taking care of this referral.  I will work on getting the Temodar set up.  We   will arrange a followup for him to come back and see us prior to starting   chemo.  We can follow up with this as an outpatient.  From oncology   standpoint, he is okay for discharge.    At this point, all of his questions have been answered.    Thank you very much referral of this nice gentleman.       ____________________________________     MD REINA Kelley / NTS    DD:  06/21/2019 11:29:53  DT:  06/21/2019 15:46:27    D#:  [id number]  Job#:  [id number]

## 2019-06-21 NOTE — DISCHARGE PLANNING
"Anticipated Discharge Disposition: Home with Home Health Care    Action: Received report from Dr. Bergeron that pt's mother is interested in pt completing Advanced Directive paperwork appointing her as POA and information related to applying for disability prior to pt's discharge. Discussed that Certificate of Competency would need to be completed indicating pt had the capacity to understand and complete an Advanced Directive while in the hospital. LSW met with pt at bedside with Dr. Bergeron which pt was able to voice he would want his mother to make decisions for him if he couldn't and showed some insight into this medical condition. Certificate of Competency was completed by provider.    LSW met with pt one on one to review Advanced Directive-Healthcare Power of  documents; however, pt did not show insight to understanding the purpose of document. LSW explained purpose of document was to notify medical providers who can make medical decisions for him, if he can not. Pt stated, \"My mom. Well I mean make it 50/50 or no 60/40 can we do that?\" LSW re-explained document to pt and how Healthcare POA would work, if an individual is unable to give informed consent. Pt tangential and changed topic, LSW provided cueing and asked if pt would like to complete document today and review further. Pt stated, \"No maybe I'll think about it, is that okay?\"  LSW provided re-insurance to pt that form does not have to be completed today. Discussed reaching out tot his mother to help explain process and answering questions. Pt agreeable.     LSW notified provider, Dr. Savage that pt is unable to show insight related to the Healthcare POA forms and at this time LSW does not feel completing forms would be appropriate. LSW will outreach to pt's mother, [place] and provide update and information/resources to look into completing documents at a later date/time, if pt is more cognitive of such documents.    LSW spoke with pt's mother to " provide update. She voiced understanding and reported she has the Healthcare POA forms and can look into at a later date should pt have more insight/understanding. She inquired about process for applying for disability through [employer]. LSW provided information related to starting disability application including contact information. Pt's mother voiced understanding.  Pt's mother aware of PCP with [facility] to establish early next week and currently pending home health acceptance.      Barriers to Discharge: pending acceptance of Home Health Care agency.     Plan: LSW will continue to follow up with [facility] regarding status of Home Health Care Referral

## 2019-06-21 NOTE — CONSULTS
"RADIATION ONCOLOGY CONSULT    DATE OF SERVICE: 6/21/2019    IDENTIFICATION: A 54 y.o. male with left frontal glioblastoma s/p gross total resection on 05/22/19 with f/u resection on 06/16/19 .  He is here at the kind request of Dr. Young.      HISTORY OF PRESENT ILLNESS: Patient was unable to give a clear history. History was obtained via chart review.  Robert Dorantes is 54 y.o. Male with a past medical history of methamphetamine use last use in 2000 per pt. Pt presented to the ER on 05/12/19 for ear pain and hearing loss.  Pt was discharged on cefdinir for acute suppurative otitis media of the L ear.  He came to the ER again on 5/21/19 for worsening L ear pain with hearing loss, HA, confusion, unsteady with word finding difficulty. Pt had a head CT that showed \"a 4 cm mass lesion in the left frontotemporal region. Imaging features favor an abscess over a solid lesion. Extensive edema associated with the mass, resulting in 10 mm left-to-right midline shift. Effacement of the left and enlargement of the right lateral ventricles may be due to developing hydrocephalus\". Neurosurgery and ID were consulted for possible abscess vs malignancy.  Patient was started on dexamethasone, vancomycin, Flagyl, cefepime.     05/22/2019 Patient had left temporal craniotomy with cystic mass evacuation by Dr. Young. Findings at the time showed \"Gray amorphous gelatinous material, frozen pathology demonstrates no evidence of malignancy.  Most consistent with abscess\". Culture was negative for infection. Pathology on 05/22/19 showed \"Mild reactive gliosis\" and \"no significant neuropathology abnormality\"    During hospital stay pt continued to have worsening symptoms including aphasia and altered mental status. Further imaging was obtained on 05/27/19 pt had CT head \"No significant change. Large left temporal mass with surrounding white matter edema. Associated mass effect with effacement left lateral ventricle, left-to-right shift, " "and subfalcine herniation, unchanged\" Pt had further imaging including an MRI on 06/08/19, head CT w/o contrast on 06/14/19 showing similar results (full report in imaging below\"    Pt was taken to the OR on 06/16/19 and underwent left temporal craniotomy with resection of the left temporal intraparenchymal mass.     MRI on 06/17/19 showed \" Recent left frontal temporal craniotomy for resection of intra-axial brain neoplasm.  Irregular postsurgical cavity noted in the left anterior temporal lobe and extending superiorly near the lateral aspect of the basal ganglia. Further there is irregular peripheral enhancement about the resection site which may represent postoperative   change, however a small amount of residual neoplasm cannot be excluded with certainty especially involving the superior medial margin of the resection site. Postoperative hemorrhagic debris noted in the postsurgical cavity and about the resection site. There is a marked amount of postoperative edema surrounding the resection site in the left frontal and temporal lobes. Prominent persistent increased T2 signal intensity is noted involving the left thalamus and left parahippocampal gyrus which is suspicious for residual neoplasm if the pathology reveals a glioblastoma. Persistent marked mass effect on the left lateral ventricle and temporal horn causing 1 cm of left-to-right midline shift the ventricular system. Further there is mild dilatation of the right temporal horn and lateral ventricle.\"    Pathology on 06/16/19 showed \"Abundant necrotic material with a few hypercellular areas          consistent with a high grade glial neoplasm. Glioblastoma\"    We consulted the patient today and discussed the possible benefits of radiation therapy.      Patient is having difficulty with describing his symptoms. No recent headaches, nausea, vomiting, seizures, or new neurologic changes. He is ambulating well.      PAST MEDICAL HISTORY:   Pt denies PMH "     PAST SURGICAL HISTORY:  Past Surgical History:   Procedure Laterality Date   [facility]    Procedure: LEFT TEMPORAL CRANIOTOMY, EXCISION OF BRAIN MASS, DURAL AUGMENTATION, STEALTH NAVIGATION ;  Surgeon: Chase Young M.D.;  Location: SURGERY [facility];  Service: Neurosurgery   • [facility]Y [facility] 5/22/2019    Procedure: LEFT TEMPORAL CRANIOTOMY for DRAINAGE OF BRAIN CYST, RESECTION OF BRAIN MASS;  Surgeon: Chase Young M.D.;  Location: SURGERY [facility];  Service: Neurosurgery       CURRENT MEDICATIONS:  Current Facility-Administered Medications   Medication Dose Route Frequency Provider Last Rate Last Dose   • acetaminophen (TYLENOL) tablet 650 mg  650 mg Oral Q6HRS PRN Meaghan Montelongo M.D.       • insulin lispro (HUMALOG) injection 1-6 Units  1-6 Units Subcutaneous Q6HRS Meaghan Montelongo M.D.   1 Units at 06/21/19 1202    And   • glucose 4 g chewable tablet 16 g  16 g Oral Q15 MIN PRN Meaghan Montelongo M.D.        And   • DEXTROSE 10% BOLUS 250 mL  250 mL Intravenous Q15 MIN PRN Meaghan Montelongo M.D.       • oxyCODONE immediate-release (ROXICODONE) tablet 5 mg  5 mg Oral Q6HRS PRN Meaghan Montelongo M.D.   5 mg at 06/20/19 2236   • hydrALAZINE (APRESOLINE) injection 10 mg  10 mg Intravenous Q6HRS PRN eMaghan Montelongo M.D.       • dexamethasone (DECADRON) tablet 6 mg  6 mg Oral Q8HRS Meaghan Montelongo M.D.   6 mg at 06/20/19 2200   • calcium carbonate (TUMS) chewable tab 500 mg  500 mg Oral Q4HRS PRN Meaghan Montelongo M.D.       • docusate sodium (COLACE) capsule 100 mg  100 mg Oral BID Meaghan Montelongo M.D.       • polyethylene glycol/lytes (MIRALAX) PACKET 1 Packet  1 Packet Oral BID Meaghan Montelongo M.D.       • senna-docusate (PERICOLACE or SENOKOT S) 8.6-50 MG per tablet 1 Tab  1 Tab Oral Nightly Meaghan Montelongo M.D.   1 Tab at 06/20/19 2020   • ferrous sulfate tablet 325 mg  325 mg Oral QDAY with Breakfast Meaghan Montelongo M.D.   325 mg at  06/21/19 0808   • Respiratory Care per Protocol   Nebulization Continuous RT Meaghan Montelongo M.D.       • Pharmacy Consult Request ...Pain Management Review 1 Each  1 Each Other PHARMACY TO DOSE Yanelis Austin M.D.       • MD ALERT...DO NOT ADMINISTER NSAIDS or ASPIRIN unless ORDERED By Neurosurgery 1 Each  1 Each Other PRN Yanelis Austin M.D.           ALLERGIES:    Per chart review Pcn [penicillins] and Sulfa drugs    FAMILY HISTORY:    Pt denies family history of cancer    SOCIAL HISTORY:    Pt has smoking history of 1ppd for 10 yrs  Pt reports drinking alcohol socially 2 times a week  Pt has prior meth with last use in 2000. Pt denies other drug use  Pt lives with mom and nieces and nephews  Contact number for mom [phone]      REVIEW OF SYSTEMS:  An 10 point review of systems for today's date of service was reviewed in the electronic medical record.  Pertinence have been stated in the present illness.  Constitutional ROS: Pt endorses 27lb weight loss recently. Denies f/c, or malaise  Eye ROS: Pt states that he has transient varying blurriness in both eyes that is different from before. No loss of vision  Ear ROS: Pt endorses slight tinnitus in L ear post surgery. Denies hearing loss  Mouth/Throat ROS: No teeth or gum problems, No bleeding gums, No tongue complaints, No sore throat, No recent change in voice or hoarseness  Neck ROS: No lumps or masses, No swollen glands, No recent swelling in thyroid area, No significant pain in neck, No h/o goiter or thyroid disease  Pulmonary ROS: No chronic cough, sputum, or hemoptysis, No dyspnea on exertion, No wheezing, No shortness of breath, No recent change in breathing  Cardiovascular ROS: No chest pain, No shortness of breath, No dyspnea on exertion, No diaphoresis, No palpitations  Gastrointestinal ROS: No abdominal pain, No change in bowel habits, No significant change in appetite, No nausea, vomiting, diarrhea, or constipation, No hematemesis, No  abdominal bloating or early satiety   Musculoskeletal/Extremities ROS: No clubbing, No cyanosis, No peripheral edema, No pain, redness or swelling on the joints   Skin/Integumentary ROS: color, texture and temperature normal, mobility and turgor normal, No evidence of bleeding or bruising, No abnormal skin lesions noted, No evidence of rash, No itching   Neurologic ROS: Pt endorses some slight dizziness but denies HA, neuropathy, imbalance, or any motor and sensory deficit      PHYSICAL EXAM:    Vitals:    06/21/19 0800   BP: 134/91   Pulse: 69   Resp: 18   Temp: 36.2 °C (97.2 °F)   SpO2: 97%     GENERAL: Pt is age appropriate in no apparent distress  HEENT:  Pt has incision on L side of head that is not purulent or erythematous. Staples are intact and wound is dry. Pupils are equal, round, and reactive to light.  Extraocular muscles   are intact. Sclerae nonicteric.  Conjunctivae pink.  Oral cavity, tongue   protrudes midline.   NECK:  Supple without evidence of thyromegaly.  NODES:  No peripheral adenopathy of the neck, supraclavicular fossa bilaterally.  LUNGS:  Good effort  HEART:  Regular rate  ABDOMEN:  Non distended   EXTREMITIES:  Without Edema.  NEUROLOGIC:  Cranial nerves II through XII were grossly intact.  Strength is 5/5 in   lower extremities bilaterally.  DTRs were symmetrical.  There was no focal   sensory deficit appreciated.  Psych: Pt is tangential in thought process and seems to not fully grasp the full extent of his disease. Pt does not remember his hospital course or treatment but does understand that this is a tumor and that he wants treatment.  He is able to converse but will often repeat himself.  Pt is able to follow discussion an    LABORATORY DATA:     Lab Results   Component Value Date/Time    SODIUM 136 06/21/2019 04:08 AM    POTASSIUM 3.9 06/21/2019 04:08 AM    CHLORIDE 103 06/21/2019 04:08 AM    CO2 25 06/21/2019 04:08 AM    GLUCOSE 133 (H) 06/21/2019 04:08 AM    BUN 22 06/21/2019  "04:08 AM    CREATININE 0.53 06/21/2019 04:08 AM      Lab Results   Component Value Date/Time    WBC 14.0 (H) 06/21/2019 04:08 AM    RBC 4.90 06/21/2019 04:08 AM    HEMOGLOBIN 11.8 (L) 06/21/2019 04:08 AM    HEMATOCRIT 38.0 (L) 06/21/2019 04:08 AM    MCV 77.6 (L) 06/21/2019 04:08 AM    MCH 24.1 (L) 06/21/2019 04:08 AM    MCHC 31.1 (L) 06/21/2019 04:08 AM    MPV 10.9 06/21/2019 04:08 AM    NEUTSPOLYS 85.10 (H) 06/19/2019 04:15 AM    LYMPHOCYTES 6.30 (L) 06/19/2019 04:15 AM    MONOCYTES 7.40 06/19/2019 04:15 AM    EOSINOPHILS 0.00 06/19/2019 04:15 AM    BASOPHILS 0.10 06/19/2019 04:15 AM    HYPOCHROMIA 1+ 06/13/2019 05:05 AM    ANISOCYTOSIS 1+ 06/13/2019 05:05 AM          RADIOLOGY DATA:    MRI on 05/22/19 showed \"Large peripherally enhancing intra-axial mass in the left temporal lobe with marked surrounding vasogenic edema. Findings are most consistent with high-grade primary brain tumor such as glioblastoma. No other enhancing lesions elsewhere to suggest   metastasis, however, a solitary brain metastasis would not be excluded. Brain abscess would not be excluded.\"    Ct Head w/o contrast on 05/27/19 showed \"No significant change. Large left temporal mass with surrounding white matter edema. Associated mass effect with effacement left lateral ventricle, left-to-right shift, and subfalcine herniation, unchanged\"    MRI on 06/08/19 showed \"Postsurgical changes adjacent to the LEFT temporal mass involving the inferior LEFT frontal lobe which is grossly unchanged in size, with edema, fluid and enhancement likely related to surgical dissection rather than spread of disease\"    CT head w/o contrast showed 06/14/19 showed \"No significant change. Large left temporal mass with surrounding white matter edema, mass effect, left-to-right shift, and subfalcine herniation\".    MRI on 06/17/19 showed \" Recent left frontal temporal craniotomy for resection of intra-axial brain neoplasm.  Irregular postsurgical cavity noted in the " "left anterior temporal lobe and extending superiorly near the lateral aspect of the basal ganglia. Further there is irregular peripheral enhancement about the resection site which may represent postoperative   change, however a small amount of residual neoplasm cannot be excluded with certainty especially involving the superior medial margin of the resection site. Postoperative hemorrhagic debris noted in the postsurgical cavity and about the resection site. There is a marked amount of postoperative edema surrounding the resection site in the left frontal and temporal lobes. Prominent persistent increased T2 signal intensity is noted involving the left thalamus and left parahippocampal gyrus which is suspicious for residual neoplasm if the pathology reveals a glioblastoma. Persistent marked mass effect on the left lateral ventricle and temporal horn causing 1 cm of left-to-right midline shift the ventricular system. Further there is mild dilatation of the right temporal horn and lateral ventricle.\"    Pathology:  6/16/19  FINAL DIAGNOSIS:    A. Left temporal mass:         Glioblastoma.  B. Outside of mass tissue:         Abundant necrotic material with a few hypercellular areas          consistent with a high grade glial neoplasm.  C. Brain mass:         Necrotic material and a few scattered atypical cells.  D. Left temporal brain mass:         Glioblastoma.    Synoptic Report for Tumors of the Brain/Spinal Cord:         -History Prior Therapy for this Neoplasm: Unknown       -History of Previous Tumor and/or Familial Syndrome: Not known       -Neuroimaging Findings: See [facility]'s EMR       -Procedure: excision of cystic brain lesion       -Specimen size: largest 2.5 x 2.5 x 2 cm       -Tumor Site: temporal lobe       -Laterality: left       -Tumor Focality: unifocal       -Histologic Type: glioblastoma       -Histologic Grade (WHO histologic grade): WHO grade 4       -Treatment Effect: Not applicable       " -Additional Pathologic findings: Abundant necrotic material       -Biomedical Studies: IDH1 and MGMT studies pending and will be        reported in a separate addendum.    Comment: This case was reviewed by another pathologist, Dr. Zavala,  who agrees with the interpretation.    IMPRESSION:  A 54 y.o. male with left frontal glioblastoma s/p gross total resection on 05/22/19 with f/u resection on 06/16/19 .  He is here at the kind request of Dr. Young.      RECOMMENDATIONS:   -Pt has an appointment on 07/03/19 for CT mapping scan and further discussion of radiation treatment  -Pt should meet with Dr. De Luna at [facility] to discuss temodar use with concurrent radiotherapy    We discussed after surgery, radiotherapy is the mainstay of treatment for people with glioblastoma. A pivotal clinical trial carried out in the early 1970s showed that among 303 GBM patients randomized to radiation or nonradiation therapy, those who received radiation had a median survival more than double those who did not. Subsequent clinical research has attempted to build on the backbone of surgery followed by radiation.    On average, radiotherapy after surgery can reduce the tumor size  and slow progression.  Whole brain radiotherapy does not improve outcome when compared to the more precise and targeted three-dimensional conformal radiotherapy.  A total radiation dose of 60-65 Gy has been found to be optimal for treatment.  Most of studies show no benefit from the addition of chemotherapy. However, a large clinical trial of 575 participants randomized to standard radiation versus radiation plus temozolomide chemotherapy showed that the group receiving temozolomide survived a median of 14.6 months as opposed to 12.1 months for the group receiving radiation alone.  This treatment regime is now standard for most cases of glioblastoma where the person is not enrolled in a clinical trial. Temozolomide seems to work by sensitizing the tumor cells  to radiation.    We discussed the goals of care and prognosis at length.  The median survival time from the time of diagnosis without any treatment is 3 months, but with treatment survival of 1-2 years is common. Increasing age (> 60 years of age) carries a worse prognostic risk. Death is usually due to cerebral edema or increased intracranial pressure.    A good initial Karnofsky Performance Score (KPS) and MGMT methylation are associated with longer survival. A DNA test can be conducted on glioblastomas to determine whether or not the promoter of the MGMT gene is methylated. Patients with a methylated MGMT promoter have been associated with significantly greater long-term benefit than patients with an unmethylated MGMT promoter. This DNA characteristic is intrinsic to the patient and currently cannot be altered externally. Another positive prognostic marker for glioblastoma patients is mutation of the IDH1 gene, which can be tested by DNA-based methods or by immunohistochemistry using an antibody against the most common mutation, namely [id number].    More prognostic power can be obtained by combining the mutational status of IDH1 and the methylation status of MGMT into a two-gene predictor. Patients with both IDH1 mutations and MGMT methylation have the longest survival, patients with an IDH1 mutation or MGMT methylation an intermediate survival and patients without either genetic event have the shortest survival.    Long-term benefits have also been associated with those patients who receive surgery, radiotherapy, and temozolomide chemotherapy. However, much remains unknown about why some patients survive longer with glioblastoma. Age of under 50 is linked to longer survival in glioblastoma multiforme, as is 98%+ resection and use of temozolomide chemotherapy and better Karnofsky performance scores. A recent study confirms that younger age is associated with a much better prognosis, with a small fraction of  patients under 40 years of age achieving a population-based cure. The population-based cure is thought to occur when a population's risk of death returns to that of the normal population, and in GBM, this is thought to occur after 10 years.    We will plan to offer the patient adjuvant radiotherapy. The treatment consists of 6 weeks of radiation treatments to the brain using thermoplastic facemask immobilzation and a linear accelerator. The goal is to help slow down the overall pace of the disease and prevent the appearance of recurrent disease.  We discussed the risks of treatment including: hair loss, hearing changes due to fluid in the ear canal, scalp irritation, the risk of progression in the brain, cataracts, dry eye, fatigue. We discussed the neurocognitive imapact of radiotherapy. They would like to move forward with treatment and we will have our coordinators work on that process.    We also discussed tumor-treating fields called Optune which is started after chemoradiation. We gave him information to read about it and recent RONNY paper Andrea et al. Effect of tumor-treating fields plus maintenance temozolomide vs maintenance temozolomide alone on survival in patients with glioblastoma: a randomized clinical trial. RONNY. 2017. The recent update at [facility] 2017  showed median survival of 25 months when % compliant on Optune.

## 2019-06-21 NOTE — PROGRESS NOTES
Received report from Aurea, RNs. Assumed patient care at this time. Pt A&Ox3-4, disoriented to event at times. Pt denies chest pain, SOB, blurry or double vision, n/v, headache, n/t. Pt denies pain. Patient ambulates with SBA with steady gait.   POC discussed and all questions and concerns addressed at this time. Fall precautions, seizure precautions, aspiration precautions, hourly rounding, and Q4 hour neuro checks in place. Patient has bed locked and in low position, call light and belongings in reach, bed alarm on. Left crani site CDI, AMRIK.

## 2019-06-21 NOTE — PROGRESS NOTES
Neurosurgery Progress Note    Subjective:  No acute events. Sitting up in bed. No c/o headache at this time. Wanting to go home    Exam:  AAOx3,Fluent speech.   Pupils 3 mm PERRL, EOMI  Tongue midline, no facial droop  Bilateral  bicep tricep IP DF 5/5  Sensation intact touch x 4 extremties  Left scalp incision c/d/i with staples  Eating. Drinking. Denies n/v  Voiding  Ambulatory      BP  Min: 116/68  Max: 134/91  Pulse  Av  Min: 62  Max: 85  Resp  Av  Min: 16  Max: 18  Temp  Av.4 °C (97.6 °F)  Min: 36.2 °C (97.2 °F)  Max: 37 °C (98.6 °F)  SpO2  Av.8 %  Min: 94 %  Max: 97 %    No Data Recorded    Recent Labs      195  19   0408   WBC  10.9*  14.0*   RBC  4.91  4.90   HEMOGLOBIN  12.3*  11.8*   HEMATOCRIT  38.7*  38.0*   MCV  78.8*  77.6*   MCH  25.1*  24.1*   MCHC  31.8*  31.1*   RDW  56.1*  56.6*   PLATELETCT  212  254   MPV  10.7  10.9     Recent Labs      19   0415  19   0408   SODIUM  134*  136   POTASSIUM  3.9  3.9   CHLORIDE  103  103   CO2  25  25   GLUCOSE  129*  133*   BUN  21  22   CREATININE  0.53  0.53   CALCIUM  8.7  8.6               Intake/Output       19 - 19 - 19 0659       Total  Total       Intake    P.O.  500  -- 500  300  -- 300    P.O. 500 -- 500 300 -- 300    Total Intake 500 -- 500 300 -- 300       Output    Urine  --  -- --  --  -- --    Number of Times Voided 2 x 2 x 4 x 1 x -- 1 x    Stool  --  -- --  --  -- --    Number of Times Stooled 1 x 2 x 3 x 1 x -- 1 x    Total Output -- -- -- -- -- --       Net I/O     500 -- 500 300 -- 300            Intake/Output Summary (Last 24 hours) at 19 1002  Last data filed at 19 0840   Gross per 24 hour   Intake              300 ml   Output                0 ml   Net              300 ml            • acetaminophen  650 mg Q6HRS PRN   • insulin lispro  1-6 Units Q6HRS    And   • glucose  16 g Q15 MIN PRN    And    • dextrose 10% bolus  250 mL Q15 MIN PRN   • oxyCODONE immediate-release  5 mg Q6HRS PRN   • hydrALAZINE  10 mg Q6HRS PRN   • dexamethasone  6 mg Q8HRS   • calcium carbonate  500 mg Q4HRS PRN   • docusate sodium  100 mg BID   • polyethylene glycol/lytes  1 Packet BID   • senna-docusate  1 Tab Nightly   • ferrous sulfate  325 mg QDAY with Breakfast   • Respiratory Care per Protocol   Continuous RT   • Pharmacy Consult Request  1 Each PHARMACY TO DOSE   • MD ALERT...DO NOT ADMINISTER NSAIDS or ASPIRIN unless ORDERED By Neurosurgery  1 Each PRN       Assessment and Plan:  Hospital day #31 left temporal peripherally enhancing cystic lesion  POD #30 left temporal cranitomy, cyst evacuation/biopsy  POD #5 resection recurrent left temporal recurrent cyst/mass     Prophylactic anticoagulation: no         Start date/time: tbd.      Final Pathology: Glioblastoma    Plan:  Neuro stable.   Final pathology Glioblastoma.   Patient will need oncology consult to [facility]. And radiation oncology consult with Dr Torres. Our office will arrange this as outpatient.   Clear from NSG standpoint to transfer to SNF/Rehab/Home once medically cleared

## 2019-06-21 NOTE — DISCHARGE PLANNING
Home Health to accept this referral pending patient's completion of appointment to establish with [facility] scheduled for 6/24/19. We will have to call [facility] to verify doctor name and that they actually saw him.  Thank you!

## 2019-06-21 NOTE — NON-PROVIDER
"RADIATION ONCOLOGY CONSULT    DATE OF SERVICE: 6/21/2019    IDENTIFICATION: A 54 y.o. male with glioblastoma s/p evacuation on 05/22/19 with f/u resection on 06/16/19 .  He is here at the kind request of  ***.      HISTORY OF PRESENT ILLNESS: Patient was unable to give a clear history. History was obtained via chart review.  Robert Dorantes is 54 y.o. Male with a past medical history of methamphetamine use last use in 2000 per pt. Pt presented to the ER on 05/12/19 for ear pain and hearing loss.  Pt was discharged on cefdinir for acute suppurative otitis media of the L ear.  He came to the ER again on 5/21/19 for worsening L ear pain with hearing loss, HA, confusion, unsteady with word finding difficulty. Pt had a head CT that showed \"a 4 cm mass lesion in the left frontotemporal region. Imaging features favor an abscess over a solid lesion. Extensive edema associated with the mass, resulting in 10 mm left-to-right midline shift. Effacement of the left and enlargement of the right lateral ventricles may be due to developing hydrocephalus\". Neurosurgery and ID were consulted for possible abscess vs malignancy.  Patient was started on dexamethasone, vancomycin, Flagyl, cefepime.     05/22/2019 Patient had left temporal craniotomy with cystic mass evacuation. Findings at the time showed \"Gray amorphous gelatinous material, frozen pathology demonstrates no evidence of malignancy.  Most consistent with abscess\". Culture was negative for infection. Pathology on 05/22/19 showed \"Mild reactive gliosis\" and \"no significant neuropathology abnormality\"    During hospital stay pt continued to have worsening symptoms including aphasia and altered mental status. Further imaging was obtained on 05/27/19 pt had CT head \"No significant change. Large left temporal mass with surrounding white matter edema. Associated mass effect with effacement left lateral ventricle, left-to-right shift, and subfalcine herniation, unchanged\" Pt " "had further imaging including an MRI on 06/08/19, head CT w/o contrast on 06/14/19 showing similar results (full report in imaging below\"    Pt was taken to the OR on 06/16/19 and underwent left temporal craniotomy with resection of the left temporal intraparenchymal mass.     MRI on 06/17/19 showed \" Recent left frontal temporal craniotomy for resection of intra-axial brain neoplasm.  Irregular postsurgical cavity noted in the left anterior temporal lobe and extending superiorly near the lateral aspect of the basal ganglia. Further there is irregular peripheral enhancement about the resection site which may represent postoperative   change, however a small amount of residual neoplasm cannot be excluded with certainty especially involving the superior medial margin of the resection site. Postoperative hemorrhagic debris noted in the postsurgical cavity and about the resection site. There is a marked amount of postoperative edema surrounding the resection site in the left frontal and temporal lobes. Prominent persistent increased T2 signal intensity is noted involving the left thalamus and left parahippocampal gyrus which is suspicious for residual neoplasm if the pathology reveals a glioblastoma. Persistent marked mass effect on the left lateral ventricle and temporal horn causing 1 cm of left-to-right midline shift the ventricular system. Further there is mild dilatation of the right temporal horn and lateral ventricle.\"    Pathology on 06/16/19 showed \"Abundant necrotic material with a few hypercellular areas          consistent with a high grade glial neoplasm. Glioblastoma\"    We consulted the patient today and discussed the possible benefits of radiation therapy.      PAST MEDICAL HISTORY:   Pt denies PMH     PAST SURGICAL HISTORY:  Past Surgical History:   Procedure Laterality Date   • CRANIOTOMY STEALTH Left 6/16/2019    Procedure: LEFT TEMPORAL CRANIOTOMY, EXCISION OF BRAIN MASS, DURAL AUGMENTATION, STEALTH " NAVIGATION ;  Surgeon: Chase Young M.D.;  Location: SURGERY [facility];  Service: Neurosurgery   • CRANIOTOMY STEALTH Left 5/22/2019    Procedure: LEFT TEMPORAL CRANIOTOMY for DRAINAGE OF BRAIN CYST, RESECTION OF BRAIN MASS;  Surgeon: Chase Young M.D.;  Location: SURGERY [facility];  Service: Neurosurgery       CURRENT MEDICATIONS:  Current Facility-Administered Medications   Medication Dose Route Frequency Provider Last Rate Last Dose   • acetaminophen (TYLENOL) tablet 650 mg  650 mg Oral Q6HRS PRN Meaghan Montelongo M.D.       • insulin lispro (HUMALOG) injection 1-6 Units  1-6 Units Subcutaneous Q6HRS Meaghan Montelongo M.D.   1 Units at 06/21/19 1202    And   • glucose 4 g chewable tablet 16 g  16 g Oral Q15 MIN PRN Meaghan Montelongo M.D.        And   • DEXTROSE 10% BOLUS 250 mL  250 mL Intravenous Q15 MIN PRN Meaghan Montelongo M.D.       • oxyCODONE immediate-release (ROXICODONE) tablet 5 mg  5 mg Oral Q6HRS PRN Meaghan Montelongo M.D.   5 mg at 06/20/19 2236   • hydrALAZINE (APRESOLINE) injection 10 mg  10 mg Intravenous Q6HRS PRN Meaghan Montelongo M.D.       • dexamethasone (DECADRON) tablet 6 mg  6 mg Oral Q8HRS Meaghan Montelongo M.D.   6 mg at 06/20/19 2200   • calcium carbonate (TUMS) chewable tab 500 mg  500 mg Oral Q4HRS PRN Meaghan Montelongo M.D.       • docusate sodium (COLACE) capsule 100 mg  100 mg Oral BID Meaghan Montelongo M.D.       • polyethylene glycol/lytes (MIRALAX) PACKET 1 Packet  1 Packet Oral BID Meaghan Montelongo M.D.       • senna-docusate (PERICOLACE or SENOKOT S) 8.6-50 MG per tablet 1 Tab  1 Tab Oral Nightly Meaghan Montelongo M.D.   1 Tab at 06/20/19 2020   • ferrous sulfate tablet 325 mg  325 mg Oral QDAY with Breakfast Meaghan Montelongo M.D.   325 mg at 06/21/19 0808   • Respiratory Care per Protocol   Nebulization Continuous RT Meaghan Montelongo M.D.       • Pharmacy Consult Request ...Pain Management Review 1 Each  1 Each Other PHARMACY TO DOSE Yanelis Austin,  M.D.       • MD ALERT...DO NOT ADMINISTER NSAIDS or ASPIRIN unless ORDERED By Neurosurgery 1 Each  1 Each Other PRN Yanelis Austin M.D.           ALLERGIES:    Per chart review Pcn [penicillins] and Sulfa drugs    FAMILY HISTORY:    Pt denies family history of disease    SOCIAL HISTORY:    Pt has smoking history of 1ppd for 10 yrs  Pt reports drinking alcohol socially 2 times a week  Pt has prior meth with last use in 2000. Pt denies other drug use  Pt lives with mom and nieces and nephews    REVIEW OF SYSTEMS:  An 18-point review of systems for today's date of service was reviewed in the electronic medical record.  Pertinence have been stated in the present illness.  Constitutional ROS: Pt endorses 27lb weight loss recently. Denies f/c, or malaise  Eye ROS: Pt states that he has transient varying blurriness in both eyes that is different from before. No loss of vision  Ear ROS: Pt endorses slight tinnitus in L ear post surgery. Denies hearing loss  Mouth/Throat ROS: No teeth or gum problems, No bleeding gums, No tongue complaints, No sore throat, No recent change in voice or hoarseness  Neck ROS: No lumps or masses, No swollen glands, No recent swelling in thyroid area, No significant pain in neck, No h/o goiter or thyroid disease  Pulmonary ROS: No chronic cough, sputum, or hemoptysis, No dyspnea on exertion, No wheezing, No shortness of breath, No recent change in breathing  Cardiovascular ROS: No chest pain, No shortness of breath, No dyspnea on exertion, No diaphoresis, No palpitations  Gastrointestinal ROS: No abdominal pain, No change in bowel habits, No significant change in appetite, No nausea, vomiting, diarrhea, or constipation, No hematemesis, No abdominal bloating or early satiety   Musculoskeletal/Extremities ROS: No clubbing, No cyanosis, No peripheral edema, No pain, redness or swelling on the joints   Skin/Integumentary ROS: color, texture and temperature normal, mobility and turgor normal, No  evidence of bleeding or bruising, No abnormal skin lesions noted, No evidence of rash, No itching   Neurologic ROS: Pt endorses some slight dizziness but denies HA, neuropathy, imbalance, or any motor and sensory deficit      PHYSICAL EXAM:    Vitals:    06/21/19 0800   BP: 134/91   Pulse: 69   Resp: 18   Temp: 36.2 °C (97.2 °F)   SpO2: 97%     GENERAL: Pt is age appropriate in no apparent distress  HEENT:  Pt has incision on L side of head that is not purulent or erythematous. Staples are intact and wound is dry. Pupils are equal, round, and reactive to light.  Extraocular muscles   are intact. Sclerae nonicteric.  Conjunctivae pink.  Oral cavity, tongue   protrudes midline.   NECK:  Supple without evidence of thyromegaly.  NODES:  No peripheral adenopathy of the neck, supraclavicular fossa or axillae   bilaterally.  LUNGS:  Clear to ascultation and resonant to percussion.  HEART:  Regular rate and rhythm.  No murmur appreciated  ABDOMEN:  Soft. No evidence of hepatosplenomegaly.  Positive bowel sounds.  EXTREMITIES:  Without Edema.  NEUROLOGIC:  Cranial nerves II through XII were intact.  Strength is 5/5 in   lower extremities bilaterally.  DTRs were symmetrical.  There was no focal   sensory deficit appreciated.  Psych: Pt is tangential in thought process and seems to not fully grasp the full extent of his disease. Pt does not remember his hospital course or treatment but does understand that this is a tumor and that he wants treatment.  He is able to converse but will often repeat himself.  Pt is able to follow discussion an    LABORATORY DATA:     Lab Results   Component Value Date/Time    SODIUM 136 06/21/2019 04:08 AM    POTASSIUM 3.9 06/21/2019 04:08 AM    CHLORIDE 103 06/21/2019 04:08 AM    CO2 25 06/21/2019 04:08 AM    GLUCOSE 133 (H) 06/21/2019 04:08 AM    BUN 22 06/21/2019 04:08 AM    CREATININE 0.53 06/21/2019 04:08 AM      Lab Results   Component Value Date/Time    WBC 14.0 (H) 06/21/2019 04:08 AM     "RBC 4.90 06/21/2019 04:08 AM    HEMOGLOBIN 11.8 (L) 06/21/2019 04:08 AM    HEMATOCRIT 38.0 (L) 06/21/2019 04:08 AM    MCV 77.6 (L) 06/21/2019 04:08 AM    MCH 24.1 (L) 06/21/2019 04:08 AM    MCHC 31.1 (L) 06/21/2019 04:08 AM    MPV 10.9 06/21/2019 04:08 AM    NEUTSPOLYS 85.10 (H) 06/19/2019 04:15 AM    LYMPHOCYTES 6.30 (L) 06/19/2019 04:15 AM    MONOCYTES 7.40 06/19/2019 04:15 AM    EOSINOPHILS 0.00 06/19/2019 04:15 AM    BASOPHILS 0.10 06/19/2019 04:15 AM    HYPOCHROMIA 1+ 06/13/2019 05:05 AM    ANISOCYTOSIS 1+ 06/13/2019 05:05 AM          RADIOLOGY DATA:    MRI on 05/22/19 showed \"Large peripherally enhancing intra-axial mass in the left temporal lobe with marked surrounding vasogenic edema. Findings are most consistent with high-grade primary brain tumor such as glioblastoma. No other enhancing lesions elsewhere to suggest   metastasis, however, a solitary brain metastasis would not be excluded. Brain abscess would not be excluded.\"    Ct Head w/o contrast on 05/27/19 showed \"No significant change. Large left temporal mass with surrounding white matter edema. Associated mass effect with effacement left lateral ventricle, left-to-right shift, and subfalcine herniation, unchanged\"    MRI on 06/08/19 showed \"Postsurgical changes adjacent to the LEFT temporal mass involving the inferior LEFT frontal lobe which is grossly unchanged in size, with edema, fluid and enhancement likely related to surgical dissection rather than spread of disease\"    CT head w/o contrast showed 06/14/19 showed \"No significant change. Large left temporal mass with surrounding white matter edema, mass effect, left-to-right shift, and subfalcine herniation\".    MRI on 06/17/19 showed \" Recent left frontal temporal craniotomy for resection of intra-axial brain neoplasm.  Irregular postsurgical cavity noted in the left anterior temporal lobe and extending superiorly near the lateral aspect of the basal ganglia. Further there is irregular " "peripheral enhancement about the resection site which may represent postoperative   change, however a small amount of residual neoplasm cannot be excluded with certainty especially involving the superior medial margin of the resection site. Postoperative hemorrhagic debris noted in the postsurgical cavity and about the resection site. There is a marked amount of postoperative edema surrounding the resection site in the left frontal and temporal lobes. Prominent persistent increased T2 signal intensity is noted involving the left thalamus and left parahippocampal gyrus which is suspicious for residual neoplasm if the pathology reveals a glioblastoma. Persistent marked mass effect on the left lateral ventricle and temporal horn causing 1 cm of left-to-right midline shift the ventricular system. Further there is mild dilatation of the right temporal horn and lateral ventricle.\"    IMPRESSION:  A 54 y.o. with GBM s/p evacuation on 05/22/19 with f/u resection on 06/16/19     RECOMMENDATIONS:   -Pt has an appointment on 07/03/19 for set up and further discussion of radiation treatment  -Pt should meet with medical oncologist to discuss temodar use with concurrent radiotherapy    Thank you for the opportunity to participate in his care.  If any questions or comments, please do not hesitate in calling.    Robert Mei, Student        After surgery, radiotherapy is the mainstay of treatment for people with glioblastoma. A pivotal clinical trial carried out in the early 1970s showed that among 303 GBM patients randomized to radiation or nonradiation therapy, those who received radiation had a median survival more than double those who did not. Subsequent clinical research has attempted to build on the backbone of surgery followed by radiation. Onaverage, radiotherapy after surgery can reduce the tumor size  and slow progression.  Whole brain radiotherapy does not improve outcome when compared to the more precise and targeted " three-dimensional conformal radiotherapy.  A total radiation dose of 60-65 Gy has been found to be optimal for treatment.  Most of studies show no benefit from the addition of chemotherapy. However, a large clinical trial of 575 participants randomized to standard radiation versus radiation plus temozolomide chemotherapy showed that the group receiving temozolomide survived a median of 14.6 months as opposed to 12.1 months for the group receiving radiation alone.  This treatment regime is now standard for most cases of glioblastoma where the person is not enrolled in a clinical trial. Temozolomide seems to work by sensitizing the tumor cells to radiation.    We discussed the goals of care and prognosis at length.  The median survival time from the time of diagnosis without any treatment is 3 months, but with treatment survival of 1-2 years is common. Increasing age (> 60 years of age) carries a worse prognostic risk. Death is usually due to cerebral edema or increased intracranial pressure.    A good initial Karnofsky Performance Score (KPS) and MGMT methylation are associated with longer survival. A DNA test can be conducted on glioblastomas to determine whether or not the promoter of the MGMT gene is methylated. Patients with a methylated MGMT promoter have been associated with significantly greater long-term benefit than patients with an unmethylated MGMT promoter. This DNA characteristic is intrinsic to the patient and currently cannot be altered externally. Another positive prognostic marker for glioblastoma patients is mutation of the IDH1 gene, which can be tested by DNA-based methods or by immunohistochemistry using an antibody against the most common mutation, namely [id number].  More prognostic power can be obtained by combining the mutational status of IDH1 and the methylation status of MGMT into a two-gene predictor. Patients with both IDH1 mutations and MGMT methylation have the longest survival,  patients with an IDH1 mutation or MGMT methylation an intermediate survival and patients without either genetic event have the shortest survival.    Long-term benefits have also been associated with those patients who receive surgery, radiotherapy, and temozolomide chemotherapy. However, much remains unknown about why some patients survive longer with glioblastoma. Age of under 50 is linked to longer survival in glioblastoma multiforme, as is 98%+ resection and use of temozolomide chemotherapy and better Karnofsky performance scores. A recent study confirms that younger age is associated with a much better prognosis, with a small fraction of patients under 40 years of age achieving a population-based cure. The population-based cure is thought to occur when a population's risk of death returns to that of the normal population, and in GBM, this is thought to occur after 10 years.    We will plan to offer the patient adjuvant radiotherapy. The treatment consists of 6 weeks of radiation treatments to the brain using thermoplastic facemask immobilzation and a linear accelerator. The goal is to help slow down the overall pace of the disease and prevent the appearance of recurrent disease.  We discussed the risks of treatment including: hair loss, hearing changes due to fluid in the ear canal, scalp irritation, the risk of progression in the brain, cataracts, dry eye, fatigue. We discussed the neurocognitive imapact of radiotherapy. They would like to move forward with treatment and we will have our coordinators work on that process.    We also discussed tumor-treating fields called Optune which is started after chemoradiation. We gave him information to read about it and recent RONNY paper Andrea et al. Effect of tumor-treating fields plus maintenance temozolomide vs maintenance temozolomide alone on survival in patients with glioblastoma: a randomized clinical trial. RONNY. 2017. The recent update at [facility] 2017  showed median  survival of 25 months when % compliant on Optune.

## 2019-06-21 NOTE — PROGRESS NOTES
"Call placed to [facility], new patient  to assist with follow up appointment with Dr De Luna after discharge.  Voice mail box full.  Nurse navigation will follow up with next week to verify pt scheduled with [facility].    Call placed to patient's mother.  Introduced self and explained role of cancer nurse navigator.  She reports was told today regarding brain tumor diagnosis and \"5 months to live\".  Provided education and encouraged her to have discussion with oncology as well regarding plan of care, treatment and prognosis.  Mother, tearful, provided active listening and support.  Gave nurse navigation number for her to call with questions or concerns.  "

## 2019-06-21 NOTE — DISCHARGE PLANNING
Agency/Facility Name: [facility]  Outcome: Patient accepted pending completion of PCP appointment. Brisa(JONE) notified.

## 2019-06-21 NOTE — DISCHARGE PLANNING
Anticipated Discharge Disposition: Home with Home Healthcare. Pt's agency of choice [facility]    Action: LSW discussed barriers to discharge regarding pt not having a current PCP to sign for Home Health orders upon discharge. Discussed options/resources. Pt interesting in determine if he can establish with [facility] which is a primary care practice that will meet the pt in their home. Practice has openings and would need to assess pt's insurance and medical information to determine if a provider will be able to meet pt's medical needs. Pt agreeable for referral to be faxed over for review.     Barriers to Discharge: Pending Medical Clearance, HH Acceptance, obtaining a PCP    Plan: Referral packet faxed to [facility] at [phone]. LSW will follow up with agency regarding determination.

## 2019-06-21 NOTE — DISCHARGE PLANNING
Agency/Facility Name: [facility]  Spoke To: Admissions  Outcome: Patient declined- at capacity for Medicaid patients.    Agency/Facility Name: [facility]  Plan or Request: Referral sent to [facility] per Brisa's(JONE) request.

## 2019-06-21 NOTE — DISCHARGE PLANNING
Thank you for the referral. Per the current Face to Face documentation the patient does not have a PCP at this time. Please provide us with a provider name or a date, time and new doctor name for an establishing appointment. We will continue to review the patient for acceptance at that time. Thank you for your understanding.

## 2019-06-21 NOTE — PROGRESS NOTES
"Met with patient and friend, Coreen, at bedside.  Introduced self and explained role of cancer nurse navigator.  Pt reporting for 4 weeks he really was not aware of what was going on and feels only understanding last 3 days.  Pt kept reporting \"everything will be ok\".  Friend talking over patient and voicing her concerns.  Pt kept saying \"everything is fine\".  Navigator attempting to provide education regarding follow up and getting established with care at home.  Pt did talk about concern over insurance, since he reports quit his job prior to current issues.  He wasn't sure what his mom had been following up on.  Pt states he understands what has been told to him but can not repeat back information.  He does not remember any discussions regarding his diagnosis.  No notes in chart yet regarding cancer consults.  Pt would benefit from these discussions so can establish with oncology for follow up care.  Pt scored self 3 out of 10 on oncology distress scale.  Friend interjecting that it should be around 7 or 8.  Friend very anxious regarding patient and current situation.  Reinforced information that patient needs to be able to verbalize what he wants and understanding.  Pt did give verbal permission for navigator to talk to this friend, Coreen Paige and his mother.  He reports he has a lot of people to support him.  Pt making comment about wanting to go out and drive his motorcycle.  Provided clear instruction that patient was not to drive for safety reasons until cleared by physician.  Friend reporting he doesn't have a motorcycle.  Reiterated to patient importance for safety to not drive until cleared to by physician.  Continue to provide cancer nurse navigation upon discharge.  "

## 2019-06-21 NOTE — DISCHARGE PLANNING
Agency/Facility Name: [facility]  Spoke To: Tamela  Outcome: [facility] does not service [facility].    Agency/Facility Name: [facility]  Plan or Request: Referral sent to [facility] per Brisa's(JONE) request.

## 2019-06-21 NOTE — DISCHARGE PLANNING
Spoke To: JONE Ledezma  Outcome: Unable to send HH referral until patient has a PCP appointment or doctor to follow orders.

## 2019-06-21 NOTE — CARE PLAN
Problem: Fluid Volume:  Goal: Will maintain balanced intake and output  Outcome: PROGRESSING AS EXPECTED  Fluids encouraged     Problem: Mobility  Goal: Risk for activity intolerance will decrease  Outcome: PROGRESSING AS EXPECTED  Pt mobilizes frequently to the bathroom with SBA

## 2019-06-22 LAB
BACTERIA SPEC ANAEROBE CULT: NORMAL
BACTERIA SPEC ANAEROBE CULT: NORMAL
SIGNIFICANT IND 70042: NORMAL
SIGNIFICANT IND 70042: NORMAL
SITE SITE: NORMAL
SITE SITE: NORMAL
SOURCE SOURCE: NORMAL
SOURCE SOURCE: NORMAL

## 2019-06-22 NOTE — PROGRESS NOTES
Reviewed discharge instructions, medication regimen, follow-up appointments, and worsening signs and symptoms with patient and his mom, [place]. Patient still a little agitated with staff, but mom verbalizes understanding. Patient removed his PIV earlier in shift. Patient dressed and ready for discharge. Patient c/o having lost his pants. This RN visualized patient with pants shortly before, but pants were not located in room. Patient's mom states that they are okay to discharge without them. Patient's mom signed a copy of patient's AVS and core measures sheet completed and in chart. Patient discharged via walking with mom to vehicle at this time, declines an escort.

## 2019-06-22 NOTE — DISCHARGE INSTRUCTIONS
Discharge Instructions    Discharged to home with OP f/u and HH by car with relative. Discharged via walking, hospital escort: Refused.  Special equipment needed: Not Applicable    Be sure to schedule a follow-up appointment with your primary care doctor or any specialists as instructed.     Discharge Plan: Home with Outpatient follow-up and HH  Pneumococcal Vaccine Administered/Refused: Given (See MAR)  Influenza Vaccine Indication: Indicated: 9 to 64 years of age  Influenza Vaccine Given - only chart on this line when given: Influenza Vaccine Given (See MAR)    I understand that a diet low in cholesterol, fat, and sodium is recommended for good health. Unless I have been given specific instructions below for another diet, I accept this instruction as my diet prescription.   Other diet: Dysphagia 3 (mechanical soft) with nectar thick liquids    Special Instructions: None    · Is patient discharged on Warfarin / Coumadin?   No     Depression / Suicide Risk    As you are discharged from this [facility] facility, it is important to learn how to keep safe from harming yourself.    Recognize the warning signs:  · Abrupt changes in personality, positive or negative- including increase in energy   · Giving away possessions  · Change in eating patterns- significant weight changes-  positive or negative  · Change in sleeping patterns- unable to sleep or sleeping all the time   · Unwillingness or inability to communicate  · Depression  · Unusual sadness, discouragement and loneliness  · Talk of wanting to die  · Neglect of personal appearance   · Rebelliousness- reckless behavior  · Withdrawal from people/activities they love  · Confusion- inability to concentrate     If you or a loved one observes any of these behaviors or has concerns about self-harm, here's what you can do:  · Talk about it- your feelings and reasons for harming yourself  · Remove any means that you might use to hurt yourself (examples: pills, rope,  extension cords, firearm)  · Get professional help from the community (Mental Health, Substance Abuse, psychological counseling)  · Do not be alone:Call your Safe Contact- someone whom you trust who will be there for you.  · Call your local CRISIS HOTLINE 754-8192 or 614-697-2981  · Call your local Children's Mobile Crisis Response Team [place] (294) 586-0910 or www.[employer]  · Call the toll free National Suicide Prevention Hotlines   · National Suicide Prevention Lifeline 259-877-XCHS (8630)  · Family[facility] Line Network 800-SUICIDE (864-3146)        Glioblastoma  Glioblastoma, also called Grade IV astrocytoma, is a type of brain cancer. Glioblastoma tumors are made up of an overgrowth of normal brain cells. A tumor is formed when these cells grow into a mass of tissue. Most people develop glioblastoma between the ages of 45 years and 70 years.   RISK FACTORS  Tumor cells of people who have had a low-grade astrocytoma may progress to this higher-grade type of cell. People who have had radiation exposure or a family cancer syndrome, such as Li-Fraumeni syndrome or Turcot syndrome, are at greater risk of glioblastoma.   SYMPTOMS   Symptoms of glioblastoma may depend on the size and location of the tumor. Possible symptoms include:   · Headache, which may be worse in the morning.    · Nausea and vomiting.  · Vision changes.    · Seizures.    · Being unable to walk.  · Weakness or numbness on one side of the body or in an arm or leg.    · Mood changes.    · Problems with memory or thinking.  DIAGNOSIS   Brain tumors can usually be seen on brain imaging, such as CT scan or MRI. A sample of the tumor will need to be studied in a laboratory (biopsy) to confirm the diagnosis of glioblastoma.  TREATMENT   There are several ways that glioblastoma is treated. Often, a person will undergo more than one type of treatment:  · Surgery to remove as much of the tumor as possible.  · High-energy rays (radiation  therapy) to help shrink or kill the tumor.  · Chemotherapy to shrink or kill the tumor. Because normal cells may also be killed, chemotherapy has many side effects.  · Targeted therapy uses substances that injure or kill cancer cells without affecting normal cells.  · Steroid medicine to decrease brain swelling and improve symptoms.  HOME CARE INSTRUCTIONS  · Take all medicines as directed by your health care provider.    · Go to all of your follow-up appointments.  SEEK MEDICAL CARE IF:  · Any symptoms come back.  · You have diarrhea, vomiting, or abdominal pain.  · You cannot eat or drink what you need.  · You are more weak or tired than usual.    · You are losing weight without trying.  SEEK IMMEDIATE MEDICAL CARE IF:  · Your diarrhea, vomiting, or abdominal pain does not go away.  · You have new symptoms, such as vision problems or difficulty walking.    · You have a seizure.    · You have bleeding that does not stop.    · You have trouble breathing.    · You have a fever.    This information is not intended to replace advice given to you by your health care provider. Make sure you discuss any questions you have with your health care provider.  Document Released: 12/23/2014 Document Revised: 01/08/2016 Document Reviewed: 12/23/2014  [employer] Interactive Patient Education © 2017 [employer]    Advance Directive  Advance directives are the legal documents that allow you to make choices about your health care and medical treatment if you cannot speak for yourself. Advance directives are a way for you to communicate your wishes to family, friends, and health care providers. The specified people can then convey your decisions about end-of-life care to avoid confusion if you should become unable to communicate.  Ideally, the process of discussing and writing advance directives should happen over time rather than making decisions all at once. Advance directives can be modified as your situation changes, and you can  change your mind at any time, even after you have signed the advance directives. Each state has its own laws regarding advance directives.  You may want to check with your health care provider, , or state representative about the law in your state. Below are some examples of advance directives.  HEALTH CARE PROXY AND DURABLE POWER OF  FOR HEALTH CARE  A health care proxy is a person (agent) appointed to make medical decisions for you if you cannot. Generally, people choose someone they know well and trust to represent their preferences when they can no longer do so. You should be sure to ask this person for agreement to act as your agent. An agent may have to exercise judgment in the event of a medical decision for which your wishes are not known.  A durable power of  for health care is a legal document that names your health care proxy. Depending on the laws in your state, after the document is written, it may also need to be:  · Signed.  · Notarized.  · Dated.  · Copied.  · Witnessed.  · Incorporated into your medical record.  You may also want to appoint someone to manage your financial affairs if you cannot. This is called a durable power of  for finances. It is a separate legal document from the durable power of  for health care. You may choose the same person or someone different from your health care proxy to act as your agent in financial matters.  LIVING WILL  A living will is a set of instructions documenting your wishes about medical care when you cannot care for yourself. It is used if you become:  · Terminally ill.  · Incapacitated.  · Unable to communicate.  · Unable to make decisions.  Items to consider in your living will include:  · The use or non-use of life-sustaining equipment, such as dialysis machines and breathing machines (ventilators).  · A do not resuscitate (DNR) order, which is the instruction not to use cardiopulmonary resuscitation (CPR) if  breathing or heartbeat stops.  · Tube feeding.  · Withholding of food and fluids.  · Comfort (palliative) care when the goal becomes comfort rather than a cure.  · Organ and tissue donation.  A living will does not give instructions about distribution of your money and property if you should pass away. It is advisable to seek the expert advice of a  in drawing up a will regarding your possessions. Decisions about taxes, beneficiaries, and asset distribution will be legally binding. This process can relieve your family and friends of any burdens surrounding disputes or questions that may come up about the allocation of your assets.  DO NOT RESUSCITATE (DNR)  A do not resuscitate (DNR) order is a request to not have CPR in the event that your heart stops beating or you stop breathing. Unless given other instructions, a health care provider will try to help any patient whose heart has stopped or who has stopped breathing.   This information is not intended to replace advice given to you by your health care provider. Make sure you discuss any questions you have with your health care provider.  Document Released: 03/26/2009 Document Revised: 04/10/2017 Document Reviewed: 05/07/2014  [employer] Interactive Patient Education © 2017 [employer]    Palliative Care  Palliative care is the care of your body, mind, and spirit. Palliative care services are offered to people dealing with serious and life-threatening illnesses, often in the hospital or a long-term care setting. Palliative care requires a team of people who will help to ensure:  · Pain and symptoms are controlled.  · Family support.  · Spiritual support.  · Emotional and social support.  · Comfort.  Palliative care is a way to bring comfort and peace of mind to a person and his or her family. It can also have a positive impact on the course of illness.  Who can receive palliative care services?  Palliative care is offered to children and adults when they are  seriously ill and may not be responding well to treatment options. The person may be undergoing active treatment, such as chemotherapy, or may have stopped active treatment. Some people may have just been diagnosed with advanced disease or life-limiting illness. A health care provider will usually recommend palliative care services when more support would be helpful.  What types of services are included in palliative care?  Palliative care includes a team of health care providers and supporters who come together to help a person facing serious and life-threatening illness. The person's existing doctors are included in the care team, and supporters are added. Family members and friends may also receive palliative care services to cope with stress and other concerns. Services are different for each person and are based on the person's needs and preferences.  The following people make up a palliative care team:  · The person receiving care and his or her family.  · Physicians, including primary health care providers and specialists.  · Nurses.  · A psychosocial worker.  Other people on the palliative care team may include:  · A pain specialist and sometimes a hospice specialist.  · A financial or .  · Community resources, such as school and spiritual organizations.  · [employer] leaders.  · A care coordinator or .  · A bereavement coordinator.  The team will talk with the person and his or her family about:  · The role of the pain specialist and the hospice specialist if this applies.  · The person's active physical symptoms, such as pain, nausea, vomiting, and shortness of breath.  · Stress, depression, and anxiety symptoms.  · Function and mobility issues and how to stay as active as possible.  · Treatment options and how they may affect life.  · Spiritual wishes, such as rituals and prayer.  · Legacy and memory making activities.  · Life and death as a normal process.  · Advance  directives or living vasquez, health care proxies, and end-of-life care.  · Any other concerns or issues.  The palliative care team will make it okay to talk about difficult issues and topics. Preferences and sensitive spiritual and emotional concerns are of high importance.  Palliative care and hospice are somewhat different  Palliative care and hospice care have similar goals of managing symptoms, promoting comfort, and maintaining dignity. However, hospice care is an option for people during the last 6 months of life expectancy. The palliative care team will coordinate the many support services provided during any phase of serious illness.  This information is not intended to replace advice given to you by your health care provider. Make sure you discuss any questions you have with your health care provider.  Document Released: 12/23/2014 Document Revised: 05/25/2017 Document Reviewed: 11/04/2014  [employer] Interactive Patient Education © 2017 [employer]    Craniotomy  A craniotomy is a surgical procedure in which part of the skull is temporarily removed (bone flap) to access the brain for surgery. The amount of skull that needs to be removed depends on the type of surgery being performed. The bone flap is put back in place after the surgery. It is usually fastened back in place with metal plates and screws. The plates and screws can often be removed later after healing.  Brain surgery that involves craniotomy may be done for various reasons, including:  · Cancer.  · Traumatic brain injury (TBI).  · Active bleeding inside the skull.  · Removal of tumors or growths.  · Swelling of the brain.  · Removal of a collection of blood (hematoma).  · Infections or abscesses.  · Placing deep brain stimulators on the brain for the treatment of Parkinson's disease, epilepsy, or cerebellar tremors.  LET YOUR HEALTH CARE PROVIDER KNOW ABOUT:  · Any allergies you have.  · All medicines you are taking, including vitamins, herbs,  eye drops, creams, and over-the-counter medicines.  · Previous problems you or members of your family have had with the use of anesthetics.  · Any blood disorders you have.  · Previous surgeries you have had.  · Medical conditions you have.  RISKS AND COMPLICATIONS   Generally, this is a safe procedure. However, as with any procedure, problems can occur. Possible problems include:  · Infection.  · An allergic reaction to the anesthetic or other medicine given.  · Injury from the pins.  · Brain damage.  · Bleeding.  · Injury to facial muscles or to sinuses.  · Seizures from brain irritability.  · Brain swelling.  BEFORE THE PROCEDURE  · Ask your health care provider about changing or stopping your regular medicines.  · Do not eat or drink anything after midnight the night before the procedure or as directed by your health care provider.  · You may need to wash your hair with a special type of germ-killing shampoo the night before the surgery.  · Make plans to have someone drive you home after your hospital stay. Also, arrange to have someone help you with activities during recovery.  PROCEDURE   A craniotomy may take 2½ hours or longer depending on the problems being addressed in the surgery.  · You will likely be given a medicine to make you sleep (general anesthetic). This will be given through an IV access tube in one of your veins.  · The site that is chosen for the craniotomy will be prepped (prepared). Hair may be removed from your scalp in this area.  · Your head will be held in place with a device. Pins will go into your skull so your head cannot move.  · A flap will then be cut in the scalp, and small holes (lisa holes) will be drilled in your skull.  · A bone saw will be used to connect the lisa holes and to cut out the bone flap.    · After the bone flap is removed, work is performed on your brain.  · A drain may be placed inside your head to remove blood or fluids that might collect after surgery.  · The  bone will then be put back in place and attached using plates, wires, or sutures. Your scalp will then be closed with stitches or staples.    AFTER THE PROCEDURE  · You will be taken to a recovery area where your progress will be closely watched.  · Once you are awake, stable, and taking fluids well, you will be taken to your hospital room.  · You may stay in the hospital up to 3 weeks depending on the type of surgery done and whether any problems arise.  · Your scalp may feel spongy for a while because of fluid under it. This will gradually get better. Numbness may persist for a while in some areas of your scalp.  This information is not intended to replace advice given to you by your health care provider. Make sure you discuss any questions you have with your health care provider.  Document Released: 03/20/2007 Document Revised: 04/10/2017 Document Reviewed: 07/30/2014  [employer] Interactive Patient Education © 2017 [employer] Inc.    Thickening Liquids for Dysphagia Diet  If you are on the dysphagia diet, you may need to thicken drinks, soups, foods that melt at room temperature, and other liquids before you drink or eat them. Thickening liquids makes them easier to swallow. It also reduces the risk of liquid traveling to your lungs.  To make a thickened liquid you will need to add a commercial thickening product or a soft food to the liquid until it reaches the consistency it needs to be. Your health care provider or dietitian will explain to you the consistency you need to aim for. Liquid consistencies include:  · Thin. Thin liquids include most drinks (such as water, milk, tea, soda, juice, carbonated drinks), as well as ice cream, sherbet, sorbet, ice pops, and broth-based soups.  · Nectar-like. Nectar-like liquids include maple syrup and creamy soup.  · Honey-like. Honey-like liquids are made to be runny but are thick like honey. They cannot be sipped through a straw.  · Spoon-thick. Spoon-thick liquids are  thick, like pudding.  My plan  I should thicken my liquids to a _______________ consistency.  Diet guidelines  · Thicken liquids to the consistency your health care provider recommends.  · Follow your dietitian's or health care provider's recommendation on how to thicken your liquids.  · See your dietitian or health care provider regularly for help with your dietary changes.  How can I thicken my liquids?  Liquids can be thickened with a commercial food and beverage thickener or with a soft food.  Commercial Food and Beverage Thickeners   A food and beverage thickener is a powder or gel that makes a food or beverage thicker. Thickeners are sold at pharmacies, medical supply stores, some grocery stores, and online. They can be added to both hot and cold liquids and do not change the taste of the liquid. Ask your health care provider or dietitian for a complete list of commercial thickeners.  Each thickening product is different. Some need to be blended into a liquid with a  while others can be stirred into a liquid with a fork or spoon. Follow the instructions on the product label.  Soft Foods   Some foods such as soups, casseroles, and gravies can be thickened with soft foods. Soft foods include:  · Baby cereal.  · Gravy powder.  · Mashed potato.  · Pureed baby food.  · Instant potato flakes.  · Powdered sauce mixes (such as cheese mixes).  · Flour.  To use one of these soft food items, stir or mix them into the thin liquid until it reaches the desired thickness. Start with a small amount and adjust soft food and liquid as necessary.  Note: Flour works best with warm liquids, such as broth. To thicken a liquid with flour, make a paste out of flour and water. Cook or warm your liquid and add the paste to it. Stir until the mixture thickens.  What are some tips to make thickening liquids easier?  · Take thickeners with you when eating out or traveling.  · If a liquid gets too thick, add more of the thinner  liquid until the desired consistency is reached.  · Consider purchasing pre-made thickened drinks.  · Consider using a thickening product to make your own frozen desserts.  This information is not intended to replace advice given to you by your health care provider. Make sure you discuss any questions you have with your health care provider.  Document Released: 06/18/2013 Document Revised: 05/25/2017 Document Reviewed: 12/01/2014  [employer] Interactive Patient Education © 2017 Else[employer] Inc.

## 2019-06-24 ENCOUNTER — TELEPHONE (OUTPATIENT)
Dept: OTHER | Facility: MEDICAL CENTER | Age: 54
End: 2019-06-24

## 2019-06-24 NOTE — TELEPHONE ENCOUNTER
Telephone call to pt's mother.  Left message to let her know a message had been left with [facility] new patient , Grace, to schedule Robert an appointment with Dr. De Luna.

## 2019-06-24 NOTE — DISCHARGE PLANNING
Per Elina with [facility] patient does not yet have an appointment set up to establish. They will try to reach out and schedule. Until that time this referral will continue to be on hold.     Thank you

## 2019-06-25 NOTE — DISCHARGE PLANNING
ATTN: Case Management  RE: Referral for Home Health    Reason for referral denial: Patient did not establish with PCP and [facility] was unable to reach the patient to schedule an appointment.  Should patient require Home Health, please have PCP send referral upon patient's successful appointment.              Unfortunately, we are not able to accept this referral for the reason listed above. If further clarity is needed, our [facility] are available to discuss any barriers to service at [phone].      We look forward to collaborating with you in the future,  [facility]

## 2019-07-01 ENCOUNTER — HOSPITAL ENCOUNTER (OUTPATIENT)
Dept: RADIATION ONCOLOGY | Facility: MEDICAL CENTER | Age: 54
End: 2019-07-31
Attending: RADIOLOGY
Payer: MEDICAID

## 2019-07-09 ENCOUNTER — PATIENT OUTREACH (OUTPATIENT)
Dept: OTHER | Facility: MEDICAL CENTER | Age: 54
End: 2019-07-09

## 2019-07-09 NOTE — PROGRESS NOTES
"Unable to reach patient or patient's mother.  Call placed to Dr Young's office for update if available.  Left message with office.    Return message from Melody at doctor Young's office.  She reports patient will be coming in to see Dr Young on 11th.  Numbers provided for patient, same as in EPIC and patient's mother of [phone].    Call placed to patient's mother, Virginia, at [phone], which is a work number.  Virginia reporting not being aware of any of the follow up information.  She reports unsure if patient even wants to do \"chemo or radiation\".  She reports has not talked to anyone and is not aware of any follow up.  She did report they see Dr Young on 11th at 11 am.  Noted in patient's chart that messages had been left.  Explained importance of follow up with medical oncology and radiation oncology for additional treatment.  Provided number for [facility].  Reviewed information regarding radiation and missed appointment.  She again reported that nothing had been told to her.  They would like additional information regarding treatment and effect on quality of life.  Will let Dr Lynne, radiation oncology, know regarding request.  Virginia hoping to have scheduled on same day as follow up with Dr Young if possible.  Provided contact information for nurse navigator again to [place].  She provided additional information that patient's sister is coming into town this weekend, and asking if it may be better to wait to do appointment until after she came.  Did discuss delay already and sooner would be better, if possible.  Provided information that navigator could also relay information that may be presented.  Will let Dr Lynne's office know concerns.    Best number to contact Virginia is at work number [phone].    Call placed to radiation oncology, left message.    Radiation oncology returned call and will follow up tomorrow.  "

## 2019-07-10 ENCOUNTER — PATIENT OUTREACH (OUTPATIENT)
Dept: OTHER | Facility: MEDICAL CENTER | Age: 54
End: 2019-07-10

## 2019-07-10 NOTE — PROGRESS NOTES
Received information back from Dr Lynne that he can meet with patient after patient sees Dr Young on July 11th.  Request that patient can come by after that appointment and talk to Dr Lynne.  Sim can be done after that discussion, same day.    Call placed to Alexa rios.  Provided her with updated information.  Gave her directions on how to get to [facility] and to expect an additional hour for simulation after talking to physician if patient chooses to move forward with treatment.

## 2019-07-11 ENCOUNTER — HOSPITAL ENCOUNTER (OUTPATIENT)
Dept: RADIATION ONCOLOGY | Facility: MEDICAL CENTER | Age: 54
End: 2019-07-11

## 2019-07-11 DIAGNOSIS — C71.9 GLIOBLASTOMA (HCC): ICD-10-CM

## 2019-07-11 LAB — HIV 1 PRO DNA BLD QL NAA+PROBE: NOT DETECTED

## 2019-07-11 PROCEDURE — 77263 THER RADIOLOGY TX PLNG CPLX: CPT | Performed by: RADIOLOGY

## 2019-07-11 PROCEDURE — 77470 SPECIAL RADIATION TREATMENT: CPT | Mod: 26 | Performed by: RADIOLOGY

## 2019-07-11 PROCEDURE — 77334 RADIATION TREATMENT AID(S): CPT | Mod: 26 | Performed by: RADIOLOGY

## 2019-07-11 PROCEDURE — 77290 THER RAD SIMULAJ FIELD CPLX: CPT | Performed by: RADIOLOGY

## 2019-07-11 PROCEDURE — 77470 SPECIAL RADIATION TREATMENT: CPT | Performed by: RADIOLOGY

## 2019-07-11 PROCEDURE — 77334 RADIATION TREATMENT AID(S): CPT | Performed by: RADIOLOGY

## 2019-07-16 LAB
FUNGUS SPEC CULT: NORMAL
SIGNIFICANT IND 70042: NORMAL
SITE SITE: NORMAL
SOURCE SOURCE: NORMAL

## 2019-07-18 LAB
MYCOBACTERIUM SPEC CULT: NORMAL
RHODAMINE-AURAMINE STN SPEC: NORMAL
SIGNIFICANT IND 70042: NORMAL
SITE SITE: NORMAL
SOURCE SOURCE: NORMAL

## 2019-07-29 ENCOUNTER — PATIENT OUTREACH (OUTPATIENT)
Dept: OTHER | Facility: MEDICAL CENTER | Age: 54
End: 2019-07-29

## 2019-07-29 NOTE — PROGRESS NOTES
Pt's mother left message, returned call.  She reported new number of [phone].  She was concerned because specialty pharmacy reporting medicaid number was not valid.  She had contacted radiation reporting that patient does not have temodar yet.  She states they want another MRI as well before starting treatment.  Mother talked about specialty pharmacy also working with Dr De Luna's office regarding dosage.  Call placed to Liz, financial resource advocate.  She verified patient had active medicaid and provided information that patient/mother may need to call medicaid to make sure updloaded correctly in system or talk to Dr De Luna's office on assisting.      Called mother, [place], back and updated her on information.  She reports speciality pharmacy is [facility].  She will follow up on.

## 2019-07-31 ENCOUNTER — HOSPITAL ENCOUNTER (OUTPATIENT)
Dept: RADIOLOGY | Facility: MEDICAL CENTER | Age: 54
End: 2019-07-31
Attending: RADIOLOGY
Payer: MEDICAID

## 2019-07-31 DIAGNOSIS — C71.9 GLIOBLASTOMA (HCC): ICD-10-CM

## 2019-07-31 PROCEDURE — 70553 MRI BRAIN STEM W/O & W/DYE: CPT

## 2019-07-31 PROCEDURE — A9585 GADOBUTROL INJECTION: HCPCS | Performed by: RADIOLOGY

## 2019-07-31 PROCEDURE — 700117 HCHG RX CONTRAST REV CODE 255: Performed by: RADIOLOGY

## 2019-07-31 RX ORDER — GADOBUTROL 604.72 MG/ML
11 INJECTION INTRAVENOUS ONCE
Status: COMPLETED | OUTPATIENT
Start: 2019-07-31 | End: 2019-07-31

## 2019-07-31 RX ADMIN — GADOBUTROL 11 ML: 604.72 INJECTION INTRAVENOUS at 16:12

## 2019-08-07 ENCOUNTER — PATIENT OUTREACH (OUTPATIENT)
Dept: OTHER | Facility: MEDICAL CENTER | Age: 54
End: 2019-08-07

## 2019-08-07 DIAGNOSIS — C71.9 GBM (GLIOBLASTOMA MULTIFORME) (HCC): ICD-10-CM

## 2019-08-07 NOTE — PROGRESS NOTES
Cancer nurse navigation:    Call placed to [place], mother, to follow up on Temodar medication.  She reports they did receive the temodar, but patient had seizure and was at [facility].  She reports patient was placed on seizure medication, levetircacetam 750mg.  Virginia reports patient was given dose in hospital and now at home.  Pt had severe headache after medication when she had given it to him, then headache went away several hours later.  Episode happened again after giving medication.  She reports does not want to give him medication anymore and is trying to get him into clinic to be seen.  She reported has been unable to get him into [facility] and will be trying to get him in to [facility].  Encouraged her to contact neurosurgeon, Dr Young regarding seizure episode as well.  Virginia talked about patient had been doing much better, even talking about returning to work.  She said she let him go visit friends.  He left to visit them and then went to see other friends where he ended up using meth.  She believes this is what triggered the seizure.  She has had discussion with patient, if he wants to continue to do this, then he needs to not do treatment.  Provided active listening and support.  She reports patient does not remember incident.  Pt is to start treatment next week.  Her goal is to have him seen for his seizures and follow up with treatment.

## 2019-08-08 ENCOUNTER — HOSPITAL ENCOUNTER (OUTPATIENT)
Dept: RADIATION ONCOLOGY | Facility: MEDICAL CENTER | Age: 54
End: 2019-08-31
Attending: RADIOLOGY
Payer: MEDICAID

## 2019-08-08 PROCEDURE — 77300 RADIATION THERAPY DOSE PLAN: CPT | Performed by: RADIOLOGY

## 2019-08-08 PROCEDURE — 77301 RADIOTHERAPY DOSE PLAN IMRT: CPT | Performed by: RADIOLOGY

## 2019-08-08 PROCEDURE — 77300 RADIATION THERAPY DOSE PLAN: CPT | Mod: 26 | Performed by: RADIOLOGY

## 2019-08-08 PROCEDURE — 77301 RADIOTHERAPY DOSE PLAN IMRT: CPT | Mod: 26 | Performed by: RADIOLOGY

## 2019-08-08 PROCEDURE — 77338 DESIGN MLC DEVICE FOR IMRT: CPT | Performed by: RADIOLOGY

## 2019-08-08 PROCEDURE — 77338 DESIGN MLC DEVICE FOR IMRT: CPT | Mod: 26 | Performed by: RADIOLOGY

## 2019-08-12 ENCOUNTER — HOSPITAL ENCOUNTER (OUTPATIENT)
Dept: RADIATION ONCOLOGY | Facility: MEDICAL CENTER | Age: 54
End: 2019-08-12

## 2019-08-12 LAB
CHEMOTHERAPY INFUSION START DATE: NORMAL
CHEMOTHERAPY RECORDS: 2
CHEMOTHERAPY RECORDS: 4600
CHEMOTHERAPY RECORDS: NORMAL
CHEMOTHERAPY RX CANCER: NORMAL
MYCOBACTERIUM SPEC CULT: NORMAL
MYCOBACTERIUM SPEC CULT: NORMAL
RAD ONC ARIA COURSE TREATMENT ELAPSED DAYS: NORMAL
RAD ONC ARIA PLAN TREATMENT DATES: NORMAL
RAD ONC ARIA REFERENCE POINT DOSAGE GIVEN TO DATE: 2
RAD ONC ARIA REFERENCE POINT DOSAGE GIVEN TO DATE: 2.05
RAD ONC ARIA REFERENCE POINT ID: NORMAL
RAD ONC ARIA REFERENCE POINT ID: NORMAL
RAD ONC ARIA REFERENCE POINT SESSION DOSAGE GIVEN: 2
RAD ONC ARIA REFERENCE POINT SESSION DOSAGE GIVEN: 2.05
RHODAMINE-AURAMINE STN SPEC: NORMAL
RHODAMINE-AURAMINE STN SPEC: NORMAL
SIGNIFICANT IND 70042: NORMAL
SIGNIFICANT IND 70042: NORMAL
SITE SITE: NORMAL
SITE SITE: NORMAL
SOURCE SOURCE: NORMAL
SOURCE SOURCE: NORMAL

## 2019-08-12 PROCEDURE — 77280 THER RAD SIMULAJ FIELD SMPL: CPT | Performed by: RADIOLOGY

## 2019-08-12 PROCEDURE — 77386 HCHG IMRT DELIVERY COMPLEX: CPT | Performed by: RADIOLOGY

## 2019-08-12 NOTE — CT SIMULATION
TREATMENT:     Radiation Treatments     Plan Most Recent Treatment Date Elapsed Course Treatment Days Fractions Treated Prescribed Fraction Dose (cGy) Prescribed Total Dose (cGy)    L_Frontal 8/12/2019 0 @ [id number] 1 of 23 200 4,600       Reference Point Most Recent Treatment Date Elapsed Course Treatment Days Most Recent Session Dose (cGy) Total Dose (cGy)    L_Frontal 8/12/2019 0 @ [id number] 200 200    L_Frontal CP 8/12/2019 0 @ [id number] 205 205          First Visit Simple Simulation: Called by Truebeam machine to verify treatment parameters including:  treatment site, treatment dose, and treatment setup prior to first treatment. CBCT derived shifts reviewed in Axial, Sagital and Coronal views were reviewed.  Shifts approved.  Patient treated.    I have personally reviewed the relevant data, performed the target localization, and determined all relevant factors for this patient’s simulation.

## 2019-08-13 ENCOUNTER — HOSPITAL ENCOUNTER (OUTPATIENT)
Dept: RADIATION ONCOLOGY | Facility: MEDICAL CENTER | Age: 54
End: 2019-08-13

## 2019-08-13 LAB
CHEMOTHERAPY INFUSION START DATE: NORMAL
CHEMOTHERAPY RECORDS: 2
CHEMOTHERAPY RECORDS: 4600
CHEMOTHERAPY RECORDS: NORMAL
CHEMOTHERAPY RX CANCER: NORMAL
RAD ONC ARIA COURSE TREATMENT ELAPSED DAYS: NORMAL
RAD ONC ARIA PLAN TREATMENT DATES: NORMAL
RAD ONC ARIA REFERENCE POINT DOSAGE GIVEN TO DATE: 4
RAD ONC ARIA REFERENCE POINT DOSAGE GIVEN TO DATE: 4.09
RAD ONC ARIA REFERENCE POINT ID: NORMAL
RAD ONC ARIA REFERENCE POINT ID: NORMAL
RAD ONC ARIA REFERENCE POINT SESSION DOSAGE GIVEN: 2
RAD ONC ARIA REFERENCE POINT SESSION DOSAGE GIVEN: 2.05

## 2019-08-13 PROCEDURE — 77386 HCHG IMRT DELIVERY COMPLEX: CPT | Performed by: RADIOLOGY

## 2019-08-13 PROCEDURE — 77014 PR CT GUIDANCE PLACEMENT RAD THERAPY FIELDS: CPT | Mod: 26 | Performed by: RADIOLOGY

## 2019-08-14 ENCOUNTER — HOSPITAL ENCOUNTER (OUTPATIENT)
Dept: RADIATION ONCOLOGY | Facility: MEDICAL CENTER | Age: 54
End: 2019-08-14

## 2019-08-14 LAB
CHEMOTHERAPY INFUSION START DATE: NORMAL
CHEMOTHERAPY RECORDS: 2
CHEMOTHERAPY RECORDS: 4600
CHEMOTHERAPY RECORDS: NORMAL
CHEMOTHERAPY RX CANCER: NORMAL
RAD ONC ARIA COURSE TREATMENT ELAPSED DAYS: NORMAL
RAD ONC ARIA PLAN TREATMENT DATES: NORMAL
RAD ONC ARIA REFERENCE POINT DOSAGE GIVEN TO DATE: 6
RAD ONC ARIA REFERENCE POINT DOSAGE GIVEN TO DATE: 6.14
RAD ONC ARIA REFERENCE POINT ID: NORMAL
RAD ONC ARIA REFERENCE POINT ID: NORMAL
RAD ONC ARIA REFERENCE POINT SESSION DOSAGE GIVEN: 2
RAD ONC ARIA REFERENCE POINT SESSION DOSAGE GIVEN: 2.05

## 2019-08-14 PROCEDURE — 77014 PR CT GUIDANCE PLACEMENT RAD THERAPY FIELDS: CPT | Mod: 26 | Performed by: RADIOLOGY

## 2019-08-14 PROCEDURE — 77386 HCHG IMRT DELIVERY COMPLEX: CPT | Performed by: RADIOLOGY

## 2019-08-15 ENCOUNTER — HOSPITAL ENCOUNTER (OUTPATIENT)
Dept: RADIATION ONCOLOGY | Facility: MEDICAL CENTER | Age: 54
End: 2019-08-15

## 2019-08-15 LAB
CHEMOTHERAPY INFUSION START DATE: NORMAL
CHEMOTHERAPY RECORDS: 2
CHEMOTHERAPY RECORDS: 4600
CHEMOTHERAPY RECORDS: NORMAL
CHEMOTHERAPY RX CANCER: NORMAL
RAD ONC ARIA COURSE TREATMENT ELAPSED DAYS: NORMAL
RAD ONC ARIA PLAN TREATMENT DATES: NORMAL
RAD ONC ARIA REFERENCE POINT DOSAGE GIVEN TO DATE: 8
RAD ONC ARIA REFERENCE POINT DOSAGE GIVEN TO DATE: 8.19
RAD ONC ARIA REFERENCE POINT ID: NORMAL
RAD ONC ARIA REFERENCE POINT ID: NORMAL
RAD ONC ARIA REFERENCE POINT SESSION DOSAGE GIVEN: 2
RAD ONC ARIA REFERENCE POINT SESSION DOSAGE GIVEN: 2.05

## 2019-08-15 PROCEDURE — 77336 RADIATION PHYSICS CONSULT: CPT | Performed by: RADIOLOGY

## 2019-08-15 PROCEDURE — 77386 HCHG IMRT DELIVERY COMPLEX: CPT | Performed by: RADIOLOGY

## 2019-08-15 PROCEDURE — 77014 PR CT GUIDANCE PLACEMENT RAD THERAPY FIELDS: CPT | Mod: 26 | Performed by: RADIOLOGY

## 2019-08-16 ENCOUNTER — HOSPITAL ENCOUNTER (OUTPATIENT)
Dept: RADIATION ONCOLOGY | Facility: MEDICAL CENTER | Age: 54
End: 2019-08-16

## 2019-08-16 LAB
CHEMOTHERAPY INFUSION START DATE: NORMAL
CHEMOTHERAPY RECORDS: 2
CHEMOTHERAPY RECORDS: 4600
CHEMOTHERAPY RECORDS: NORMAL
CHEMOTHERAPY RX CANCER: NORMAL
RAD ONC ARIA COURSE TREATMENT ELAPSED DAYS: NORMAL
RAD ONC ARIA PLAN TREATMENT DATES: NORMAL
RAD ONC ARIA REFERENCE POINT DOSAGE GIVEN TO DATE: 10
RAD ONC ARIA REFERENCE POINT DOSAGE GIVEN TO DATE: 10.24
RAD ONC ARIA REFERENCE POINT ID: NORMAL
RAD ONC ARIA REFERENCE POINT ID: NORMAL
RAD ONC ARIA REFERENCE POINT SESSION DOSAGE GIVEN: 2
RAD ONC ARIA REFERENCE POINT SESSION DOSAGE GIVEN: 2.05

## 2019-08-16 PROCEDURE — 77386 HCHG IMRT DELIVERY COMPLEX: CPT | Performed by: RADIOLOGY

## 2019-08-16 PROCEDURE — 77014 PR CT GUIDANCE PLACEMENT RAD THERAPY FIELDS: CPT | Mod: 26 | Performed by: RADIOLOGY

## 2019-08-16 PROCEDURE — 77427 RADIATION TX MANAGEMENT X5: CPT | Performed by: RADIOLOGY

## 2019-08-19 ENCOUNTER — HOSPITAL ENCOUNTER (OUTPATIENT)
Dept: RADIATION ONCOLOGY | Facility: MEDICAL CENTER | Age: 54
End: 2019-08-19

## 2019-08-19 VITALS
OXYGEN SATURATION: 97 % | HEART RATE: 79 BPM | SYSTOLIC BLOOD PRESSURE: 132 MMHG | DIASTOLIC BLOOD PRESSURE: 87 MMHG | TEMPERATURE: 98.1 F

## 2019-08-19 DIAGNOSIS — C71.9 GLIOBLASTOMA (HCC): ICD-10-CM

## 2019-08-19 DIAGNOSIS — G93.6 CEREBRAL EDEMA (HCC): ICD-10-CM

## 2019-08-19 LAB
CHEMOTHERAPY INFUSION START DATE: NORMAL
CHEMOTHERAPY RECORDS: 2
CHEMOTHERAPY RECORDS: 4600
CHEMOTHERAPY RECORDS: NORMAL
CHEMOTHERAPY RX CANCER: NORMAL
RAD ONC ARIA COURSE TREATMENT ELAPSED DAYS: NORMAL
RAD ONC ARIA PLAN TREATMENT DATES: NORMAL
RAD ONC ARIA REFERENCE POINT DOSAGE GIVEN TO DATE: 12
RAD ONC ARIA REFERENCE POINT DOSAGE GIVEN TO DATE: 12.28
RAD ONC ARIA REFERENCE POINT ID: NORMAL
RAD ONC ARIA REFERENCE POINT ID: NORMAL
RAD ONC ARIA REFERENCE POINT SESSION DOSAGE GIVEN: 2
RAD ONC ARIA REFERENCE POINT SESSION DOSAGE GIVEN: 2.05

## 2019-08-19 PROCEDURE — 77300 RADIATION THERAPY DOSE PLAN: CPT | Performed by: RADIOLOGY

## 2019-08-19 PROCEDURE — 77338 DESIGN MLC DEVICE FOR IMRT: CPT | Mod: XU | Performed by: RADIOLOGY

## 2019-08-19 PROCEDURE — 77300 RADIATION THERAPY DOSE PLAN: CPT | Mod: 26 | Performed by: RADIOLOGY

## 2019-08-19 PROCEDURE — 77014 PR CT GUIDANCE PLACEMENT RAD THERAPY FIELDS: CPT | Mod: 26 | Performed by: RADIOLOGY

## 2019-08-19 PROCEDURE — 77386 HCHG IMRT DELIVERY COMPLEX: CPT | Performed by: RADIOLOGY

## 2019-08-19 PROCEDURE — 77338 DESIGN MLC DEVICE FOR IMRT: CPT | Mod: 26 | Performed by: RADIOLOGY

## 2019-08-19 RX ORDER — DEXAMETHASONE 2 MG/1
2 TABLET ORAL
Qty: 30 TAB | Refills: 0 | Status: SHIPPED | OUTPATIENT
Start: 2019-08-19 | End: 2019-08-29

## 2019-08-19 RX ORDER — TEMOZOLOMIDE 20 MG/1
20 CAPSULE ORAL DAILY
COMMUNITY
Start: 2019-08-01

## 2019-08-19 RX ORDER — TEMOZOLOMIDE 140 MG/1
140 CAPSULE ORAL DAILY
COMMUNITY
Start: 2019-08-01

## 2019-08-19 ASSESSMENT — PAIN SCALES - GENERAL
PAINLEVEL: 7=MODERATE-SEVERE PAIN
PAINLEVEL: 7=MODERATE-SEVERE PAIN

## 2019-08-19 NOTE — PROGRESS NOTES
ON TREATMENT  NOTE  RADIATION ONCOLOGY DEPARTMENT    Patient name:  Robert Dorantes    Primary Physician:  Pcp Pt States None MRN: [id number]  CSN: [id number]   Referring physician:  No ref. provider found : 1965, 54 y.o.     ENCOUNTER DATE:  19    DIAGNOSIS:  Visit Diagnoses     ICD-10-CM   1. Glioblastoma (HCC) C71.9   2. Cerebral edema (HCC) G93.6     Glioblastoma (HCC)  Staging form: Brain and Spinal Cord, AJCC 8th Edition  - Pathologic stage from 2019: WHO Grade IV - Signed by Shawna BAUGH M.D. on 2019  Histologic grading system: 4 grade system  Histopathologic type: Glioblastoma  Diagnostic confirmation: Positive histology  Specimen type: Biopsy / Limited Resection  Staged by: Managing physician  Extent of surgical resection: Complete (gross) resection      TREATMENT SUMMARY:  Radiation Treatments     Plan Most Recent Treatment Date Elapsed Course Treatment Days Fractions Treated Prescribed Fraction Dose (cGy) Prescribed Total Dose (cGy)    L_Frontal 2019 7 @ [id number] 6 of 23 200 4,600       Reference Point Most Recent Treatment Date Elapsed Course Treatment Days Most Recent Session Dose (cGy) Total Dose (cGy)    L_Frontal 2019 7 @ [id number] 200 1,200    L_Frontal CP 2019 7 @ [id number] 205 1,228          SUBJECTIVE:        VITAL SIGNS:  KPS: 70, Cares for self; unable to carry on normal activity or to do active work (ECOG equivalent 1)  Encounter Vitals  Temperature: 36.7 °C (98.1 °F)  Temp src: Temporal  Blood Pressure: 132/87  Pulse: 79  Pulse Oximetry: 97 %  Pain Score: 7=Moderate-Severe Pain  Pain Assessment 2019   Pain Assessment Acute Pain -   Pain Score 7 7   Pain Loc Head Head   What decreases pain? tylenol  -   Some recent data might be hidden          PHYSICAL EXAM:  Physical Exam   Constitutional: He is oriented to person, place, and time. He appears well-developed and well-nourished. No distress.   HENT:   Head: Normocephalic.    Healed left temporal craniotomy scar.   Neurological: He is alert and oriented to person, place, and time.   Skin: Skin is warm and dry. No erythema.   Psychiatric: He has a normal mood and affect. His behavior is normal. Judgment and thought content normal.   Nursing note and vitals reviewed.       Toxicity Assessment 8/19/2019   Toxicity Assessment Head/Neck   Fatigue (lethargy, malaise, asthenia) Increased fatigue over baseline, but not altering normal activities   Radiation Dermatitis None   Rash/desquamation None   Constipation None   Dehydration None   Mouth Dryness Normal   RT Dysphagia-Pharyngeal None   Mucositis None   Salivary Gland Changes None   Stomatitis/Pharyngitis None   Taste Disturbance (dysgeusia) Normal   RT - Pain due to RT Moderate pain, pain or analgesics interfering with function, but not interfering with activities of daily living   Cough Absent   Voice changes/stridor/larynx (hoarseness, loss of voice, laryngitis) Normal       CURRENT MEDICATIONS:    Current Outpatient Medications:   •  Temozolomide 140 MG Cap, , Disp: , Rfl:   •  temozolomide (TEMODAR) 20 MG Cap, , Disp: , Rfl:   •  dexamethasone (DECADRON) 2 MG tablet, Take 1 Tab by mouth 3 times a day, with meals for 10 days., Disp: 30 Tab, Rfl: 0  •  ferrous sulfate 325 (65 Fe) MG tablet, Take 1 Tab by mouth every morning with breakfast for 90 days., Disp: 90 Tab, Rfl: 0  •  acetaminophen (TYLENOL) 325 MG Tab, Take 650 mg by mouth every four hours as needed., Disp: , Rfl:     LABORATORY DATA:   Lab Results   Component Value Date/Time    SODIUM 136 06/21/2019 04:08 AM    POTASSIUM 3.9 06/21/2019 04:08 AM    CHLORIDE 103 06/21/2019 04:08 AM    CO2 25 06/21/2019 04:08 AM    GLUCOSE 133 (H) 06/21/2019 04:08 AM    BUN 22 06/21/2019 04:08 AM    CREATININE 0.53 06/21/2019 04:08 AM       Lab Results   Component Value Date/Time    WBC 14.0 (H) 06/21/2019 04:08 AM    RBC 4.90 06/21/2019 04:08 AM    HEMOGLOBIN 11.8 (L) 06/21/2019 04:08 AM     HEMATOCRIT 38.0 (L) 06/21/2019 04:08 AM    MCV 77.6 (L) 06/21/2019 04:08 AM    MCH 24.1 (L) 06/21/2019 04:08 AM    MCHC 31.1 (L) 06/21/2019 04:08 AM    RDW 56.6 (H) 06/21/2019 04:08 AM    PLATELETCT 254 06/21/2019 04:08 AM    MPV 10.9 06/21/2019 04:08 AM    NEUTSPOLYS 85.10 (H) 06/19/2019 04:15 AM    LYMPHOCYTES 6.30 (L) 06/19/2019 04:15 AM    MONOCYTES 7.40 06/19/2019 04:15 AM    EOSINOPHILS 0.00 06/19/2019 04:15 AM    BASOPHILS 0.10 06/19/2019 04:15 AM    HYPOCHROMIA 1+ 06/13/2019 05:05 AM    ANISOCYTOSIS 1+ 06/13/2019 05:05 AM        RADIOLOGY DATA:  Mr-brain-with & W/o    Result Date: 8/1/2019 7/31/2019 3:30 PM HISTORY/REASON FOR EXAM:  Anaplastic gliomas/glioblastoma. TECHNIQUE/EXAM DESCRIPTION:   MRI of the brain without and with contrast. T1 sagittal, T2 fast spin-echo axial, T1 coronal, FLAIR coronal, diffusion-weighted and apparent diffusion coefficient (ADC map) axial images were obtained of the whole brain. T1 postcontrast axial and T1 postcontrast coronal images were obtained. The study was performed on a NextDigest Signa 1.5 Nory MRI scanner. 11 mL Gadavist contrast was administered intravenously. COMPARISON:  6/17/2019 FINDINGS: There is an approximately 41 x 26 x 31 mm sized enhancing area noted along the left temporal lobe surgical bed. Diffuse abnormal T2 hyperintensity is noted in the left hippocampus left thalamus and left temporal lobe. There is no hydrocephalus. There is an approximately 3 to 4 mm midline shift towards right side. The visualized flow voids of the cerebral vasculature are unremarkable.  There is no large lesion identified in the expected course of the intracranial portions of the cranial nerves. Left temporal craniotomy changes are seen. There is minimal extradural fluid is noted adjacent to the craniotomy.     1.  There is an approximately 41 x 26 x 31 mm sized enhancing area noted along the left temporal lobe surgical bed. This is significantly increased since the previous study  and concerning for worsening of recurrent/residual neoplasm. The other less likely possibility includes radiation induced changes. 2.  Diffuse abnormal T2 hyperintensity is noted in the left hippocampus, left thalamus and left temporal lobe likely representing nonenhancing portion of the multifocal glioblastoma. 3.  An approximately 3 mm midline shift towards right side. This is reduced since previous study. 4.  There is no evidence of hydrocephalus. The size of the ventricles are introduced since the previous study.      IMPRESSION:  Cancer Staging  No matching staging information was found for the patient.    PLAN:  Rx dexamethasone. Recommended start dose of 1mg/day and increase as needed not to exceed 6mg/day.    Disposition:  Treatment plan reviewed. Questions answered. Continue therapy outlined.     Shawna BAUGH M.D.    Orders Placed This Encounter   • Temozolomide 140 MG Cap   • temozolomide (TEMODAR) 20 MG Cap   • dexamethasone (DECADRON) 2 MG tablet

## 2019-08-20 ENCOUNTER — HOSPITAL ENCOUNTER (OUTPATIENT)
Dept: RADIATION ONCOLOGY | Facility: MEDICAL CENTER | Age: 54
End: 2019-08-20

## 2019-08-20 LAB
CHEMOTHERAPY INFUSION START DATE: NORMAL
CHEMOTHERAPY RECORDS: 2
CHEMOTHERAPY RECORDS: 4600
CHEMOTHERAPY RECORDS: NORMAL
CHEMOTHERAPY RX CANCER: NORMAL
RAD ONC ARIA COURSE TREATMENT ELAPSED DAYS: NORMAL
RAD ONC ARIA PLAN TREATMENT DATES: NORMAL
RAD ONC ARIA REFERENCE POINT DOSAGE GIVEN TO DATE: 14
RAD ONC ARIA REFERENCE POINT DOSAGE GIVEN TO DATE: 14.33
RAD ONC ARIA REFERENCE POINT ID: NORMAL
RAD ONC ARIA REFERENCE POINT ID: NORMAL
RAD ONC ARIA REFERENCE POINT SESSION DOSAGE GIVEN: 2
RAD ONC ARIA REFERENCE POINT SESSION DOSAGE GIVEN: 2.05

## 2019-08-20 PROCEDURE — 77386 HCHG IMRT DELIVERY COMPLEX: CPT | Performed by: RADIOLOGY

## 2019-08-20 PROCEDURE — 77014 PR CT GUIDANCE PLACEMENT RAD THERAPY FIELDS: CPT | Mod: 26 | Performed by: RADIOLOGY

## 2019-08-21 LAB
CHEMOTHERAPY INFUSION START DATE: NORMAL
CHEMOTHERAPY RECORDS: 2
CHEMOTHERAPY RECORDS: 4600
CHEMOTHERAPY RECORDS: NORMAL
CHEMOTHERAPY RX CANCER: NORMAL
RAD ONC ARIA COURSE TREATMENT ELAPSED DAYS: NORMAL
RAD ONC ARIA PLAN TREATMENT DATES: NORMAL
RAD ONC ARIA REFERENCE POINT DOSAGE GIVEN TO DATE: 16
RAD ONC ARIA REFERENCE POINT DOSAGE GIVEN TO DATE: 16.38
RAD ONC ARIA REFERENCE POINT ID: NORMAL
RAD ONC ARIA REFERENCE POINT ID: NORMAL
RAD ONC ARIA REFERENCE POINT SESSION DOSAGE GIVEN: 2
RAD ONC ARIA REFERENCE POINT SESSION DOSAGE GIVEN: 2.05

## 2019-08-21 PROCEDURE — 77386 HCHG IMRT DELIVERY COMPLEX: CPT | Performed by: RADIOLOGY

## 2019-08-21 PROCEDURE — 77014 PR CT GUIDANCE PLACEMENT RAD THERAPY FIELDS: CPT | Mod: 26 | Performed by: RADIOLOGY

## 2019-08-22 ENCOUNTER — HOSPITAL ENCOUNTER (OUTPATIENT)
Dept: RADIATION ONCOLOGY | Facility: MEDICAL CENTER | Age: 54
End: 2019-08-22

## 2019-08-22 LAB
CHEMOTHERAPY INFUSION START DATE: NORMAL
CHEMOTHERAPY RECORDS: 2
CHEMOTHERAPY RECORDS: 4600
CHEMOTHERAPY RECORDS: NORMAL
CHEMOTHERAPY RX CANCER: NORMAL
RAD ONC ARIA COURSE TREATMENT ELAPSED DAYS: NORMAL
RAD ONC ARIA PLAN TREATMENT DATES: NORMAL
RAD ONC ARIA REFERENCE POINT DOSAGE GIVEN TO DATE: 18
RAD ONC ARIA REFERENCE POINT DOSAGE GIVEN TO DATE: 18.43
RAD ONC ARIA REFERENCE POINT ID: NORMAL
RAD ONC ARIA REFERENCE POINT ID: NORMAL
RAD ONC ARIA REFERENCE POINT SESSION DOSAGE GIVEN: 2
RAD ONC ARIA REFERENCE POINT SESSION DOSAGE GIVEN: 2.05

## 2019-08-22 PROCEDURE — 77014 PR CT GUIDANCE PLACEMENT RAD THERAPY FIELDS: CPT | Mod: 26 | Performed by: RADIOLOGY

## 2019-08-22 PROCEDURE — 77386 HCHG IMRT DELIVERY COMPLEX: CPT | Performed by: RADIOLOGY

## 2019-08-22 PROCEDURE — 77336 RADIATION PHYSICS CONSULT: CPT | Performed by: RADIOLOGY

## 2019-08-23 ENCOUNTER — HOSPITAL ENCOUNTER (OUTPATIENT)
Dept: RADIATION ONCOLOGY | Facility: MEDICAL CENTER | Age: 54
End: 2019-08-23

## 2019-08-23 LAB
CHEMOTHERAPY INFUSION START DATE: NORMAL
CHEMOTHERAPY RECORDS: 2
CHEMOTHERAPY RECORDS: 4600
CHEMOTHERAPY RECORDS: NORMAL
CHEMOTHERAPY RX CANCER: NORMAL
RAD ONC ARIA COURSE TREATMENT ELAPSED DAYS: NORMAL
RAD ONC ARIA PLAN TREATMENT DATES: NORMAL
RAD ONC ARIA REFERENCE POINT DOSAGE GIVEN TO DATE: 20
RAD ONC ARIA REFERENCE POINT DOSAGE GIVEN TO DATE: 20.47
RAD ONC ARIA REFERENCE POINT ID: NORMAL
RAD ONC ARIA REFERENCE POINT ID: NORMAL
RAD ONC ARIA REFERENCE POINT SESSION DOSAGE GIVEN: 2
RAD ONC ARIA REFERENCE POINT SESSION DOSAGE GIVEN: 2.05

## 2019-08-23 PROCEDURE — 77014 PR CT GUIDANCE PLACEMENT RAD THERAPY FIELDS: CPT | Mod: 26 | Performed by: RADIOLOGY

## 2019-08-23 PROCEDURE — 77386 HCHG IMRT DELIVERY COMPLEX: CPT | Performed by: RADIOLOGY

## 2019-08-23 PROCEDURE — 77427 RADIATION TX MANAGEMENT X5: CPT | Performed by: RADIOLOGY

## 2019-08-26 ENCOUNTER — HOSPITAL ENCOUNTER (OUTPATIENT)
Dept: RADIATION ONCOLOGY | Facility: MEDICAL CENTER | Age: 54
End: 2019-08-26

## 2019-08-26 LAB
CHEMOTHERAPY INFUSION START DATE: NORMAL
CHEMOTHERAPY RECORDS: 2
CHEMOTHERAPY RECORDS: 4600
CHEMOTHERAPY RECORDS: NORMAL
CHEMOTHERAPY RX CANCER: NORMAL
RAD ONC ARIA COURSE TREATMENT ELAPSED DAYS: NORMAL
RAD ONC ARIA PLAN TREATMENT DATES: NORMAL
RAD ONC ARIA REFERENCE POINT DOSAGE GIVEN TO DATE: 22
RAD ONC ARIA REFERENCE POINT DOSAGE GIVEN TO DATE: 22.52
RAD ONC ARIA REFERENCE POINT ID: NORMAL
RAD ONC ARIA REFERENCE POINT ID: NORMAL
RAD ONC ARIA REFERENCE POINT SESSION DOSAGE GIVEN: 2
RAD ONC ARIA REFERENCE POINT SESSION DOSAGE GIVEN: 2.05

## 2019-08-26 PROCEDURE — 77014 PR CT GUIDANCE PLACEMENT RAD THERAPY FIELDS: CPT | Mod: 26 | Performed by: RADIOLOGY

## 2019-08-26 PROCEDURE — 77386 HCHG IMRT DELIVERY COMPLEX: CPT | Performed by: RADIOLOGY

## 2019-08-27 ENCOUNTER — HOSPITAL ENCOUNTER (OUTPATIENT)
Dept: RADIATION ONCOLOGY | Facility: MEDICAL CENTER | Age: 54
End: 2019-08-27

## 2019-08-27 LAB
CHEMOTHERAPY INFUSION START DATE: NORMAL
CHEMOTHERAPY RECORDS: 2
CHEMOTHERAPY RECORDS: 4600
CHEMOTHERAPY RECORDS: NORMAL
CHEMOTHERAPY RX CANCER: NORMAL
RAD ONC ARIA COURSE TREATMENT ELAPSED DAYS: NORMAL
RAD ONC ARIA PLAN TREATMENT DATES: NORMAL
RAD ONC ARIA REFERENCE POINT DOSAGE GIVEN TO DATE: 24
RAD ONC ARIA REFERENCE POINT DOSAGE GIVEN TO DATE: 24.57
RAD ONC ARIA REFERENCE POINT ID: NORMAL
RAD ONC ARIA REFERENCE POINT ID: NORMAL
RAD ONC ARIA REFERENCE POINT SESSION DOSAGE GIVEN: 2
RAD ONC ARIA REFERENCE POINT SESSION DOSAGE GIVEN: 2.05

## 2019-08-27 PROCEDURE — 77386 HCHG IMRT DELIVERY COMPLEX: CPT | Performed by: RADIOLOGY

## 2019-08-27 PROCEDURE — 77014 PR CT GUIDANCE PLACEMENT RAD THERAPY FIELDS: CPT | Mod: 26 | Performed by: RADIOLOGY

## 2019-08-28 ENCOUNTER — HOSPITAL ENCOUNTER (OUTPATIENT)
Dept: RADIATION ONCOLOGY | Facility: MEDICAL CENTER | Age: 54
End: 2019-08-28

## 2019-08-28 LAB
CHEMOTHERAPY INFUSION START DATE: NORMAL
CHEMOTHERAPY RECORDS: 2
CHEMOTHERAPY RECORDS: 4600
CHEMOTHERAPY RECORDS: NORMAL
CHEMOTHERAPY RX CANCER: NORMAL
RAD ONC ARIA COURSE TREATMENT ELAPSED DAYS: NORMAL
RAD ONC ARIA PLAN TREATMENT DATES: NORMAL
RAD ONC ARIA REFERENCE POINT DOSAGE GIVEN TO DATE: 26
RAD ONC ARIA REFERENCE POINT DOSAGE GIVEN TO DATE: 26.61
RAD ONC ARIA REFERENCE POINT ID: NORMAL
RAD ONC ARIA REFERENCE POINT ID: NORMAL
RAD ONC ARIA REFERENCE POINT SESSION DOSAGE GIVEN: 2
RAD ONC ARIA REFERENCE POINT SESSION DOSAGE GIVEN: 2.05

## 2019-08-28 PROCEDURE — 77014 PR CT GUIDANCE PLACEMENT RAD THERAPY FIELDS: CPT | Mod: 26 | Performed by: RADIOLOGY

## 2019-08-28 PROCEDURE — 77386 HCHG IMRT DELIVERY COMPLEX: CPT | Performed by: RADIOLOGY

## 2019-08-29 ENCOUNTER — HOSPITAL ENCOUNTER (OUTPATIENT)
Dept: RADIATION ONCOLOGY | Facility: MEDICAL CENTER | Age: 54
End: 2019-08-29

## 2019-08-29 LAB
CHEMOTHERAPY INFUSION START DATE: NORMAL
CHEMOTHERAPY RECORDS: 2
CHEMOTHERAPY RECORDS: 4600
CHEMOTHERAPY RECORDS: NORMAL
CHEMOTHERAPY RX CANCER: NORMAL
RAD ONC ARIA COURSE TREATMENT ELAPSED DAYS: NORMAL
RAD ONC ARIA PLAN TREATMENT DATES: NORMAL
RAD ONC ARIA REFERENCE POINT DOSAGE GIVEN TO DATE: 28
RAD ONC ARIA REFERENCE POINT DOSAGE GIVEN TO DATE: 28.66
RAD ONC ARIA REFERENCE POINT ID: NORMAL
RAD ONC ARIA REFERENCE POINT ID: NORMAL
RAD ONC ARIA REFERENCE POINT SESSION DOSAGE GIVEN: 2
RAD ONC ARIA REFERENCE POINT SESSION DOSAGE GIVEN: 2.05

## 2019-08-29 PROCEDURE — 77014 PR CT GUIDANCE PLACEMENT RAD THERAPY FIELDS: CPT | Mod: 26 | Performed by: RADIOLOGY

## 2019-08-29 PROCEDURE — 77336 RADIATION PHYSICS CONSULT: CPT | Performed by: RADIOLOGY

## 2019-08-29 PROCEDURE — 77386 HCHG IMRT DELIVERY COMPLEX: CPT | Performed by: RADIOLOGY

## 2019-08-30 ENCOUNTER — PATIENT OUTREACH (OUTPATIENT)
Dept: OTHER | Facility: MEDICAL CENTER | Age: 54
End: 2019-08-30

## 2019-08-30 ENCOUNTER — HOSPITAL ENCOUNTER (OUTPATIENT)
Dept: RADIATION ONCOLOGY | Facility: MEDICAL CENTER | Age: 54
End: 2019-08-30

## 2019-08-30 LAB
CHEMOTHERAPY INFUSION START DATE: NORMAL
CHEMOTHERAPY RECORDS: 2
CHEMOTHERAPY RECORDS: 4600
CHEMOTHERAPY RECORDS: NORMAL
CHEMOTHERAPY RX CANCER: NORMAL
RAD ONC ARIA COURSE TREATMENT ELAPSED DAYS: NORMAL
RAD ONC ARIA PLAN TREATMENT DATES: NORMAL
RAD ONC ARIA REFERENCE POINT DOSAGE GIVEN TO DATE: 30
RAD ONC ARIA REFERENCE POINT DOSAGE GIVEN TO DATE: 30.71
RAD ONC ARIA REFERENCE POINT ID: NORMAL
RAD ONC ARIA REFERENCE POINT ID: NORMAL
RAD ONC ARIA REFERENCE POINT SESSION DOSAGE GIVEN: 2
RAD ONC ARIA REFERENCE POINT SESSION DOSAGE GIVEN: 2.05

## 2019-08-30 PROCEDURE — 77427 RADIATION TX MANAGEMENT X5: CPT | Performed by: RADIOLOGY

## 2019-08-30 PROCEDURE — 77386 HCHG IMRT DELIVERY COMPLEX: CPT | Performed by: RADIOLOGY

## 2019-08-30 PROCEDURE — 77014 PR CT GUIDANCE PLACEMENT RAD THERAPY FIELDS: CPT | Mod: 26 | Performed by: RADIOLOGY

## 2019-08-30 NOTE — PROGRESS NOTES
On 8/30/2019 at 1133 patient's mother attempted to call PAUL Carpenter. PAUL Carpenter out of office and this covering ONN answered line. Patient's mother is looking for lodging while patient in treatment. Per patient's mother patient has three weeks left and patient will be moving with family to [place] at completion of treatment. Per patient's mother rest of family has left for [place] and as of Tuesday patient and patient's mother will need a place to stay. This ONN explained that sometimes there are discounted hotel rates or [employer] has discounted rooms sometimes. This ONN explained that PAUL Carpenter is not in office but VIPUL Arrington may be able to assist. This ONN transferred call to VIPUL Arrington.     After speaking with patient's mother this ONN spoke with VIPUL Arrington who did provide general lodging information/resources. VIPUL Arrington stated to this ONN that VIPUL Arrington only shared general resource information as patient's mother did not provide patient information.

## 2019-09-03 ENCOUNTER — HOSPITAL ENCOUNTER (OUTPATIENT)
Dept: RADIATION ONCOLOGY | Facility: MEDICAL CENTER | Age: 54
End: 2019-09-03

## 2019-09-03 ENCOUNTER — HOSPITAL ENCOUNTER (OUTPATIENT)
Dept: RADIATION ONCOLOGY | Facility: MEDICAL CENTER | Age: 54
End: 2019-09-30
Attending: RADIOLOGY
Payer: MEDICAID

## 2019-09-03 LAB
CHEMOTHERAPY INFUSION START DATE: NORMAL
CHEMOTHERAPY RECORDS: 2
CHEMOTHERAPY RECORDS: 4600
CHEMOTHERAPY RECORDS: NORMAL
CHEMOTHERAPY RX CANCER: NORMAL
RAD ONC ARIA COURSE TREATMENT ELAPSED DAYS: NORMAL
RAD ONC ARIA PLAN TREATMENT DATES: NORMAL
RAD ONC ARIA REFERENCE POINT DOSAGE GIVEN TO DATE: 32
RAD ONC ARIA REFERENCE POINT DOSAGE GIVEN TO DATE: 32.76
RAD ONC ARIA REFERENCE POINT ID: NORMAL
RAD ONC ARIA REFERENCE POINT ID: NORMAL
RAD ONC ARIA REFERENCE POINT SESSION DOSAGE GIVEN: 2
RAD ONC ARIA REFERENCE POINT SESSION DOSAGE GIVEN: 2.05

## 2019-09-03 PROCEDURE — 77386 HCHG IMRT DELIVERY COMPLEX: CPT | Performed by: RADIOLOGY

## 2019-09-03 PROCEDURE — 77014 PR CT GUIDANCE PLACEMENT RAD THERAPY FIELDS: CPT | Mod: 26 | Performed by: RADIOLOGY

## 2019-09-04 LAB
CHEMOTHERAPY INFUSION START DATE: NORMAL
CHEMOTHERAPY RECORDS: 2
CHEMOTHERAPY RECORDS: 4600
CHEMOTHERAPY RECORDS: NORMAL
CHEMOTHERAPY RX CANCER: NORMAL
RAD ONC ARIA COURSE TREATMENT ELAPSED DAYS: NORMAL
RAD ONC ARIA PLAN TREATMENT DATES: NORMAL
RAD ONC ARIA REFERENCE POINT DOSAGE GIVEN TO DATE: 34
RAD ONC ARIA REFERENCE POINT DOSAGE GIVEN TO DATE: 34.8
RAD ONC ARIA REFERENCE POINT ID: NORMAL
RAD ONC ARIA REFERENCE POINT ID: NORMAL
RAD ONC ARIA REFERENCE POINT SESSION DOSAGE GIVEN: 2
RAD ONC ARIA REFERENCE POINT SESSION DOSAGE GIVEN: 2.05

## 2019-09-04 PROCEDURE — 77014 PR CT GUIDANCE PLACEMENT RAD THERAPY FIELDS: CPT | Mod: 26 | Performed by: RADIOLOGY

## 2019-09-04 PROCEDURE — 77386 HCHG IMRT DELIVERY COMPLEX: CPT | Performed by: RADIOLOGY

## 2019-09-04 RX ORDER — DEXAMETHASONE 1 MG
TABLET ORAL
COMMUNITY
Start: 2019-06-22 | End: 2019-09-30

## 2019-09-05 ENCOUNTER — HOSPITAL ENCOUNTER (OUTPATIENT)
Dept: RADIATION ONCOLOGY | Facility: MEDICAL CENTER | Age: 54
End: 2019-09-05

## 2019-09-05 LAB
CHEMOTHERAPY INFUSION START DATE: NORMAL
CHEMOTHERAPY RECORDS: 2
CHEMOTHERAPY RECORDS: 4600
CHEMOTHERAPY RECORDS: NORMAL
CHEMOTHERAPY RX CANCER: NORMAL
RAD ONC ARIA COURSE TREATMENT ELAPSED DAYS: NORMAL
RAD ONC ARIA PLAN TREATMENT DATES: NORMAL
RAD ONC ARIA REFERENCE POINT DOSAGE GIVEN TO DATE: 36
RAD ONC ARIA REFERENCE POINT DOSAGE GIVEN TO DATE: 36.85
RAD ONC ARIA REFERENCE POINT ID: NORMAL
RAD ONC ARIA REFERENCE POINT ID: NORMAL
RAD ONC ARIA REFERENCE POINT SESSION DOSAGE GIVEN: 2
RAD ONC ARIA REFERENCE POINT SESSION DOSAGE GIVEN: 2.05

## 2019-09-05 PROCEDURE — 77386 HCHG IMRT DELIVERY COMPLEX: CPT | Performed by: RADIOLOGY

## 2019-09-05 PROCEDURE — 77014 PR CT GUIDANCE PLACEMENT RAD THERAPY FIELDS: CPT | Mod: 26 | Performed by: RADIOLOGY

## 2019-09-06 ENCOUNTER — HOSPITAL ENCOUNTER (OUTPATIENT)
Dept: RADIATION ONCOLOGY | Facility: MEDICAL CENTER | Age: 54
End: 2019-09-06

## 2019-09-06 LAB
CHEMOTHERAPY INFUSION START DATE: NORMAL
CHEMOTHERAPY RECORDS: 2
CHEMOTHERAPY RECORDS: 4600
CHEMOTHERAPY RECORDS: NORMAL
CHEMOTHERAPY RX CANCER: NORMAL
RAD ONC ARIA COURSE TREATMENT ELAPSED DAYS: NORMAL
RAD ONC ARIA PLAN TREATMENT DATES: NORMAL
RAD ONC ARIA REFERENCE POINT DOSAGE GIVEN TO DATE: 38
RAD ONC ARIA REFERENCE POINT DOSAGE GIVEN TO DATE: 38.9
RAD ONC ARIA REFERENCE POINT ID: NORMAL
RAD ONC ARIA REFERENCE POINT ID: NORMAL
RAD ONC ARIA REFERENCE POINT SESSION DOSAGE GIVEN: 2
RAD ONC ARIA REFERENCE POINT SESSION DOSAGE GIVEN: 2.05

## 2019-09-06 PROCEDURE — 77014 PR CT GUIDANCE PLACEMENT RAD THERAPY FIELDS: CPT | Mod: 26 | Performed by: RADIOLOGY

## 2019-09-06 PROCEDURE — 77336 RADIATION PHYSICS CONSULT: CPT | Performed by: RADIOLOGY

## 2019-09-06 PROCEDURE — 77386 HCHG IMRT DELIVERY COMPLEX: CPT | Performed by: RADIOLOGY

## 2019-09-09 ENCOUNTER — HOSPITAL ENCOUNTER (OUTPATIENT)
Dept: RADIATION ONCOLOGY | Facility: MEDICAL CENTER | Age: 54
End: 2019-09-09

## 2019-09-09 LAB
CHEMOTHERAPY INFUSION START DATE: NORMAL
CHEMOTHERAPY RECORDS: 2
CHEMOTHERAPY RECORDS: 4600
CHEMOTHERAPY RECORDS: NORMAL
CHEMOTHERAPY RX CANCER: NORMAL
RAD ONC ARIA COURSE TREATMENT ELAPSED DAYS: NORMAL
RAD ONC ARIA PLAN TREATMENT DATES: NORMAL
RAD ONC ARIA REFERENCE POINT DOSAGE GIVEN TO DATE: 40
RAD ONC ARIA REFERENCE POINT DOSAGE GIVEN TO DATE: 40.95
RAD ONC ARIA REFERENCE POINT ID: NORMAL
RAD ONC ARIA REFERENCE POINT ID: NORMAL
RAD ONC ARIA REFERENCE POINT SESSION DOSAGE GIVEN: 2
RAD ONC ARIA REFERENCE POINT SESSION DOSAGE GIVEN: 2.05

## 2019-09-09 PROCEDURE — 77427 RADIATION TX MANAGEMENT X5: CPT | Performed by: RADIOLOGY

## 2019-09-09 PROCEDURE — 77386 HCHG IMRT DELIVERY COMPLEX: CPT | Performed by: RADIOLOGY

## 2019-09-09 PROCEDURE — 77014 PR CT GUIDANCE PLACEMENT RAD THERAPY FIELDS: CPT | Mod: 26 | Performed by: RADIOLOGY

## 2019-09-10 ENCOUNTER — HOSPITAL ENCOUNTER (OUTPATIENT)
Dept: RADIATION ONCOLOGY | Facility: MEDICAL CENTER | Age: 54
End: 2019-09-10

## 2019-09-10 LAB
CHEMOTHERAPY INFUSION START DATE: NORMAL
CHEMOTHERAPY RECORDS: 2
CHEMOTHERAPY RECORDS: 4600
CHEMOTHERAPY RECORDS: NORMAL
CHEMOTHERAPY RX CANCER: NORMAL
RAD ONC ARIA COURSE TREATMENT ELAPSED DAYS: NORMAL
RAD ONC ARIA PLAN TREATMENT DATES: NORMAL
RAD ONC ARIA REFERENCE POINT DOSAGE GIVEN TO DATE: 42
RAD ONC ARIA REFERENCE POINT DOSAGE GIVEN TO DATE: 42.99
RAD ONC ARIA REFERENCE POINT ID: NORMAL
RAD ONC ARIA REFERENCE POINT ID: NORMAL
RAD ONC ARIA REFERENCE POINT SESSION DOSAGE GIVEN: 2
RAD ONC ARIA REFERENCE POINT SESSION DOSAGE GIVEN: 2.05

## 2019-09-10 PROCEDURE — 77386 HCHG IMRT DELIVERY COMPLEX: CPT | Performed by: RADIOLOGY

## 2019-09-10 PROCEDURE — 77014 PR CT GUIDANCE PLACEMENT RAD THERAPY FIELDS: CPT | Mod: 26 | Performed by: RADIOLOGY

## 2019-09-11 DIAGNOSIS — C71.9 GLIOBLASTOMA (HCC): ICD-10-CM

## 2019-09-11 LAB
CHEMOTHERAPY INFUSION START DATE: NORMAL
CHEMOTHERAPY RECORDS: 2
CHEMOTHERAPY RECORDS: 4600
CHEMOTHERAPY RECORDS: NORMAL
CHEMOTHERAPY RX CANCER: NORMAL
RAD ONC ARIA COURSE TREATMENT ELAPSED DAYS: NORMAL
RAD ONC ARIA PLAN TREATMENT DATES: NORMAL
RAD ONC ARIA REFERENCE POINT DOSAGE GIVEN TO DATE: 44
RAD ONC ARIA REFERENCE POINT DOSAGE GIVEN TO DATE: 45.04
RAD ONC ARIA REFERENCE POINT ID: NORMAL
RAD ONC ARIA REFERENCE POINT ID: NORMAL
RAD ONC ARIA REFERENCE POINT SESSION DOSAGE GIVEN: 2
RAD ONC ARIA REFERENCE POINT SESSION DOSAGE GIVEN: 2.05

## 2019-09-11 PROCEDURE — 77386 HCHG IMRT DELIVERY COMPLEX: CPT | Performed by: RADIOLOGY

## 2019-09-11 PROCEDURE — 77014 PR CT GUIDANCE PLACEMENT RAD THERAPY FIELDS: CPT | Mod: 26 | Performed by: RADIOLOGY

## 2019-09-12 ENCOUNTER — HOSPITAL ENCOUNTER (OUTPATIENT)
Dept: RADIATION ONCOLOGY | Facility: MEDICAL CENTER | Age: 54
End: 2019-09-12

## 2019-09-12 LAB
CHEMOTHERAPY INFUSION START DATE: NORMAL
CHEMOTHERAPY RECORDS: 2
CHEMOTHERAPY RECORDS: 4600
CHEMOTHERAPY RECORDS: NORMAL
CHEMOTHERAPY RX CANCER: NORMAL
RAD ONC ARIA COURSE TREATMENT ELAPSED DAYS: NORMAL
RAD ONC ARIA PLAN TREATMENT DATES: NORMAL
RAD ONC ARIA REFERENCE POINT DOSAGE GIVEN TO DATE: 46
RAD ONC ARIA REFERENCE POINT DOSAGE GIVEN TO DATE: 47.09
RAD ONC ARIA REFERENCE POINT ID: NORMAL
RAD ONC ARIA REFERENCE POINT ID: NORMAL
RAD ONC ARIA REFERENCE POINT SESSION DOSAGE GIVEN: 2
RAD ONC ARIA REFERENCE POINT SESSION DOSAGE GIVEN: 2.05

## 2019-09-12 PROCEDURE — 77014 PR CT GUIDANCE PLACEMENT RAD THERAPY FIELDS: CPT | Mod: 26 | Performed by: RADIOLOGY

## 2019-09-12 PROCEDURE — 77386 HCHG IMRT DELIVERY COMPLEX: CPT | Performed by: RADIOLOGY

## 2019-09-13 ENCOUNTER — HOSPITAL ENCOUNTER (OUTPATIENT)
Dept: RADIATION ONCOLOGY | Facility: MEDICAL CENTER | Age: 54
End: 2019-09-13

## 2019-09-13 LAB
CHEMOTHERAPY INFUSION START DATE: NORMAL
CHEMOTHERAPY RECORDS: 1400
CHEMOTHERAPY RECORDS: 2
CHEMOTHERAPY RECORDS: NORMAL
CHEMOTHERAPY RX CANCER: NORMAL
RAD ONC ARIA COURSE TREATMENT ELAPSED DAYS: NORMAL
RAD ONC ARIA PLAN TREATMENT DATES: NORMAL
RAD ONC ARIA REFERENCE POINT DOSAGE GIVEN TO DATE: 2
RAD ONC ARIA REFERENCE POINT DOSAGE GIVEN TO DATE: 2.07
RAD ONC ARIA REFERENCE POINT ID: NORMAL
RAD ONC ARIA REFERENCE POINT ID: NORMAL
RAD ONC ARIA REFERENCE POINT SESSION DOSAGE GIVEN: 2
RAD ONC ARIA REFERENCE POINT SESSION DOSAGE GIVEN: 2.07

## 2019-09-13 PROCEDURE — 77386 HCHG IMRT DELIVERY COMPLEX: CPT | Performed by: RADIOLOGY

## 2019-09-13 PROCEDURE — 77336 RADIATION PHYSICS CONSULT: CPT | Performed by: RADIOLOGY

## 2019-09-13 PROCEDURE — 77014 PR CT GUIDANCE PLACEMENT RAD THERAPY FIELDS: CPT | Mod: 26 | Performed by: RADIOLOGY

## 2019-09-16 ENCOUNTER — HOSPITAL ENCOUNTER (OUTPATIENT)
Dept: RADIATION ONCOLOGY | Facility: MEDICAL CENTER | Age: 54
End: 2019-09-16

## 2019-09-16 LAB
CHEMOTHERAPY INFUSION START DATE: NORMAL
CHEMOTHERAPY RECORDS: 1400
CHEMOTHERAPY RECORDS: 2
CHEMOTHERAPY RECORDS: NORMAL
CHEMOTHERAPY RX CANCER: NORMAL
RAD ONC ARIA COURSE TREATMENT ELAPSED DAYS: NORMAL
RAD ONC ARIA PLAN TREATMENT DATES: NORMAL
RAD ONC ARIA REFERENCE POINT DOSAGE GIVEN TO DATE: 4
RAD ONC ARIA REFERENCE POINT DOSAGE GIVEN TO DATE: 4.13
RAD ONC ARIA REFERENCE POINT ID: NORMAL
RAD ONC ARIA REFERENCE POINT ID: NORMAL
RAD ONC ARIA REFERENCE POINT SESSION DOSAGE GIVEN: 2
RAD ONC ARIA REFERENCE POINT SESSION DOSAGE GIVEN: 2.07

## 2019-09-16 PROCEDURE — 77014 PR CT GUIDANCE PLACEMENT RAD THERAPY FIELDS: CPT | Mod: 26 | Performed by: RADIOLOGY

## 2019-09-16 PROCEDURE — 77427 RADIATION TX MANAGEMENT X5: CPT | Performed by: RADIOLOGY

## 2019-09-16 PROCEDURE — 77386 HCHG IMRT DELIVERY COMPLEX: CPT | Performed by: RADIOLOGY

## 2019-09-17 ENCOUNTER — HOSPITAL ENCOUNTER (OUTPATIENT)
Dept: RADIATION ONCOLOGY | Facility: MEDICAL CENTER | Age: 54
End: 2019-09-17

## 2019-09-17 LAB
CHEMOTHERAPY INFUSION START DATE: NORMAL
CHEMOTHERAPY RECORDS: 1400
CHEMOTHERAPY RECORDS: 2
CHEMOTHERAPY RECORDS: NORMAL
CHEMOTHERAPY RX CANCER: NORMAL
RAD ONC ARIA COURSE TREATMENT ELAPSED DAYS: NORMAL
RAD ONC ARIA PLAN TREATMENT DATES: NORMAL
RAD ONC ARIA REFERENCE POINT DOSAGE GIVEN TO DATE: 6
RAD ONC ARIA REFERENCE POINT DOSAGE GIVEN TO DATE: 6.2
RAD ONC ARIA REFERENCE POINT ID: NORMAL
RAD ONC ARIA REFERENCE POINT ID: NORMAL
RAD ONC ARIA REFERENCE POINT SESSION DOSAGE GIVEN: 2
RAD ONC ARIA REFERENCE POINT SESSION DOSAGE GIVEN: 2.07

## 2019-09-17 PROCEDURE — 77014 PR CT GUIDANCE PLACEMENT RAD THERAPY FIELDS: CPT | Mod: 26 | Performed by: RADIOLOGY

## 2019-09-17 PROCEDURE — 77386 HCHG IMRT DELIVERY COMPLEX: CPT | Performed by: RADIOLOGY

## 2019-09-18 ENCOUNTER — PATIENT OUTREACH (OUTPATIENT)
Dept: OTHER | Facility: MEDICAL CENTER | Age: 54
End: 2019-09-18

## 2019-09-18 DIAGNOSIS — C71.9 GLIOBLASTOMA (HCC): ICD-10-CM

## 2019-09-18 LAB
CHEMOTHERAPY INFUSION START DATE: NORMAL
CHEMOTHERAPY RECORDS: 1400
CHEMOTHERAPY RECORDS: 2
CHEMOTHERAPY RECORDS: NORMAL
CHEMOTHERAPY RX CANCER: NORMAL
RAD ONC ARIA COURSE TREATMENT ELAPSED DAYS: NORMAL
RAD ONC ARIA PLAN TREATMENT DATES: NORMAL
RAD ONC ARIA REFERENCE POINT DOSAGE GIVEN TO DATE: 8
RAD ONC ARIA REFERENCE POINT DOSAGE GIVEN TO DATE: 8.26
RAD ONC ARIA REFERENCE POINT ID: NORMAL
RAD ONC ARIA REFERENCE POINT ID: NORMAL
RAD ONC ARIA REFERENCE POINT SESSION DOSAGE GIVEN: 2
RAD ONC ARIA REFERENCE POINT SESSION DOSAGE GIVEN: 2.07

## 2019-09-18 PROCEDURE — 77386 HCHG IMRT DELIVERY COMPLEX: CPT | Performed by: RADIOLOGY

## 2019-09-18 PROCEDURE — 77014 PR CT GUIDANCE PLACEMENT RAD THERAPY FIELDS: CPT | Mod: 26 | Performed by: RADIOLOGY

## 2019-09-18 NOTE — PROGRESS NOTES
"Pt's mother, [place], left message this am reporting that she is being told patient's medicaid is not active.  Hoping navigator can help out with information.  Call placed to financial resource advocate who was able to access information and verify medicaid eligibility.  Pt still has active medicaid.    Call placed to [place], left message that patient's \"insurance\" does show as active.  Can call for additional questions or concerns.  "

## 2019-09-19 ENCOUNTER — HOSPITAL ENCOUNTER (OUTPATIENT)
Dept: RADIATION ONCOLOGY | Facility: MEDICAL CENTER | Age: 54
End: 2019-09-19

## 2019-09-19 LAB
CHEMOTHERAPY INFUSION START DATE: NORMAL
CHEMOTHERAPY RECORDS: 1400
CHEMOTHERAPY RECORDS: 2
CHEMOTHERAPY RECORDS: NORMAL
CHEMOTHERAPY RX CANCER: NORMAL
RAD ONC ARIA COURSE TREATMENT ELAPSED DAYS: NORMAL
RAD ONC ARIA PLAN TREATMENT DATES: NORMAL
RAD ONC ARIA REFERENCE POINT DOSAGE GIVEN TO DATE: 10
RAD ONC ARIA REFERENCE POINT DOSAGE GIVEN TO DATE: 10.33
RAD ONC ARIA REFERENCE POINT ID: NORMAL
RAD ONC ARIA REFERENCE POINT ID: NORMAL
RAD ONC ARIA REFERENCE POINT SESSION DOSAGE GIVEN: 2
RAD ONC ARIA REFERENCE POINT SESSION DOSAGE GIVEN: 2.07

## 2019-09-19 PROCEDURE — 77014 PR CT GUIDANCE PLACEMENT RAD THERAPY FIELDS: CPT | Mod: 26 | Performed by: RADIOLOGY

## 2019-09-19 PROCEDURE — 77386 HCHG IMRT DELIVERY COMPLEX: CPT | Performed by: RADIOLOGY

## 2019-09-20 ENCOUNTER — HOSPITAL ENCOUNTER (OUTPATIENT)
Dept: RADIATION ONCOLOGY | Facility: MEDICAL CENTER | Age: 54
End: 2019-09-20

## 2019-09-20 ENCOUNTER — TELEPHONE (OUTPATIENT)
Dept: OPHTHALMOLOGY | Facility: MEDICAL CENTER | Age: 54
End: 2019-09-20

## 2019-09-20 LAB
CHEMOTHERAPY INFUSION START DATE: NORMAL
CHEMOTHERAPY RECORDS: 1400
CHEMOTHERAPY RECORDS: 2
CHEMOTHERAPY RECORDS: NORMAL
CHEMOTHERAPY RX CANCER: NORMAL
RAD ONC ARIA COURSE TREATMENT ELAPSED DAYS: NORMAL
RAD ONC ARIA PLAN TREATMENT DATES: NORMAL
RAD ONC ARIA REFERENCE POINT DOSAGE GIVEN TO DATE: 12
RAD ONC ARIA REFERENCE POINT DOSAGE GIVEN TO DATE: 12.39
RAD ONC ARIA REFERENCE POINT ID: NORMAL
RAD ONC ARIA REFERENCE POINT ID: NORMAL
RAD ONC ARIA REFERENCE POINT SESSION DOSAGE GIVEN: 2
RAD ONC ARIA REFERENCE POINT SESSION DOSAGE GIVEN: 2.07

## 2019-09-20 PROCEDURE — 77014 PR CT GUIDANCE PLACEMENT RAD THERAPY FIELDS: CPT | Mod: 26 | Performed by: RADIOLOGY

## 2019-09-20 PROCEDURE — 77386 HCHG IMRT DELIVERY COMPLEX: CPT | Performed by: RADIOLOGY

## 2019-09-20 PROCEDURE — 77336 RADIATION PHYSICS CONSULT: CPT | Performed by: RADIOLOGY

## 2019-09-23 ENCOUNTER — HOSPITAL ENCOUNTER (OUTPATIENT)
Dept: RADIATION ONCOLOGY | Facility: MEDICAL CENTER | Age: 54
End: 2019-09-23

## 2019-09-23 LAB
CHEMOTHERAPY INFUSION START DATE: NORMAL
CHEMOTHERAPY RECORDS: 1400
CHEMOTHERAPY RECORDS: 2
CHEMOTHERAPY RECORDS: NORMAL
CHEMOTHERAPY RX CANCER: NORMAL
RAD ONC ARIA COURSE TREATMENT ELAPSED DAYS: NORMAL
RAD ONC ARIA PLAN TREATMENT DATES: NORMAL
RAD ONC ARIA REFERENCE POINT DOSAGE GIVEN TO DATE: 14
RAD ONC ARIA REFERENCE POINT DOSAGE GIVEN TO DATE: 14.46
RAD ONC ARIA REFERENCE POINT ID: NORMAL
RAD ONC ARIA REFERENCE POINT ID: NORMAL
RAD ONC ARIA REFERENCE POINT SESSION DOSAGE GIVEN: 2
RAD ONC ARIA REFERENCE POINT SESSION DOSAGE GIVEN: 2.07

## 2019-09-23 PROCEDURE — 77014 PR CT GUIDANCE PLACEMENT RAD THERAPY FIELDS: CPT | Mod: 26 | Performed by: RADIOLOGY

## 2019-09-23 PROCEDURE — 77427 RADIATION TX MANAGEMENT X5: CPT | Performed by: RADIOLOGY

## 2019-09-23 PROCEDURE — 77386 HCHG IMRT DELIVERY COMPLEX: CPT | Performed by: RADIOLOGY

## 2019-09-30 ENCOUNTER — HOSPITAL ENCOUNTER (EMERGENCY)
Facility: MEDICAL CENTER | Age: 54
End: 2019-09-30
Attending: EMERGENCY MEDICINE
Payer: MEDICAID

## 2019-09-30 ENCOUNTER — HOSPITAL ENCOUNTER (OUTPATIENT)
Dept: RADIOLOGY | Facility: MEDICAL CENTER | Age: 54
End: 2019-09-30

## 2019-09-30 VITALS
BODY MASS INDEX: 33.86 KG/M2 | OXYGEN SATURATION: 96 % | WEIGHT: 250 LBS | SYSTOLIC BLOOD PRESSURE: 144 MMHG | HEART RATE: 88 BPM | DIASTOLIC BLOOD PRESSURE: 94 MMHG | RESPIRATION RATE: 16 BRPM | TEMPERATURE: 97.5 F | HEIGHT: 72 IN

## 2019-09-30 DIAGNOSIS — C71.9 GBM (GLIOBLASTOMA MULTIFORME) (HCC): ICD-10-CM

## 2019-09-30 DIAGNOSIS — R56.9 SEIZURE (HCC): ICD-10-CM

## 2019-09-30 PROCEDURE — 99284 EMERGENCY DEPT VISIT MOD MDM: CPT

## 2019-09-30 PROCEDURE — 700111 HCHG RX REV CODE 636 W/ 250 OVERRIDE (IP): Performed by: EMERGENCY MEDICINE

## 2019-09-30 PROCEDURE — 700105 HCHG RX REV CODE 258: Performed by: EMERGENCY MEDICINE

## 2019-09-30 PROCEDURE — 96374 THER/PROPH/DIAG INJ IV PUSH: CPT

## 2019-09-30 RX ORDER — DEXAMETHASONE SODIUM PHOSPHATE 10 MG/ML
10 INJECTION, SOLUTION INTRAMUSCULAR; INTRAVENOUS ONCE
Status: COMPLETED | OUTPATIENT
Start: 2019-09-30 | End: 2019-09-30

## 2019-09-30 RX ORDER — ACETAMINOPHEN 500 MG
500-1000 TABLET ORAL EVERY 6 HOURS PRN
COMMUNITY

## 2019-09-30 RX ORDER — LEVETIRACETAM 750 MG/1
750 TABLET ORAL 2 TIMES DAILY
COMMUNITY
Start: 2019-08-06 | End: 2019-09-30

## 2019-09-30 RX ORDER — DEXAMETHASONE 4 MG/1
4 TABLET ORAL 2 TIMES DAILY
Qty: 10 TAB | Refills: 0 | Status: SHIPPED | OUTPATIENT
Start: 2019-09-30 | End: 2019-10-10

## 2019-09-30 RX ORDER — LEVETIRACETAM 500 MG/1
500 TABLET ORAL 2 TIMES DAILY
Qty: 60 TAB | Refills: 0 | Status: SHIPPED | OUTPATIENT
Start: 2019-09-30

## 2019-09-30 RX ADMIN — SODIUM CHLORIDE 1000 MG: 9 INJECTION, SOLUTION INTRAVENOUS at 18:14

## 2019-09-30 RX ADMIN — DEXAMETHASONE SODIUM PHOSPHATE 10 MG: 10 INJECTION INTRAMUSCULAR; INTRAVENOUS at 18:10

## 2019-09-30 NOTE — ED TRIAGE NOTES
Chief Complaint   Patient presents with   • Seizure     Had a seizure this afternoon.  Transferred from [facility].  Pt alert and oriented X3 minus event.  gcs 15.  Pt to red 9.

## 2019-09-30 NOTE — ED PROVIDER NOTES
ED Provider Note    ED Provider Note    Primary care provider: Pcp Pt States None  Means of arrival: EMS  History obtained from: Patient    CHIEF COMPLAINT  Chief Complaint   Patient presents with   • Seizure     Seen at 4:56 PM.   HPI  Robert Dorantes is a 54 y.o. male who presents to the Emergency Department as a transfer from [facility] for seizure.  Patient with known neuroblastoma status post radiation therapy and chemo.  The patient was actually doing well today.  He had some daily headaches initially and was on ibuprofen, he stopped this over the past 48 hours and realizes headaches have resolved.  He was in his usual state of health, the passenger in his mom's car today when he had a tonic-clonic seizure.  The mother noted that he was shaking and had decreased level of consciousness.  This lasted for several minutes until she arrived to the emergency department.  He has not had any seizures since his discharge from this facility.  He reports a history of some nocturnal seizures that happened in childhood, none since this time.  He is amnestic to the event.  He currently is without complaints.  He is not on any antiepileptic medications.    He denies any recent fevers, chills, visual changes, numbness, weakness, vomiting, shortness of breath.    Labs at the outside facility: UDS negative, CBC unremarkable, BMP shows creatinine of 1.31, otherwise unremarkable.  CT head reveals left hemispheric encephalomalacia with 2.5 similar masslike hypodensity.  Mass-effect with 5 mm of left-to-right midline shift at the foramen of Wu and compression of the left lateral ventricle.  No hemorrhage.    REVIEW OF SYSTEMS  See HPI,   Remainder of ROS negative.     PAST MEDICAL HISTORY   has a past medical history of Smoker.    SURGICAL HISTORY   has a past surgical history that includes craniotomy stealth (Left, 5/22/2019) and craniotomy stealth (Left, 6/16/2019).    SOCIAL HISTORY  Social History      Tobacco Use   • Smoking status: Current Every Day Smoker     Packs/day: 0.25     Types: Cigarettes     Last attempt to quit: 2013     Years since quittin.6   • Smokeless tobacco: Never Used   Substance Use Topics   • Alcohol use: Yes     Comment: rare   • Drug use: Yes     Types: Methamphetamines      Social History     Substance and Sexual Activity   Drug Use Yes   • Types: Methamphetamines       FAMILY HISTORY  No family history on file.    CURRENT MEDICATIONS  Reviewed.  See Encounter Summary.     ALLERGIES  Allergies   Allergen Reactions   • Pcn [Penicillins]      Has tolerated cefdinir in May 2019   • Sulfa Drugs        PHYSICAL EXAM  VITAL SIGNS: /94   Pulse 88   Temp 36.4 °C (97.5 °F) (Temporal)   Resp 16   Ht 1.829 m (6')   Wt 113.4 kg (250 lb)   SpO2 96%   BMI 33.91 kg/m²   Constitutional: Awake, alert in no apparent distress.  HENT: Normocephalic, Bilateral external ears normal. Nose normal.  Craniotomy scar.  Eyes: Conjunctiva normal, non-icteric, EOMI.    Thorax & Lungs: Easy unlabored respirations, Clear to ascultation bilaterally.  Cardiovascular: Regular rate, Regular rhythm, No murmurs, rubs or gallops.  Abdomen:  Soft, nontender, nondistended, normal active bowel sounds.   :    Skin: Visualized skin is  Dry, No erythema, No rash.   Musculoskeletal:   No cyanosis, clubbing or edema.  Neurologic: Alert, Grossly non-focal.  Cranial nerves II through XII intact, normal speech.  Finger-nose intact.  Good strength all 4 extremities.  Psychiatric: Normal affect, Normal mood  Lymphatic:  No cervical LAD      RADIOLOGY  OUTSIDE IMAGES-CT HEAD   Final Result            COURSE & MEDICAL DECISION MAKING  Pertinent Labs & Imaging studies reviewed. (See chart for details)        4:56 PM - Medical record reviewed, patient had craniotomy x2 in  of this year, initially for a cystic mass/abscess and the same hospitalization was found to have GBM on the second craniotomy.  Radiology  called to attempt to pull images from [facility] as they are not in the medical record at this time.    5:40 PM-CT now uploaded, plan to page Dr. Young.     5:50 PM- Dr. Lo-he reviewed the images, the edema appears to be slightly worsened than it was in June.  He recommends discussion with radiation oncology as well as oncology.  He feels that the patient needs to be seen promptly by the medical specialties.  There is no neurosurgical issue.  He recommends starting Decadron as well as antiepileptics.    5:59 PM-patient updated, he currently sees Dr. De Luna, he last saw him last week and his next follow-up was until October 21.  He was seeing Dr. Lynne of radiation therapy.  Plan to call Dr. Ulloa who is covering for Dr. De Luna.    6:15 PM -Dr. Ulloa returned the phone call for Dr. De Luna.  He recommends dexamethasone.  He states that this should be followed by radiation oncology and not the oncology service, he will forward the message on to Dr. De Luna now.    6:27 PM Dr. Ellington-radiation oncology would like the patient started on antiepileptics and Decadron as well.  He feels that the patient can be discharged.  He will notify Dr. Lynne tomorrow to arrange follow-up.      Decision Making:  This is a 54 y.o. year old male who presents with new onset seizure, he is status post craniotomy for GBM and is status post XRT as well as chemotherapy.  I discussed the case with neurosurgery, oncology and radiation oncology.  L3 specialist feel the patient could be safely discharged provided he has close follow-up and starts dexamethasone as well as antiepileptic medications.  The patient is comfortable with this and would like to be discharged.  He is neurologically intact and well-appearing at this time.  He does not have any headaches.  He does not appear to be at any risk for impending herniation at this time.  He was loaded with dexamethasone 10 mg IV in the emergency department, he will be started on 4 mg twice  daily in concordance with radiation oncology.  I have also started him on Keppra as well.    He is welcome to return for any additional seizures, headaches, neurological sequelae or any other concern.    Discharge Medications:  Discharge Medication List as of 9/30/2019  6:39 PM      START taking these medications    Details   levETIRAcetam (KEPPRA) 500 MG Tab Take 1 Tab by mouth 2 times a day., Disp-60 Tab, R-0, Normal      dexamethasone (DECADRON) 4 MG Tab Take 1 Tab by mouth 2 times a day for 10 days., Disp-10 Tab, R-0, Normal             The patient was discharged home (see d/c instructions) and parent was told to return immediately for any signs or symptoms listed, or any worsening at all.  The patient's parent verbally agreed to the discharge precautions and follow-up plan which is documented in EPIC.    The patient's blood pressure is elevated today. >120/80. I have referred them to primary care for follow up.       45 minutes were spent in consultation with outlying physicians, radiology and coordinating care with the patient.      FINAL IMPRESSION  1. Seizure (HCC)    2. GBM (glioblastoma multiforme) (HCC)

## 2019-10-01 NOTE — ED NOTES
Med Rec complete per Pt at bedside  Allergies Reviewed  No ABX in the last 14 days    Pt finished TEMODAR 1 week ago.

## 2019-10-01 NOTE — ED NOTES
Pt provided with discharge instructions, prescriptions x 2, instructions for follow up appointment with Radiation Oncologist tomorrow, s/s of when to seek emergency care.  Pt verbalizes understanding.  Pt discharged in good condition with Mother driving pt home.

## 2019-10-02 LAB
CHEMOTHERAPY INFUSION START DATE: NORMAL
CHEMOTHERAPY RECORDS: 1400
CHEMOTHERAPY RECORDS: 2
CHEMOTHERAPY RECORDS: 2
CHEMOTHERAPY RECORDS: 4600
CHEMOTHERAPY RECORDS: NORMAL
CHEMOTHERAPY RX CANCER: NORMAL
RAD ONC ARIA COURSE TREATMENT ELAPSED DAYS: NORMAL
RAD ONC ARIA PLAN TREATMENT DATES: NORMAL
RAD ONC ARIA REFERENCE POINT DOSAGE GIVEN TO DATE: 14
RAD ONC ARIA REFERENCE POINT DOSAGE GIVEN TO DATE: 14.46
RAD ONC ARIA REFERENCE POINT DOSAGE GIVEN TO DATE: 46
RAD ONC ARIA REFERENCE POINT DOSAGE GIVEN TO DATE: 47.09
RAD ONC ARIA REFERENCE POINT ID: NORMAL

## 2019-10-03 ENCOUNTER — PATIENT OUTREACH (OUTPATIENT)
Dept: OTHER | Facility: MEDICAL CENTER | Age: 54
End: 2019-10-03

## 2019-10-03 NOTE — PROGRESS NOTES
"Patient's mother, [place], left 2 messages yesterday afternoon regarding concern regarding patient not having active medicaid.  Verified with financial resource advocate that medicaid was showing as inactive for patient.  Call placed to [place] with information.  Discussed she would need to contact medicaid office to find out information regarding change in eligibility.  Pt has upcoming MRI and she is concerned regarding how much this will cost if pt doesn't have medicaid.  Encouraged her to follow up with medicaid office first to see why patient is being listed as ineligible and if they need to submit information to change this.  Provided number for Liz, financial resource advocate, if this is not able to be resolved and they need financial resource information for patient to still get MRI.  Mother also talked about patient having seizure and being in ER.  He was placed on medication and patient \"more angry\".  Noted patient placed on decadron.  Reviewed possible side effects from medication.  She plans to contact Dr Lynne to see if able to decrease dose given to help with behavior change.  Pt currently not in active treatment with [facility].  Available as needed.  "

## 2019-10-04 ENCOUNTER — TELEPHONE (OUTPATIENT)
Dept: OPHTHALMOLOGY | Facility: MEDICAL CENTER | Age: 54
End: 2019-10-04

## 2019-10-09 ENCOUNTER — HOSPITAL ENCOUNTER (OUTPATIENT)
Dept: RADIOLOGY | Facility: MEDICAL CENTER | Age: 54
End: 2019-10-09
Attending: NURSE PRACTITIONER
Payer: COMMERCIAL

## 2019-10-09 DIAGNOSIS — C71.9 MALIGNANT NEOPLASM OF BRAIN, UNSPECIFIED LOCATION (HCC): ICD-10-CM

## 2019-10-09 PROCEDURE — 700117 HCHG RX CONTRAST REV CODE 255: Performed by: NURSE PRACTITIONER

## 2019-10-09 PROCEDURE — A9576 INJ PROHANCE MULTIPACK: HCPCS | Performed by: NURSE PRACTITIONER

## 2019-10-09 PROCEDURE — 70553 MRI BRAIN STEM W/O & W/DYE: CPT

## 2019-10-09 RX ADMIN — GADOTERIDOL 20 ML: 279.3 INJECTION, SOLUTION INTRAVENOUS at 09:25

## 2019-10-10 ENCOUNTER — HOSPITAL ENCOUNTER (OUTPATIENT)
Dept: RADIATION ONCOLOGY | Facility: MEDICAL CENTER | Age: 54
End: 2019-10-31
Attending: RADIOLOGY
Payer: COMMERCIAL

## 2019-10-10 VITALS
HEART RATE: 83 BPM | SYSTOLIC BLOOD PRESSURE: 136 MMHG | OXYGEN SATURATION: 96 % | TEMPERATURE: 97.4 F | DIASTOLIC BLOOD PRESSURE: 81 MMHG | BODY MASS INDEX: 33.39 KG/M2 | WEIGHT: 246.18 LBS

## 2019-10-10 PROCEDURE — 99212 OFFICE O/P EST SF 10 MIN: CPT | Performed by: RADIOLOGY

## 2019-10-10 ASSESSMENT — PAIN SCALES - GENERAL: PAINLEVEL: NO PAIN

## 2019-10-10 NOTE — PROGRESS NOTES
"RADIATION ONCOLOGY FOLLOW-UP    DATE OF SERVICE: 10/10/2019    IDENTIFICATION:   A 54 y.o. 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     HISTORY OF PRESENT ILLNESS: Patient was unable to give a clear history. History was obtained via chart review.  Robert Dorantes is 54 y.o. Male with a past medical history of methamphetamine use last use in 2000 per pt. Pt presented to the ER on 05/12/19 for ear pain and hearing loss.  Pt was discharged on cefdinir for acute suppurative otitis media of the L ear.  He came to the ER again on 5/21/19 for worsening L ear pain with hearing loss, HA, confusion, unsteady with word finding difficulty. Pt had a head CT that showed \"a 4 cm mass lesion in the left frontotemporal region. Imaging features favor an abscess over a solid lesion. Extensive edema associated with the mass, resulting in 10 mm left-to-right midline shift. Effacement of the left and enlargement of the right lateral ventricles may be due to developing hydrocephalus\". Neurosurgery and ID were consulted for possible abscess vs malignancy.  Patient was started on dexamethasone, vancomycin, Flagyl, cefepime.      Interval History:  Patient had interval MRI brain on 10/9/19 which showed increase in size of multiloculated postoperative cavity in the left temporal lobe with extensive thick-walled peripheral enhancement around the periphery of the cavity with increased T2 signal in the surrounding white matter in the left frontal and temporal lobes and left thalamus with increasing effacement of left lateral ventricle and slight increase left to right midline shift specialist for recurrent residual neoplasm.  It is a little difficult to interpret one month at a treatment given high propensity of pseudo-progression in the MGMT methylated tumors.  He had one episode of seizure when he was off his Keppra and was admitted and started on steroids and " restarted his Keppra.  He currently denies any headaches, nausea, vomiting or new seizures.    CURRENT MEDICATIONS:  Current Outpatient Medications   Medication Sig Dispense Refill   • acetaminophen (TYLENOL) 500 MG Tab Take 500-1,000 mg by mouth every 6 hours as needed. Indications: Pain     • levETIRAcetam (KEPPRA) 500 MG Tab Take 1 Tab by mouth 2 times a day. 60 Tab 0   • dexamethasone (DECADRON) 4 MG Tab Take 1 Tab by mouth 2 times a day for 10 days. (Patient not taking: Reported on 10/10/2019) 10 Tab 0   • Temozolomide 140 MG Cap Take 140 mg by mouth every day. Take with 20 mg for a total dose of 160 mg     • temozolomide (TEMODAR) 20 MG Cap Take 20 mg by mouth every day. Take with 140 mg for a total dose of 160 mg       No current facility-administered medications for this encounter.        ALLERGIES:  Pcn [penicillins] and Sulfa drugs    PHYSICAL EXAM:   /81 (BP Location: Right arm, Patient Position: Sitting, BP Cuff Size: Adult long)   Pulse 83   Temp 36.3 °C (97.4 °F) (Temporal)   Wt 111.7 kg (246 lb 2.9 oz)   SpO2 96%   BMI 33.39 kg/m²     Gen: well appearing    LABORATORY DATA:   Lab Results   Component Value Date/Time    SODIUM 136 06/21/2019 04:08 AM    POTASSIUM 3.9 06/21/2019 04:08 AM    CHLORIDE 103 06/21/2019 04:08 AM    CO2 25 06/21/2019 04:08 AM    GLUCOSE 133 (H) 06/21/2019 04:08 AM    BUN 22 06/21/2019 04:08 AM    CREATININE 0.53 06/21/2019 04:08 AM     Lab Results   Component Value Date/Time    ALKPHOSPHAT 37 06/18/2019 04:34 AM    ASTSGOT 7 (L) 06/18/2019 04:34 AM    ALTSGPT 14 06/18/2019 04:34 AM    TBILIRUBIN 0.6 06/18/2019 04:34 AM      Lab Results   Component Value Date/Time    WBC 14.0 (H) 06/21/2019 04:08 AM    RBC 4.90 06/21/2019 04:08 AM    HEMOGLOBIN 11.8 (L) 06/21/2019 04:08 AM    HEMATOCRIT 38.0 (L) 06/21/2019 04:08 AM    MCV 77.6 (L) 06/21/2019 04:08 AM    MCH 24.1 (L) 06/21/2019 04:08 AM    MCHC 31.1 (L) 06/21/2019 04:08 AM    MPV 10.9 06/21/2019 04:08 AM    NEUTSPOLYS  85.10 (H) 06/19/2019 04:15 AM    LYMPHOCYTES 6.30 (L) 06/19/2019 04:15 AM    MONOCYTES 7.40 06/19/2019 04:15 AM    EOSINOPHILS 0.00 06/19/2019 04:15 AM    BASOPHILS 0.10 06/19/2019 04:15 AM    HYPOCHROMIA 1+ 06/13/2019 05:05 AM    ANISOCYTOSIS 1+ 06/13/2019 05:05 AM        RADIOLOGY DATA:  Mr-brain-with & W/o    Result Date: 10/9/2019  10/9/2019 7:57 AM HISTORY/REASON FOR EXAM: Postop left temporal glioblastoma. TECHNIQUE/EXAM DESCRIPTION:   MRI of the brain without and with contrast. T1 sagittal, T2 fast spin-echo axial, T1 coronal, FLAIR coronal, diffusion-weighted and apparent diffusion coefficient (ADC map) axial images were obtained of the whole brain. T1 postcontrast axial and T1 postcontrast coronal images were obtained. The study was performed on a Vaibhav 1.5 Nory MRI scanner. 20 mL ProHance contrast was administered intravenously. COMPARISON:  MRI scan of the brain 7/31/2019 FINDINGS: Previous left frontal temporal craniotomy. There is an irregular postsurgical defect deep to the craniotomy site. There is thick walled peripheral enhancement about the postsurgical cavity. The multiloculated postsurgical cavity has increased in size since previous exam. There is also increased amount of T2 signal intensity in the adjacent left frontal and temporal lobes. There is moderate effacement of the left frontal horn and body of the left lateral ventricle. There is also increased T2 signal intensity throughout the left thalamus. There is 5 mm of left-to-right midline shift the ventricular system. There are no extra-axial fluid collections. The ventricular system is not dilated. There are no hemorrhagic lesions. The diffusion-weighted axial images show no evidence of acute cerebral infarction. The brainstem and posterior fossa structures are unremarkable. Vascular flow voids in the vertebrobasilar and carotid arteries, [place], and dural venous sinuses are intact. The paranasal sinuses mild chronic mucosal  inflammatory change..     1.  Previous left frontal temporal craniotomy for resection of brain neoplasm. 2.  Interval increase in size of the multiloculated postoperative cavity in the left temporal lobe. Further there is extensive thick walled peripheral enhancement about the periphery of this cavity. There is also increased T2 signal intensity in the surrounding white matter in the left frontal and temporal lobes and also the left thalamus. There is also increasing effacement of the left lateral ventricle and slight increased left to right midline shift the ventricular system. All of these findings are suspicious for recurrent/residual neoplasm, less likely radiation necrosis could have this appearance.    Pain Scale: 0/10  Pain Assessement: Reassessment      IMPRESSION:    A 54 y.o. 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    RECOMMENDATIONS:   I discussed with patient that imaging findings are unclear at this time given he is only 1 month post treatment and this could be pseudo-progression given high propensity of this phenomenon in MGMT methylated tumors in MRI for first 6 months.  I have advised him to pursue Optune at this time and start Temodar adjuvantly with Dr. De Luna at his next appointment. He will see Dr. Young next week as well and is on keppra with Dr. Kim.  I have plan for close interval MRI in late December to follow imaging findings.  I have advised his mother that if he develops increased headaches to start 4 mg Decadron and if it does not resolve give our office a call or come to the hospital for close evaluation.    Thank you for the opportunity to participate in his care.  If any questions or comments, please do not hesitate in calling.    CC: Dr. Young, Dr. De Luna, Dr. Kim

## 2019-10-10 NOTE — NON-PROVIDER
Patient was seen today in clinic with Dr. Lynne for follow up.  Vitals signs and weight were obtained and pain assessment was completed.  Allergies and medications were reviewed with the patient.  Review of systems completed.     Vitals/Pain:  Vitals:    10/10/19 0840   BP: 136/81   BP Location: Right arm   Patient Position: Sitting   BP Cuff Size: Adult long   Pulse: 83   Temp: 36.3 °C (97.4 °F)   TempSrc: Temporal   SpO2: 96%   Weight: 111.7 kg (246 lb 2.9 oz)   Pain Score: No pain        Allergies:   Pcn [penicillins] and Sulfa drugs    Current Medications:  Current Outpatient Medications   Medication Sig Dispense Refill   • acetaminophen (TYLENOL) 500 MG Tab Take 500-1,000 mg by mouth every 6 hours as needed. Indications: Pain     • levETIRAcetam (KEPPRA) 500 MG Tab Take 1 Tab by mouth 2 times a day. 60 Tab 0   • dexamethasone (DECADRON) 4 MG Tab Take 1 Tab by mouth 2 times a day for 10 days. (Patient not taking: Reported on 10/10/2019) 10 Tab 0   • Temozolomide 140 MG Cap Take 140 mg by mouth every day. Take with 20 mg for a total dose of 160 mg     • temozolomide (TEMODAR) 20 MG Cap Take 20 mg by mouth every day. Take with 140 mg for a total dose of 160 mg       No current facility-administered medications for this encounter.          PCP:  Pcp Pt States None        Juan Carlos Jin Ass't

## 2019-12-05 ENCOUNTER — TELEPHONE (OUTPATIENT)
Dept: OPHTHALMOLOGY | Facility: MEDICAL CENTER | Age: 54
End: 2019-12-05

## 2020-01-22 NOTE — H&P
Internal Medicine Admitting History and Physical    Note Author: Sadiq Guerin M.D.       Name Robert Dorantes 1965   Age/Sex 53 y.o. male   MRN [id number]   Code Status Full Code      After 5PM or if no immediate response to page, please call for cross-coverage  Attending/Team: Dr. Gonda / NETO slaughter  See Patient List for primary contact information  Call [phone] to page    1st Call - Day Intern (R1):     2nd Call - Day Sr. Resident (R2/R3):   Dr. Mcnulty       Chief Complaint:  Altered mental status    HPI:  Robert Dorantes is a 53 y.o. Male with medical history of meth abuse presented to the emergency department with 9 days history of confusion accompanied by his mother, patient was seen in the emergency department on May 12 diagnosed with acute suppurative otitis media, was treated with cefdinir for 7 days, per mother patient has been complaining of of left side earache, sleeping more than usual, feeling tired and increasing confusion, over the past 2 days he had difficulty recognizing family members, speech difficulties mostly finding appropriate words.  Also this morning mom noticed right-sided facial droop.  Denies fever, chills, falls and neck stiffness.  He is not on any anticoagulation.  Also patient has a history of meth abuse, last dose was 2 days ago, per nephew he uses it by smoking, does not use IV.  In the ED CT scan of head showed 4 cm mass lesion in the left frontotemporal lesion, imaging features favor an abscess of for a solid lesion, extensive edema and mass-effect, 10 mm left-to-right midline shift, effacement of the left and enlargement of the right lateral ventricles, possibly developing hydrocephalus.  Neurosurgery consulted planning to do surgery tomorrow, ID consulted Dr. Dorado, started the patient on ceftriaxone, vancomycin and Flagyl.        Review of Systems   Unable to perform ROS: Mental acuity             Past Medical History:   Past Medical  History:   Diagnosis Date   • Smoker        Past Surgical History:  History reviewed. No pertinent surgical history.    Current Outpatient Medications:  Home Medications     Reviewed by Zuly Gilbert (Pharmacy Tech) on 05/21/19 at 2043  Med List Status: Complete   Medication Last Dose Status   acetaminophen (TYLENOL) 325 MG Tab 5/21/2019 Active   cefdinir (OMNICEF) 300 MG Cap 5/19/2019 Active                Medication Allergy/Sensitivities:  Allergies   Allergen Reactions   • Pcn [Penicillins]      Has tolerated cefdinir in May 2019   • Sulfa Drugs          Family History:  History reviewed. No pertinent family history.    Social History:  Social History     Social History   • Marital status:      Spouse name: N/A   • Number of children: N/A   • Years of education: N/A     Occupational History   • Not on file.     Social History Main Topics   • Smoking status: Current Every Day Smoker     Packs/day: 0.25     Types: Cigarettes     Last attempt to quit: 2/24/2013   • Smokeless tobacco: Never Used   • Alcohol use Yes      Comment: rare   • Drug use: Yes     Types: Methamphetamines   • Sexual activity: Not on file     Other Topics Concern   • Not on file     Social History Narrative   • No narrative on file     Living situation: Lives with his mother  PCP : Pcp Pt States None      Physical Exam     Vitals:    05/21/19 2131 05/21/19 2224 05/21/19 2240 05/21/19 2245   BP:       Pulse: 66 68 69 69   Resp:       Temp:       TempSrc:       SpO2: 97% 94% 92% 95%   Weight:       Height:         Body mass index is 31.87 kg/m².  /88   Pulse 69   Temp 36.3 °C (97.3 °F) (Oral)   Resp 16   Ht 1.829 m (6')   Wt 106.6 kg (235 lb 0.2 oz)   SpO2 95%   BMI 31.87 kg/m²   O2 therapy: Pulse Oximetry: 95 %, O2 (LPM): 0, O2 Delivery: None (Room Air)    Physical Exam   Constitutional: Vital signs are normal.   HENT:   Head: Normocephalic and atraumatic.   Eyes: Pupils are equal, round, and reactive to light.    Neck: Normal range of motion.   Cardiovascular: Normal rate.  Exam reveals no gallop and no friction rub.    No murmur heard.  Pulmonary/Chest: Effort normal. No respiratory distress. He has no wheezes.   Abdominal: Soft. He exhibits no distension. There is no tenderness.   Musculoskeletal: He exhibits no edema.   Lymphadenopathy:     He has no cervical adenopathy.   Neurological: He is alert. He has normal sensation and normal strength. A cranial nerve deficit (Right facial droop) is present.   Confused, oriented x1   Skin: No rash noted. No erythema.   Psychiatric:   Confused             Data Review       Old Records Request:   Completed  Current Records review/summary: Completed    Lab Data Review:  Recent Results (from the past 24 hour(s))   ACCU-CHEK GLUCOSE    Collection Time: 05/21/19  6:37 PM   Result Value Ref Range    Glucose - Accu-Ck 104 (H) 65 - 99 mg/dL   CBC WITH DIFFERENTIAL    Collection Time: 05/21/19  7:44 PM   Result Value Ref Range    WBC 8.2 4.8 - 10.8 K/uL    RBC 6.33 (H) 4.70 - 6.10 M/uL    Hemoglobin 14.7 14.0 - 18.0 g/dL    Hematocrit 48.2 42.0 - 52.0 %    MCV 76.1 (L) 81.4 - 97.8 fL    MCH 23.2 (L) 27.0 - 33.0 pg    MCHC 30.5 (L) 33.7 - 35.3 g/dL    RDW 50.4 (H) 35.9 - 50.0 fL    Platelet Count 350 164 - 446 K/uL    MPV 10.3 9.0 - 12.9 fL    Neutrophils-Polys 76.50 (H) 44.00 - 72.00 %    Lymphocytes 14.90 (L) 22.00 - 41.00 %    Monocytes 6.70 0.00 - 13.40 %    Eosinophils 0.50 0.00 - 6.90 %    Basophils 1.00 0.00 - 1.80 %    Immature Granulocytes 0.40 0.00 - 0.90 %    Nucleated RBC 0.00 /100 WBC    Neutrophils (Absolute) 6.25 1.82 - 7.42 K/uL    Lymphs (Absolute) 1.22 1.00 - 4.80 K/uL    Monos (Absolute) 0.55 0.00 - 0.85 K/uL    Eos (Absolute) 0.04 0.00 - 0.51 K/uL    Baso (Absolute) 0.08 0.00 - 0.12 K/uL    Immature Granulocytes (abs) 0.03 0.00 - 0.11 K/uL    NRBC (Absolute) 0.00 K/uL   PROTHROMBIN TIME (INR)    Collection Time: 05/21/19  7:44 PM   Result Value Ref Range    PT 15.4 (H)  12.0 - 14.6 sec    INR 1.21 (H) 0.87 - 1.13   APTT    Collection Time: 05/21/19  7:44 PM   Result Value Ref Range    APTT 33.7 24.7 - 36.0 sec   LACTIC ACID    Collection Time: 05/21/19  7:44 PM   Result Value Ref Range    Lactic Acid 1.8 0.5 - 2.0 mmol/L   Comp Metabolic Panel    Collection Time: 05/21/19  7:44 PM   Result Value Ref Range    Sodium 140 135 - 145 mmol/L    Potassium 3.7 3.6 - 5.5 mmol/L    Chloride 107 96 - 112 mmol/L    Co2 22 20 - 33 mmol/L    Anion Gap 11.0 0.0 - 11.9    Glucose 110 (H) 65 - 99 mg/dL    Bun 20 8 - 22 mg/dL    Creatinine 0.79 0.50 - 1.40 mg/dL    Calcium 9.0 8.5 - 10.5 mg/dL    AST(SGOT) 16 12 - 45 U/L    ALT(SGPT) 23 2 - 50 U/L    Alkaline Phosphatase 48 30 - 99 U/L    Total Bilirubin 0.9 0.1 - 1.5 mg/dL    Albumin 4.5 3.2 - 4.9 g/dL    Total Protein 7.6 6.0 - 8.2 g/dL    Globulin 3.1 1.9 - 3.5 g/dL    A-G Ratio 1.5 g/dL   TROPONIN    Collection Time: 05/21/19  7:44 PM   Result Value Ref Range    Troponin I <0.01 0.00 - 0.04 ng/mL   ESTIMATED GFR    Collection Time: 05/21/19  7:44 PM   Result Value Ref Range    GFR If African American >60 >60 mL/min/1.73 m 2    GFR If Non African American >60 >60 mL/min/1.73 m 2       Imaging/Procedures Review:    Independant Imaging Review: Completed  DX-CHEST-PORTABLE (1 VIEW)   Final Result         1. Borderline cardiomegaly. No focal consolidation or pleural effusions.   2. Hiatal hernia.      CT-CTA NECK WITH & W/O-POST PROCESSING   Final Result      Unremarkable CT angiogram of the neck. No high-grade stenosis, large vessel occlusion, aneurysm or dissection.      CT-CTA HEAD WITH & W/O-POST PROCESS   Final Result      1.  No large vessel occlusion, high-grade stenosis or aneurysm of the [place].   2.  A 4 cm mass lesion in the left frontotemporal region. Imaging features favor an abscess over a solid lesion. However, MRI is needed for definitive characterization.   3.  Extensive edema associated with the mass, resulting in 10 mm  left-to-right midline shift.   4.  Effacement of the left and enlargement of the right lateral ventricles may be due to developing hydrocephalus.      Findings were discussed with JOSE ALTAMIRANO on 5/21/2019 8:39 PM.      MR-STEALTH BRAIN WITH & W/O    (Results Pending)             EKG:   EKG Independant Review:orderd on admision.       Assessment/Plan     Smoker- (present on admission)   Assessment & Plan    -Patient confused, per mom smokes about 3 cigarettes/day.  -Consider education and counseling.     Methamphetamine abuse (HCC)- (present on admission)   Assessment & Plan    -History of meth abuse per nephew, smoking.  No history of IV use.  -Consider patient education and counseling.  -Consider further work-up including echo, HIV and hepatitis C screening.     Brain abscess- (present on admission)   Assessment & Plan    -Treated for acute suppurative otitis media 9 days ago.  -9 days history of increasing left ear pain, confusion, difficulty finding words.  -History of meth abuse.  -CT scan of head with contrast showed fluid collection most likely abscess over the left frontotemporal area with mass-effect, midline shift and evidence of developing hydrocephalus.  -Neurosurgery consulted, planning surgery tomorrow, n.p.o. at midnight, started Keppra for seizure prophylaxis and steroids.  -Infectious disease consulted, Dr. Dorado, started the patient on ceftriaxone, Flagyl and vancomycin.  -Admit to ICU, neurochecks every hour.         Anticipated Hospital stay:  >2 midnights        Quality Measures  Quality-Core Measures   Reviewed items::  Radiology images reviewed, Labs reviewed and Medications reviewed  Richards catheter::  No Richards  DVT prophylaxis pharmacological::  Contraindicated - High bleeding risk  DVT prophylaxis - mechanical:  SCDs  Ulcer Prophylaxis::  Not indicated  Antibiotics:  Treating active infection/contamination beyond 24 hours perioperative coverage    PCP: Pcp Pt States None     Alert and oriented to person, place and time

## 2022-02-28 NOTE — PROGRESS NOTES
"Patient became agitated at this time stating he wanted to leave \"right now.\" Confirmed with Dr. Savage patient is not currently able to leave AMA. SW in process of trying to set up HH. Patient tried to exit through emergency exit doors and security assisted this RN to get patient back to the bedroom when he became increasingly agitated and tried to push past security to the emergency exit doors. No pharmacological or physical restraints or PRN medications at this time, patient in room still irritable but not threatening and appears to be more calm than before. Patient verbalizes that he does not understand while he is still here despite education and reinforcement.   " No

## 2022-07-06 NOTE — OP REPORT
NEUROSURGERY OPERATIVE NOTE  DATE:  4:15 PM 2019    PATIENT NAME:  Robert Dorantes   1965 MRN [id number]      PROCEDURE:  1.  Left temporal craniotomy  2.  Resection left temporal intraparenchymal mass  3.  Dural augmentation (Dura repair, [employer])  4.  Stealth intraoperative navigation    Surgeon:  Chase Young MD, PhD  Assistant: CHRISTOS Lin    Anesthesia:  GETA    Diagnosis: Left temporal cystic mass    Indication: 54-year-old male who presented with altered mental status and aphasia.  Imaging demonstrated peripherally enhancing cystic mass.  He underwent drainage of the cyst with biopsy of the cyst wall several weeks ago.  Pathology and cultures were nondiagnostic.  Follow-up MRI demonstrates recurrence of the lesion.  Craniotomy with resection of the mass is planned.    Procedure:  The patient was identified in the holding area, and the surgery site marked, consent was obtained.  The patient was brought back to the operating room and intubated by anesthesia service.  2 grams Ancef was administered intravenously.  He was transferred to the operating room table in a supine manner and all pressure points well padded.  His head was secured to the [employer] pin head bear, a large gel roll placed under his left shoulder and his head turned near parallel to the floor with the left side up.  His head was registered to the TabSys intraoperative navigation system, good registration was verified.  The Stealth 8 system was utilized.  Tracer methodology was utilized.  His left frontal temporal parietal region was  prepped with hair clipping, chlorhexidine and betadine scrub, and Chloroprep.  Sterile drapes were applied including a layer of Ioban.      The previous incision was infiltrated with 0.5% Marcaine with epinephrine.  The incision was opened sharply dissection carried deeply in the skull using monopolar cautery.  The temporalis muscle was elevated off the underlying skull using  Work status updated per Dr Velda Heimlich monopolar cautery.  The myocutaneous flap was wrapped in a damp lap pad and retracted inferiorly using fishhook retractors.  The previous craniotomy plate was removed, and stored in normal saline bacitracin irrigation.  The dura inferior to the prior craniotomy was dissected from the overlying bone using a Penfield 3 dissector.  A craniotomy was created using a high-speed drill fit with the B1 footplate attachment.  This skull flap was also started normal saline bacitracin irrigation.  The underlying mass was identified using Stealth.  The dural augmentation was opened sharply using a 15 blade scalpel, and division carried inferiorly in a curved manner using Metzenbaum scissors.  The dura was retracted using 4-0 Nurolon suture.  The previous corticotomy was identified, there is a somewhat amorphous gray material in this space consistent with what was noted on his prior surgery.  The corticotomy was enlarged by cauterizing the abdoul and arachnoid using bipolar cautery and dividing these layer sharply with microscissors.  Dissection was carried a little bit deep, at which point a yellow Morphis avascular material was encountered.  Specimens were sent for stat Gram stain as well as frozen pathology.  A more solid component was noted along the superior wall of this cavity.  Dissection was carried on the outside of the cyst wall using bipolar cautery and gentle suction.  Specimen was sent for frozen pathology, frozen pathology demonstrated some foamy cells and atypia but was nondiagnostic.  Dissection was then carried circumferentially around the firm border consistent with the cyst wall.  Dissection was carried out under Stealth navigation guidance.  Gross total resection visually of the abnormal mass was encountered.  The more inferior and anterior temporal portions of the mass were very amorphous, tan-pinkish and had multiple apparent thrombosed vessels inside.  Multiple specimens were obtained and sent for permanent  pathology.  A sub-peel resection was then performed along the inferior and anterior temporal regions.  Hemostasis was obtained using bipolar cautery.  Good hemostasis was noted at the end of the procedure.  Interrogation of the surgical cavity using Stealth demonstrated good resection of the mass.  Surgical cavity was filled with Surgifoam, tamponade held for several minutes using a cottonoid kane, and the Surgifoam irrigated out using normal saline.  The surgical site was then lined with woven Surgicel.  The dura was reapproximated using 4-0 Nurolon suture incorporating a dural graft (Dura repair, [employer]).  A central tack up suture was placed, and the skull secured in position using the Agari mini plate system.  Both skull flaps were connected using a pre-existing plate.  The operative site was copiously irrigated with normal saline bacitracin irrigation.  The central tack up suture was tied.  A medium Hemovac drain was placed and brought out through a separate incision.  The drain was secured to the skin using 2-0 nylon suture.  The temporalis muscle was reapproximated using 2-0 Vicryl suture in an interrupted manner.  The dermis was reapproximated with 3-0 Vicryl suture in an inverted interrupted manner.  The skin was reapproximated with staples.  A silver containing dressing was applied.  Final counts were correct.    FINDINGS: Loosely amorphous mass left temporal lobe, visually apparent presence of thrombosed vessels consistent with high-grade glioma.  Visual gross total resection achieved.  SPECIMEN:  None  DRAINS: Medium Hemovac, subgaleal  EBL:  75 CC  COMPLICATIONS:  None apparent at end of procedure.

## 2022-10-10 NOTE — PROGRESS NOTES
Neurosurgery Progress Note    Subjective:  No acute events    Exam:  A&O to self, year, [facility].     4 PERRL, EOMI.   Follows all commands but requires frequent repeating  Tongue midline without fasciculation. No facial droop.   Strength: Bilateral shoulder shrug, bicep, tricep, deltoid,  5/5.                   Bilateral IP, DF, PF 5/5.   Sensation: Intact throughout.   Incision: healing well. Open to air    BP  Min: 105/68  Max: 111/82  Pulse  Av.6  Min: 63  Max: 68  Resp  Av.8  Min: 16  Max: 18  Temp  Av.9 °C (98.4 °F)  Min: 36.6 °C (97.9 °F)  Max: 37.1 °C (98.7 °F)  SpO2  Av.6 %  Min: 93 %  Max: 98 %    No Data Recorded    Recent Labs      06/15/19   0555  19   0325   WBC  14.6*  14.8*   RBC  5.35  5.38   HEMOGLOBIN  12.7*  13.0*   HEMATOCRIT  42.6  42.0   MCV  79.6*  78.1*   MCH  23.7*  24.2*   MCHC  29.8*  31.0*   RDW  60.2*  58.7*   PLATELETCT  245  238   MPV  10.5  10.2     Recent Labs      06/15/19   0555  19   0325   SODIUM  140  137   POTASSIUM  3.8  3.8   CHLORIDE  106  106   CO2  25  23   GLUCOSE  146*  166*   BUN  20  21   CREATININE  0.65  0.60   CALCIUM  9.0  9.1     Recent Labs      19   0635   INR  1.08           Intake/Output       06/15/19 07 - 19 0659 19 07 - 19 0659      0600-6005 1932-2670 Total 5175-3651 2117-7945 Total       Intake    Total Intake -- -- -- -- -- --       Output    Stool  --  -- --  --  -- --    Number of Times Stooled 1 x -- 1 x 1 x -- 1 x    Total Output -- -- -- -- -- --       Net I/O     -- -- -- -- -- --          No intake or output data in the 24 hours ending 19 1127         • [MAR Hold] diazePAM  5 mg Once PRN   • [MAR Hold] dexamethasone pf  10 mg Q6HRS   • [MAR Hold] insulin lispro  1-6 Units Q6HRS    And   • [MAR Hold] glucose  16 g Q15 MIN PRN    And   • [MAR Hold] dextrose 10% bolus  250 mL Q15 MIN PRN   • [MAR Hold] metroNIDAZOLE (FLAGYL) IV  500 mg Q8HRS   • [MAR Hold] cefepime  2 g Q8HRS   •  [MAR Hold] MD Alert...Vancomycin per Pharmacy   PHARMACY TO DOSE   • [MAR Hold] vancomycin  15 mg/kg Q8HR   • [MAR Hold] oxyCODONE immediate-release  5 mg Q6HRS PRN   • [MAR Hold] ferrous sulfate  325 mg QDAY with Breakfast   • [MAR Hold] acetaminophen  650 mg Q8HRS PRN   • [MAR Hold] polyethylene glycol/lytes  1 Packet BID   • [MAR Hold] docusate sodium  100 mg BID   • [MAR Hold] senna-docusate  1 Tab Nightly   • [MAR Hold] cloNIDine  0.1 mg Q4HRS PRN   • [MAR Hold] magnesium hydroxide  30 mL QDAY PRN   • [MAR Hold] senna-docusate  1 Tab Q24HRS PRN   • [MAR Hold] Respiratory Care per Protocol   Continuous RT   • [MAR Hold] Pharmacy Consult Request  1 Each PHARMACY TO DOSE   • [MAR Hold] MD ALERT...DO NOT ADMINISTER NSAIDS or ASPIRIN unless ORDERED By Neurosurgery  1 Each PRN   • [MAR Hold] ondansetron  4 mg Q4HRS PRN   • [MAR Hold] scopolamine  1 Patch Q72HRS PRN   • [MAR Hold] labetalol  10 mg Q HOUR PRN   • [MAR Hold] hydrALAZINE  10 mg Q HOUR PRN   • [MAR Hold] bisacodyl  10 mg Q24HRS PRN   • [MAR Hold] fleet  1 Each Once PRN       Assessment and Plan:  Hospital day #26 left temporal peripherally enhancing cystic lesion  POD #25 left temporal cranitomy, cyst evacuation/biopsy     Prophylactic anticoagulation: no         Start date/time: tbd. Surgery scheduled 6/17/19 at noon     Final pathology from [place] mild reactive gliosis  ID plan for PICC line and IV antibiotics x 6 weeks, through 7/6/2019        Plan:   Neuro exam stable, on steroids.   Continue decadron  Keep Na normalized. Na 140  OR  for repeat craniotomy to drain recurrent left temporal cyst  Risks including but not limited to infection recurrent cyst seizure brain injury speech deficits right sided weakness were discussed.  He expressed understanding of these issues and wishes to proceed. All questions were answered.      Anesthesia Volume In Cc: 2

## 2022-11-11 NOTE — PROGRESS NOTES
RN MOBILITY NOTE     Surgery patient?: Yes  Date of surgery:  5/22  Ambulated 50 ft on day of surgery? (N/A if today is not date of surgery): NA  Number of times ambulated 50 feet or greater today: 3  Patient has been up to chair, edge of bed or HOB 90 degrees for all meals?: Yes  Goal met? (goal is ambulating at least 50 feet 2 times on day shift, one time on night shift): Yes  If patient did not meet mobility goal, why?: NA   [Rash] : rash [Dry Skin] : dry skin [Negative] : Genitourinary [FreeTextEntry4] : some mild erythema, papular rash on hands; ears peeling

## (undated) DEVICE — SODIUM CHL IRRIGATION 0.9% 1000ML (12EA/CA)

## (undated) DEVICE — FORCEP BIPOLAR ISOCOOL 8.5 1.0MM TIP"

## (undated) DEVICE — GLOVESZ 8.5 BIOGEL PI MICRO - PF LF (50PR/BX)

## (undated) DEVICE — BATTERY QUICKDRIVE

## (undated) DEVICE — PATTIES SURG X-RAYCOTTONOID - 1 X 3 IN (200/CA)

## (undated) DEVICE — LINER CANISTER SUCTION DISPOSABLE (12EA/PK)

## (undated) DEVICE — BOVIE BLADE COATED &INSULATED - 25/PK

## (undated) DEVICE — SET EXTENSION WITH 2 PORTS (48EA/CA) ***PART #2C8610 IS A SUBSTITUTE*****

## (undated) DEVICE — TRAY MULTI-LUMEN 7FR PRESSURE W/MAX BARRIER AND BIOPATCH - (5/CA)

## (undated) DEVICE — GLOVE BIOGEL SZ 6.5 SURGICAL PF LTX (50PR/BX 4BX/CA)

## (undated) DEVICE — SPHERE NAVIGATION STEALTH (5EA/TY 12TY/PK)

## (undated) DEVICE — TOWELS CLOTH SURGICAL - (4/PK 20PK/CA)

## (undated) DEVICE — CANISTER SUCTION 3000ML MECHANICAL FILTER AUTO SHUTOFF MEDI-VAC NONSTERILE LF DISP  (40EA/CA)

## (undated) DEVICE — SUTURE 4-0 NUROLON CR/8 TF - (12/BX) ETHICON

## (undated) DEVICE — TUBE, CULTURE AEROBIC

## (undated) DEVICE — LACTATED RINGERS INJ. 500 ML - (24EA/CA)

## (undated) DEVICE — SUTURE 2-0 VICRYL PLUS CT-1 - 8 X 18 INCH(12/BX)

## (undated) DEVICE — DRESSING TRANSPARENT FILM TEGADERM 2.375 X 2.75"  (100EA/BX)"

## (undated) DEVICE — DRAPE MEDI-SLUSH STERILE  - (40/CA)

## (undated) DEVICE — DRESSING XEROFORM 1X8 - (50/BX 4BX/CA)

## (undated) DEVICE — PATTIES SURG X-RAYCOTTONOID - 1/2 X 3 IN (200/CA)

## (undated) DEVICE — DRAPE IOBAN II INCISE 23X17 - (10EA/BX 4BX/CA)

## (undated) DEVICE — BONE WAX (12PK/BX)

## (undated) DEVICE — PACK CRANI - (1EA/CA)

## (undated) DEVICE — KIT EVACUATER 3 SPRING PVC LF 1/8 DRAIN SIZE (10EA/CA)"

## (undated) DEVICE — BLADE CLIPPER FITS 2501 CLIPPER (BLUE)  (20EA/CA)

## (undated) DEVICE — ELECTRODE 850 FOAM ADHESIVE - HYDROGEL RADIOTRNSPRNT (50/PK)

## (undated) DEVICE — BLANKET WARMING FULL BODY - (10/CA)

## (undated) DEVICE — GLOVE SZ 8 BIOGEL PI MICRO - PF LF (50PR/BX)

## (undated) DEVICE — PIN HEAD MAYFIELD DISP. (3EA/PK 12PK/BX)

## (undated) DEVICE — GLOVE BIOGEL ECLIPSE PF LATEX SIZE 8.0  (50PR/BX)

## (undated) DEVICE — TRANSDUCER ADULT DISP. SINGLE BONDED STERILE - (20EA/CA)

## (undated) DEVICE — RESERVOIR SUCTION 100 CC - SILICONE (20EA/CA)

## (undated) DEVICE — SYRINGE ASEPTO - (50EA/CA

## (undated) DEVICE — CORDS BIPOLAR COAGULATION - 12FT STERILE DISP. (10EA/BX)

## (undated) DEVICE — DISSECT TOOL MIDAS F1/8TA15

## (undated) DEVICE — GLOVE PROTEXIS W/NEU-THERA SZ 8.5 (50PR/BX)

## (undated) DEVICE — MASK ANESTHESIA ADULT  - (100/CA)

## (undated) DEVICE — GLOVE BIOGEL INDICATOR SZ 6.5 SURGICAL PF LTX - (50PR/BX 4BX/CA)

## (undated) DEVICE — BANDAGE ROLL STERILE BULKEE 4.5 IN X 4 YD (100EA/CA)

## (undated) DEVICE — SYRINGE 20 ML LS (48/BX 4BX/CA)

## (undated) DEVICE — SPHERE NAVIGATION STEALTH (4EA/PK12PK/BX)

## (undated) DEVICE — PERFORATER DISP TIP DGR-0

## (undated) DEVICE — GOWN SURGEONS X-LARGE - DISP. (30/CA)

## (undated) DEVICE — NEPTUNE 4 PORT MANIFOLD - (20/PK)

## (undated) DEVICE — DRESSING NON-ADHERING 8 X 3 - (50/BX)

## (undated) DEVICE — STAY ELASTIC BLUNT RETRACTOR 12MM (8EA/PK)

## (undated) DEVICE — DRAIN J-VAC 10MM FLAT - (10/CA)

## (undated) DEVICE — BALL COTTON NEURO 3/4 INCH - (5/PK50PK/CA)

## (undated) DEVICE — PAD LAP STERILE 18 X 18 - (5/PK 40PK/CA)

## (undated) DEVICE — HEMOSTAT SURG ABSORBABLE - 4 X 8 IN SURGICEL (24EA/CA)

## (undated) DEVICE — TUBING C&T SET FLYING LEADS DRAIN TUBING (10EA/BX)

## (undated) DEVICE — GLOVE BIOGEL PI ORTHO SZ 8.5 PF LF (40/BX)

## (undated) DEVICE — MIDAS LUBRICATOR DIFFUSER PACK (4EA/CA)

## (undated) DEVICE — BALL COTTON NEURO 1 INCH - (5/PK50PK/CA)

## (undated) DEVICE — SPONGE GAUZE STER 4X4 8-PL - (2/PK 50PK/BX 12BX/CS)

## (undated) DEVICE — NEEDLE NON SAFETY HYPO 22 GA X 1 1/2 IN (100/BX)

## (undated) DEVICE — GOWN SURGEONS LARGE - (32/CA)

## (undated) DEVICE — KIT GEL-FLOW NT ABORBABLE GELATIN (6EA/BX)

## (undated) DEVICE — SET LEADWIRE 5 LEAD BEDSIDE DISPOSABLE ECG (1SET OF 5/EA)

## (undated) DEVICE — PROTECTOR ULNA NERVE - (36PR/CA)

## (undated) DEVICE — NEEDLE, DISP 16G X 1-1/2 BLUNT

## (undated) DEVICE — SLEEVE, VASO, THIGH, MED

## (undated) DEVICE — SET TUBING SONOPET (5EA/PK)

## (undated) DEVICE — SCALP CLIP RANEY 20-1037 (10EA/PK 20PK/CA)

## (undated) DEVICE — PATTIES SURG NEURO X-RAY 1/2X1/2 (10EA/PK 20PK/CS)

## (undated) DEVICE — CONTAINER SPECIMEN BAG OR - STERILE 4 OZ W/LID (100EA/CA)

## (undated) DEVICE — CHLORAPREP 26 ML APPLICATOR - ORANGE TINT(25/CA)

## (undated) DEVICE — GLOVE BIOGEL PI INDICATOR SZ 8.0 SURGICAL PF LF -(50/BX 4BX/CA)

## (undated) DEVICE — DRAPE STRLE REG TOWEL 18X24 - (10/BX 4BX/CA)"

## (undated) DEVICE — GLOVE BIOGEL INDICATOR SZ 7SURGICAL PF LTX - (50/BX 4BX/CA)

## (undated) DEVICE — TUBING CLEARLINK DUO-VENT - C-FLO (48EA/CA)

## (undated) DEVICE — STAPLER SKIN DISP - (6/BX 10BX/CA) VISISTAT

## (undated) DEVICE — CATHETER IV 14 GA X 2 ---SURG.& SDS ONLY---(200EA/CA)

## (undated) DEVICE — SWAB ANAEROBIC SPEC.COLLECTOR - (25/PK 4PK/CA 100EA/CA)

## (undated) DEVICE — GOWN WARMING STANDARD FLEX - (30/CA)

## (undated) DEVICE — SODIUM CHL. INJ. 0.9% 500ML (24EA/CA 50CA/PF)

## (undated) DEVICE — SUCTION INSTRUMENT YANKAUER BULBOUS TIP W/O VENT (50EA/CA)

## (undated) DEVICE — DISSECT TOOL MIDAS F2/8TA23

## (undated) DEVICE — SYRINGE SAFETY 10 ML 18 GA X 1 1/2 BLUNT LL (100/BX 4BX/CA)

## (undated) DEVICE — SURGIFOAM (SIZE 100) - (6EA/CA)

## (undated) DEVICE — SENSOR SPO2 NEO LNCS ADHESIVE (20/BX) SEE USER NOTES

## (undated) DEVICE — Device

## (undated) DEVICE — SOD. CHL. INJ. 0.9% 1000 ML - (14EA/CA 60CA/PF)

## (undated) DEVICE — ELECTRODE DUAL RETURN W/ CORD - (50/PK)

## (undated) DEVICE — KIT RADIAL ARTERY 20GA W/MAX BARRIER AND BIOPATCH  (5EA/CA) #10740 IS FOR THE SET RADIAL ARTERIAL

## (undated) DEVICE — SUTURE GENERAL

## (undated) DEVICE — LACTATED RINGERS INJ 1000 ML - (14EA/CA 60CA/PF)

## (undated) DEVICE — COVER PROBE STERILE CONE (12EA/CA)

## (undated) DEVICE — TRAY SRGPRP PVP IOD WT PRP - (20/CA)

## (undated) DEVICE — SUTURE ETHILON 2-0 FSLX 30 (36PK/BX)"

## (undated) DEVICE — KIT ANESTHESIA W/CIRCUIT & 3/LT BAG W/FILTER (20EA/CA)

## (undated) DEVICE — KIT ROOM DECONTAMINATION

## (undated) DEVICE — SUTURE 2-0 ETHILON FS - (36/BX) 18 INCH

## (undated) DEVICE — SUTURE 3-0 VICRYL PLUS SH - 8X 18 INCH (12/BX)